# Patient Record
Sex: MALE | Race: WHITE | Employment: UNEMPLOYED | URBAN - METROPOLITAN AREA
[De-identification: names, ages, dates, MRNs, and addresses within clinical notes are randomized per-mention and may not be internally consistent; named-entity substitution may affect disease eponyms.]

---

## 2017-03-03 DIAGNOSIS — E13.8 OTHER SPECIFIED DIABETES MELLITUS WITH UNSPECIFIED COMPLICATIONS (HCC): ICD-10-CM

## 2017-03-03 DIAGNOSIS — E11.42 DM TYPE 2 WITH DIABETIC PERIPHERAL NEUROPATHY (HCC): ICD-10-CM

## 2017-03-03 DIAGNOSIS — I10 ESSENTIAL HYPERTENSION: ICD-10-CM

## 2017-03-03 RX ORDER — FUROSEMIDE 40 MG/1
TABLET ORAL
Qty: 60 TABLET | Refills: 11 | Status: SHIPPED | OUTPATIENT
Start: 2017-03-03 | End: 2017-11-14 | Stop reason: SDUPTHER

## 2017-03-03 RX ORDER — CARVEDILOL 25 MG/1
TABLET ORAL
Qty: 60 TABLET | Refills: 11 | Status: SHIPPED | OUTPATIENT
Start: 2017-03-03 | End: 2017-11-14 | Stop reason: SDUPTHER

## 2017-03-03 RX ORDER — GLYBURIDE 5 MG/1
TABLET ORAL
Qty: 90 TABLET | Refills: 11 | Status: SHIPPED | OUTPATIENT
Start: 2017-03-03 | End: 2017-11-14 | Stop reason: SDUPTHER

## 2017-08-04 ENCOUNTER — TELEPHONE (OUTPATIENT)
Dept: INTERNAL MEDICINE CLINIC | Age: 52
End: 2017-08-04

## 2017-08-29 ENCOUNTER — TELEPHONE (OUTPATIENT)
Dept: INTERNAL MEDICINE CLINIC | Age: 52
End: 2017-08-29

## 2017-08-29 DIAGNOSIS — Z79.4 DIABETES MELLITUS DUE TO UNDERLYING CONDITION WITH COMPLICATION, WITH LONG-TERM CURRENT USE OF INSULIN (HCC): Primary | ICD-10-CM

## 2017-08-29 DIAGNOSIS — E08.8 DIABETES MELLITUS DUE TO UNDERLYING CONDITION WITH COMPLICATION, WITH LONG-TERM CURRENT USE OF INSULIN (HCC): Primary | ICD-10-CM

## 2017-08-30 RX ORDER — DIGOXIN 125 MCG
TABLET ORAL
Qty: 30 TABLET | Refills: 6 | Status: SHIPPED | OUTPATIENT
Start: 2017-08-30 | End: 2017-11-14 | Stop reason: SDUPTHER

## 2017-08-30 RX ORDER — ATORVASTATIN CALCIUM 20 MG/1
TABLET, FILM COATED ORAL
Qty: 30 TABLET | Refills: 6 | Status: SHIPPED | OUTPATIENT
Start: 2017-08-30 | End: 2017-11-14 | Stop reason: SDUPTHER

## 2017-08-30 RX ORDER — LOSARTAN POTASSIUM 50 MG/1
TABLET ORAL
Qty: 30 TABLET | Refills: 6 | Status: SHIPPED | OUTPATIENT
Start: 2017-08-30 | End: 2017-11-14 | Stop reason: SDUPTHER

## 2017-11-14 ENCOUNTER — OFFICE VISIT (OUTPATIENT)
Dept: INTERNAL MEDICINE CLINIC | Age: 52
End: 2017-11-14
Payer: MEDICAID

## 2017-11-14 ENCOUNTER — TELEPHONE (OUTPATIENT)
Dept: INTERNAL MEDICINE CLINIC | Age: 52
End: 2017-11-14

## 2017-11-14 ENCOUNTER — HOSPITAL ENCOUNTER (OUTPATIENT)
Age: 52
Setting detail: SPECIMEN
Discharge: HOME | End: 2017-11-14
Payer: MEDICAID

## 2017-11-14 VITALS
SYSTOLIC BLOOD PRESSURE: 168 MMHG | HEIGHT: 71 IN | WEIGHT: 223.11 LBS | BODY MASS INDEX: 31.23 KG/M2 | DIASTOLIC BLOOD PRESSURE: 102 MMHG

## 2017-11-14 DIAGNOSIS — Z79.4 DIABETES MELLITUS DUE TO UNDERLYING CONDITION WITH COMPLICATION, WITH LONG-TERM CURRENT USE OF INSULIN (HCC): Primary | ICD-10-CM

## 2017-11-14 DIAGNOSIS — E11.42 DM TYPE 2 WITH DIABETIC PERIPHERAL NEUROPATHY (HCC): Primary | ICD-10-CM

## 2017-11-14 DIAGNOSIS — E08.8 DIABETES MELLITUS DUE TO UNDERLYING CONDITION WITH COMPLICATION, WITH LONG-TERM CURRENT USE OF INSULIN (HCC): Primary | ICD-10-CM

## 2017-11-14 DIAGNOSIS — E11.8 TYPE 2 DIABETES MELLITUS WITH COMPLICATION, UNSPECIFIED LONG TERM INSULIN USE STATUS: ICD-10-CM

## 2017-11-14 DIAGNOSIS — E08.8 DIABETES MELLITUS DUE TO UNDERLYING CONDITION WITH COMPLICATION, WITH LONG-TERM CURRENT USE OF INSULIN (HCC): ICD-10-CM

## 2017-11-14 DIAGNOSIS — Z12.11 SCREEN FOR COLON CANCER: ICD-10-CM

## 2017-11-14 DIAGNOSIS — E11.42 DM TYPE 2 WITH DIABETIC PERIPHERAL NEUROPATHY (HCC): ICD-10-CM

## 2017-11-14 DIAGNOSIS — Z79.4 DIABETES MELLITUS DUE TO UNDERLYING CONDITION WITH COMPLICATION, WITH LONG-TERM CURRENT USE OF INSULIN (HCC): ICD-10-CM

## 2017-11-14 DIAGNOSIS — I10 ESSENTIAL HYPERTENSION: ICD-10-CM

## 2017-11-14 LAB
ANION GAP SERPL CALCULATED.3IONS-SCNC: 11 MMOL/L (ref 9–17)
BUN BLDV-MCNC: 31 MG/DL (ref 6–20)
BUN/CREAT BLD: ABNORMAL (ref 9–20)
CALCIUM SERPL-MCNC: 9.1 MG/DL (ref 8.6–10.4)
CHLORIDE BLD-SCNC: 96 MMOL/L (ref 98–107)
CHOLESTEROL/HDL RATIO: 12
CHOLESTEROL: 419 MG/DL
CO2: 27 MMOL/L (ref 20–31)
CREAT SERPL-MCNC: 1.8 MG/DL (ref 0.7–1.2)
CREATININE URINE: 60.8 MG/DL (ref 39–259)
GFR AFRICAN AMERICAN: 48 ML/MIN
GFR NON-AFRICAN AMERICAN: 40 ML/MIN
GFR SERPL CREATININE-BSD FRML MDRD: ABNORMAL ML/MIN/{1.73_M2}
GFR SERPL CREATININE-BSD FRML MDRD: ABNORMAL ML/MIN/{1.73_M2}
GLUCOSE BLD-MCNC: 413 MG/DL (ref 70–99)
HBA1C MFR BLD: 14 %
HDLC SERPL-MCNC: 35 MG/DL
LDL CHOLESTEROL DIRECT: 300 MG/DL
LDL CHOLESTEROL: ABNORMAL MG/DL (ref 0–130)
MICROALBUMIN/CREAT 24H UR: 5774 MG/L
MICROALBUMIN/CREAT UR-RTO: 9497 MCG/MG CREAT
POTASSIUM SERPL-SCNC: 4.6 MMOL/L (ref 3.7–5.3)
SODIUM BLD-SCNC: 134 MMOL/L (ref 135–144)
TRIGL SERPL-MCNC: 408 MG/DL
VLDLC SERPL CALC-MCNC: ABNORMAL MG/DL (ref 1–30)

## 2017-11-14 PROCEDURE — G8484 FLU IMMUNIZE NO ADMIN: HCPCS | Performed by: INTERNAL MEDICINE

## 2017-11-14 PROCEDURE — 99214 OFFICE O/P EST MOD 30 MIN: CPT | Performed by: INTERNAL MEDICINE

## 2017-11-14 PROCEDURE — G8427 DOCREV CUR MEDS BY ELIG CLIN: HCPCS | Performed by: INTERNAL MEDICINE

## 2017-11-14 PROCEDURE — 1036F TOBACCO NON-USER: CPT | Performed by: INTERNAL MEDICINE

## 2017-11-14 PROCEDURE — 83036 HEMOGLOBIN GLYCOSYLATED A1C: CPT | Performed by: INTERNAL MEDICINE

## 2017-11-14 PROCEDURE — 3046F HEMOGLOBIN A1C LEVEL >9.0%: CPT | Performed by: INTERNAL MEDICINE

## 2017-11-14 PROCEDURE — G8417 CALC BMI ABV UP PARAM F/U: HCPCS | Performed by: INTERNAL MEDICINE

## 2017-11-14 PROCEDURE — 3017F COLORECTAL CA SCREEN DOC REV: CPT | Performed by: INTERNAL MEDICINE

## 2017-11-14 RX ORDER — ATORVASTATIN CALCIUM 20 MG/1
TABLET, FILM COATED ORAL
Qty: 30 TABLET | Refills: 11 | Status: SHIPPED | OUTPATIENT
Start: 2017-11-14 | End: 2017-11-14 | Stop reason: SDUPTHER

## 2017-11-14 RX ORDER — FUROSEMIDE 40 MG/1
TABLET ORAL
Qty: 60 TABLET | Refills: 11 | Status: SHIPPED | OUTPATIENT
Start: 2017-11-14 | End: 2018-01-01 | Stop reason: DRUGHIGH

## 2017-11-14 RX ORDER — CARVEDILOL 25 MG/1
TABLET ORAL
Qty: 60 TABLET | Refills: 11 | Status: ON HOLD | OUTPATIENT
Start: 2017-11-14 | End: 2018-01-01 | Stop reason: HOSPADM

## 2017-11-14 RX ORDER — GLYBURIDE 5 MG/1
TABLET ORAL
Qty: 90 TABLET | Refills: 11 | Status: SHIPPED | OUTPATIENT
Start: 2017-11-14 | End: 2018-01-01 | Stop reason: SDUPTHER

## 2017-11-14 RX ORDER — LANCETS 30 GAUGE
EACH MISCELLANEOUS
Qty: 100 EACH | Refills: 3 | Status: SHIPPED | OUTPATIENT
Start: 2017-11-14 | End: 2018-01-01 | Stop reason: SDUPTHER

## 2017-11-14 RX ORDER — ATORVASTATIN CALCIUM 20 MG/1
TABLET, FILM COATED ORAL
Qty: 30 TABLET | Refills: 11 | Status: SHIPPED | OUTPATIENT
Start: 2017-11-14 | End: 2018-01-01 | Stop reason: SDUPTHER

## 2017-11-14 RX ORDER — LOSARTAN POTASSIUM 50 MG/1
TABLET ORAL
Qty: 30 TABLET | Refills: 11 | Status: SHIPPED | OUTPATIENT
Start: 2017-11-14 | End: 2018-01-01 | Stop reason: SDUPTHER

## 2017-11-14 RX ORDER — DIGOXIN 125 MCG
TABLET ORAL
Qty: 30 TABLET | Refills: 11 | Status: SHIPPED | OUTPATIENT
Start: 2017-11-14 | End: 2018-01-01 | Stop reason: SDUPTHER

## 2017-11-14 ASSESSMENT — PATIENT HEALTH QUESTIONNAIRE - PHQ9
SUM OF ALL RESPONSES TO PHQ QUESTIONS 1-9: 0
1. LITTLE INTEREST OR PLEASURE IN DOING THINGS: 0
SUM OF ALL RESPONSES TO PHQ9 QUESTIONS 1 & 2: 0
2. FEELING DOWN, DEPRESSED OR HOPELESS: 0

## 2017-11-14 NOTE — TELEPHONE ENCOUNTER
Patient was seen today, was told to call back with name of glucometer so we can call in the rest of his supplies. He is using a true results machine. Needs to have test strips and lancets called to [facility] on [address]. I believe he is testing 3x daily since he is on insulin.

## 2017-11-15 RX ORDER — BLOOD-GLUCOSE METER
1 EACH MISCELLANEOUS 3 TIMES DAILY
Qty: 1 DEVICE | Refills: 0 | Status: ON HOLD | OUTPATIENT
Start: 2017-11-15 | End: 2019-01-01

## 2017-11-16 ENCOUNTER — HOSPITAL ENCOUNTER (EMERGENCY)
Age: 52
Discharge: HOME | End: 2017-11-17
Attending: EMERGENCY MEDICINE
Payer: MEDICAID

## 2017-11-16 VITALS
DIASTOLIC BLOOD PRESSURE: 82 MMHG | OXYGEN SATURATION: 96 % | WEIGHT: 228 LBS | HEIGHT: 72 IN | RESPIRATION RATE: 16 BRPM | BODY MASS INDEX: 30.88 KG/M2 | HEART RATE: 105 BPM | TEMPERATURE: 98.7 F | SYSTOLIC BLOOD PRESSURE: 169 MMHG

## 2017-11-16 DIAGNOSIS — R20.2 PARESTHESIA: ICD-10-CM

## 2017-11-16 DIAGNOSIS — G62.9 PERIPHERAL POLYNEUROPATHY: Primary | ICD-10-CM

## 2017-11-16 DIAGNOSIS — R26.9 GAIT ABNORMALITY: ICD-10-CM

## 2017-11-16 LAB
ABSOLUTE EOS #: 0.3 K/UL (ref 0–0.4)
ABSOLUTE IMMATURE GRANULOCYTE: ABNORMAL K/UL (ref 0–0.3)
ABSOLUTE LYMPH #: 1.7 K/UL (ref 1–4.8)
ABSOLUTE MONO #: 0.6 K/UL (ref 0.1–1.3)
ANION GAP SERPL CALCULATED.3IONS-SCNC: 12 MMOL/L (ref 9–17)
BASOPHILS # BLD: 1 %
BASOPHILS ABSOLUTE: 0.1 K/UL (ref 0–0.2)
BUN BLDV-MCNC: 34 MG/DL (ref 6–20)
BUN/CREAT BLD: ABNORMAL (ref 9–20)
CALCIUM SERPL-MCNC: 8.7 MG/DL (ref 8.6–10.4)
CHLORIDE BLD-SCNC: 98 MMOL/L (ref 98–107)
CO2: 26 MMOL/L (ref 20–31)
CREAT SERPL-MCNC: 2 MG/DL (ref 0.7–1.2)
DIFFERENTIAL TYPE: ABNORMAL
EOSINOPHILS RELATIVE PERCENT: 5 %
GFR AFRICAN AMERICAN: 43 ML/MIN
GFR NON-AFRICAN AMERICAN: 35 ML/MIN
GFR SERPL CREATININE-BSD FRML MDRD: ABNORMAL ML/MIN/{1.73_M2}
GFR SERPL CREATININE-BSD FRML MDRD: ABNORMAL ML/MIN/{1.73_M2}
GLUCOSE BLD-MCNC: 221 MG/DL (ref 70–99)
HCT VFR BLD CALC: 37.8 % (ref 41–53)
HEMOGLOBIN: 13.3 G/DL (ref 13.5–17.5)
IMMATURE GRANULOCYTES: ABNORMAL %
LYMPHOCYTES # BLD: 27 %
MAGNESIUM: 2 MG/DL (ref 1.6–2.6)
MCH RBC QN AUTO: 28.3 PG (ref 26–34)
MCHC RBC AUTO-ENTMCNC: 35.3 G/DL (ref 31–37)
MCV RBC AUTO: 80.2 FL (ref 80–100)
MONOCYTES # BLD: 9 %
PDW BLD-RTO: 13.7 % (ref 11.5–14.9)
PHOSPHORUS: 4.7 MG/DL (ref 2.5–4.5)
PLATELET # BLD: 268 K/UL (ref 150–450)
PLATELET ESTIMATE: ABNORMAL
PMV BLD AUTO: 8.3 FL (ref 6–12)
POTASSIUM SERPL-SCNC: 3.8 MMOL/L (ref 3.7–5.3)
RBC # BLD: 4.71 M/UL (ref 4.5–5.9)
RBC # BLD: ABNORMAL 10*6/UL
SEG NEUTROPHILS: 58 %
SEGMENTED NEUTROPHILS ABSOLUTE COUNT: 3.7 K/UL (ref 1.3–9.1)
SODIUM BLD-SCNC: 136 MMOL/L (ref 135–144)
WBC # BLD: 6.4 K/UL (ref 3.5–11)
WBC # BLD: ABNORMAL 10*3/UL

## 2017-11-16 PROCEDURE — 84100 ASSAY OF PHOSPHORUS: CPT

## 2017-11-16 PROCEDURE — 93971 EXTREMITY STUDY: CPT

## 2017-11-16 PROCEDURE — 2580000003 HC RX 258: Performed by: EMERGENCY MEDICINE

## 2017-11-16 PROCEDURE — 99284 EMERGENCY DEPT VISIT MOD MDM: CPT

## 2017-11-16 PROCEDURE — 83735 ASSAY OF MAGNESIUM: CPT

## 2017-11-16 PROCEDURE — 85025 COMPLETE CBC W/AUTO DIFF WBC: CPT

## 2017-11-16 PROCEDURE — 80048 BASIC METABOLIC PNL TOTAL CA: CPT

## 2017-11-16 PROCEDURE — 36415 COLL VENOUS BLD VENIPUNCTURE: CPT

## 2017-11-16 RX ORDER — 0.9 % SODIUM CHLORIDE 0.9 %
1000 INTRAVENOUS SOLUTION INTRAVENOUS ONCE
Status: COMPLETED | OUTPATIENT
Start: 2017-11-16 | End: 2017-11-16

## 2017-11-16 RX ADMIN — SODIUM CHLORIDE 1000 ML: 9 INJECTION, SOLUTION INTRAVENOUS at 22:16

## 2017-11-17 ENCOUNTER — TELEPHONE (OUTPATIENT)
Dept: INTERNAL MEDICINE CLINIC | Age: 52
End: 2017-11-17

## 2017-11-17 ASSESSMENT — ENCOUNTER SYMPTOMS
VOMITING: 0
COUGH: 1
RHINORRHEA: 0
EYE PAIN: 0
NAUSEA: 0
ABDOMINAL PAIN: 0
SHORTNESS OF BREATH: 0

## 2017-11-17 NOTE — ED NOTES
Pt presented to the ED c/o of left leg numbness that started 1930 11/16/17. Pt states he was sitting in a recliner watching TV when he felt his left leg go numb. Pt denies pain, denies tingling. Pt has Hx of neuropathy in both legs. Pt states he started new medication on 11/15/17, and states that he had a non-productive coughing fit last night that made him SOB. Pt states he checked his BS at 2000 11/16/17 and it was 330. Pt alert and oriented, PWD. Pt was able to identify when legs were being touch, able to stand and ambulate with unsteady gait.      Jacklyn Payne RN  11/16/17 2115

## 2017-11-17 NOTE — ED PROVIDER NOTES
[address]  eMERGENCY dEPARTMENT eNCOUnter   Attending Attestation     Pt Name: Rolando Morillo  MRN: [id number]  [place] 1965  Date of evaluation: 11/16/17       Rolando Morillo is a 47 y.o. male who presents with Numbness (left leg)      MDM: Left leg numbness below his knee that started today. No falls or trauma. Patient has been ambulating but states his left leg feels \"heavy. \"  He has no neurologic deficits on exam.  He ambulated into the department. Remainder of neurologic exam is unremarkable. He doesn't history of diabetes. On exam his left calf is slightly more swollen than his right however his right is mildly swollen as well. Plan to get basic blood work, Doppler ultrasound of left leg and reassess. Vitals:   Vitals:    11/16/17 2053   BP: (!) 176/93   Pulse: 111   Resp: 16   Temp: 98.5 °F (36.9 °C)   TempSrc: Oral   SpO2: 96%   Weight: 228 lb (103.4 kg)   Height: 6' (1.829 m)         I personally evaluated and examined the patient in conjunction with the resident and agree with the assessment, treatment plan, and disposition of the patient as recorded by the resident. I performed a history and physical examination of the patient and discussed management with the resident. I reviewed the residents note and agree with the documented findings and plan of care. Any areas of disagreement are noted on the chart. I was personally present for the key portions of any procedures. I have documented in the chart those procedures where I was not present during the key portions. I have personally reviewed all images and agree with the resident's interpretation. I have reviewed the emergency nurses triage note. I agree with the chief complaint, past medical history, past surgical history, allergies, medications, social and family history as documented unless otherwise noted.     Bianka Lunsford DO  Attending Emergency Physician            Bianka Lunsford DO  11/16/17 6448

## 2017-11-17 NOTE — ED PROVIDER NOTES
No    Drug use: No    Sexual activity: Yes     Partners: Female     Other Topics Concern    Not on file     Social History Narrative    No narrative on file       Family History   Problem Relation Age of Onset    Diabetes Mother     Heart Disease Mother     Diabetes Brother     Heart Disease Father        Allergies:  Metformin and related    Home Medications:  Prior to Admission medications    Medication Sig Start Date End Date Taking? Authorizing Provider   Blood Glucose Monitoring Suppl (TRUE METRIX AIR GLUCOSE METER) JONAH 1 Device by Does not apply route 3 times daily 11/15/17  Yes Hermes Cameron MD   digoxin (LANOXIN) 125 MCG tablet TAKE ONE TABLET BY MOUTH ONCE DAILY 11/14/17  Yes Hermes Cameron MD   losartan (COZAAR) 50 MG tablet TAKE ONE TABLET BY MOUTH ONCE DAILY 11/14/17  Yes Hermes Cameron MD   glyBURIDE (DIABETA) 5 MG tablet TAKE TWO TABLETS BY MOUTH IN THE MORNING AND ONE TABLET IN THE EVENING 11/14/17  Yes Hermes Cameron MD   furosemide (LASIX) 40 MG tablet TAKE ONE TABLET BY MOUTH TWICE DAILY 11/14/17  Yes Hermes Cameron MD   carvedilol (COREG) 25 MG tablet TAKE ONE TABLET BY MOUTH TWICE DAILY WITH MEALS 11/14/17  Yes Hermes Cameron MD   Insulin Pen Needle (B-D ULTRAFINE III SHORT PEN) 31G X 8 MM MISC Inject 1 each into the skin daily May substitute with any brand. 11/14/17  Yes Hermes Cameron MD   insulin glargine (TOUJEO SOLOSTAR) 300 UNIT/ML injection pen Inject 20 Units into the skin daily 11/14/17  Yes Hermes Cameron MD   atorvastatin (LIPITOR) 20 MG tablet TAKE ONE TABLET BY MOUTH ONCE DAILY 11/14/17  Yes Hermes Cameron MD   Lancets MISC Use three times daily 11/14/17  Yes Hermes Cameron MD   glucose blood VI test strips (ASCENSIA AUTODISC VI;ONE TOUCH ULTRA TEST VI) strip Use tid with associated glucose meter.  Pt is using true result meter 11/14/17  Yes Hermes Cameron MD   ONE TOUCH ULTRA TEST strip USE ONE NEW STRIP TO CHECK GLUCOSE 4 TIMES DAILY AS NEEDED 8/30/17  Yes Keiko Patel MD   aspirin 81 MG EC tablet Take 81 mg by mouth daily. Yes Historical Provider, MD       REVIEW OF SYSTEMS    (2-9 systems for level 4, 10 or more for level 5)      Review of Systems   Constitutional: Negative for chills, fatigue and fever. HENT: Negative for congestion and rhinorrhea. Eyes: Negative for pain and visual disturbance. Respiratory: Positive for cough. Negative for shortness of breath. Cardiovascular: Negative for chest pain and palpitations. Gastrointestinal: Negative for abdominal pain, nausea and vomiting. Genitourinary: Negative for difficulty urinating and dysuria. Musculoskeletal: Positive for gait problem. Negative for neck pain. Skin: Negative for rash and wound. Neurological: Positive for numbness. Negative for dizziness and weakness. PHYSICAL EXAM   (up to 7 for level 4, 8 or more for level 5)      INITIAL VITALS:   BP (!) 169/82   Pulse 105   Temp 98.7 °F (37.1 °C) (Oral)   Resp 16   Ht 6' (1.829 m)   Wt 228 lb (103.4 kg)   SpO2 96%   BMI 30.92 kg/m²     Physical Exam   Constitutional: He is oriented to person, place, and time. He appears well-developed and well-nourished. No distress. HENT:   Head: Normocephalic and atraumatic. Mouth/Throat: Oropharynx is clear and moist.   Eyes: Conjunctivae and EOM are normal. Pupils are equal, round, and reactive to light. Neck: Neck supple. Cardiovascular: Normal rate, regular rhythm, normal heart sounds and intact distal pulses. Exam reveals no gallop and no friction rub. No murmur heard. Pulses:       Dorsalis pedis pulses are 2+ on the right side, and 2+ on the left side. Posterior tibial pulses are 2+ on the right side, and 2+ on the left side. Pulmonary/Chest: Effort normal and breath sounds normal. No respiratory distress. He has no wheezes. He has no rales. Abdominal: Soft. He exhibits no distension. There is no tenderness.  There k/uL    MPV 8.3 6.0 - 12.0 fL    Differential Type NOT REPORTED     Seg Neutrophils 58 %    Lymphocytes 27 %    Monocytes 9 %    Eosinophils % 5 %    Basophils 1 %    Immature Granulocytes NOT REPORTED 0 %    Segs Absolute 3.70 1.3 - 9.1 k/uL    Absolute Lymph # 1.70 1.0 - 4.8 k/uL    Absolute Mono # 0.60 0.1 - 1.3 k/uL    Absolute Eos # 0.30 0.0 - 0.4 k/uL    Basophils # 0.10 0.0 - 0.2 k/uL    Absolute Immature Granulocyte NOT REPORTED 0.00 - 0.30 k/uL    WBC Morphology NOT REPORTED     RBC Morphology NOT REPORTED     Platelet Estimate NOT REPORTED    Basic Metabolic Panel   Result Value Ref Range    Glucose 221 (H) 70 - 99 mg/dL    BUN 34 (H) 6 - 20 mg/dL    CREATININE 2.00 (H) 0.70 - 1.20 mg/dL    Bun/Cre Ratio NOT REPORTED 9 - 20    Calcium 8.7 8.6 - 10.4 mg/dL    Sodium 136 135 - 144 mmol/L    Potassium 3.8 3.7 - 5.3 mmol/L    Chloride 98 98 - 107 mmol/L    CO2 26 20 - 31 mmol/L    Anion Gap 12 9 - 17 mmol/L    GFR Non-African American 35 (L) >60 mL/min    GFR  43 (L) >60 mL/min    GFR Comment          GFR Staging NOT REPORTED    Magnesium   Result Value Ref Range    Magnesium 2.0 1.6 - 2.6 mg/dL   Phosphorus   Result Value Ref Range    Phosphorus 4.7 (H) 2.5 - 4.5 mg/dL       IMPRESSION: Neuropathy/paresthesia, abnormal gait    RADIOLOGY:  None    EKG  None    All EKG's are interpreted by the Emergency Department Physician who either signs or Co-signs this chart in the absence of a cardiologist.    EMERGENCY DEPARTMENT COURSE:  Patient evaluated by myself and attending physician. Patient appears in no acute distress. Patient does have sensation intact in bilateral lower extremities until the feet where he states he cannot feel to palpation. Patient moves all extremities. Muscle strength out of 5 bilateral upper and lower extremities. Patient lungs clear to auscultation bilaterally. Patient mildly tachycardic. Clear to auscultation bilaterally. Patient numbers to her distress.   O2 of this note were completed with a voice recognition program.  Efforts were made to edit the dictations but occasionally words are mis-transcribed.)      Erica Beckford, DO  11/17/17 1400 [place] Ari, DO  11/17/17 2760

## 2017-11-17 NOTE — ED NOTES
Pt ambulated in hallway with 2 RNs. Pt ambulated with unsteady gait, was dragging left leg.      Arik Avila RN  11/17/17 0005

## 2017-11-17 NOTE — ED NOTES
Pt discharged with crutches. Pt educated about the proper use of crutches. Ambulated with no difficulty with crutches.      Elena Ro RN  11/17/17 9045

## 2017-11-20 ENCOUNTER — OFFICE VISIT (OUTPATIENT)
Dept: INTERNAL MEDICINE CLINIC | Age: 52
End: 2017-11-20
Payer: MEDICAID

## 2017-11-20 VITALS
WEIGHT: 225 LBS | SYSTOLIC BLOOD PRESSURE: 132 MMHG | RESPIRATION RATE: 14 BRPM | TEMPERATURE: 97.7 F | HEART RATE: 102 BPM | OXYGEN SATURATION: 96 % | BODY MASS INDEX: 30.48 KG/M2 | HEIGHT: 72 IN | DIASTOLIC BLOOD PRESSURE: 80 MMHG

## 2017-11-20 DIAGNOSIS — R20.2 PARESTHESIA: ICD-10-CM

## 2017-11-20 DIAGNOSIS — R05.9 COUGH: Primary | ICD-10-CM

## 2017-11-20 DIAGNOSIS — R26.9 GAIT ABNORMALITY: ICD-10-CM

## 2017-11-20 DIAGNOSIS — Z23 NEED FOR VACCINATION: ICD-10-CM

## 2017-11-20 PROCEDURE — 90686 IIV4 VACC NO PRSV 0.5 ML IM: CPT | Performed by: NURSE PRACTITIONER

## 2017-11-20 PROCEDURE — 99213 OFFICE O/P EST LOW 20 MIN: CPT | Performed by: NURSE PRACTITIONER

## 2017-11-20 PROCEDURE — G8427 DOCREV CUR MEDS BY ELIG CLIN: HCPCS | Performed by: NURSE PRACTITIONER

## 2017-11-20 PROCEDURE — 1036F TOBACCO NON-USER: CPT | Performed by: NURSE PRACTITIONER

## 2017-11-20 PROCEDURE — 90471 IMMUNIZATION ADMIN: CPT | Performed by: NURSE PRACTITIONER

## 2017-11-20 PROCEDURE — G8484 FLU IMMUNIZE NO ADMIN: HCPCS | Performed by: NURSE PRACTITIONER

## 2017-11-20 PROCEDURE — 3017F COLORECTAL CA SCREEN DOC REV: CPT | Performed by: NURSE PRACTITIONER

## 2017-11-20 PROCEDURE — G8417 CALC BMI ABV UP PARAM F/U: HCPCS | Performed by: NURSE PRACTITIONER

## 2017-11-20 RX ORDER — BENZONATATE 100 MG/1
100 CAPSULE ORAL 3 TIMES DAILY PRN
Qty: 21 CAPSULE | Refills: 0 | Status: SHIPPED | OUTPATIENT
Start: 2017-11-20 | End: 2017-11-27

## 2017-11-20 ASSESSMENT — ENCOUNTER SYMPTOMS
SHORTNESS OF BREATH: 0
COUGH: 1
SORE THROAT: 0
RHINORRHEA: 1
SINUS PRESSURE: 0

## 2017-11-20 NOTE — PROGRESS NOTES
Topics    Smoking status: Never Smoker    Smokeless tobacco: Never Used    Alcohol use No      Current Outpatient Prescriptions   Medication Sig Dispense Refill    benzonatate (TESSALON PERLES) 100 MG capsule Take 1 capsule by mouth 3 times daily as needed for Cough 21 capsule 0    Blood Glucose Monitoring Suppl (TRUE METRIX AIR GLUCOSE METER) JONAH 1 Device by Does not apply route 3 times daily 1 Device 0    digoxin (LANOXIN) 125 MCG tablet TAKE ONE TABLET BY MOUTH ONCE DAILY 30 tablet 11    losartan (COZAAR) 50 MG tablet TAKE ONE TABLET BY MOUTH ONCE DAILY 30 tablet 11    glyBURIDE (DIABETA) 5 MG tablet TAKE TWO TABLETS BY MOUTH IN THE MORNING AND ONE TABLET IN THE EVENING 90 tablet 11    furosemide (LASIX) 40 MG tablet TAKE ONE TABLET BY MOUTH TWICE DAILY 60 tablet 11    carvedilol (COREG) 25 MG tablet TAKE ONE TABLET BY MOUTH TWICE DAILY WITH MEALS 60 tablet 11    Insulin Pen Needle (B-D ULTRAFINE III SHORT PEN) 31G X 8 MM MISC Inject 1 each into the skin daily May substitute with any brand. 100 each 11    insulin glargine (TOUJEO SOLOSTAR) 300 UNIT/ML injection pen Inject 20 Units into the skin daily 3 Pen 3    atorvastatin (LIPITOR) 20 MG tablet TAKE ONE TABLET BY MOUTH ONCE DAILY 30 tablet 11    Lancets MISC Use three times daily 100 each 3    glucose blood VI test strips (ASCENSIA AUTODISC VI;ONE TOUCH ULTRA TEST VI) strip Use tid with associated glucose meter. Pt is using true result meter 100 strip 11    ONE TOUCH ULTRA TEST strip USE ONE NEW STRIP TO CHECK GLUCOSE 4 TIMES DAILY AS NEEDED 100 strip 6    aspirin 81 MG EC tablet Take 81 mg by mouth daily. No current facility-administered medications for this visit. Allergies   Allergen Reactions    Metformin And Related Nausea And Vomiting         Subjective:      Review of Systems   Constitutional: Positive for fatigue. HENT: Positive for rhinorrhea (in am ).  Negative for congestion, postnasal drip, sinus pressure and sore throat. Respiratory: Positive for cough. Negative for shortness of breath. Neurological: Positive for weakness (L leg ). Negative for headaches. Objective:     Physical Exam   Constitutional: He is oriented to person, place, and time. He appears well-developed and well-nourished. HENT:   Head: Normocephalic and atraumatic. Eyes: EOM are normal.   Cardiovascular: Normal rate, regular rhythm and normal heart sounds. Pulmonary/Chest: Effort normal and breath sounds normal.   Abdominal: Soft. Bowel sounds are normal.   Musculoskeletal:   Affected gait. L leg weakness. No rom assessed  in L ankle /foot d/t inability. Using crutches to ambulate. Does bear wt mildly on L foot when using crutches. Neurological: He is alert and oriented to person, place, and time. Skin: Skin is warm and dry. Psychiatric: He has a normal mood and affect. Nursing note and vitals reviewed. /80   Pulse 102   Temp 97.7 °F (36.5 °C)   Resp 14   Ht 5' 11.65\" (1.82 m)   Wt 225 lb (102.1 kg)   SpO2 96%   BMI 30.81 kg/m²     CBC:   Lab Results   Component Value Date    WBC 6.4 11/16/2017    RBC 4.71 11/16/2017    RBC 3.98 02/27/2012    HGB 13.3 11/16/2017    HCT 37.8 11/16/2017    MCV 80.2 11/16/2017    MCH 28.3 11/16/2017    MCHC 35.3 11/16/2017    RDW 13.7 11/16/2017     11/16/2017     02/27/2012    MPV 8.3 11/16/2017     CMP:    Lab Results   Component Value Date     11/16/2017    K 3.8 11/16/2017    CL 98 11/16/2017    CO2 26 11/16/2017    BUN 34 11/16/2017    CREATININE 2.00 11/16/2017    GFRAA 43 11/16/2017    LABGLOM 35 11/16/2017    GLUCOSE 221 11/16/2017    GLUCOSE 229 02/21/2012    PROT 7.6 10/14/2015    LABALBU 3.9 10/14/2015    LABALBU 3.6 01/16/2012    CALCIUM 8.7 11/16/2017    BILITOT 0.55 10/14/2015    ALKPHOS 110 10/14/2015    AST 15 10/14/2015    ALT 24 10/14/2015     Lab Results   Component Value Date    LABA1C 14 11/14/2017           Assessment:      1.  Cough benzonatate (TESSALON PERLES) 100 MG capsule   2. Gait abnormality  [facility].- Ravi Pedraza MD   3. Demetrius Hinojosa MD   4. Need for vaccination  INFLUENZA, QUADV, 3 YRS AND OLDER, IM, PF, PREFILL SYR OR SDV, 0.5ML (FLUZONE QUADV, PF)       Plan:        Yan Lopez received counseling on the following healthy behaviors: medication adherence  Reviewed prior labs and health maintenance. Continue current medications, diet and exercise. Discussed use, benefit, and side effects of prescribed medications. Barriers to medication compliance addressed. Patient given educational materials - see patient instructions. All patient questions answered. Patient voiced understanding. Return if symptoms worsen or fail to improve. Orders Placed This Encounter   Procedures    INFLUENZA, QUADV, 3 YRS AND OLDER, IM, PF, PREFILL SYR OR SDV, 0.5ML ([address] [place] [address], )   [address] [place] [address] Rd.- Ravi Pedraza MD     Referral Priority:   Routine     Referral Type:   Consult for Advice and Opinion     Referral Reason:   Specialty Services Required     Referred to Provider:   Rachele Parmar MD     Requested Specialty:   Neurology     Number of Visits Requested:   1     Orders Placed This Encounter   Medications    benzonatate (TESSALON PERLES) 100 MG capsule     Sig: Take 1 capsule by mouth 3 times daily as needed for Cough     Dispense:  21 capsule     Refill:  0       Patient given verbal and/or written educational instructions. Follow up as directed. I have reviewed and agree with the nursing documentation.     Electronically signed by Zakia Tripp NP on 11/20/2017 at 4:04 PM

## 2017-11-21 ENCOUNTER — TELEPHONE (OUTPATIENT)
Dept: INTERNAL MEDICINE CLINIC | Age: 52
End: 2017-11-21

## 2017-11-21 DIAGNOSIS — R20.2 PARESTHESIA: ICD-10-CM

## 2017-11-21 DIAGNOSIS — R26.9 ABNORMALITY OF GAIT: Primary | ICD-10-CM

## 2017-11-21 NOTE — TELEPHONE ENCOUNTER
Patient's daughter called, Dr Addy Taylor doesn't participate with his insurance. Insurance recommended [facility].     Referral was faxed along with office note, er note, vascular results

## 2017-11-22 ENCOUNTER — TELEPHONE (OUTPATIENT)
Dept: INTERNAL MEDICINE CLINIC | Age: 52
End: 2017-11-22

## 2017-11-22 RX ORDER — PROMETHAZINE HYDROCHLORIDE AND CODEINE PHOSPHATE 6.25; 1 MG/5ML; MG/5ML
5 SYRUP ORAL EVERY 4 HOURS PRN
Qty: 90 ML | Refills: 0 | Status: SHIPPED | OUTPATIENT
Start: 2017-11-22 | End: 2017-11-25

## 2017-11-22 NOTE — TELEPHONE ENCOUNTER
Patient was seen by [facility] Monday. Was given tessalon perles for his cough, but daughter states that there has been no improvement. States that he is still fatigued and coughing. Wanting to know if something else can be prescribed.     Allergies   Allergen Reactions    Metformin And Related Nausea And Vomiting       [facility] [place]

## 2017-11-27 ENCOUNTER — TELEPHONE (OUTPATIENT)
Dept: INTERNAL MEDICINE CLINIC | Age: 52
End: 2017-11-27

## 2017-11-27 NOTE — TELEPHONE ENCOUNTER
Sick Call    Loral Romberg   1965   [id number]   Jena Mcdaniels MD   Allergies   Allergen Reactions    Metformin And Related Nausea And Vomiting        Last Visit: 11/20/17    Reason for No Same Day Appt: no appts avail    Complaint:dry cough, no other symptoms. Daughter states this only happens when he starts taking his medications again.      Was given tessalon and phenergan with codeine    [facility]arm[facility]: [facility]

## 2017-11-28 NOTE — TELEPHONE ENCOUNTER
dtr called, informed that he needed to be seen to address this concern. He has an appt 11/30/2017 but might be admitted by the neurologist because of his leg.

## 2017-11-30 ENCOUNTER — HOSPITAL ENCOUNTER (OUTPATIENT)
Dept: CT IMAGING | Age: 52
Discharge: HOME | End: 2017-11-30
Payer: MEDICAID

## 2017-11-30 ENCOUNTER — OFFICE VISIT (OUTPATIENT)
Dept: INTERNAL MEDICINE CLINIC | Age: 52
End: 2017-11-30
Payer: MEDICAID

## 2017-11-30 ENCOUNTER — TELEPHONE (OUTPATIENT)
Dept: INTERNAL MEDICINE CLINIC | Age: 52
End: 2017-11-30

## 2017-11-30 VITALS
HEIGHT: 72 IN | DIASTOLIC BLOOD PRESSURE: 78 MMHG | BODY MASS INDEX: 28.71 KG/M2 | SYSTOLIC BLOOD PRESSURE: 124 MMHG | WEIGHT: 212 LBS

## 2017-11-30 DIAGNOSIS — I63.9 CEREBROVASCULAR ACCIDENT (CVA), UNSPECIFIED MECHANISM (HCC): ICD-10-CM

## 2017-11-30 DIAGNOSIS — I63.9 CEREBROVASCULAR ACCIDENT (CVA), UNSPECIFIED MECHANISM (HCC): Primary | ICD-10-CM

## 2017-11-30 PROCEDURE — 99214 OFFICE O/P EST MOD 30 MIN: CPT | Performed by: INTERNAL MEDICINE

## 2017-11-30 PROCEDURE — 70450 CT HEAD/BRAIN W/O DYE: CPT

## 2017-11-30 PROCEDURE — G8598 ASA/ANTIPLAT THER USED: HCPCS | Performed by: INTERNAL MEDICINE

## 2017-11-30 PROCEDURE — G8484 FLU IMMUNIZE NO ADMIN: HCPCS | Performed by: INTERNAL MEDICINE

## 2017-11-30 PROCEDURE — G8417 CALC BMI ABV UP PARAM F/U: HCPCS | Performed by: INTERNAL MEDICINE

## 2017-11-30 PROCEDURE — 1036F TOBACCO NON-USER: CPT | Performed by: INTERNAL MEDICINE

## 2017-11-30 PROCEDURE — 3017F COLORECTAL CA SCREEN DOC REV: CPT | Performed by: INTERNAL MEDICINE

## 2017-11-30 PROCEDURE — G8427 DOCREV CUR MEDS BY ELIG CLIN: HCPCS | Performed by: INTERNAL MEDICINE

## 2017-11-30 RX ORDER — ATORVASTATIN CALCIUM 20 MG/1
20 TABLET, FILM COATED ORAL DAILY
Qty: 30 TABLET | Refills: 3 | Status: ON HOLD | OUTPATIENT
Start: 2017-11-30 | End: 2018-01-01 | Stop reason: HOSPADM

## 2017-11-30 NOTE — PROGRESS NOTES
Subjective:      Patient ID: Francie Powell is a 47 y.o. male. Chronic Disease Visit Information    BP Readings from Last 3 Encounters:   11/20/17 132/80   11/16/17 (!) 169/82   11/14/17 (!) 168/102          Hemoglobin A1C (%)   Date Value   11/14/2017 14   04/04/2016 16.1 (H)   11/20/2015 10.4 (H)     Microalb/Crt. Ratio (mcg/mg creat)   Date Value   11/14/2017 9,497 (H)     LDL Cholesterol (mg/dL)   Date Value   11/14/2017          HDL (mg/dL)   Date Value   11/14/2017 35 (L)     BUN (mg/dL)   Date Value   11/16/2017 34 (H)     CREATININE (mg/dL)   Date Value   11/16/2017 2.00 (H)     Glucose (mg/dL)   Date Value   11/16/2017 221 (H)   02/21/2012 229 (H)            Have you changed or started any medications since your last visit including any over-the-counter medicines, vitamins, or herbal medicines? no   Are you having any side effects from any of your medications? -  no  Have you stopped taking any of your medications? Is so, why? -  no    Have you seen any other physician or provider since your last visit? No  Have you had any other diagnostic tests since your last visit? No  Have you been seen in the emergency room and/or had an admission to a hospital since we last saw you? No  Have you had your annual diabetic retinal (eye) exam? No  Have you had your routine dental cleaning in the past 6 months? yes -     Have you activated your [employer] account? If not, what are your barriers?  No: declined     Patient Care Team:  Sola Iglesias MD as PCP - General (Internal Medicine)         Medical History Review  Past Medical, Family, and Social History reviewed and does contribute to the patient presenting condition    Health Maintenance   Topic Date Due    HIV screen  10/06/1981    Pneumococcal med risk (1 of 1 - PPSV23) 10/06/1985    Diabetic foot exam  07/29/2016    Diabetic retinal exam  10/06/2016    Colon cancer screen colonoscopy  10/06/2016    DTaP/Tdap/Td vaccine (1 - Tdap) 11/14/2018 (Originally

## 2017-12-04 ENCOUNTER — TELEPHONE (OUTPATIENT)
Dept: INTERNAL MEDICINE CLINIC | Age: 52
End: 2017-12-04

## 2017-12-04 RX ORDER — AZITHROMYCIN 250 MG/1
TABLET, FILM COATED ORAL
Qty: 1 PACKET | Refills: 0 | Status: SHIPPED | OUTPATIENT
Start: 2017-12-04 | End: 2017-12-14

## 2017-12-04 NOTE — TELEPHONE ENCOUNTER
Per pharmacist Milan Hernandez has a drug interaction that will increase the concentration  levels of digoxin.     Please rx alternative to [facility] [place]    Allergies   Allergen Reactions    Metformin And Related Nausea And Vomiting

## 2017-12-04 NOTE — TELEPHONE ENCOUNTER
Pt was seen last Thursday, still c/o cough. Was told to call back today if no better and an atb would be called in. Please advise. Current Outpatient Prescriptions   Medication Sig Dispense Refill    atorvastatin (LIPITOR) 20 MG tablet Take 1 tablet by mouth daily 30 tablet 3    Blood Glucose Monitoring Suppl (TRUE METRIX AIR GLUCOSE METER) JONAH 1 Device by Does not apply route 3 times daily 1 Device 0    digoxin (LANOXIN) 125 MCG tablet TAKE ONE TABLET BY MOUTH ONCE DAILY 30 tablet 11    losartan (COZAAR) 50 MG tablet TAKE ONE TABLET BY MOUTH ONCE DAILY 30 tablet 11    glyBURIDE (DIABETA) 5 MG tablet TAKE TWO TABLETS BY MOUTH IN THE MORNING AND ONE TABLET IN THE EVENING 90 tablet 11    furosemide (LASIX) 40 MG tablet TAKE ONE TABLET BY MOUTH TWICE DAILY 60 tablet 11    carvedilol (COREG) 25 MG tablet TAKE ONE TABLET BY MOUTH TWICE DAILY WITH MEALS 60 tablet 11    Insulin Pen Needle (B-D ULTRAFINE III SHORT PEN) 31G X 8 MM MISC Inject 1 each into the skin daily May substitute with any brand. 100 each 11    insulin glargine (TOUJEO SOLOSTAR) 300 UNIT/ML injection pen Inject 20 Units into the skin daily 3 Pen 3    atorvastatin (LIPITOR) 20 MG tablet TAKE ONE TABLET BY MOUTH ONCE DAILY 30 tablet 11    Lancets MISC Use three times daily 100 each 3    glucose blood VI test strips (ASCENSIA AUTODISC VI;ONE TOUCH ULTRA TEST VI) strip Use tid with associated glucose meter. Pt is using true result meter 100 strip 11    ONE TOUCH ULTRA TEST strip USE ONE NEW STRIP TO CHECK GLUCOSE 4 TIMES DAILY AS NEEDED 100 strip 6    aspirin 81 MG EC tablet Take 81 mg by mouth daily. No current facility-administered medications for this visit.           Allergies   Allergen Reactions    Metformin And Related Nausea And Vomiting

## 2017-12-04 NOTE — TELEPHONE ENCOUNTER
Pt called re: STAT ct scan from last Thursday. He has an MRI scheduled for 12/17/17 and a f/u appt in January. Would you like to see him sooner? Please advise on CT results.

## 2017-12-05 ENCOUNTER — TELEPHONE (OUTPATIENT)
Dept: INTERNAL MEDICINE CLINIC | Age: 52
End: 2017-12-05

## 2017-12-05 NOTE — TELEPHONE ENCOUNTER
Pts dtr called today asking Dr. Patrick Emery to please address message regarding drug interaction w/ zpak. Please advise.

## 2017-12-05 NOTE — TELEPHONE ENCOUNTER
Results of CT Scan? He still is having problems with his left foot not working properly. Physical therapy? Please advise.

## 2017-12-07 ENCOUNTER — HOSPITAL ENCOUNTER (OUTPATIENT)
Dept: MRI IMAGING | Age: 52
Discharge: HOME | End: 2017-12-07
Payer: MEDICAID

## 2017-12-07 DIAGNOSIS — I63.9 CEREBROVASCULAR ACCIDENT (CVA), UNSPECIFIED MECHANISM (HCC): ICD-10-CM

## 2017-12-07 PROCEDURE — 70551 MRI BRAIN STEM W/O DYE: CPT

## 2017-12-11 ENCOUNTER — TELEPHONE (OUTPATIENT)
Dept: INTERNAL MEDICINE CLINIC | Age: 52
End: 2017-12-11

## 2017-12-11 DIAGNOSIS — E13.621 DIABETIC FOOT ULCER ASSOCIATED WITH OTHER SPECIFIED DIABETES MELLITUS, UNSPECIFIED LATERALITY, UNSPECIFIED PART OF FOOT, UNSPECIFIED ULCER STAGE (HCC): Primary | ICD-10-CM

## 2017-12-11 DIAGNOSIS — L97.509 DIABETIC FOOT ULCER ASSOCIATED WITH OTHER SPECIFIED DIABETES MELLITUS, UNSPECIFIED LATERALITY, UNSPECIFIED PART OF FOOT, UNSPECIFIED ULCER STAGE (HCC): Primary | ICD-10-CM

## 2017-12-11 NOTE — TELEPHONE ENCOUNTER
Pt's dtr called & stated pt is using crutches for numbness in legs. Crutches are getting in pts way & he is not using, & dtr is afraid pt will fall. Dtr is requesting orders for a cane to the [address] [place] [address] # [phone], Fax # [phone]    Please advise.

## 2018-01-01 ENCOUNTER — TELEPHONE (OUTPATIENT)
Dept: INTERNAL MEDICINE CLINIC | Age: 53
End: 2018-01-01

## 2018-01-01 ENCOUNTER — OFFICE VISIT (OUTPATIENT)
Dept: INTERNAL MEDICINE CLINIC | Age: 53
End: 2018-01-01
Payer: MEDICAID

## 2018-01-01 ENCOUNTER — APPOINTMENT (OUTPATIENT)
Dept: GENERAL RADIOLOGY | Age: 53
DRG: 469 | End: 2018-01-01
Payer: MEDICAID

## 2018-01-01 ENCOUNTER — APPOINTMENT (OUTPATIENT)
Dept: GENERAL RADIOLOGY | Age: 53
DRG: 194 | End: 2018-01-01
Attending: INTERNAL MEDICINE
Payer: MEDICAID

## 2018-01-01 ENCOUNTER — APPOINTMENT (OUTPATIENT)
Dept: INTERVENTIONAL RADIOLOGY/VASCULAR | Age: 53
DRG: 721 | End: 2018-01-01
Payer: MEDICAID

## 2018-01-01 ENCOUNTER — CARE COORDINATION (OUTPATIENT)
Dept: CASE MANAGEMENT | Age: 53
End: 2018-01-01

## 2018-01-01 ENCOUNTER — APPOINTMENT (OUTPATIENT)
Dept: ULTRASOUND IMAGING | Age: 53
DRG: 469 | End: 2018-01-01
Payer: MEDICAID

## 2018-01-01 ENCOUNTER — HOSPITAL ENCOUNTER (INPATIENT)
Age: 53
LOS: 10 days | Discharge: HOME HEALTH | DRG: 469 | End: 2018-09-07
Attending: EMERGENCY MEDICINE | Admitting: INTERNAL MEDICINE
Payer: MEDICAID

## 2018-01-01 ENCOUNTER — TELEPHONE (OUTPATIENT)
Dept: VASCULAR SURGERY | Age: 53
End: 2018-01-01

## 2018-01-01 ENCOUNTER — APPOINTMENT (OUTPATIENT)
Dept: NUCLEAR MEDICINE | Age: 53
DRG: 194 | End: 2018-01-01
Attending: INTERNAL MEDICINE
Payer: MEDICAID

## 2018-01-01 ENCOUNTER — HOSPITAL ENCOUNTER (OUTPATIENT)
Dept: PREADMISSION TESTING | Age: 53
Discharge: HOME | End: 2018-11-09
Payer: MEDICAID

## 2018-01-01 ENCOUNTER — APPOINTMENT (OUTPATIENT)
Dept: GENERAL RADIOLOGY | Age: 53
DRG: 721 | End: 2018-01-01
Payer: MEDICAID

## 2018-01-01 ENCOUNTER — HOSPITAL ENCOUNTER (INPATIENT)
Age: 53
LOS: 2 days | Discharge: HOME | DRG: 194 | End: 2018-08-09
Attending: INTERNAL MEDICINE | Admitting: INTERNAL MEDICINE
Payer: MEDICAID

## 2018-01-01 ENCOUNTER — TELEPHONE (OUTPATIENT)
Dept: INFECTIOUS DISEASES | Age: 53
End: 2018-01-01

## 2018-01-01 ENCOUNTER — INITIAL CONSULT (OUTPATIENT)
Dept: VASCULAR SURGERY | Age: 53
End: 2018-01-01
Payer: MEDICAID

## 2018-01-01 ENCOUNTER — APPOINTMENT (OUTPATIENT)
Dept: INTERVENTIONAL RADIOLOGY/VASCULAR | Age: 53
DRG: 469 | End: 2018-01-01
Payer: MEDICAID

## 2018-01-01 ENCOUNTER — APPOINTMENT (OUTPATIENT)
Dept: ULTRASOUND IMAGING | Age: 53
DRG: 194 | End: 2018-01-01
Attending: INTERNAL MEDICINE
Payer: MEDICAID

## 2018-01-01 ENCOUNTER — HOSPITAL ENCOUNTER (INPATIENT)
Age: 53
LOS: 5 days | Discharge: HOME | DRG: 721 | End: 2018-11-13
Attending: EMERGENCY MEDICINE | Admitting: INTERNAL MEDICINE
Payer: MEDICAID

## 2018-01-01 VITALS
SYSTOLIC BLOOD PRESSURE: 156 MMHG | HEIGHT: 72 IN | TEMPERATURE: 97.3 F | RESPIRATION RATE: 17 BRPM | DIASTOLIC BLOOD PRESSURE: 74 MMHG | OXYGEN SATURATION: 100 % | WEIGHT: 210.32 LBS | HEART RATE: 86 BPM | BODY MASS INDEX: 28.49 KG/M2

## 2018-01-01 VITALS
DIASTOLIC BLOOD PRESSURE: 40 MMHG | HEART RATE: 76 BPM | WEIGHT: 183.64 LBS | BODY MASS INDEX: 24.87 KG/M2 | RESPIRATION RATE: 20 BRPM | OXYGEN SATURATION: 98 % | HEIGHT: 72 IN | SYSTOLIC BLOOD PRESSURE: 80 MMHG

## 2018-01-01 VITALS
DIASTOLIC BLOOD PRESSURE: 78 MMHG | RESPIRATION RATE: 16 BRPM | BODY MASS INDEX: 25.5 KG/M2 | HEIGHT: 72 IN | SYSTOLIC BLOOD PRESSURE: 134 MMHG

## 2018-01-01 VITALS
TEMPERATURE: 97.9 F | SYSTOLIC BLOOD PRESSURE: 133 MMHG | HEART RATE: 86 BPM | HEIGHT: 72 IN | OXYGEN SATURATION: 100 % | WEIGHT: 188.05 LBS | DIASTOLIC BLOOD PRESSURE: 75 MMHG | RESPIRATION RATE: 15 BRPM | BODY MASS INDEX: 25.47 KG/M2

## 2018-01-01 VITALS
OXYGEN SATURATION: 99 % | TEMPERATURE: 97.9 F | BODY MASS INDEX: 29.38 KG/M2 | DIASTOLIC BLOOD PRESSURE: 82 MMHG | HEART RATE: 87 BPM | HEIGHT: 72 IN | RESPIRATION RATE: 16 BRPM | SYSTOLIC BLOOD PRESSURE: 133 MMHG | WEIGHT: 216.93 LBS

## 2018-01-01 VITALS
WEIGHT: 217 LBS | OXYGEN SATURATION: 97 % | HEART RATE: 82 BPM | SYSTOLIC BLOOD PRESSURE: 130 MMHG | DIASTOLIC BLOOD PRESSURE: 72 MMHG | HEIGHT: 72 IN | BODY MASS INDEX: 29.39 KG/M2

## 2018-01-01 VITALS
HEIGHT: 72 IN | BODY MASS INDEX: 25.68 KG/M2 | TEMPERATURE: 97.5 F | OXYGEN SATURATION: 98 % | HEART RATE: 88 BPM | SYSTOLIC BLOOD PRESSURE: 144 MMHG | RESPIRATION RATE: 16 BRPM | WEIGHT: 189.6 LBS | DIASTOLIC BLOOD PRESSURE: 70 MMHG

## 2018-01-01 VITALS
HEIGHT: 72 IN | SYSTOLIC BLOOD PRESSURE: 108 MMHG | RESPIRATION RATE: 19 BRPM | OXYGEN SATURATION: 99 % | DIASTOLIC BLOOD PRESSURE: 58 MMHG | HEART RATE: 73 BPM | BODY MASS INDEX: 28.49 KG/M2 | WEIGHT: 210.32 LBS

## 2018-01-01 DIAGNOSIS — I95.9 HYPOTENSION, UNSPECIFIED HYPOTENSION TYPE: Primary | ICD-10-CM

## 2018-01-01 DIAGNOSIS — E08.8 DIABETES MELLITUS DUE TO UNDERLYING CONDITION WITH COMPLICATION, WITH LONG-TERM CURRENT USE OF INSULIN (HCC): Chronic | ICD-10-CM

## 2018-01-01 DIAGNOSIS — I20.0 UNSTABLE ANGINA PECTORIS (HCC): ICD-10-CM

## 2018-01-01 DIAGNOSIS — N18.6 ESRD ON DIALYSIS (HCC): ICD-10-CM

## 2018-01-01 DIAGNOSIS — I50.22 CHRONIC SYSTOLIC CONGESTIVE HEART FAILURE (HCC): Chronic | ICD-10-CM

## 2018-01-01 DIAGNOSIS — N18.5 CHRONIC RENAL FAILURE, STAGE 5 (HCC): Chronic | ICD-10-CM

## 2018-01-01 DIAGNOSIS — A04.72 C. DIFFICILE COLITIS: Primary | ICD-10-CM

## 2018-01-01 DIAGNOSIS — I50.9 CONGESTIVE HEART FAILURE, UNSPECIFIED HF CHRONICITY, UNSPECIFIED HEART FAILURE TYPE (HCC): ICD-10-CM

## 2018-01-01 DIAGNOSIS — I48.91 ATRIAL FIBRILLATION, UNSPECIFIED TYPE (HCC): ICD-10-CM

## 2018-01-01 DIAGNOSIS — E11.42 DM TYPE 2 WITH DIABETIC PERIPHERAL NEUROPATHY (HCC): Primary | ICD-10-CM

## 2018-01-01 DIAGNOSIS — Z79.4 DIABETES MELLITUS DUE TO UNDERLYING CONDITION WITH COMPLICATION, WITH LONG-TERM CURRENT USE OF INSULIN (HCC): Chronic | ICD-10-CM

## 2018-01-01 DIAGNOSIS — I65.23 CAROTID STENOSIS, ASYMPTOMATIC, BILATERAL: ICD-10-CM

## 2018-01-01 DIAGNOSIS — N18.4 CKD (CHRONIC KIDNEY DISEASE) STAGE 4, GFR 15-29 ML/MIN (HCC): Chronic | ICD-10-CM

## 2018-01-01 DIAGNOSIS — R50.9 FEVER, UNSPECIFIED FEVER CAUSE: Primary | ICD-10-CM

## 2018-01-01 DIAGNOSIS — I50.9 CONGESTIVE HEART FAILURE, UNSPECIFIED HF CHRONICITY, UNSPECIFIED HEART FAILURE TYPE (HCC): Primary | ICD-10-CM

## 2018-01-01 DIAGNOSIS — Z99.2 ESRD ON DIALYSIS (HCC): ICD-10-CM

## 2018-01-01 DIAGNOSIS — R07.9 CHEST PAIN, UNSPECIFIED TYPE: Primary | ICD-10-CM

## 2018-01-01 DIAGNOSIS — E11.42 DM TYPE 2 WITH DIABETIC PERIPHERAL NEUROPATHY (HCC): Primary | Chronic | ICD-10-CM

## 2018-01-01 DIAGNOSIS — E11.8 TYPE 2 DIABETES MELLITUS WITH COMPLICATION (HCC): ICD-10-CM

## 2018-01-01 DIAGNOSIS — I50.22 CHRONIC SYSTOLIC CONGESTIVE HEART FAILURE (HCC): ICD-10-CM

## 2018-01-01 DIAGNOSIS — Z12.11 SCREENING FOR COLON CANCER: ICD-10-CM

## 2018-01-01 DIAGNOSIS — E11.42 DM TYPE 2 WITH DIABETIC PERIPHERAL NEUROPATHY (HCC): ICD-10-CM

## 2018-01-01 DIAGNOSIS — L03.113 CELLULITIS OF RIGHT UPPER EXTREMITY: ICD-10-CM

## 2018-01-01 DIAGNOSIS — N18.4 CKD (CHRONIC KIDNEY DISEASE) STAGE 4, GFR 15-29 ML/MIN (HCC): ICD-10-CM

## 2018-01-01 DIAGNOSIS — N18.6 ESRD (END STAGE RENAL DISEASE) (HCC): Primary | ICD-10-CM

## 2018-01-01 DIAGNOSIS — Z96.0 INDWELLING CATHETER PRESENT ON ADMISSION: ICD-10-CM

## 2018-01-01 DIAGNOSIS — N17.9 AKI (ACUTE KIDNEY INJURY) (HCC): Primary | ICD-10-CM

## 2018-01-01 LAB
-: ABNORMAL
ABSOLUTE BANDS #: 0.46 K/UL (ref 0–1)
ABSOLUTE EOS #: 0.15 K/UL (ref 0–0.4)
ABSOLUTE EOS #: 0.15 K/UL (ref 0–0.4)
ABSOLUTE EOS #: 0.2 K/UL (ref 0–0.4)
ABSOLUTE EOS #: 0.31 K/UL (ref 0–0.4)
ABSOLUTE EOS #: 0.32 K/UL (ref 0–0.4)
ABSOLUTE EOS #: 0.38 K/UL (ref 0–0.4)
ABSOLUTE EOS #: 0.39 K/UL (ref 0–0.4)
ABSOLUTE EOS #: 0.4 K/UL (ref 0–0.4)
ABSOLUTE EOS #: 0.4 K/UL (ref 0–0.4)
ABSOLUTE EOS #: 0.5 K/UL (ref 0–0.4)
ABSOLUTE EOS #: 0.5 K/UL (ref 0–0.4)
ABSOLUTE IMMATURE GRANULOCYTE: ABNORMAL K/UL (ref 0–0.3)
ABSOLUTE LYMPH #: 0.31 K/UL (ref 1–4.8)
ABSOLUTE LYMPH #: 1.1 K/UL (ref 1–4.8)
ABSOLUTE LYMPH #: 1.18 K/UL (ref 1–4.8)
ABSOLUTE LYMPH #: 1.4 K/UL (ref 1–4.8)
ABSOLUTE LYMPH #: 1.41 K/UL (ref 1–4.8)
ABSOLUTE LYMPH #: 1.46 K/UL (ref 1–4.8)
ABSOLUTE LYMPH #: 1.51 K/UL (ref 1–4.8)
ABSOLUTE LYMPH #: 1.69 K/UL (ref 1–4.8)
ABSOLUTE LYMPH #: 1.9 K/UL (ref 1–4.8)
ABSOLUTE LYMPH #: 2 K/UL (ref 1–4.8)
ABSOLUTE LYMPH #: 2.1 K/UL (ref 1–4.8)
ABSOLUTE MONO #: 0.5 K/UL (ref 0.1–1.3)
ABSOLUTE MONO #: 0.59 K/UL (ref 0.1–1.3)
ABSOLUTE MONO #: 0.6 K/UL (ref 0.1–1.3)
ABSOLUTE MONO #: 0.6 K/UL (ref 0.1–1.3)
ABSOLUTE MONO #: 0.62 K/UL (ref 0.1–1.3)
ABSOLUTE MONO #: 0.69 K/UL (ref 0.1–1.3)
ABSOLUTE MONO #: 0.7 K/UL (ref 0.1–1.3)
ABSOLUTE MONO #: 0.92 K/UL (ref 0.1–1.3)
ABSOLUTE MONO #: 1.33 K/UL (ref 0.1–1.3)
ADENOVIRUS PCR: NOT DETECTED
ALBUMIN (CALCULATED): 2.9 G/DL (ref 3.2–5.2)
ALBUMIN PERCENT: 54 % (ref 45–65)
ALBUMIN SERPL-MCNC: 1.9 G/DL (ref 3.5–5.2)
ALBUMIN SERPL-MCNC: 1.9 G/DL (ref 3.5–5.2)
ALBUMIN SERPL-MCNC: 2 G/DL (ref 3.5–5.2)
ALBUMIN SERPL-MCNC: 2.1 G/DL (ref 3.5–5.2)
ALBUMIN SERPL-MCNC: 2.3 G/DL (ref 3.5–5.2)
ALBUMIN SERPL-MCNC: 2.5 G/DL (ref 3.5–5.2)
ALBUMIN SERPL-MCNC: 2.9 G/DL (ref 3.5–5.2)
ALBUMIN SERPL-MCNC: 3.9 G/DL (ref 3.5–5.2)
ALBUMIN/GLOBULIN RATIO: ABNORMAL (ref 1–2.5)
ALBUMIN/GLOBULIN RATIO: NORMAL (ref 1–2.5)
ALP BLD-CCNC: 103 U/L (ref 40–129)
ALP BLD-CCNC: 107 U/L (ref 40–129)
ALP BLD-CCNC: 109 U/L (ref 40–129)
ALP BLD-CCNC: 110 U/L (ref 40–129)
ALP BLD-CCNC: 115 U/L (ref 40–129)
ALP BLD-CCNC: 117 U/L (ref 40–129)
ALP BLD-CCNC: 122 U/L (ref 40–129)
ALP BLD-CCNC: 130 U/L (ref 40–129)
ALP BLD-CCNC: 131 U/L (ref 40–129)
ALP BLD-CCNC: 79 U/L (ref 40–129)
ALP BLD-CCNC: 80 U/L (ref 40–129)
ALPHA 1 PERCENT: 3 % (ref 3–6)
ALPHA 2 PERCENT: 17 % (ref 6–13)
ALPHA-1-GLOBULIN: 0.2 G/DL (ref 0.1–0.4)
ALPHA-2-GLOBULIN: 0.9 G/DL (ref 0.5–0.9)
ALT SERPL-CCNC: 10 U/L (ref 5–41)
ALT SERPL-CCNC: 11 U/L (ref 5–41)
ALT SERPL-CCNC: 12 U/L (ref 5–41)
ALT SERPL-CCNC: 14 U/L (ref 5–41)
ALT SERPL-CCNC: 14 U/L (ref 5–41)
ALT SERPL-CCNC: 15 U/L (ref 5–41)
ALT SERPL-CCNC: 18 U/L (ref 5–41)
ALT SERPL-CCNC: 6 U/L (ref 5–41)
ALT SERPL-CCNC: 9 U/L (ref 5–41)
AMORPHOUS: ABNORMAL
ANA REFERENCE RANGE:: ABNORMAL
ANCA MYELOPEROXIDASE: 25 AU/ML
ANCA PROTEINASE 3: 20 AU/ML
ANION GAP SERPL CALCULATED.3IONS-SCNC: 11 MMOL/L (ref 9–17)
ANION GAP SERPL CALCULATED.3IONS-SCNC: 12 MMOL/L (ref 9–17)
ANION GAP SERPL CALCULATED.3IONS-SCNC: 13 MMOL/L (ref 9–17)
ANION GAP SERPL CALCULATED.3IONS-SCNC: 14 MMOL/L (ref 9–17)
ANION GAP SERPL CALCULATED.3IONS-SCNC: 15 MMOL/L (ref 9–17)
ANION GAP SERPL CALCULATED.3IONS-SCNC: 16 MMOL/L (ref 9–17)
ANION GAP SERPL CALCULATED.3IONS-SCNC: 17 MMOL/L (ref 9–17)
ANION GAP SERPL CALCULATED.3IONS-SCNC: 17 MMOL/L (ref 9–17)
ANION GAP SERPL CALCULATED.3IONS-SCNC: 20 MMOL/L (ref 9–17)
ANTI DNA DOUBLE STRANDED: 33 IU/ML
ANTI JO-1 IGG: 19 U/ML
ANTI RNP AB: 149 U/ML
ANTI SSA: 35 U/ML
ANTI SSB: 35 U/ML
ANTI-CENTROMERE: 8 U/ML
ANTI-NUCLEAR ANTIBODY (ANA): POSITIVE
ANTI-SCLERODERMA: 12 U/ML
ANTI-SMITH: 24 U/ML
AST SERPL-CCNC: 10 U/L
AST SERPL-CCNC: 12 U/L
AST SERPL-CCNC: 12 U/L
AST SERPL-CCNC: 13 U/L
AST SERPL-CCNC: 13 U/L
AST SERPL-CCNC: 16 U/L
AST SERPL-CCNC: 8 U/L
AST SERPL-CCNC: 9 U/L
BACTERIA: ABNORMAL
BANDS: 3 % (ref 0–10)
BASOPHILS # BLD: 0 % (ref 0–2)
BASOPHILS # BLD: 0 % (ref 0–2)
BASOPHILS # BLD: 1 % (ref 0–2)
BASOPHILS # BLD: 2 % (ref 0–2)
BASOPHILS ABSOLUTE: 0 K/UL (ref 0–0.2)
BASOPHILS ABSOLUTE: 0 K/UL (ref 0–0.2)
BASOPHILS ABSOLUTE: 0.06 K/UL (ref 0–0.2)
BASOPHILS ABSOLUTE: 0.06 K/UL (ref 0–0.2)
BASOPHILS ABSOLUTE: 0.1 K/UL (ref 0–0.2)
BASOPHILS ABSOLUTE: 0.13 K/UL (ref 0–0.2)
BETA GLOBULIN: 0.7 G/DL (ref 0.5–1.1)
BETA PERCENT: 13 % (ref 11–19)
BILIRUB SERPL-MCNC: 0.25 MG/DL (ref 0.3–1.2)
BILIRUB SERPL-MCNC: 0.26 MG/DL (ref 0.3–1.2)
BILIRUB SERPL-MCNC: 0.27 MG/DL (ref 0.3–1.2)
BILIRUB SERPL-MCNC: 0.27 MG/DL (ref 0.3–1.2)
BILIRUB SERPL-MCNC: 0.28 MG/DL (ref 0.3–1.2)
BILIRUB SERPL-MCNC: 0.28 MG/DL (ref 0.3–1.2)
BILIRUB SERPL-MCNC: 0.31 MG/DL (ref 0.3–1.2)
BILIRUB SERPL-MCNC: 0.32 MG/DL (ref 0.3–1.2)
BILIRUB SERPL-MCNC: 0.33 MG/DL (ref 0.3–1.2)
BILIRUB SERPL-MCNC: 0.36 MG/DL (ref 0.3–1.2)
BILIRUB SERPL-MCNC: 0.47 MG/DL (ref 0.3–1.2)
BILIRUB SERPL-MCNC: 0.49 MG/DL (ref 0.3–1.2)
BILIRUB SERPL-MCNC: 0.94 MG/DL (ref 0.3–1.2)
BILIRUBIN DIRECT: 0.17 MG/DL
BILIRUBIN URINE: NEGATIVE
BILIRUBIN, INDIRECT: 0.77 MG/DL (ref 0–1)
BNP INTERPRETATION: ABNORMAL
BORDETELLA PERTUSSIS PCR: NOT DETECTED
BUN BLDV-MCNC: 100 MG/DL (ref 6–20)
BUN BLDV-MCNC: 101 MG/DL (ref 6–20)
BUN BLDV-MCNC: 22 MG/DL (ref 6–20)
BUN BLDV-MCNC: 23 MG/DL (ref 6–20)
BUN BLDV-MCNC: 27 MG/DL (ref 6–20)
BUN BLDV-MCNC: 28 MG/DL (ref 6–20)
BUN BLDV-MCNC: 28 MG/DL (ref 6–20)
BUN BLDV-MCNC: 29 MG/DL (ref 6–20)
BUN BLDV-MCNC: 29 MG/DL (ref 6–20)
BUN BLDV-MCNC: 31 MG/DL (ref 6–20)
BUN BLDV-MCNC: 35 MG/DL (ref 6–20)
BUN BLDV-MCNC: 36 MG/DL (ref 6–20)
BUN BLDV-MCNC: 37 MG/DL (ref 6–20)
BUN BLDV-MCNC: 40 MG/DL (ref 6–20)
BUN BLDV-MCNC: 40 MG/DL (ref 6–20)
BUN BLDV-MCNC: 42 MG/DL (ref 6–20)
BUN BLDV-MCNC: 43 MG/DL (ref 6–20)
BUN BLDV-MCNC: 71 MG/DL (ref 6–20)
BUN BLDV-MCNC: 98 MG/DL (ref 6–20)
BUN BLDV-MCNC: 99 MG/DL (ref 6–20)
BUN/CREAT BLD: ABNORMAL (ref 9–20)
C DIFFICILE TOXINS, PCR: ABNORMAL
C-REACTIVE PROTEIN: 36 MG/L (ref 0–5)
CALCIUM SERPL-MCNC: 7.7 MG/DL (ref 8.6–10.4)
CALCIUM SERPL-MCNC: 7.9 MG/DL (ref 8.6–10.4)
CALCIUM SERPL-MCNC: 7.9 MG/DL (ref 8.6–10.4)
CALCIUM SERPL-MCNC: 8 MG/DL (ref 8.6–10.4)
CALCIUM SERPL-MCNC: 8.1 MG/DL (ref 8.6–10.4)
CALCIUM SERPL-MCNC: 8.2 MG/DL (ref 8.6–10.4)
CALCIUM SERPL-MCNC: 8.3 MG/DL (ref 8.6–10.4)
CALCIUM SERPL-MCNC: 8.4 MG/DL (ref 8.6–10.4)
CALCIUM SERPL-MCNC: 8.7 MG/DL (ref 8.6–10.4)
CALCIUM SERPL-MCNC: 9.3 MG/DL (ref 8.6–10.4)
CASTS UA: ABNORMAL /LPF
CHLAMYDIA PNEUMONIAE BY PCR: NOT DETECTED
CHLORIDE BLD-SCNC: 100 MMOL/L (ref 98–107)
CHLORIDE BLD-SCNC: 101 MMOL/L (ref 98–107)
CHLORIDE BLD-SCNC: 101 MMOL/L (ref 98–107)
CHLORIDE BLD-SCNC: 102 MMOL/L (ref 98–107)
CHLORIDE BLD-SCNC: 108 MMOL/L (ref 98–107)
CHLORIDE BLD-SCNC: 90 MMOL/L (ref 98–107)
CHLORIDE BLD-SCNC: 95 MMOL/L (ref 98–107)
CHLORIDE BLD-SCNC: 95 MMOL/L (ref 98–107)
CHLORIDE BLD-SCNC: 96 MMOL/L (ref 98–107)
CHLORIDE BLD-SCNC: 96 MMOL/L (ref 98–107)
CHLORIDE BLD-SCNC: 97 MMOL/L (ref 98–107)
CHLORIDE BLD-SCNC: 98 MMOL/L (ref 98–107)
CHLORIDE BLD-SCNC: 99 MMOL/L (ref 98–107)
CHLORIDE, UR: 79 MMOL/L
CHLORIDE, UR: <20 MMOL/L
CHOLESTEROL/HDL RATIO: 11.6
CHOLESTEROL: 279 MG/DL
CO2: 16 MMOL/L (ref 20–31)
CO2: 17 MMOL/L (ref 20–31)
CO2: 17 MMOL/L (ref 20–31)
CO2: 19 MMOL/L (ref 20–31)
CO2: 20 MMOL/L (ref 20–31)
CO2: 22 MMOL/L (ref 20–31)
CO2: 22 MMOL/L (ref 20–31)
CO2: 23 MMOL/L (ref 20–31)
CO2: 24 MMOL/L (ref 20–31)
CO2: 25 MMOL/L (ref 20–31)
CO2: 26 MMOL/L (ref 20–31)
CO2: 27 MMOL/L (ref 20–31)
CO2: 28 MMOL/L (ref 20–31)
COLOR: YELLOW
COMMENT UA: ABNORMAL
COMPLEMENT C3: 119 MG/DL (ref 90–180)
COMPLEMENT C4: 30 MG/DL (ref 10–40)
CORONAVIRUS 229E PCR: NOT DETECTED
CORONAVIRUS HKU1 PCR: NOT DETECTED
CORONAVIRUS NL63 PCR: NOT DETECTED
CORONAVIRUS OC43 PCR: NOT DETECTED
CREAT SERPL-MCNC: 2.8 MG/DL (ref 0.7–1.2)
CREAT SERPL-MCNC: 2.89 MG/DL (ref 0.7–1.2)
CREAT SERPL-MCNC: 2.93 MG/DL (ref 0.7–1.2)
CREAT SERPL-MCNC: 3.26 MG/DL (ref 0.7–1.2)
CREAT SERPL-MCNC: 3.31 MG/DL (ref 0.7–1.2)
CREAT SERPL-MCNC: 3.48 MG/DL (ref 0.7–1.2)
CREAT SERPL-MCNC: 3.49 MG/DL (ref 0.7–1.2)
CREAT SERPL-MCNC: 3.7 MG/DL (ref 0.7–1.2)
CREAT SERPL-MCNC: 3.78 MG/DL (ref 0.7–1.2)
CREAT SERPL-MCNC: 3.85 MG/DL (ref 0.7–1.2)
CREAT SERPL-MCNC: 3.87 MG/DL (ref 0.7–1.2)
CREAT SERPL-MCNC: 4.14 MG/DL (ref 0.7–1.2)
CREAT SERPL-MCNC: 4.32 MG/DL (ref 0.7–1.2)
CREAT SERPL-MCNC: 4.37 MG/DL (ref 0.7–1.2)
CREAT SERPL-MCNC: 4.44 MG/DL (ref 0.7–1.2)
CREAT SERPL-MCNC: 4.76 MG/DL (ref 0.7–1.2)
CREAT SERPL-MCNC: 4.87 MG/DL (ref 0.7–1.2)
CREAT SERPL-MCNC: 4.91 MG/DL (ref 0.7–1.2)
CREAT SERPL-MCNC: 5.09 MG/DL (ref 0.7–1.2)
CREAT SERPL-MCNC: 6.65 MG/DL (ref 0.7–1.2)
CREAT SERPL-MCNC: 6.82 MG/DL (ref 0.7–1.2)
CREAT SERPL-MCNC: 7.01 MG/DL (ref 0.7–1.2)
CREAT SERPL-MCNC: 7.08 MG/DL (ref 0.7–1.2)
CREATININE URINE: 116.6 MG/DL (ref 39–259)
CREATININE URINE: 61.8 MG/DL (ref 39–259)
CREATININE URINE: 93.8 MG/DL (ref 39–259)
CRYSTALS, UA: ABNORMAL /HPF
CULTURE: ABNORMAL
CULTURE: NO GROWTH
CULTURE: NORMAL
DIFFERENTIAL TYPE: ABNORMAL
DIGOXIN DATE LAST DOSE: ABNORMAL
DIGOXIN DATE LAST DOSE: ABNORMAL
DIGOXIN DATE LAST DOSE: NORMAL
DIGOXIN DATE LAST DOSE: NORMAL
DIGOXIN DOSE AMOUNT: 125
DIGOXIN DOSE AMOUNT: ABNORMAL
DIGOXIN DOSE AMOUNT: ABNORMAL
DIGOXIN DOSE AMOUNT: NORMAL
DIGOXIN DOSE TIME: 2100
DIGOXIN DOSE TIME: 900
DIGOXIN DOSE TIME: ABNORMAL
DIGOXIN DOSE TIME: NORMAL
DIGOXIN LEVEL: 0.4 NG/ML (ref 0.5–2)
DIGOXIN LEVEL: 0.6 NG/ML (ref 0.5–2)
DIGOXIN LEVEL: 1.6 NG/ML (ref 0.5–2)
DIGOXIN LEVEL: <0.3 NG/ML (ref 0.5–2)
DIRECT EXAM: ABNORMAL
DIRECT EXAM: NORMAL
EKG ATRIAL RATE: 125 BPM
EKG ATRIAL RATE: 80 BPM
EKG ATRIAL RATE: 84 BPM
EKG ATRIAL RATE: 95 BPM
EKG ATRIAL RATE: 98 BPM
EKG P AXIS: 17 DEGREES
EKG P AXIS: 36 DEGREES
EKG P AXIS: 46 DEGREES
EKG P AXIS: 58 DEGREES
EKG P-R INTERVAL: 166 MS
EKG P-R INTERVAL: 180 MS
EKG P-R INTERVAL: 186 MS
EKG P-R INTERVAL: 200 MS
EKG Q-T INTERVAL: 374 MS
EKG Q-T INTERVAL: 376 MS
EKG Q-T INTERVAL: 406 MS
EKG Q-T INTERVAL: 430 MS
EKG Q-T INTERVAL: 434 MS
EKG QRS DURATION: 154 MS
EKG QRS DURATION: 154 MS
EKG QRS DURATION: 164 MS
EKG QRS DURATION: 164 MS
EKG QRS DURATION: 168 MS
EKG QTC CALCULATION (BAZETT): 462 MS
EKG QTC CALCULATION (BAZETT): 472 MS
EKG QTC CALCULATION (BAZETT): 477 MS
EKG QTC CALCULATION (BAZETT): 500 MS
EKG QTC CALCULATION (BAZETT): 508 MS
EKG R AXIS: 10 DEGREES
EKG R AXIS: 22 DEGREES
EKG R AXIS: 26 DEGREES
EKG R AXIS: 27 DEGREES
EKG R AXIS: 29 DEGREES
EKG T AXIS: -168 DEGREES
EKG T AXIS: -175 DEGREES
EKG T AXIS: 117 DEGREES
EKG T AXIS: 158 DEGREES
EKG T AXIS: 166 DEGREES
EKG VENTRICULAR RATE: 78 BPM
EKG VENTRICULAR RATE: 80 BPM
EKG VENTRICULAR RATE: 84 BPM
EKG VENTRICULAR RATE: 95 BPM
EKG VENTRICULAR RATE: 98 BPM
EOSINOPHIL,URINE: NORMAL
EOSINOPHIL,URINE: NORMAL
EOSINOPHILS RELATIVE PERCENT: 1 % (ref 0–4)
EOSINOPHILS RELATIVE PERCENT: 1 % (ref 0–4)
EOSINOPHILS RELATIVE PERCENT: 2 % (ref 0–4)
EOSINOPHILS RELATIVE PERCENT: 3 % (ref 0–4)
EOSINOPHILS RELATIVE PERCENT: 5 % (ref 0–4)
EOSINOPHILS RELATIVE PERCENT: 6 % (ref 0–4)
EPITHELIAL CELLS UA: ABNORMAL /HPF
ESTIMATED AVERAGE GLUCOSE: 326 MG/DL
FERRITIN: 1519 UG/L (ref 30–400)
FREE KAPPA/LAMBDA RATIO: 1.81 (ref 0.26–1.65)
GAMMA GLOBULIN %: 12 % (ref 9–20)
GAMMA GLOBULIN: 0.7 G/DL (ref 0.5–1.5)
GBM ANTIBODY IGG: 17 AU/ML
GFR AFRICAN AMERICAN: 10 ML/MIN
GFR AFRICAN AMERICAN: 11 ML/MIN
GFR AFRICAN AMERICAN: 15 ML/MIN
GFR AFRICAN AMERICAN: 16 ML/MIN
GFR AFRICAN AMERICAN: 17 ML/MIN
GFR AFRICAN AMERICAN: 17 ML/MIN
GFR AFRICAN AMERICAN: 18 ML/MIN
GFR AFRICAN AMERICAN: 18 ML/MIN
GFR AFRICAN AMERICAN: 20 ML/MIN
GFR AFRICAN AMERICAN: 20 ML/MIN
GFR AFRICAN AMERICAN: 21 ML/MIN
GFR AFRICAN AMERICAN: 21 ML/MIN
GFR AFRICAN AMERICAN: 23 ML/MIN
GFR AFRICAN AMERICAN: 23 ML/MIN
GFR AFRICAN AMERICAN: 24 ML/MIN
GFR AFRICAN AMERICAN: 24 ML/MIN
GFR AFRICAN AMERICAN: 28 ML/MIN
GFR AFRICAN AMERICAN: 28 ML/MIN
GFR AFRICAN AMERICAN: 29 ML/MIN
GFR NON-AFRICAN AMERICAN: 12 ML/MIN
GFR NON-AFRICAN AMERICAN: 13 ML/MIN
GFR NON-AFRICAN AMERICAN: 14 ML/MIN
GFR NON-AFRICAN AMERICAN: 14 ML/MIN
GFR NON-AFRICAN AMERICAN: 15 ML/MIN
GFR NON-AFRICAN AMERICAN: 15 ML/MIN
GFR NON-AFRICAN AMERICAN: 17 ML/MIN
GFR NON-AFRICAN AMERICAN: 19 ML/MIN
GFR NON-AFRICAN AMERICAN: 19 ML/MIN
GFR NON-AFRICAN AMERICAN: 20 ML/MIN
GFR NON-AFRICAN AMERICAN: 20 ML/MIN
GFR NON-AFRICAN AMERICAN: 23 ML/MIN
GFR NON-AFRICAN AMERICAN: 23 ML/MIN
GFR NON-AFRICAN AMERICAN: 24 ML/MIN
GFR NON-AFRICAN AMERICAN: 8 ML/MIN
GFR NON-AFRICAN AMERICAN: 8 ML/MIN
GFR NON-AFRICAN AMERICAN: 9 ML/MIN
GFR NON-AFRICAN AMERICAN: 9 ML/MIN
GFR SERPL CREATININE-BSD FRML MDRD: ABNORMAL ML/MIN/{1.73_M2}
GLOBULIN: NORMAL G/DL (ref 1.5–3.8)
GLUCOSE BLD-MCNC: 100 MG/DL (ref 75–110)
GLUCOSE BLD-MCNC: 100 MG/DL (ref 75–110)
GLUCOSE BLD-MCNC: 102 MG/DL (ref 75–110)
GLUCOSE BLD-MCNC: 103 MG/DL (ref 70–99)
GLUCOSE BLD-MCNC: 104 MG/DL (ref 75–110)
GLUCOSE BLD-MCNC: 111 MG/DL (ref 75–110)
GLUCOSE BLD-MCNC: 112 MG/DL (ref 75–110)
GLUCOSE BLD-MCNC: 113 MG/DL (ref 75–110)
GLUCOSE BLD-MCNC: 115 MG/DL (ref 75–110)
GLUCOSE BLD-MCNC: 118 MG/DL (ref 75–110)
GLUCOSE BLD-MCNC: 123 MG/DL (ref 70–99)
GLUCOSE BLD-MCNC: 127 MG/DL (ref 75–110)
GLUCOSE BLD-MCNC: 128 MG/DL (ref 70–99)
GLUCOSE BLD-MCNC: 129 MG/DL (ref 75–110)
GLUCOSE BLD-MCNC: 131 MG/DL (ref 75–110)
GLUCOSE BLD-MCNC: 135 MG/DL (ref 75–110)
GLUCOSE BLD-MCNC: 137 MG/DL (ref 75–110)
GLUCOSE BLD-MCNC: 139 MG/DL (ref 75–110)
GLUCOSE BLD-MCNC: 142 MG/DL (ref 75–110)
GLUCOSE BLD-MCNC: 144 MG/DL (ref 70–99)
GLUCOSE BLD-MCNC: 149 MG/DL (ref 75–110)
GLUCOSE BLD-MCNC: 150 MG/DL (ref 75–110)
GLUCOSE BLD-MCNC: 151 MG/DL (ref 75–110)
GLUCOSE BLD-MCNC: 151 MG/DL (ref 75–110)
GLUCOSE BLD-MCNC: 153 MG/DL (ref 75–110)
GLUCOSE BLD-MCNC: 155 MG/DL (ref 75–110)
GLUCOSE BLD-MCNC: 157 MG/DL (ref 70–99)
GLUCOSE BLD-MCNC: 160 MG/DL (ref 70–99)
GLUCOSE BLD-MCNC: 162 MG/DL (ref 75–110)
GLUCOSE BLD-MCNC: 162 MG/DL (ref 75–110)
GLUCOSE BLD-MCNC: 163 MG/DL (ref 70–99)
GLUCOSE BLD-MCNC: 163 MG/DL (ref 75–110)
GLUCOSE BLD-MCNC: 165 MG/DL (ref 75–110)
GLUCOSE BLD-MCNC: 166 MG/DL (ref 75–110)
GLUCOSE BLD-MCNC: 170 MG/DL (ref 70–99)
GLUCOSE BLD-MCNC: 170 MG/DL (ref 75–110)
GLUCOSE BLD-MCNC: 170 MG/DL (ref 75–110)
GLUCOSE BLD-MCNC: 171 MG/DL (ref 75–110)
GLUCOSE BLD-MCNC: 175 MG/DL (ref 70–99)
GLUCOSE BLD-MCNC: 175 MG/DL (ref 75–110)
GLUCOSE BLD-MCNC: 176 MG/DL (ref 75–110)
GLUCOSE BLD-MCNC: 177 MG/DL (ref 70–99)
GLUCOSE BLD-MCNC: 178 MG/DL (ref 75–110)
GLUCOSE BLD-MCNC: 187 MG/DL (ref 75–110)
GLUCOSE BLD-MCNC: 188 MG/DL (ref 70–99)
GLUCOSE BLD-MCNC: 190 MG/DL (ref 75–110)
GLUCOSE BLD-MCNC: 191 MG/DL (ref 75–110)
GLUCOSE BLD-MCNC: 194 MG/DL (ref 75–110)
GLUCOSE BLD-MCNC: 195 MG/DL (ref 75–110)
GLUCOSE BLD-MCNC: 197 MG/DL (ref 75–110)
GLUCOSE BLD-MCNC: 201 MG/DL (ref 75–110)
GLUCOSE BLD-MCNC: 208 MG/DL (ref 70–99)
GLUCOSE BLD-MCNC: 208 MG/DL (ref 75–110)
GLUCOSE BLD-MCNC: 209 MG/DL (ref 75–110)
GLUCOSE BLD-MCNC: 210 MG/DL (ref 75–110)
GLUCOSE BLD-MCNC: 213 MG/DL (ref 70–99)
GLUCOSE BLD-MCNC: 214 MG/DL (ref 75–110)
GLUCOSE BLD-MCNC: 216 MG/DL (ref 70–99)
GLUCOSE BLD-MCNC: 219 MG/DL (ref 75–110)
GLUCOSE BLD-MCNC: 224 MG/DL (ref 70–99)
GLUCOSE BLD-MCNC: 225 MG/DL (ref 70–99)
GLUCOSE BLD-MCNC: 225 MG/DL (ref 75–110)
GLUCOSE BLD-MCNC: 226 MG/DL (ref 75–110)
GLUCOSE BLD-MCNC: 227 MG/DL (ref 75–110)
GLUCOSE BLD-MCNC: 227 MG/DL (ref 75–110)
GLUCOSE BLD-MCNC: 229 MG/DL (ref 75–110)
GLUCOSE BLD-MCNC: 238 MG/DL (ref 70–99)
GLUCOSE BLD-MCNC: 240 MG/DL (ref 75–110)
GLUCOSE BLD-MCNC: 250 MG/DL (ref 70–99)
GLUCOSE BLD-MCNC: 273 MG/DL (ref 75–110)
GLUCOSE BLD-MCNC: 284 MG/DL (ref 75–110)
GLUCOSE BLD-MCNC: 285 MG/DL (ref 75–110)
GLUCOSE BLD-MCNC: 292 MG/DL (ref 70–99)
GLUCOSE BLD-MCNC: 295 MG/DL (ref 75–110)
GLUCOSE BLD-MCNC: 44 MG/DL (ref 75–110)
GLUCOSE BLD-MCNC: 54 MG/DL (ref 75–110)
GLUCOSE BLD-MCNC: 60 MG/DL (ref 75–110)
GLUCOSE BLD-MCNC: 64 MG/DL (ref 75–110)
GLUCOSE BLD-MCNC: 66 MG/DL (ref 75–110)
GLUCOSE BLD-MCNC: 71 MG/DL (ref 75–110)
GLUCOSE BLD-MCNC: 80 MG/DL (ref 75–110)
GLUCOSE BLD-MCNC: 81 MG/DL (ref 70–99)
GLUCOSE BLD-MCNC: 83 MG/DL (ref 70–99)
GLUCOSE BLD-MCNC: 84 MG/DL (ref 70–99)
GLUCOSE BLD-MCNC: 84 MG/DL (ref 75–110)
GLUCOSE BLD-MCNC: 86 MG/DL (ref 75–110)
GLUCOSE BLD-MCNC: 86 MG/DL (ref 75–110)
GLUCOSE BLD-MCNC: 89 MG/DL (ref 75–110)
GLUCOSE BLD-MCNC: 89 MG/DL (ref 75–110)
GLUCOSE BLD-MCNC: 91 MG/DL (ref 75–110)
GLUCOSE BLD-MCNC: 92 MG/DL (ref 75–110)
GLUCOSE BLD-MCNC: 93 MG/DL (ref 75–110)
GLUCOSE BLD-MCNC: 98 MG/DL (ref 75–110)
GLUCOSE BLD-MCNC: 99 MG/DL (ref 70–99)
GLUCOSE BLD-MCNC: 99 MG/DL (ref 75–110)
GLUCOSE URINE: ABNORMAL
HBA1C MFR BLD: 13 % (ref 4–6)
HBA1C MFR BLD: 6.2 %
HBV SURFACE AB TITR SER: <3.5 MIU/ML
HCT VFR BLD CALC: 22.1 % (ref 41–53)
HCT VFR BLD CALC: 22.5 % (ref 41–53)
HCT VFR BLD CALC: 22.6 % (ref 41–53)
HCT VFR BLD CALC: 23 % (ref 41–53)
HCT VFR BLD CALC: 23.3 % (ref 41–53)
HCT VFR BLD CALC: 23.6 % (ref 41–53)
HCT VFR BLD CALC: 25.4 % (ref 41–53)
HCT VFR BLD CALC: 26.1 % (ref 41–53)
HCT VFR BLD CALC: 26.2 % (ref 41–53)
HCT VFR BLD CALC: 26.3 % (ref 41–53)
HCT VFR BLD CALC: 26.4 % (ref 41–53)
HCT VFR BLD CALC: 28.2 % (ref 41–53)
HCT VFR BLD CALC: 29 % (ref 41–53)
HCT VFR BLD CALC: 30 % (ref 41–53)
HCT VFR BLD CALC: 31.1 % (ref 41–53)
HCT VFR BLD CALC: 31.1 % (ref 41–53)
HCT VFR BLD CALC: 31.5 % (ref 41–53)
HCT VFR BLD CALC: 31.5 % (ref 41–53)
HCT VFR BLD CALC: 31.8 % (ref 41–53)
HCT VFR BLD CALC: 32 % (ref 41–53)
HCT VFR BLD CALC: 32.5 % (ref 41–53)
HCT VFR BLD CALC: 34.2 % (ref 41–53)
HCT VFR BLD CALC: 37.4 % (ref 40.7–50.3)
HCT VFR BLD CALC: 38.8 % (ref 41–53)
HDLC SERPL-MCNC: 24 MG/DL
HEMOGLOBIN: 10 G/DL (ref 13.5–17.5)
HEMOGLOBIN: 10.4 G/DL (ref 13.5–17.5)
HEMOGLOBIN: 10.7 G/DL (ref 13.5–17.5)
HEMOGLOBIN: 10.8 G/DL (ref 13.5–17.5)
HEMOGLOBIN: 10.9 G/DL (ref 13.5–17.5)
HEMOGLOBIN: 11 G/DL (ref 13.5–17.5)
HEMOGLOBIN: 11 G/DL (ref 13.5–17.5)
HEMOGLOBIN: 11.4 G/DL (ref 13–17)
HEMOGLOBIN: 12.7 G/DL (ref 13.5–17.5)
HEMOGLOBIN: 7.4 G/DL (ref 13.5–17.5)
HEMOGLOBIN: 7.4 G/DL (ref 13.5–17.5)
HEMOGLOBIN: 7.5 G/DL (ref 13.5–17.5)
HEMOGLOBIN: 7.5 G/DL (ref 13.5–17.5)
HEMOGLOBIN: 7.6 G/DL (ref 13.5–17.5)
HEMOGLOBIN: 7.8 G/DL (ref 13.5–17.5)
HEMOGLOBIN: 8.2 G/DL (ref 13.5–17.5)
HEMOGLOBIN: 8.3 G/DL (ref 13.5–17.5)
HEMOGLOBIN: 8.5 G/DL (ref 13.5–17.5)
HEMOGLOBIN: 8.6 G/DL (ref 13.5–17.5)
HEMOGLOBIN: 8.7 G/DL (ref 13.5–17.5)
HEMOGLOBIN: 9.1 G/DL (ref 13.5–17.5)
HEMOGLOBIN: 9.4 G/DL (ref 13.5–17.5)
HEMOGLOBIN: 9.6 G/DL (ref 13.5–17.5)
HEMOGLOBIN: 9.9 G/DL (ref 13.5–17.5)
HEPATITIS B CORE IGM ANTIBODY: NONREACTIVE
HEPATITIS B CORE TOTAL ANTIBODY: NONREACTIVE
HEPATITIS B SURFACE ANTIGEN: NONREACTIVE
HEPATITIS C ANTIBODY: NONREACTIVE
HISTONE ANTIBODY: 16 U/ML
HOURS COLLECTED: 24 H
HUMAN METAPNEUMOVIRUS PCR: NOT DETECTED
IMMATURE GRANULOCYTES: ABNORMAL %
INFLUENZA A BY PCR: NOT DETECTED
INFLUENZA A H1 (2009) PCR: NORMAL
INFLUENZA A H1 PCR: NORMAL
INFLUENZA A H3 PCR: NORMAL
INFLUENZA B BY PCR: NOT DETECTED
INR BLD: 1.1
INR BLD: 1.2
IRON SATURATION: 16 % (ref 20–55)
IRON: 15 UG/DL (ref 59–158)
KAPPA FREE LIGHT CHAINS QNT: 12.88 MG/DL (ref 0.37–1.94)
KETONES, URINE: NEGATIVE
LACTIC ACID: 1.4 MMOL/L (ref 0.5–2.2)
LACTIC ACID: 1.5 MMOL/L (ref 0.5–2.2)
LAMBDA FREE LIGHT CHAINS QNT: 7.1 MG/DL (ref 0.57–2.63)
LDL CHOLESTEROL: 202 MG/DL (ref 0–130)
LEUKOCYTE ESTERASE, URINE: NEGATIVE
LIPASE: 37 U/L (ref 13–60)
LV EF: 22 %
LV EF: 43 %
LVEF MODALITY: NORMAL
LVEF MODALITY: NORMAL
LYMPHOCYTES # BLD: 14 % (ref 24–44)
LYMPHOCYTES # BLD: 14 % (ref 24–44)
LYMPHOCYTES # BLD: 2 % (ref 24–44)
LYMPHOCYTES # BLD: 21 % (ref 24–44)
LYMPHOCYTES # BLD: 22 % (ref 24–44)
LYMPHOCYTES # BLD: 23 % (ref 24–44)
LYMPHOCYTES # BLD: 24 % (ref 24–44)
LYMPHOCYTES # BLD: 25 % (ref 24–44)
LYMPHOCYTES # BLD: 26 % (ref 24–44)
LYMPHOCYTES # BLD: 28 % (ref 24–44)
LYMPHOCYTES # BLD: 8 % (ref 24–44)
Lab: ABNORMAL
Lab: NORMAL
MAGNESIUM: 1.8 MG/DL (ref 1.6–2.6)
MAGNESIUM: 2.2 MG/DL (ref 1.6–2.6)
MAGNESIUM: 2.5 MG/DL (ref 1.6–2.6)
MCH RBC QN AUTO: 24.8 PG (ref 26–34)
MCH RBC QN AUTO: 25 PG (ref 26–34)
MCH RBC QN AUTO: 25.1 PG (ref 26–34)
MCH RBC QN AUTO: 25.2 PG (ref 26–34)
MCH RBC QN AUTO: 25.2 PG (ref 26–34)
MCH RBC QN AUTO: 25.4 PG (ref 26–34)
MCH RBC QN AUTO: 25.4 PG (ref 26–34)
MCH RBC QN AUTO: 25.5 PG (ref 26–34)
MCH RBC QN AUTO: 25.5 PG (ref 26–34)
MCH RBC QN AUTO: 25.6 PG (ref 26–34)
MCH RBC QN AUTO: 25.8 PG (ref 26–34)
MCH RBC QN AUTO: 25.9 PG (ref 26–34)
MCH RBC QN AUTO: 26 PG (ref 26–34)
MCH RBC QN AUTO: 26.1 PG (ref 26–34)
MCH RBC QN AUTO: 26.8 PG (ref 26–34)
MCH RBC QN AUTO: 27 PG (ref 26–34)
MCH RBC QN AUTO: 27 PG (ref 26–34)
MCH RBC QN AUTO: 27.2 PG (ref 26–34)
MCH RBC QN AUTO: 27.8 PG (ref 26–34)
MCHC RBC AUTO-ENTMCNC: 31.8 G/DL (ref 31–37)
MCHC RBC AUTO-ENTMCNC: 31.8 G/DL (ref 31–37)
MCHC RBC AUTO-ENTMCNC: 32.1 G/DL (ref 31–37)
MCHC RBC AUTO-ENTMCNC: 32.2 G/DL (ref 31–37)
MCHC RBC AUTO-ENTMCNC: 32.2 G/DL (ref 31–37)
MCHC RBC AUTO-ENTMCNC: 32.3 G/DL (ref 31–37)
MCHC RBC AUTO-ENTMCNC: 32.3 G/DL (ref 31–37)
MCHC RBC AUTO-ENTMCNC: 32.4 G/DL (ref 31–37)
MCHC RBC AUTO-ENTMCNC: 32.6 G/DL (ref 31–37)
MCHC RBC AUTO-ENTMCNC: 32.6 G/DL (ref 31–37)
MCHC RBC AUTO-ENTMCNC: 32.7 G/DL (ref 31–37)
MCHC RBC AUTO-ENTMCNC: 32.8 G/DL (ref 31–37)
MCHC RBC AUTO-ENTMCNC: 32.9 G/DL (ref 31–37)
MCHC RBC AUTO-ENTMCNC: 32.9 G/DL (ref 31–37)
MCHC RBC AUTO-ENTMCNC: 33.1 G/DL (ref 31–37)
MCHC RBC AUTO-ENTMCNC: 33.4 G/DL (ref 31–37)
MCHC RBC AUTO-ENTMCNC: 33.8 G/DL (ref 31–37)
MCHC RBC AUTO-ENTMCNC: 33.9 G/DL (ref 31–37)
MCHC RBC AUTO-ENTMCNC: 33.9 G/DL (ref 31–37)
MCHC RBC AUTO-ENTMCNC: 34.1 G/DL (ref 31–37)
MCHC RBC AUTO-ENTMCNC: 34.5 G/DL (ref 31–37)
MCV RBC AUTO: 77.2 FL (ref 80–100)
MCV RBC AUTO: 77.9 FL (ref 80–100)
MCV RBC AUTO: 77.9 FL (ref 80–100)
MCV RBC AUTO: 78.1 FL (ref 80–100)
MCV RBC AUTO: 78.1 FL (ref 80–100)
MCV RBC AUTO: 78.3 FL (ref 80–100)
MCV RBC AUTO: 78.4 FL (ref 80–100)
MCV RBC AUTO: 78.4 FL (ref 80–100)
MCV RBC AUTO: 78.5 FL (ref 80–100)
MCV RBC AUTO: 78.5 FL (ref 80–100)
MCV RBC AUTO: 78.6 FL (ref 80–100)
MCV RBC AUTO: 78.6 FL (ref 80–100)
MCV RBC AUTO: 78.8 FL (ref 80–100)
MCV RBC AUTO: 78.8 FL (ref 80–100)
MCV RBC AUTO: 78.9 FL (ref 80–100)
MCV RBC AUTO: 79.3 FL (ref 80–100)
MCV RBC AUTO: 79.4 FL (ref 80–100)
MCV RBC AUTO: 79.6 FL (ref 80–100)
MCV RBC AUTO: 79.7 FL (ref 80–100)
MCV RBC AUTO: 79.8 FL (ref 80–100)
MCV RBC AUTO: 80.2 FL (ref 80–100)
MCV RBC AUTO: 80.4 FL (ref 80–100)
MCV RBC AUTO: 80.5 FL (ref 80–100)
MONOCYTES # BLD: 11 % (ref 1–7)
MONOCYTES # BLD: 11 % (ref 1–7)
MONOCYTES # BLD: 6 % (ref 1–7)
MONOCYTES # BLD: 6 % (ref 1–7)
MONOCYTES # BLD: 7 % (ref 1–7)
MONOCYTES # BLD: 8 % (ref 1–7)
MONOCYTES # BLD: 9 % (ref 1–7)
MONOCYTES # BLD: 9 % (ref 1–7)
MORPHOLOGY: ABNORMAL
MUCUS: ABNORMAL
MYCOPLASMA PNEUMONIAE PCR: NOT DETECTED
MYOGLOBIN: 557 NG/ML (ref 28–72)
MYOGLOBIN: 617 NG/ML (ref 28–72)
NITRITE, URINE: NEGATIVE
NRBC AUTOMATED: ABNORMAL PER 100 WBC
ORGANISM: ABNORMAL
ORGANISM: ABNORMAL
OTHER OBSERVATIONS UA: ABNORMAL
PARAINFLUENZA 1 PCR: NOT DETECTED
PARAINFLUENZA 2 PCR: NOT DETECTED
PARAINFLUENZA 3 PCR: NOT DETECTED
PARAINFLUENZA 4 PCR: NOT DETECTED
PARTIAL THROMBOPLASTIN TIME: 29.9 SEC (ref 23–31)
PARTIAL THROMBOPLASTIN TIME: 30.7 SEC (ref 23–31)
PARTIAL THROMBOPLASTIN TIME: 31.3 SEC (ref 23–31)
PARTIAL THROMBOPLASTIN TIME: 31.3 SEC (ref 23–31)
PARTIAL THROMBOPLASTIN TIME: 31.4 SEC (ref 23–31)
PARTIAL THROMBOPLASTIN TIME: 42.9 SEC (ref 23–31)
PARTIAL THROMBOPLASTIN TIME: 44.7 SEC (ref 23–31)
PARTIAL THROMBOPLASTIN TIME: 52.7 SEC (ref 23–31)
PARTIAL THROMBOPLASTIN TIME: 53.7 SEC (ref 23–31)
PARTIAL THROMBOPLASTIN TIME: 57.4 SEC (ref 23–31)
PARTIAL THROMBOPLASTIN TIME: 65.5 SEC (ref 23–31)
PATHOLOGIST: ABNORMAL
PDW BLD-RTO: 13.8 % (ref 11.5–14.9)
PDW BLD-RTO: 13.9 % (ref 11.5–14.9)
PDW BLD-RTO: 13.9 % (ref 11.5–14.9)
PDW BLD-RTO: 14 % (ref 11.5–14.9)
PDW BLD-RTO: 14.1 % (ref 11.5–14.9)
PDW BLD-RTO: 14.1 % (ref 11.5–14.9)
PDW BLD-RTO: 14.3 % (ref 11.5–14.9)
PDW BLD-RTO: 14.4 % (ref 11.5–14.9)
PDW BLD-RTO: 14.5 % (ref 11.5–14.9)
PDW BLD-RTO: 14.6 % (ref 11.5–14.9)
PDW BLD-RTO: 14.7 % (ref 11.5–14.9)
PDW BLD-RTO: 17.8 % (ref 11.5–14.9)
PDW BLD-RTO: 18.2 % (ref 11.5–14.9)
PDW BLD-RTO: 18.4 % (ref 11.5–14.9)
PDW BLD-RTO: 18.7 % (ref 11.5–14.9)
PDW BLD-RTO: 18.9 % (ref 11.5–14.9)
PDW BLD-RTO: 19 % (ref 11.5–14.9)
PDW BLD-RTO: 19 % (ref 11.5–14.9)
PH UA: 5 (ref 5–8)
PH UA: 5.5 (ref 5–8)
PH UA: 6.5 (ref 5–8)
PHOSPHORUS: 3.1 MG/DL (ref 2.5–4.5)
PLATELET # BLD: 165 K/UL (ref 150–450)
PLATELET # BLD: 167 K/UL (ref 150–450)
PLATELET # BLD: 178 K/UL (ref 150–450)
PLATELET # BLD: 184 K/UL (ref 150–450)
PLATELET # BLD: 190 K/UL (ref 150–450)
PLATELET # BLD: 225 K/UL (ref 150–450)
PLATELET # BLD: 281 K/UL (ref 150–450)
PLATELET # BLD: 283 K/UL (ref 150–450)
PLATELET # BLD: 283 K/UL (ref 150–450)
PLATELET # BLD: 289 K/UL (ref 150–450)
PLATELET # BLD: 292 K/UL (ref 150–450)
PLATELET # BLD: 303 K/UL (ref 150–450)
PLATELET # BLD: 358 K/UL (ref 150–450)
PLATELET # BLD: 400 K/UL (ref 150–450)
PLATELET # BLD: 413 K/UL (ref 150–450)
PLATELET # BLD: 422 K/UL (ref 150–450)
PLATELET # BLD: 424 K/UL (ref 150–450)
PLATELET # BLD: 451 K/UL (ref 150–450)
PLATELET # BLD: 471 K/UL (ref 150–450)
PLATELET # BLD: 485 K/UL (ref 150–450)
PLATELET # BLD: 495 K/UL (ref 150–450)
PLATELET # BLD: 495 K/UL (ref 150–450)
PLATELET # BLD: 515 K/UL (ref 150–450)
PLATELET ESTIMATE: ABNORMAL
PMV BLD AUTO: 7.4 FL (ref 6–12)
PMV BLD AUTO: 7.5 FL (ref 6–12)
PMV BLD AUTO: 7.7 FL (ref 6–12)
PMV BLD AUTO: 7.7 FL (ref 6–12)
PMV BLD AUTO: 7.8 FL (ref 6–12)
PMV BLD AUTO: 7.8 FL (ref 6–12)
PMV BLD AUTO: 7.9 FL (ref 6–12)
PMV BLD AUTO: 8 FL (ref 6–12)
PMV BLD AUTO: 8 FL (ref 6–12)
PMV BLD AUTO: 8.1 FL (ref 6–12)
PMV BLD AUTO: 8.5 FL (ref 6–12)
PMV BLD AUTO: 8.6 FL (ref 6–12)
PMV BLD AUTO: 8.7 FL (ref 6–12)
PMV BLD AUTO: 8.7 FL (ref 6–12)
PMV BLD AUTO: 8.8 FL (ref 6–12)
PMV BLD AUTO: 8.8 FL (ref 6–12)
PMV BLD AUTO: 8.9 FL (ref 6–12)
POTASSIUM SERPL-SCNC: 3.5 MMOL/L (ref 3.7–5.3)
POTASSIUM SERPL-SCNC: 3.6 MMOL/L (ref 3.7–5.3)
POTASSIUM SERPL-SCNC: 3.7 MMOL/L (ref 3.7–5.3)
POTASSIUM SERPL-SCNC: 3.7 MMOL/L (ref 3.7–5.3)
POTASSIUM SERPL-SCNC: 3.8 MMOL/L (ref 3.7–5.3)
POTASSIUM SERPL-SCNC: 3.8 MMOL/L (ref 3.7–5.3)
POTASSIUM SERPL-SCNC: 3.9 MMOL/L (ref 3.7–5.3)
POTASSIUM SERPL-SCNC: 4 MMOL/L (ref 3.7–5.3)
POTASSIUM SERPL-SCNC: 4 MMOL/L (ref 3.7–5.3)
POTASSIUM SERPL-SCNC: 4.1 MMOL/L (ref 3.7–5.3)
POTASSIUM SERPL-SCNC: 4.1 MMOL/L (ref 3.7–5.3)
POTASSIUM SERPL-SCNC: 4.2 MMOL/L (ref 3.7–5.3)
POTASSIUM SERPL-SCNC: 4.4 MMOL/L (ref 3.7–5.3)
POTASSIUM SERPL-SCNC: 4.5 MMOL/L (ref 3.7–5.3)
POTASSIUM SERPL-SCNC: 4.5 MMOL/L (ref 3.7–5.3)
POTASSIUM SERPL-SCNC: 4.6 MMOL/L (ref 3.7–5.3)
POTASSIUM SERPL-SCNC: 4.8 MMOL/L (ref 3.7–5.3)
POTASSIUM SERPL-SCNC: 5 MMOL/L (ref 3.7–5.3)
POTASSIUM, UR: 26.7 MMOL/L
POTASSIUM, UR: 31.6 MMOL/L
PRO-BNP: ABNORMAL PG/ML
PROCALCITONIN: 1.73 NG/ML
PROTEIN 24 HOUR URINE: 7246 MG/24 H
PROTEIN ELECTROPHORESIS, SERUM: ABNORMAL
PROTEIN UA: ABNORMAL
PROTEIN,TOT TIMED UR: ABNORMAL MG/X H
PROTHROMBIN TIME: 11.1 SEC (ref 9.7–12)
PROTHROMBIN TIME: 12.9 SEC (ref 9.7–12)
RBC # BLD: 2.86 M/UL (ref 4.5–5.9)
RBC # BLD: 2.86 M/UL (ref 4.5–5.9)
RBC # BLD: 2.9 M/UL (ref 4.5–5.9)
RBC # BLD: 2.91 M/UL (ref 4.5–5.9)
RBC # BLD: 2.93 M/UL (ref 4.5–5.9)
RBC # BLD: 3.01 M/UL (ref 4.5–5.9)
RBC # BLD: 3.23 M/UL (ref 4.5–5.9)
RBC # BLD: 3.35 M/UL (ref 4.5–5.9)
RBC # BLD: 3.36 M/UL (ref 4.5–5.9)
RBC # BLD: 3.36 M/UL (ref 4.5–5.9)
RBC # BLD: 3.37 M/UL (ref 4.5–5.9)
RBC # BLD: 3.58 M/UL (ref 4.5–5.9)
RBC # BLD: 3.69 M/UL (ref 4.5–5.9)
RBC # BLD: 3.83 M/UL (ref 4.5–5.9)
RBC # BLD: 3.9 M/UL (ref 4.5–5.9)
RBC # BLD: 3.91 M/UL (ref 4.5–5.9)
RBC # BLD: 3.92 M/UL (ref 4.5–5.9)
RBC # BLD: 3.96 M/UL (ref 4.5–5.9)
RBC # BLD: 4 M/UL (ref 4.5–5.9)
RBC # BLD: 4.01 M/UL (ref 4.5–5.9)
RBC # BLD: 4.08 M/UL (ref 4.5–5.9)
RBC # BLD: 4.31 M/UL (ref 4.5–5.9)
RBC # BLD: 4.94 M/UL (ref 4.5–5.9)
RBC # BLD: ABNORMAL 10*6/UL
RBC UA: ABNORMAL /HPF
RENAL EPITHELIAL, UA: ABNORMAL /HPF
RESP SYNCYTIAL VIRUS PCR: NOT DETECTED
RHINO/ENTEROVIRUS PCR: NOT DETECTED
SEDIMENTATION RATE, ERYTHROCYTE: 47 MM (ref 0–15)
SEDIMENTATION RATE, ERYTHROCYTE: >130 MM (ref 0–15)
SEG NEUTROPHILS: 57 % (ref 36–66)
SEG NEUTROPHILS: 57 % (ref 36–66)
SEG NEUTROPHILS: 59 % (ref 36–66)
SEG NEUTROPHILS: 59 % (ref 36–66)
SEG NEUTROPHILS: 60 % (ref 36–66)
SEG NEUTROPHILS: 63 % (ref 36–66)
SEG NEUTROPHILS: 65 % (ref 36–66)
SEG NEUTROPHILS: 76 % (ref 36–66)
SEG NEUTROPHILS: 76 % (ref 36–66)
SEG NEUTROPHILS: 82 % (ref 36–66)
SEG NEUTROPHILS: 88 % (ref 36–66)
SEGMENTED NEUTROPHILS ABSOLUTE COUNT: 12.14 K/UL (ref 1.3–9.1)
SEGMENTED NEUTROPHILS ABSOLUTE COUNT: 13.46 K/UL (ref 1.3–9.1)
SEGMENTED NEUTROPHILS ABSOLUTE COUNT: 3.7 K/UL (ref 1.3–9.1)
SEGMENTED NEUTROPHILS ABSOLUTE COUNT: 3.72 K/UL (ref 1.3–9.1)
SEGMENTED NEUTROPHILS ABSOLUTE COUNT: 3.85 K/UL (ref 1.3–9.1)
SEGMENTED NEUTROPHILS ABSOLUTE COUNT: 4.3 K/UL (ref 1.3–9.1)
SEGMENTED NEUTROPHILS ABSOLUTE COUNT: 4.3 K/UL (ref 1.3–9.1)
SEGMENTED NEUTROPHILS ABSOLUTE COUNT: 4.9 K/UL (ref 1.3–9.1)
SEGMENTED NEUTROPHILS ABSOLUTE COUNT: 5.4 K/UL (ref 1.3–9.1)
SEGMENTED NEUTROPHILS ABSOLUTE COUNT: 5.9 K/UL (ref 1.3–9.1)
SEGMENTED NEUTROPHILS ABSOLUTE COUNT: 7.91 K/UL (ref 1.3–9.1)
SODIUM BLD-SCNC: 126 MMOL/L (ref 135–144)
SODIUM BLD-SCNC: 129 MMOL/L (ref 135–144)
SODIUM BLD-SCNC: 130 MMOL/L (ref 135–144)
SODIUM BLD-SCNC: 131 MMOL/L (ref 135–144)
SODIUM BLD-SCNC: 134 MMOL/L (ref 135–144)
SODIUM BLD-SCNC: 135 MMOL/L (ref 135–144)
SODIUM BLD-SCNC: 136 MMOL/L (ref 135–144)
SODIUM BLD-SCNC: 137 MMOL/L (ref 135–144)
SODIUM BLD-SCNC: 138 MMOL/L (ref 135–144)
SODIUM BLD-SCNC: 140 MMOL/L (ref 135–144)
SODIUM BLD-SCNC: 142 MMOL/L (ref 135–144)
SODIUM,UR: 31 MMOL/L
SODIUM,UR: 80 MMOL/L
SOURCE: NORMAL
SPECIFIC GRAVITY UA: 1.01 (ref 1–1.03)
SPECIFIC GRAVITY UA: 1.02 (ref 1–1.03)
SPECIFIC GRAVITY UA: 1.02 (ref 1–1.03)
SPECIMEN DESCRIPTION: ABNORMAL
SPECIMEN DESCRIPTION: NORMAL
STATUS: ABNORMAL
STATUS: NORMAL
TOTAL CK: 181 U/L (ref 39–308)
TOTAL IRON BINDING CAPACITY: 95 UG/DL (ref 250–450)
TOTAL PROT. SUM,%: 99 % (ref 98–102)
TOTAL PROT. SUM: 5.4 G/DL (ref 6.3–8.2)
TOTAL PROTEIN, URINE: 114 MG/DL
TOTAL PROTEIN, URINE: 218 MG/DL
TOTAL PROTEIN, URINE: 438 MG/DL
TOTAL PROTEIN: 5.4 G/DL (ref 6.4–8.3)
TOTAL PROTEIN: 5.5 G/DL (ref 6.4–8.3)
TOTAL PROTEIN: 5.5 G/DL (ref 6.4–8.3)
TOTAL PROTEIN: 5.6 G/DL (ref 6.4–8.3)
TOTAL PROTEIN: 5.7 G/DL (ref 6.4–8.3)
TOTAL PROTEIN: 5.8 G/DL (ref 6.4–8.3)
TOTAL PROTEIN: 5.9 G/DL (ref 6.4–8.3)
TOTAL PROTEIN: 5.9 G/DL (ref 6.4–8.3)
TOTAL PROTEIN: 6.9 G/DL (ref 6.4–8.3)
TOTAL PROTEIN: 7.6 G/DL (ref 6.4–8.3)
TRICHOMONAS: ABNORMAL
TRIGL SERPL-MCNC: 267 MG/DL
TROPONIN INTERP: ABNORMAL
TROPONIN T: 0.17 NG/ML
TROPONIN T: 0.17 NG/ML
TROPONIN T: 0.18 NG/ML
TROPONIN T: 0.18 NG/ML
TURBIDITY: ABNORMAL
TURBIDITY: CLEAR
TURBIDITY: CLEAR
UNSATURATED IRON BINDING CAPACITY: 80 UG/DL (ref 112–347)
URIC ACID: 11.6 MG/DL (ref 3.4–7)
URIC ACID: 8.4 MG/DL (ref 3.4–7)
URINE HGB: ABNORMAL
URINE TOTAL PROTEIN: 337 MG/DL
UROBILINOGEN, URINE: NORMAL
VLDLC SERPL CALC-MCNC: ABNORMAL MG/DL (ref 1–30)
VOLUME: 2150 ML
WBC # BLD: 10.4 K/UL (ref 3.5–11)
WBC # BLD: 10.7 K/UL (ref 3.5–11)
WBC # BLD: 12.2 K/UL (ref 3.5–11)
WBC # BLD: 12.9 K/UL (ref 3.5–11)
WBC # BLD: 13.4 K/UL (ref 3.5–11)
WBC # BLD: 14.3 K/UL (ref 3.5–11)
WBC # BLD: 14.8 K/UL (ref 3.5–11)
WBC # BLD: 15.1 K/UL (ref 3.5–11)
WBC # BLD: 15.1 K/UL (ref 3.5–11)
WBC # BLD: 15.2 K/UL (ref 3.5–11)
WBC # BLD: 15.3 K/UL (ref 3.5–11)
WBC # BLD: 15.8 K/UL (ref 3.5–11)
WBC # BLD: 16.7 K/UL (ref 3.5–11)
WBC # BLD: 6.3 K/UL (ref 3.5–11)
WBC # BLD: 6.4 K/UL (ref 3.5–11)
WBC # BLD: 6.5 K/UL (ref 3.5–11)
WBC # BLD: 6.5 K/UL (ref 3.5–11)
WBC # BLD: 6.6 K/UL (ref 3.5–11)
WBC # BLD: 7.4 K/UL (ref 3.5–11)
WBC # BLD: 7.9 K/UL (ref 3.5–11)
WBC # BLD: 8.1 K/UL (ref 3.5–11)
WBC # BLD: 8.5 K/UL (ref 3.5–11)
WBC # BLD: 9.5 K/UL (ref 3.5–11)
WBC # BLD: ABNORMAL 10*3/UL
WBC UA: ABNORMAL /HPF
YEAST: ABNORMAL

## 2018-01-01 PROCEDURE — 86705 HEP B CORE ANTIBODY IGM: CPT

## 2018-01-01 PROCEDURE — 6360000002 HC RX W HCPCS: Performed by: INTERNAL MEDICINE

## 2018-01-01 PROCEDURE — 2580000003 HC RX 258: Performed by: INTERNAL MEDICINE

## 2018-01-01 PROCEDURE — 80053 COMPREHEN METABOLIC PANEL: CPT

## 2018-01-01 PROCEDURE — 6370000000 HC RX 637 (ALT 250 FOR IP): Performed by: NURSE PRACTITIONER

## 2018-01-01 PROCEDURE — 97163 PT EVAL HIGH COMPLEX 45 MIN: CPT

## 2018-01-01 PROCEDURE — 2500000003 HC RX 250 WO HCPCS: Performed by: INTERNAL MEDICINE

## 2018-01-01 PROCEDURE — 80076 HEPATIC FUNCTION PANEL: CPT

## 2018-01-01 PROCEDURE — 97165 OT EVAL LOW COMPLEX 30 MIN: CPT

## 2018-01-01 PROCEDURE — 90937 HEMODIALYSIS REPEATED EVAL: CPT

## 2018-01-01 PROCEDURE — 99285 EMERGENCY DEPT VISIT HI MDM: CPT

## 2018-01-01 PROCEDURE — 85025 COMPLETE CBC W/AUTO DIFF WBC: CPT

## 2018-01-01 PROCEDURE — G8598 ASA/ANTIPLAT THER USED: HCPCS | Performed by: INTERNAL MEDICINE

## 2018-01-01 PROCEDURE — 2060000000 HC ICU INTERMEDIATE R&B

## 2018-01-01 PROCEDURE — 93005 ELECTROCARDIOGRAM TRACING: CPT

## 2018-01-01 PROCEDURE — 2500000003 HC RX 250 WO HCPCS: Performed by: RADIOLOGY

## 2018-01-01 PROCEDURE — 3046F HEMOGLOBIN A1C LEVEL >9.0%: CPT | Performed by: INTERNAL MEDICINE

## 2018-01-01 PROCEDURE — 97530 THERAPEUTIC ACTIVITIES: CPT

## 2018-01-01 PROCEDURE — 6370000000 HC RX 637 (ALT 250 FOR IP): Performed by: STUDENT IN AN ORGANIZED HEALTH CARE EDUCATION/TRAINING PROGRAM

## 2018-01-01 PROCEDURE — 82947 ASSAY GLUCOSE BLOOD QUANT: CPT

## 2018-01-01 PROCEDURE — 80051 ELECTROLYTE PANEL: CPT

## 2018-01-01 PROCEDURE — 1111F DSCHRG MED/CURRENT MED MERGE: CPT | Performed by: INTERNAL MEDICINE

## 2018-01-01 PROCEDURE — G8427 DOCREV CUR MEDS BY ELIG CLIN: HCPCS | Performed by: INTERNAL MEDICINE

## 2018-01-01 PROCEDURE — 83735 ASSAY OF MAGNESIUM: CPT

## 2018-01-01 PROCEDURE — C1769 GUIDE WIRE: HCPCS

## 2018-01-01 PROCEDURE — 85027 COMPLETE CBC AUTOMATED: CPT

## 2018-01-01 PROCEDURE — 6360000002 HC RX W HCPCS: Performed by: RADIOLOGY

## 2018-01-01 PROCEDURE — 6370000000 HC RX 637 (ALT 250 FOR IP): Performed by: INTERNAL MEDICINE

## 2018-01-01 PROCEDURE — 6360000002 HC RX W HCPCS: Performed by: NURSE PRACTITIONER

## 2018-01-01 PROCEDURE — 36415 COLL VENOUS BLD VENIPUNCTURE: CPT

## 2018-01-01 PROCEDURE — 80048 BASIC METABOLIC PNL TOTAL CA: CPT

## 2018-01-01 PROCEDURE — B214YZZ FLUOROSCOPY OF RIGHT HEART USING OTHER CONTRAST: ICD-10-PCS | Performed by: RADIOLOGY

## 2018-01-01 PROCEDURE — 2709999900 IR FLUORO GUIDED CVA DEVICE PLACEMENT

## 2018-01-01 PROCEDURE — 96365 THER/PROPH/DIAG IV INF INIT: CPT

## 2018-01-01 PROCEDURE — 3017F COLORECTAL CA SCREEN DOC REV: CPT | Performed by: INTERNAL MEDICINE

## 2018-01-01 PROCEDURE — 2580000003 HC RX 258: Performed by: NURSE PRACTITIONER

## 2018-01-01 PROCEDURE — 6360000002 HC RX W HCPCS: Performed by: EMERGENCY MEDICINE

## 2018-01-01 PROCEDURE — 99233 SBSQ HOSP IP/OBS HIGH 50: CPT | Performed by: INTERNAL MEDICINE

## 2018-01-01 PROCEDURE — 99214 OFFICE O/P EST MOD 30 MIN: CPT | Performed by: INTERNAL MEDICINE

## 2018-01-01 PROCEDURE — 84300 ASSAY OF URINE SODIUM: CPT

## 2018-01-01 PROCEDURE — G0379 DIRECT REFER HOSPITAL OBSERV: HCPCS

## 2018-01-01 PROCEDURE — 76770 US EXAM ABDO BACK WALL COMP: CPT

## 2018-01-01 PROCEDURE — 99232 SBSQ HOSP IP/OBS MODERATE 35: CPT | Performed by: INTERNAL MEDICINE

## 2018-01-01 PROCEDURE — G8417 CALC BMI ABV UP PARAM F/U: HCPCS | Performed by: SURGERY

## 2018-01-01 PROCEDURE — 86038 ANTINUCLEAR ANTIBODIES: CPT

## 2018-01-01 PROCEDURE — 51702 INSERT TEMP BLADDER CATH: CPT

## 2018-01-01 PROCEDURE — 99214 OFFICE O/P EST MOD 30 MIN: CPT | Performed by: NURSE PRACTITIONER

## 2018-01-01 PROCEDURE — 2000000000 HC ICU R&B

## 2018-01-01 PROCEDURE — 2580000003 HC RX 258: Performed by: EMERGENCY MEDICINE

## 2018-01-01 PROCEDURE — 6370000000 HC RX 637 (ALT 250 FOR IP): Performed by: EMERGENCY MEDICINE

## 2018-01-01 PROCEDURE — 73030 X-RAY EXAM OF SHOULDER: CPT

## 2018-01-01 PROCEDURE — 84133 ASSAY OF URINE POTASSIUM: CPT

## 2018-01-01 PROCEDURE — 84156 ASSAY OF PROTEIN URINE: CPT

## 2018-01-01 PROCEDURE — 83874 ASSAY OF MYOGLOBIN: CPT

## 2018-01-01 PROCEDURE — 85730 THROMBOPLASTIN TIME PARTIAL: CPT

## 2018-01-01 PROCEDURE — 87798 DETECT AGENT NOS DNA AMP: CPT

## 2018-01-01 PROCEDURE — 71045 X-RAY EXAM CHEST 1 VIEW: CPT

## 2018-01-01 PROCEDURE — 02PA33Z REMOVAL OF INFUSION DEVICE FROM HEART, PERCUTANEOUS APPROACH: ICD-10-PCS | Performed by: RADIOLOGY

## 2018-01-01 PROCEDURE — 6360000002 HC RX W HCPCS: Performed by: STUDENT IN AN ORGANIZED HEALTH CARE EDUCATION/TRAINING PROGRAM

## 2018-01-01 PROCEDURE — 6360000004 HC RX CONTRAST MEDICATION: Performed by: INTERNAL MEDICINE

## 2018-01-01 PROCEDURE — 82565 ASSAY OF CREATININE: CPT

## 2018-01-01 PROCEDURE — 85610 PROTHROMBIN TIME: CPT

## 2018-01-01 PROCEDURE — 99239 HOSP IP/OBS DSCHRG MGMT >30: CPT | Performed by: INTERNAL MEDICINE

## 2018-01-01 PROCEDURE — 87205 SMEAR GRAM STAIN: CPT

## 2018-01-01 PROCEDURE — 97110 THERAPEUTIC EXERCISES: CPT

## 2018-01-01 PROCEDURE — 83605 ASSAY OF LACTIC ACID: CPT

## 2018-01-01 PROCEDURE — 84520 ASSAY OF UREA NITROGEN: CPT

## 2018-01-01 PROCEDURE — 83036 HEMOGLOBIN GLYCOSYLATED A1C: CPT

## 2018-01-01 PROCEDURE — 99223 1ST HOSP IP/OBS HIGH 75: CPT | Performed by: INTERNAL MEDICINE

## 2018-01-01 PROCEDURE — 97116 GAIT TRAINING THERAPY: CPT

## 2018-01-01 PROCEDURE — 97162 PT EVAL MOD COMPLEX 30 MIN: CPT

## 2018-01-01 PROCEDURE — 86140 C-REACTIVE PROTEIN: CPT

## 2018-01-01 PROCEDURE — 84484 ASSAY OF TROPONIN QUANT: CPT

## 2018-01-01 PROCEDURE — 87086 URINE CULTURE/COLONY COUNT: CPT

## 2018-01-01 PROCEDURE — 3017F COLORECTAL CA SCREEN DOC REV: CPT | Performed by: NURSE PRACTITIONER

## 2018-01-01 PROCEDURE — 5A1D70Z PERFORMANCE OF URINARY FILTRATION, INTERMITTENT, LESS THAN 6 HOURS PER DAY: ICD-10-PCS | Performed by: INTERNAL MEDICINE

## 2018-01-01 PROCEDURE — 3017F COLORECTAL CA SCREEN DOC REV: CPT | Performed by: SURGERY

## 2018-01-01 PROCEDURE — 99254 IP/OBS CNSLTJ NEW/EST MOD 60: CPT | Performed by: SURGERY

## 2018-01-01 PROCEDURE — 81001 URINALYSIS AUTO W/SCOPE: CPT

## 2018-01-01 PROCEDURE — 86225 DNA ANTIBODY NATIVE: CPT

## 2018-01-01 PROCEDURE — 84550 ASSAY OF BLOOD/URIC ACID: CPT

## 2018-01-01 PROCEDURE — 96375 TX/PRO/DX INJ NEW DRUG ADDON: CPT

## 2018-01-01 PROCEDURE — 2580000003 HC RX 258: Performed by: RADIOLOGY

## 2018-01-01 PROCEDURE — 83516 IMMUNOASSAY NONANTIBODY: CPT

## 2018-01-01 PROCEDURE — 36556 INSERT NON-TUNNEL CV CATH: CPT

## 2018-01-01 PROCEDURE — 84145 PROCALCITONIN (PCT): CPT

## 2018-01-01 PROCEDURE — 97535 SELF CARE MNGMENT TRAINING: CPT

## 2018-01-01 PROCEDURE — 2709999900 IR TUNNELED CVC PLACE WO SQ PORT/PUMP > 5 YEARS

## 2018-01-01 PROCEDURE — 86803 HEPATITIS C AB TEST: CPT

## 2018-01-01 PROCEDURE — 96374 THER/PROPH/DIAG INJ IV PUSH: CPT

## 2018-01-01 PROCEDURE — 85651 RBC SED RATE NONAUTOMATED: CPT

## 2018-01-01 PROCEDURE — 83036 HEMOGLOBIN GLYCOSYLATED A1C: CPT | Performed by: NURSE PRACTITIONER

## 2018-01-01 PROCEDURE — 2580000003 HC RX 258: Performed by: STUDENT IN AN ORGANIZED HEALTH CARE EDUCATION/TRAINING PROGRAM

## 2018-01-01 PROCEDURE — G8484 FLU IMMUNIZE NO ADMIN: HCPCS | Performed by: INTERNAL MEDICINE

## 2018-01-01 PROCEDURE — 87581 M.PNEUMON DNA AMP PROBE: CPT

## 2018-01-01 PROCEDURE — 86403 PARTICLE AGGLUT ANTBDY SCRN: CPT

## 2018-01-01 PROCEDURE — 05PY33Z REMOVAL OF INFUSION DEVICE FROM UPPER VEIN, PERCUTANEOUS APPROACH: ICD-10-PCS | Performed by: RADIOLOGY

## 2018-01-01 PROCEDURE — 82570 ASSAY OF URINE CREATININE: CPT

## 2018-01-01 PROCEDURE — 81050 URINALYSIS VOLUME MEASURE: CPT

## 2018-01-01 PROCEDURE — G8427 DOCREV CUR MEDS BY ELIG CLIN: HCPCS | Performed by: NURSE PRACTITIONER

## 2018-01-01 PROCEDURE — 3430000000 HC RX DIAGNOSTIC RADIOPHARMACEUTICAL: Performed by: INTERNAL MEDICINE

## 2018-01-01 PROCEDURE — 87040 BLOOD CULTURE FOR BACTERIA: CPT

## 2018-01-01 PROCEDURE — 83550 IRON BINDING TEST: CPT

## 2018-01-01 PROCEDURE — 82436 ASSAY OF URINE CHLORIDE: CPT

## 2018-01-01 PROCEDURE — 94762 N-INVAS EAR/PLS OXIMTRY CONT: CPT

## 2018-01-01 PROCEDURE — 82550 ASSAY OF CK (CPK): CPT

## 2018-01-01 PROCEDURE — G8484 FLU IMMUNIZE NO ADMIN: HCPCS | Performed by: SURGERY

## 2018-01-01 PROCEDURE — 80061 LIPID PANEL: CPT

## 2018-01-01 PROCEDURE — 71046 X-RAY EXAM CHEST 2 VIEWS: CPT

## 2018-01-01 PROCEDURE — 86317 IMMUNOASSAY INFECTIOUS AGENT: CPT

## 2018-01-01 PROCEDURE — G8427 DOCREV CUR MEDS BY ELIG CLIN: HCPCS | Performed by: SURGERY

## 2018-01-01 PROCEDURE — 87186 SC STD MICRODIL/AGAR DIL: CPT

## 2018-01-01 PROCEDURE — 80162 ASSAY OF DIGOXIN TOTAL: CPT

## 2018-01-01 PROCEDURE — C8929 TTE W OR WO FOL WCON,DOPPLER: HCPCS

## 2018-01-01 PROCEDURE — 96366 THER/PROPH/DIAG IV INF ADDON: CPT

## 2018-01-01 PROCEDURE — 99214 OFFICE O/P EST MOD 30 MIN: CPT | Performed by: SURGERY

## 2018-01-01 PROCEDURE — G8979 MOBILITY GOAL STATUS: HCPCS

## 2018-01-01 PROCEDURE — 3044F HG A1C LEVEL LT 7.0%: CPT | Performed by: INTERNAL MEDICINE

## 2018-01-01 PROCEDURE — 83880 ASSAY OF NATRIURETIC PEPTIDE: CPT

## 2018-01-01 PROCEDURE — 96376 TX/PRO/DX INJ SAME DRUG ADON: CPT

## 2018-01-01 PROCEDURE — 82728 ASSAY OF FERRITIN: CPT

## 2018-01-01 PROCEDURE — 77001 FLUOROGUIDE FOR VEIN DEVICE: CPT

## 2018-01-01 PROCEDURE — 2709999900 IR NONTUNNELED VASCULAR CATHETER > 5 YEARS

## 2018-01-01 PROCEDURE — 83883 ASSAY NEPHELOMETRY NOT SPEC: CPT

## 2018-01-01 PROCEDURE — 1111F DSCHRG MED/CURRENT MED MERGE: CPT

## 2018-01-01 PROCEDURE — G0378 HOSPITAL OBSERVATION PER HR: HCPCS

## 2018-01-01 PROCEDURE — 86235 NUCLEAR ANTIGEN ANTIBODY: CPT

## 2018-01-01 PROCEDURE — 1036F TOBACCO NON-USER: CPT | Performed by: SURGERY

## 2018-01-01 PROCEDURE — 99254 IP/OBS CNSLTJ NEW/EST MOD 60: CPT | Performed by: INTERNAL MEDICINE

## 2018-01-01 PROCEDURE — 85014 HEMATOCRIT: CPT

## 2018-01-01 PROCEDURE — A9500 TC99M SESTAMIBI: HCPCS | Performed by: INTERNAL MEDICINE

## 2018-01-01 PROCEDURE — 99213 OFFICE O/P EST LOW 20 MIN: CPT | Performed by: INTERNAL MEDICINE

## 2018-01-01 PROCEDURE — 36558 INSERT TUNNELED CV CATH: CPT

## 2018-01-01 PROCEDURE — 87486 CHLMYD PNEUM DNA AMP PROBE: CPT

## 2018-01-01 PROCEDURE — 99220 PR INITIAL OBSERVATION CARE/DAY 70 MINUTES: CPT | Performed by: INTERNAL MEDICINE

## 2018-01-01 PROCEDURE — 93308 TTE F-UP OR LMTD: CPT

## 2018-01-01 PROCEDURE — 83690 ASSAY OF LIPASE: CPT

## 2018-01-01 PROCEDURE — 84165 PROTEIN E-PHORESIS SERUM: CPT

## 2018-01-01 PROCEDURE — 02H633Z INSERTION OF INFUSION DEVICE INTO RIGHT ATRIUM, PERCUTANEOUS APPROACH: ICD-10-PCS | Performed by: RADIOLOGY

## 2018-01-01 PROCEDURE — G8598 ASA/ANTIPLAT THER USED: HCPCS | Performed by: NURSE PRACTITIONER

## 2018-01-01 PROCEDURE — 36558 INSERT TUNNELED CV CATH: CPT | Performed by: RADIOLOGY

## 2018-01-01 PROCEDURE — G0365 VESSEL MAPPING HEMO ACCESS: HCPCS

## 2018-01-01 PROCEDURE — 2022F DILAT RTA XM EVC RTNOPTHY: CPT | Performed by: INTERNAL MEDICINE

## 2018-01-01 PROCEDURE — 76937 US GUIDE VASCULAR ACCESS: CPT

## 2018-01-01 PROCEDURE — 87070 CULTURE OTHR SPECIMN AEROBIC: CPT

## 2018-01-01 PROCEDURE — 87075 CULTR BACTERIA EXCEPT BLOOD: CPT

## 2018-01-01 PROCEDURE — 84155 ASSAY OF PROTEIN SERUM: CPT

## 2018-01-01 PROCEDURE — 86704 HEP B CORE ANTIBODY TOTAL: CPT

## 2018-01-01 PROCEDURE — 83540 ASSAY OF IRON: CPT

## 2018-01-01 PROCEDURE — 87147 CULTURE TYPE IMMUNOLOGIC: CPT

## 2018-01-01 PROCEDURE — G8978 MOBILITY CURRENT STATUS: HCPCS

## 2018-01-01 PROCEDURE — 1036F TOBACCO NON-USER: CPT | Performed by: INTERNAL MEDICINE

## 2018-01-01 PROCEDURE — 0JPV3XZ REMOVAL OF TUNNELED VASCULAR ACCESS DEVICE FROM UPPER EXTREMITY SUBCUTANEOUS TISSUE AND FASCIA, PERCUTANEOUS APPROACH: ICD-10-PCS | Performed by: RADIOLOGY

## 2018-01-01 PROCEDURE — 85018 HEMOGLOBIN: CPT

## 2018-01-01 PROCEDURE — 87340 HEPATITIS B SURFACE AG IA: CPT

## 2018-01-01 PROCEDURE — G8420 CALC BMI NORM PARAMETERS: HCPCS | Performed by: NURSE PRACTITIONER

## 2018-01-01 PROCEDURE — 05HN33Z INSERTION OF INFUSION DEVICE INTO LEFT INTERNAL JUGULAR VEIN, PERCUTANEOUS APPROACH: ICD-10-PCS | Performed by: RADIOLOGY

## 2018-01-01 PROCEDURE — G8484 FLU IMMUNIZE NO ADMIN: HCPCS | Performed by: NURSE PRACTITIONER

## 2018-01-01 PROCEDURE — 87493 C DIFF AMPLIFIED PROBE: CPT

## 2018-01-01 PROCEDURE — 97167 OT EVAL HIGH COMPLEX 60 MIN: CPT

## 2018-01-01 PROCEDURE — 1036F TOBACCO NON-USER: CPT | Performed by: NURSE PRACTITIONER

## 2018-01-01 PROCEDURE — 2500000003 HC RX 250 WO HCPCS: Performed by: FAMILY MEDICINE

## 2018-01-01 PROCEDURE — B544ZZA ULTRASONOGRAPHY OF LEFT JUGULAR VEINS, GUIDANCE: ICD-10-PCS | Performed by: RADIOLOGY

## 2018-01-01 PROCEDURE — G8598 ASA/ANTIPLAT THER USED: HCPCS | Performed by: SURGERY

## 2018-01-01 PROCEDURE — 87633 RESP VIRUS 12-25 TARGETS: CPT

## 2018-01-01 PROCEDURE — 87804 INFLUENZA ASSAY W/OPTIC: CPT

## 2018-01-01 PROCEDURE — 93017 CV STRESS TEST TRACING ONLY: CPT

## 2018-01-01 PROCEDURE — 86160 COMPLEMENT ANTIGEN: CPT

## 2018-01-01 PROCEDURE — G8417 CALC BMI ABV UP PARAM F/U: HCPCS | Performed by: INTERNAL MEDICINE

## 2018-01-01 PROCEDURE — 0JH63XZ INSERTION OF TUNNELED VASCULAR ACCESS DEVICE INTO CHEST SUBCUTANEOUS TISSUE AND FASCIA, PERCUTANEOUS APPROACH: ICD-10-PCS | Performed by: RADIOLOGY

## 2018-01-01 PROCEDURE — 84100 ASSAY OF PHOSPHORUS: CPT

## 2018-01-01 PROCEDURE — 77001 FLUOROGUIDE FOR VEIN DEVICE: CPT | Performed by: RADIOLOGY

## 2018-01-01 PROCEDURE — 78452 HT MUSCLE IMAGE SPECT MULT: CPT

## 2018-01-01 PROCEDURE — 96372 THER/PROPH/DIAG INJ SC/IM: CPT

## 2018-01-01 RX ORDER — SODIUM CHLORIDE 0.9 % (FLUSH) 0.9 %
10 SYRINGE (ML) INJECTION PRN
Status: DISCONTINUED | OUTPATIENT
Start: 2018-01-01 | End: 2018-01-01 | Stop reason: HOSPADM

## 2018-01-01 RX ORDER — SODIUM CHLORIDE 0.9 % (FLUSH) 0.9 %
10 SYRINGE (ML) INJECTION PRN
Status: ACTIVE | OUTPATIENT
Start: 2018-01-01 | End: 2018-01-01

## 2018-01-01 RX ORDER — HEPARIN SODIUM 1000 [USP'U]/ML
INJECTION, SOLUTION INTRAVENOUS; SUBCUTANEOUS
Status: COMPLETED | OUTPATIENT
Start: 2018-01-01 | End: 2018-01-01

## 2018-01-01 RX ORDER — DIGOXIN 125 MCG
125 TABLET ORAL DAILY
Status: DISCONTINUED | OUTPATIENT
Start: 2018-01-01 | End: 2018-01-01

## 2018-01-01 RX ORDER — FUROSEMIDE 10 MG/ML
40 INJECTION INTRAMUSCULAR; INTRAVENOUS DAILY
Status: DISCONTINUED | OUTPATIENT
Start: 2018-01-01 | End: 2018-01-01

## 2018-01-01 RX ORDER — FAMOTIDINE 20 MG/1
20 TABLET, FILM COATED ORAL 2 TIMES DAILY
Status: DISCONTINUED | OUTPATIENT
Start: 2018-01-01 | End: 2018-01-01 | Stop reason: HOSPADM

## 2018-01-01 RX ORDER — SODIUM CHLORIDE 0.9 % (FLUSH) 0.9 %
10 SYRINGE (ML) INJECTION EVERY 12 HOURS SCHEDULED
Status: DISCONTINUED | OUTPATIENT
Start: 2018-01-01 | End: 2018-01-01 | Stop reason: HOSPADM

## 2018-01-01 RX ORDER — HEPARIN SODIUM 5000 [USP'U]/ML
5000 INJECTION, SOLUTION INTRAVENOUS; SUBCUTANEOUS 2 TIMES DAILY
Status: DISCONTINUED | OUTPATIENT
Start: 2018-01-01 | End: 2018-01-01 | Stop reason: SDUPTHER

## 2018-01-01 RX ORDER — HEPARIN SODIUM 1000 [USP'U]/ML
2000 INJECTION, SOLUTION INTRAVENOUS; SUBCUTANEOUS PRN
Status: DISCONTINUED | OUTPATIENT
Start: 2018-01-01 | End: 2018-01-01 | Stop reason: ALTCHOICE

## 2018-01-01 RX ORDER — NITROGLYCERIN 0.4 MG/1
0.4 TABLET SUBLINGUAL EVERY 5 MIN PRN
Status: ACTIVE | OUTPATIENT
Start: 2018-01-01 | End: 2018-01-01

## 2018-01-01 RX ORDER — LOSARTAN POTASSIUM 50 MG/1
TABLET ORAL
Qty: 30 TABLET | Refills: 11 | Status: SHIPPED | OUTPATIENT
Start: 2018-01-01

## 2018-01-01 RX ORDER — HEPARIN SODIUM 5000 [USP'U]/ML
INJECTION, SOLUTION INTRAVENOUS; SUBCUTANEOUS
Status: COMPLETED | OUTPATIENT
Start: 2018-01-01 | End: 2018-01-01

## 2018-01-01 RX ORDER — HYDROCODONE BITARTRATE AND ACETAMINOPHEN 5; 325 MG/1; MG/1
1 TABLET ORAL EVERY 4 HOURS PRN
Status: DISCONTINUED | OUTPATIENT
Start: 2018-01-01 | End: 2018-01-01 | Stop reason: HOSPADM

## 2018-01-01 RX ORDER — FAMOTIDINE 20 MG/1
20 TABLET, FILM COATED ORAL 2 TIMES DAILY
Status: DISCONTINUED | OUTPATIENT
Start: 2018-01-01 | End: 2018-01-01

## 2018-01-01 RX ORDER — DOCUSATE SODIUM 100 MG/1
100 CAPSULE, LIQUID FILLED ORAL 2 TIMES DAILY PRN
Status: DISCONTINUED | OUTPATIENT
Start: 2018-01-01 | End: 2018-01-01 | Stop reason: HOSPADM

## 2018-01-01 RX ORDER — CARVEDILOL 25 MG/1
25 TABLET ORAL 2 TIMES DAILY WITH MEALS
Status: DISCONTINUED | OUTPATIENT
Start: 2018-01-01 | End: 2018-01-01 | Stop reason: HOSPADM

## 2018-01-01 RX ORDER — PEN NEEDLE, DIABETIC 29 G X1/2"
NEEDLE, DISPOSABLE MISCELLANEOUS
Qty: 100 EACH | Refills: 3 | Status: ON HOLD | OUTPATIENT
Start: 2018-01-01 | End: 2019-01-01

## 2018-01-01 RX ORDER — SODIUM CHLORIDE 9 MG/ML
INJECTION, SOLUTION INTRAVENOUS CONTINUOUS PRN
Status: COMPLETED | OUTPATIENT
Start: 2018-01-01 | End: 2018-01-01

## 2018-01-01 RX ORDER — NICOTINE POLACRILEX 4 MG
15 LOZENGE BUCCAL PRN
Status: DISCONTINUED | OUTPATIENT
Start: 2018-01-01 | End: 2018-01-01 | Stop reason: HOSPADM

## 2018-01-01 RX ORDER — TAMSULOSIN HYDROCHLORIDE 0.4 MG/1
0.4 CAPSULE ORAL DAILY
Qty: 30 CAPSULE | Refills: 3 | Status: SHIPPED | OUTPATIENT
Start: 2018-01-01 | End: 2019-01-01 | Stop reason: SDUPTHER

## 2018-01-01 RX ORDER — ATORVASTATIN CALCIUM 40 MG/1
40 TABLET, FILM COATED ORAL NIGHTLY
Qty: 30 TABLET | Refills: 3 | Status: SHIPPED | OUTPATIENT
Start: 2018-01-01 | End: 2018-01-01 | Stop reason: ALTCHOICE

## 2018-01-01 RX ORDER — DEXTROSE MONOHYDRATE 50 MG/ML
100 INJECTION, SOLUTION INTRAVENOUS PRN
Status: DISCONTINUED | OUTPATIENT
Start: 2018-01-01 | End: 2018-01-01 | Stop reason: HOSPADM

## 2018-01-01 RX ORDER — HEPARIN SODIUM 10000 [USP'U]/100ML
12 INJECTION, SOLUTION INTRAVENOUS CONTINUOUS
Status: DISCONTINUED | OUTPATIENT
Start: 2018-01-01 | End: 2018-01-01

## 2018-01-01 RX ORDER — LIDOCAINE HYDROCHLORIDE 20 MG/ML
INJECTION, SOLUTION EPIDURAL; INFILTRATION; INTRACAUDAL; PERINEURAL
Status: COMPLETED | OUTPATIENT
Start: 2018-01-01 | End: 2018-01-01

## 2018-01-01 RX ORDER — TAMSULOSIN HYDROCHLORIDE 0.4 MG/1
0.4 CAPSULE ORAL DAILY
Qty: 30 CAPSULE | Refills: 3 | Status: SHIPPED | OUTPATIENT
Start: 2018-01-01 | End: 2018-01-01

## 2018-01-01 RX ORDER — ATORVASTATIN CALCIUM 40 MG/1
40 TABLET, FILM COATED ORAL NIGHTLY
Status: DISCONTINUED | OUTPATIENT
Start: 2018-01-01 | End: 2018-01-01 | Stop reason: HOSPADM

## 2018-01-01 RX ORDER — DIGOXIN 125 MCG
125 TABLET ORAL DAILY
Status: DISCONTINUED | OUTPATIENT
Start: 2018-01-01 | End: 2018-01-01 | Stop reason: HOSPADM

## 2018-01-01 RX ORDER — DEXTROSE MONOHYDRATE 25 G/50ML
12.5 INJECTION, SOLUTION INTRAVENOUS PRN
Status: DISCONTINUED | OUTPATIENT
Start: 2018-01-01 | End: 2018-01-01 | Stop reason: HOSPADM

## 2018-01-01 RX ORDER — TAMSULOSIN HYDROCHLORIDE 0.4 MG/1
0.4 CAPSULE ORAL DAILY
Status: DISCONTINUED | OUTPATIENT
Start: 2018-01-01 | End: 2018-01-01 | Stop reason: HOSPADM

## 2018-01-01 RX ORDER — CARVEDILOL 12.5 MG/1
12.5 TABLET ORAL 2 TIMES DAILY WITH MEALS
Status: DISCONTINUED | OUTPATIENT
Start: 2018-01-01 | End: 2018-01-01

## 2018-01-01 RX ORDER — GLYBURIDE 5 MG/1
5 TABLET ORAL 2 TIMES DAILY WITH MEALS
Status: DISCONTINUED | OUTPATIENT
Start: 2018-01-01 | End: 2018-01-01

## 2018-01-01 RX ORDER — WARFARIN SODIUM 5 MG/1
5 TABLET ORAL
Status: COMPLETED | OUTPATIENT
Start: 2018-01-01 | End: 2018-01-01

## 2018-01-01 RX ORDER — ACETAMINOPHEN 325 MG/1
650 TABLET ORAL EVERY 4 HOURS PRN
Status: DISCONTINUED | OUTPATIENT
Start: 2018-01-01 | End: 2018-01-01 | Stop reason: HOSPADM

## 2018-01-01 RX ORDER — CARVEDILOL 12.5 MG/1
12.5 TABLET ORAL 2 TIMES DAILY WITH MEALS
Status: DISCONTINUED | OUTPATIENT
Start: 2018-01-01 | End: 2018-01-01 | Stop reason: HOSPADM

## 2018-01-01 RX ORDER — ASPIRIN 81 MG/1
81 TABLET ORAL DAILY
Status: DISCONTINUED | OUTPATIENT
Start: 2018-01-01 | End: 2018-01-01

## 2018-01-01 RX ORDER — GLYBURIDE 5 MG/1
TABLET ORAL
Qty: 90 TABLET | Refills: 11 | Status: SHIPPED | OUTPATIENT
Start: 2018-01-01 | End: 2018-01-01

## 2018-01-01 RX ORDER — CARVEDILOL 25 MG/1
25 TABLET ORAL 2 TIMES DAILY WITH MEALS
Status: DISCONTINUED | OUTPATIENT
Start: 2018-01-01 | End: 2018-01-01

## 2018-01-01 RX ORDER — POTASSIUM CHLORIDE 20MEQ/15ML
40 LIQUID (ML) ORAL PRN
Status: DISCONTINUED | OUTPATIENT
Start: 2018-01-01 | End: 2018-01-01 | Stop reason: HOSPADM

## 2018-01-01 RX ORDER — INSULIN GLARGINE 100 [IU]/ML
30 INJECTION, SOLUTION SUBCUTANEOUS EVERY MORNING
Status: DISCONTINUED | OUTPATIENT
Start: 2018-01-01 | End: 2018-01-01 | Stop reason: HOSPADM

## 2018-01-01 RX ORDER — HEPARIN SODIUM 10000 [USP'U]/100ML
10.1 INJECTION, SOLUTION INTRAVENOUS CONTINUOUS
Status: DISCONTINUED | OUTPATIENT
Start: 2018-01-01 | End: 2018-01-01

## 2018-01-01 RX ORDER — LOSARTAN POTASSIUM 50 MG/1
50 TABLET ORAL DAILY
Status: DISCONTINUED | OUTPATIENT
Start: 2018-01-01 | End: 2018-01-01

## 2018-01-01 RX ORDER — HEPARIN SODIUM 5000 [USP'U]/ML
5000 INJECTION, SOLUTION INTRAVENOUS; SUBCUTANEOUS EVERY 8 HOURS SCHEDULED
Status: DISCONTINUED | OUTPATIENT
Start: 2018-01-01 | End: 2018-01-01

## 2018-01-01 RX ORDER — WARFARIN SODIUM 5 MG/1
5 TABLET ORAL DAILY
Qty: 30 TABLET | Refills: 3
Start: 2018-01-01 | End: 2019-01-01

## 2018-01-01 RX ORDER — ONDANSETRON 2 MG/ML
4 INJECTION INTRAMUSCULAR; INTRAVENOUS EVERY 6 HOURS PRN
Status: DISCONTINUED | OUTPATIENT
Start: 2018-01-01 | End: 2018-01-01 | Stop reason: HOSPADM

## 2018-01-01 RX ORDER — METOPROLOL TARTRATE 5 MG/5ML
2.5 INJECTION INTRAVENOUS PRN
Status: ACTIVE | OUTPATIENT
Start: 2018-01-01 | End: 2018-01-01

## 2018-01-01 RX ORDER — DOCUSATE SODIUM 100 MG/1
100 CAPSULE, LIQUID FILLED ORAL 2 TIMES DAILY
Status: DISCONTINUED | OUTPATIENT
Start: 2018-01-01 | End: 2018-01-01 | Stop reason: HOSPADM

## 2018-01-01 RX ORDER — ADHESIVE BANDAGE 3/4"
BANDAGE TOPICAL
Qty: 1 EACH | Refills: 0 | Status: ON HOLD | OUTPATIENT
Start: 2018-01-01 | End: 2019-01-01

## 2018-01-01 RX ORDER — DIGOXIN 125 MCG
TABLET ORAL
Qty: 30 TABLET | Refills: 11 | Status: SHIPPED | OUTPATIENT
Start: 2018-01-01 | End: 2019-01-01

## 2018-01-01 RX ORDER — FUROSEMIDE 40 MG/1
40 TABLET ORAL 2 TIMES DAILY
Status: ON HOLD | COMMUNITY
End: 2019-01-01 | Stop reason: HOSPADM

## 2018-01-01 RX ORDER — ONDANSETRON 2 MG/ML
4 INJECTION INTRAMUSCULAR; INTRAVENOUS ONCE
Status: COMPLETED | OUTPATIENT
Start: 2018-01-01 | End: 2018-01-01

## 2018-01-01 RX ORDER — MAGNESIUM SULFATE 1 G/100ML
1 INJECTION INTRAVENOUS PRN
Status: DISCONTINUED | OUTPATIENT
Start: 2018-01-01 | End: 2018-01-01 | Stop reason: HOSPADM

## 2018-01-01 RX ORDER — ACETAMINOPHEN 325 MG/1
650 TABLET ORAL EVERY 4 HOURS PRN
Status: DISCONTINUED | OUTPATIENT
Start: 2018-01-01 | End: 2018-01-01 | Stop reason: SDUPTHER

## 2018-01-01 RX ORDER — FLASH GLUCOSE SCANNING READER
EACH MISCELLANEOUS
Qty: 1 DEVICE | Refills: 0 | Status: SHIPPED | OUTPATIENT
Start: 2018-01-01 | End: 2018-01-01 | Stop reason: SDUPTHER

## 2018-01-01 RX ORDER — FUROSEMIDE 40 MG/1
TABLET ORAL
Qty: 60 TABLET | Refills: 11 | Status: SHIPPED | OUTPATIENT
Start: 2018-01-01 | End: 2018-01-01 | Stop reason: SDUPTHER

## 2018-01-01 RX ORDER — SPIRONOLACTONE 25 MG/1
25 TABLET ORAL ONCE
Status: COMPLETED | OUTPATIENT
Start: 2018-01-01 | End: 2018-01-01

## 2018-01-01 RX ORDER — CARVEDILOL 12.5 MG/1
12.5 TABLET ORAL 2 TIMES DAILY
Qty: 60 TABLET | Refills: 3 | Status: ON HOLD | OUTPATIENT
Start: 2018-01-01 | End: 2019-01-01

## 2018-01-01 RX ORDER — HEPARIN SODIUM 1000 [USP'U]/ML
4000 INJECTION, SOLUTION INTRAVENOUS; SUBCUTANEOUS PRN
Status: DISCONTINUED | OUTPATIENT
Start: 2018-01-01 | End: 2018-01-01

## 2018-01-01 RX ORDER — ATORVASTATIN CALCIUM 20 MG/1
20 TABLET, FILM COATED ORAL NIGHTLY
Status: DISCONTINUED | OUTPATIENT
Start: 2018-01-01 | End: 2018-01-01

## 2018-01-01 RX ORDER — LIDOCAINE HYDROCHLORIDE 20 MG/ML
INJECTION, SOLUTION INFILTRATION; PERINEURAL
Status: COMPLETED | OUTPATIENT
Start: 2018-01-01 | End: 2018-01-01

## 2018-01-01 RX ORDER — FUROSEMIDE 40 MG/1
40 TABLET ORAL 2 TIMES DAILY WITH MEALS
Status: DISCONTINUED | OUTPATIENT
Start: 2018-01-01 | End: 2018-01-01 | Stop reason: HOSPADM

## 2018-01-01 RX ORDER — LOSARTAN POTASSIUM 50 MG/1
50 TABLET ORAL DAILY
Status: DISCONTINUED | OUTPATIENT
Start: 2018-01-01 | End: 2018-01-01 | Stop reason: HOSPADM

## 2018-01-01 RX ORDER — FUROSEMIDE 10 MG/ML
40 INJECTION INTRAMUSCULAR; INTRAVENOUS ONCE
Status: COMPLETED | OUTPATIENT
Start: 2018-01-01 | End: 2018-01-01

## 2018-01-01 RX ORDER — HEPARIN SODIUM 5000 [USP'U]/ML
5000 INJECTION, SOLUTION INTRAVENOUS; SUBCUTANEOUS EVERY 8 HOURS SCHEDULED
Status: DISCONTINUED | OUTPATIENT
Start: 2018-01-01 | End: 2018-01-01 | Stop reason: HOSPADM

## 2018-01-01 RX ORDER — CLOPIDOGREL BISULFATE 75 MG/1
75 TABLET ORAL DAILY
Status: DISCONTINUED | OUTPATIENT
Start: 2018-01-01 | End: 2018-01-01

## 2018-01-01 RX ORDER — NITROGLYCERIN 0.4 MG/1
0.4 TABLET SUBLINGUAL EVERY 5 MIN PRN
Status: DISCONTINUED | OUTPATIENT
Start: 2018-01-01 | End: 2018-01-01 | Stop reason: HOSPADM

## 2018-01-01 RX ORDER — AMINOPHYLLINE DIHYDRATE 25 MG/ML
100 INJECTION, SOLUTION INTRAVENOUS
Status: ACTIVE | OUTPATIENT
Start: 2018-01-01 | End: 2018-01-01

## 2018-01-01 RX ORDER — FAMOTIDINE 20 MG/1
20 TABLET, FILM COATED ORAL DAILY
Status: DISCONTINUED | OUTPATIENT
Start: 2018-01-01 | End: 2018-01-01 | Stop reason: HOSPADM

## 2018-01-01 RX ORDER — SODIUM CHLORIDE 9 MG/ML
INJECTION, SOLUTION INTRAVENOUS CONTINUOUS
Status: DISCONTINUED | OUTPATIENT
Start: 2018-01-01 | End: 2018-01-01

## 2018-01-01 RX ORDER — 0.9 % SODIUM CHLORIDE 0.9 %
250 INTRAVENOUS SOLUTION INTRAVENOUS ONCE
Status: DISCONTINUED | OUTPATIENT
Start: 2018-01-01 | End: 2018-01-01 | Stop reason: HOSPADM

## 2018-01-01 RX ORDER — INSULIN GLARGINE 100 [IU]/ML
24 INJECTION, SOLUTION SUBCUTANEOUS EVERY MORNING
Status: DISCONTINUED | OUTPATIENT
Start: 2018-01-01 | End: 2018-01-01

## 2018-01-01 RX ORDER — SODIUM CHLORIDE 0.9 % (FLUSH) 0.9 %
10 SYRINGE (ML) INJECTION PRN
Status: DISCONTINUED | OUTPATIENT
Start: 2018-01-01 | End: 2018-01-01 | Stop reason: SDUPTHER

## 2018-01-01 RX ORDER — CARVEDILOL 25 MG/1
25 TABLET ORAL 2 TIMES DAILY WITH MEALS
Qty: 60 TABLET | Refills: 3 | Status: SHIPPED | OUTPATIENT
Start: 2018-01-01 | End: 2018-01-01 | Stop reason: DRUGHIGH

## 2018-01-01 RX ORDER — GLYBURIDE 5 MG/1
5 TABLET ORAL DAILY
Status: DISCONTINUED | OUTPATIENT
Start: 2018-01-01 | End: 2018-01-01

## 2018-01-01 RX ORDER — BLOOD-GLUCOSE METER
1 EACH MISCELLANEOUS DAILY
Qty: 1 KIT | Refills: 0 | Status: ON HOLD | OUTPATIENT
Start: 2018-01-01 | End: 2019-01-01

## 2018-01-01 RX ORDER — 0.9 % SODIUM CHLORIDE 0.9 %
250 INTRAVENOUS SOLUTION INTRAVENOUS ONCE
Status: COMPLETED | OUTPATIENT
Start: 2018-01-01 | End: 2018-01-01

## 2018-01-01 RX ORDER — GLYBURIDE 5 MG/1
10 TABLET ORAL
Status: DISCONTINUED | OUTPATIENT
Start: 2018-01-01 | End: 2018-01-01

## 2018-01-01 RX ORDER — FUROSEMIDE 40 MG/1
40 TABLET ORAL DAILY
Qty: 30 TABLET | Refills: 1
Start: 2018-01-01 | End: 2018-01-01 | Stop reason: DRUGHIGH

## 2018-01-01 RX ORDER — 0.9 % SODIUM CHLORIDE 0.9 %
1000 INTRAVENOUS SOLUTION INTRAVENOUS ONCE
Status: COMPLETED | OUTPATIENT
Start: 2018-01-01 | End: 2018-01-01

## 2018-01-01 RX ORDER — HEPARIN SODIUM 1000 [USP'U]/ML
4000 INJECTION, SOLUTION INTRAVENOUS; SUBCUTANEOUS ONCE
Status: COMPLETED | OUTPATIENT
Start: 2018-01-01 | End: 2018-01-01

## 2018-01-01 RX ORDER — CLOPIDOGREL BISULFATE 75 MG/1
75 TABLET ORAL DAILY
Status: DISCONTINUED | OUTPATIENT
Start: 2018-01-01 | End: 2018-01-01 | Stop reason: HOSPADM

## 2018-01-01 RX ORDER — ATORVASTATIN CALCIUM 40 MG/1
40 TABLET, FILM COATED ORAL NIGHTLY
Status: DISCONTINUED | OUTPATIENT
Start: 2018-01-01 | End: 2018-01-01

## 2018-01-01 RX ORDER — SODIUM CHLORIDE 0.9 % (FLUSH) 0.9 %
10 SYRINGE (ML) INJECTION EVERY 12 HOURS SCHEDULED
Status: DISCONTINUED | OUTPATIENT
Start: 2018-01-01 | End: 2018-01-01 | Stop reason: SDUPTHER

## 2018-01-01 RX ORDER — ACETAMINOPHEN 500 MG
1000 TABLET ORAL ONCE
Status: COMPLETED | OUTPATIENT
Start: 2018-01-01 | End: 2018-01-01

## 2018-01-01 RX ORDER — LACTOBACILLUS RHAMNOSUS GG 10B CELL
1 CAPSULE ORAL
Status: DISCONTINUED | OUTPATIENT
Start: 2018-01-01 | End: 2018-01-01 | Stop reason: HOSPADM

## 2018-01-01 RX ORDER — FUROSEMIDE 40 MG/1
40 TABLET ORAL 2 TIMES DAILY WITH MEALS
Status: DISCONTINUED | OUTPATIENT
Start: 2018-01-01 | End: 2018-01-01

## 2018-01-01 RX ORDER — FUROSEMIDE 40 MG/1
40 TABLET ORAL DAILY
Status: DISCONTINUED | OUTPATIENT
Start: 2018-01-01 | End: 2018-01-01 | Stop reason: HOSPADM

## 2018-01-01 RX ORDER — CLOPIDOGREL BISULFATE 75 MG/1
75 TABLET ORAL
Status: ON HOLD | COMMUNITY
Start: 2018-04-23 | End: 2018-01-01 | Stop reason: HOSPADM

## 2018-01-01 RX ORDER — CLOPIDOGREL BISULFATE 75 MG/1
75 TABLET ORAL DAILY
Qty: 30 TABLET | Refills: 3 | Status: SHIPPED | OUTPATIENT
Start: 2018-01-01

## 2018-01-01 RX ORDER — HEPARIN SODIUM 1000 [USP'U]/ML
4000 INJECTION, SOLUTION INTRAVENOUS; SUBCUTANEOUS PRN
Status: DISCONTINUED | OUTPATIENT
Start: 2018-01-01 | End: 2018-01-01 | Stop reason: ALTCHOICE

## 2018-01-01 RX ORDER — LINEZOLID 600 MG/1
600 TABLET, FILM COATED ORAL EVERY 12 HOURS SCHEDULED
Status: DISCONTINUED | OUTPATIENT
Start: 2018-01-01 | End: 2018-01-01

## 2018-01-01 RX ORDER — HEPARIN SODIUM 1000 [USP'U]/ML
2000 INJECTION, SOLUTION INTRAVENOUS; SUBCUTANEOUS PRN
Status: DISCONTINUED | OUTPATIENT
Start: 2018-01-01 | End: 2018-01-01

## 2018-01-01 RX ORDER — VANCOMYCIN HYDROCHLORIDE 125 MG/1
125 CAPSULE ORAL 4 TIMES DAILY
Qty: 64 CAPSULE | Refills: 0 | Status: SHIPPED | OUTPATIENT
Start: 2018-01-01 | End: 2018-01-01

## 2018-01-01 RX ORDER — ASPIRIN 81 MG/1
324 TABLET, CHEWABLE ORAL ONCE
Status: COMPLETED | OUTPATIENT
Start: 2018-01-01 | End: 2018-01-01

## 2018-01-01 RX ORDER — NICOTINE 21 MG/24HR
1 PATCH, TRANSDERMAL 24 HOURS TRANSDERMAL DAILY PRN
Status: DISCONTINUED | OUTPATIENT
Start: 2018-01-01 | End: 2018-01-01 | Stop reason: HOSPADM

## 2018-01-01 RX ORDER — FLASH GLUCOSE SENSOR
KIT MISCELLANEOUS
Qty: 10 EACH | Refills: 3 | Status: SHIPPED | OUTPATIENT
Start: 2018-01-01 | End: 2018-01-01 | Stop reason: ALTCHOICE

## 2018-01-01 RX ORDER — GLYBURIDE 5 MG/1
5 TABLET ORAL 2 TIMES DAILY WITH MEALS
Status: DISCONTINUED | OUTPATIENT
Start: 2018-01-01 | End: 2018-01-01 | Stop reason: SDUPTHER

## 2018-01-01 RX ORDER — POTASSIUM CHLORIDE 7.45 MG/ML
10 INJECTION INTRAVENOUS PRN
Status: DISCONTINUED | OUTPATIENT
Start: 2018-01-01 | End: 2018-01-01 | Stop reason: HOSPADM

## 2018-01-01 RX ORDER — POTASSIUM CHLORIDE 20 MEQ/1
40 TABLET, EXTENDED RELEASE ORAL PRN
Status: DISCONTINUED | OUTPATIENT
Start: 2018-01-01 | End: 2018-01-01 | Stop reason: HOSPADM

## 2018-01-01 RX ORDER — IBUPROFEN 800 MG/1
800 TABLET ORAL ONCE
Status: COMPLETED | OUTPATIENT
Start: 2018-01-01 | End: 2018-01-01

## 2018-01-01 RX ORDER — LANCETS
EACH MISCELLANEOUS
Qty: 100 EACH | Refills: 3 | Status: ON HOLD | OUTPATIENT
Start: 2018-01-01 | End: 2019-01-01

## 2018-01-01 RX ORDER — CEFAZOLIN SODIUM 1 G/3ML
3 INJECTION, POWDER, FOR SOLUTION INTRAMUSCULAR; INTRAVENOUS
Qty: 6000 MG | Refills: 0
Start: 2018-01-01 | End: 2018-01-01

## 2018-01-01 RX ORDER — PREDNISONE 20 MG/1
20 TABLET ORAL DAILY
Status: COMPLETED | OUTPATIENT
Start: 2018-01-01 | End: 2018-01-01

## 2018-01-01 RX ADMIN — MICONAZOLE NITRATE: 20.6 POWDER TOPICAL at 21:22

## 2018-01-01 RX ADMIN — FUROSEMIDE 40 MG: 40 TABLET ORAL at 18:33

## 2018-01-01 RX ADMIN — TAMSULOSIN HYDROCHLORIDE 0.4 MG: 0.4 CAPSULE ORAL at 12:26

## 2018-01-01 RX ADMIN — ATORVASTATIN CALCIUM 40 MG: 40 TABLET, FILM COATED ORAL at 21:27

## 2018-01-01 RX ADMIN — Medication 10 ML: at 08:51

## 2018-01-01 RX ADMIN — HEPARIN SODIUM 5000 UNITS: 5000 INJECTION, SOLUTION INTRAVENOUS; SUBCUTANEOUS at 05:12

## 2018-01-01 RX ADMIN — MICONAZOLE NITRATE: 20.6 POWDER TOPICAL at 21:30

## 2018-01-01 RX ADMIN — HYDROCODONE BITARTRATE AND ACETAMINOPHEN 1 TABLET: 5; 325 TABLET ORAL at 21:21

## 2018-01-01 RX ADMIN — CARVEDILOL 25 MG: 25 TABLET ORAL at 16:31

## 2018-01-01 RX ADMIN — HEPARIN SODIUM AND DEXTROSE 12 UNITS/KG/HR: 10000; 5 INJECTION INTRAVENOUS at 06:40

## 2018-01-01 RX ADMIN — FAMOTIDINE 20 MG: 20 TABLET ORAL at 08:06

## 2018-01-01 RX ADMIN — LOSARTAN POTASSIUM 50 MG: 50 TABLET ORAL at 08:38

## 2018-01-01 RX ADMIN — FUROSEMIDE 40 MG: 10 INJECTION, SOLUTION INTRAMUSCULAR; INTRAVENOUS at 08:13

## 2018-01-01 RX ADMIN — SODIUM CHLORIDE: 9 INJECTION, SOLUTION INTRAVENOUS at 03:03

## 2018-01-01 RX ADMIN — PIPERACILLIN SODIUM,TAZOBACTAM SODIUM 2.25 G: 2; .25 INJECTION, POWDER, FOR SOLUTION INTRAVENOUS at 17:11

## 2018-01-01 RX ADMIN — INSULIN LISPRO 2 UNITS: 100 INJECTION, SOLUTION INTRAVENOUS; SUBCUTANEOUS at 21:14

## 2018-01-01 RX ADMIN — SODIUM CHLORIDE 20 ML/HR: 9 INJECTION, SOLUTION INTRAVENOUS at 16:40

## 2018-01-01 RX ADMIN — TETRAKIS(2-METHOXYISOBUTYLISOCYANIDE)COPPER(I) TETRAFLUOROBORATE 35.2 MILLICURIE: 1 INJECTION, POWDER, LYOPHILIZED, FOR SOLUTION INTRAVENOUS at 09:11

## 2018-01-01 RX ADMIN — INSULIN LISPRO 2 UNITS: 100 INJECTION, SOLUTION INTRAVENOUS; SUBCUTANEOUS at 20:36

## 2018-01-01 RX ADMIN — MICONAZOLE NITRATE: 20.6 POWDER TOPICAL at 08:41

## 2018-01-01 RX ADMIN — Medication 1.2 ML: at 19:02

## 2018-01-01 RX ADMIN — Medication 10 ML: at 20:52

## 2018-01-01 RX ADMIN — Medication 1.3 ML: at 14:47

## 2018-01-01 RX ADMIN — SODIUM CHLORIDE: 9 INJECTION, SOLUTION INTRAVENOUS at 08:08

## 2018-01-01 RX ADMIN — HEPARIN SODIUM 5000 UNITS: 5000 INJECTION, SOLUTION INTRAVENOUS; SUBCUTANEOUS at 21:02

## 2018-01-01 RX ADMIN — DOCUSATE SODIUM 100 MG: 100 CAPSULE, LIQUID FILLED ORAL at 22:09

## 2018-01-01 RX ADMIN — FUROSEMIDE 40 MG: 40 TABLET ORAL at 11:37

## 2018-01-01 RX ADMIN — FUROSEMIDE 40 MG: 10 INJECTION, SOLUTION INTRAMUSCULAR; INTRAVENOUS at 10:28

## 2018-01-01 RX ADMIN — HEPARIN SODIUM 5000 UNITS: 5000 INJECTION, SOLUTION INTRAVENOUS; SUBCUTANEOUS at 21:04

## 2018-01-01 RX ADMIN — ACETAMINOPHEN 650 MG: 325 TABLET, FILM COATED ORAL at 08:07

## 2018-01-01 RX ADMIN — FAMOTIDINE 20 MG: 20 TABLET ORAL at 08:21

## 2018-01-01 RX ADMIN — TAMSULOSIN HYDROCHLORIDE 0.4 MG: 0.4 CAPSULE ORAL at 08:21

## 2018-01-01 RX ADMIN — INSULIN LISPRO 4 UNITS: 100 INJECTION, SOLUTION INTRAVENOUS; SUBCUTANEOUS at 18:52

## 2018-01-01 RX ADMIN — CARVEDILOL 25 MG: 25 TABLET, FILM COATED ORAL at 18:36

## 2018-01-01 RX ADMIN — GLYBURIDE 5 MG: 5 TABLET ORAL at 02:30

## 2018-01-01 RX ADMIN — INSULIN LISPRO 4 UNITS: 100 INJECTION, SOLUTION INTRAVENOUS; SUBCUTANEOUS at 07:59

## 2018-01-01 RX ADMIN — INSULIN LISPRO 2 UNITS: 100 INJECTION, SOLUTION INTRAVENOUS; SUBCUTANEOUS at 08:13

## 2018-01-01 RX ADMIN — ATORVASTATIN CALCIUM 20 MG: 20 TABLET, FILM COATED ORAL at 01:36

## 2018-01-01 RX ADMIN — INSULIN LISPRO 2 UNITS: 100 INJECTION, SOLUTION INTRAVENOUS; SUBCUTANEOUS at 18:15

## 2018-01-01 RX ADMIN — MUPIROCIN: 20 OINTMENT TOPICAL at 21:22

## 2018-01-01 RX ADMIN — IRON SUCROSE 100 MG: 20 INJECTION, SOLUTION INTRAVENOUS at 18:37

## 2018-01-01 RX ADMIN — LINEZOLID 600 MG: 600 TABLET, FILM COATED ORAL at 08:58

## 2018-01-01 RX ADMIN — MICONAZOLE NITRATE: 20.6 POWDER TOPICAL at 21:35

## 2018-01-01 RX ADMIN — INSULIN LISPRO 2 UNITS: 100 INJECTION, SOLUTION INTRAVENOUS; SUBCUTANEOUS at 16:38

## 2018-01-01 RX ADMIN — Medication 10 ML: at 09:09

## 2018-01-01 RX ADMIN — FUROSEMIDE 40 MG: 10 INJECTION, SOLUTION INTRAMUSCULAR; INTRAVENOUS at 17:13

## 2018-01-01 RX ADMIN — SODIUM CHLORIDE: 9 INJECTION, SOLUTION INTRAVENOUS at 00:23

## 2018-01-01 RX ADMIN — HEPARIN SODIUM 5000 UNITS: 5000 INJECTION, SOLUTION INTRAVENOUS; SUBCUTANEOUS at 14:19

## 2018-01-01 RX ADMIN — INSULIN LISPRO 6 UNITS: 100 INJECTION, SOLUTION INTRAVENOUS; SUBCUTANEOUS at 18:37

## 2018-01-01 RX ADMIN — INSULIN LISPRO 4 UNITS: 100 INJECTION, SOLUTION INTRAVENOUS; SUBCUTANEOUS at 17:23

## 2018-01-01 RX ADMIN — LIDOCAINE HYDROCHLORIDE 5 ML: 20 INJECTION, SOLUTION EPIDURAL; INFILTRATION; INTRACAUDAL; PERINEURAL at 16:31

## 2018-01-01 RX ADMIN — MUPIROCIN: 20 OINTMENT TOPICAL at 12:27

## 2018-01-01 RX ADMIN — Medication 10 ML: at 10:01

## 2018-01-01 RX ADMIN — CARVEDILOL 25 MG: 25 TABLET ORAL at 17:12

## 2018-01-01 RX ADMIN — SODIUM CHLORIDE 1000 ML: 9 INJECTION, SOLUTION INTRAVENOUS at 19:44

## 2018-01-01 RX ADMIN — SODIUM CHLORIDE: 9 INJECTION, SOLUTION INTRAVENOUS at 17:21

## 2018-01-01 RX ADMIN — HEPARIN SODIUM 1.2 UNITS: 1000 INJECTION, SOLUTION INTRAVENOUS; SUBCUTANEOUS at 12:03

## 2018-01-01 RX ADMIN — HEPARIN SODIUM 5000 UNITS: 5000 INJECTION, SOLUTION INTRAVENOUS; SUBCUTANEOUS at 13:31

## 2018-01-01 RX ADMIN — PREDNISONE 20 MG: 20 TABLET ORAL at 08:19

## 2018-01-01 RX ADMIN — Medication 125 MCG: at 10:27

## 2018-01-01 RX ADMIN — Medication 1.3 ML: at 17:14

## 2018-01-01 RX ADMIN — DIGOXIN 125 MCG: 125 TABLET ORAL at 08:25

## 2018-01-01 RX ADMIN — Medication 10 ML: at 09:37

## 2018-01-01 RX ADMIN — IRON SUCROSE 300 MG: 20 INJECTION, SOLUTION INTRAVENOUS at 16:03

## 2018-01-01 RX ADMIN — HEPARIN SODIUM 1.6 UNITS: 1000 INJECTION, SOLUTION INTRAVENOUS; SUBCUTANEOUS at 16:54

## 2018-01-01 RX ADMIN — FUROSEMIDE 40 MG: 40 TABLET ORAL at 08:38

## 2018-01-01 RX ADMIN — ATORVASTATIN CALCIUM 40 MG: 40 TABLET, FILM COATED ORAL at 20:58

## 2018-01-01 RX ADMIN — FAMOTIDINE 20 MG: 20 TABLET ORAL at 08:17

## 2018-01-01 RX ADMIN — PREDNISONE 20 MG: 20 TABLET ORAL at 08:58

## 2018-01-01 RX ADMIN — TAMSULOSIN HYDROCHLORIDE 0.4 MG: 0.4 CAPSULE ORAL at 16:17

## 2018-01-01 RX ADMIN — PIPERACILLIN SODIUM,TAZOBACTAM SODIUM 2.25 G: 2; .25 INJECTION, POWDER, FOR SOLUTION INTRAVENOUS at 05:27

## 2018-01-01 RX ADMIN — MUPIROCIN: 20 OINTMENT TOPICAL at 08:41

## 2018-01-01 RX ADMIN — ATORVASTATIN CALCIUM 40 MG: 40 TABLET, FILM COATED ORAL at 22:15

## 2018-01-01 RX ADMIN — Medication 10 ML: at 21:36

## 2018-01-01 RX ADMIN — DOCUSATE SODIUM 100 MG: 100 CAPSULE, LIQUID FILLED ORAL at 08:58

## 2018-01-01 RX ADMIN — TAMSULOSIN HYDROCHLORIDE 0.4 MG: 0.4 CAPSULE ORAL at 08:06

## 2018-01-01 RX ADMIN — Medication 10 ML: at 21:26

## 2018-01-01 RX ADMIN — Medication 3 ML: at 13:56

## 2018-01-01 RX ADMIN — FAMOTIDINE 20 MG: 20 TABLET ORAL at 11:52

## 2018-01-01 RX ADMIN — INSULIN LISPRO 3 UNITS: 100 INJECTION, SOLUTION INTRAVENOUS; SUBCUTANEOUS at 23:43

## 2018-01-01 RX ADMIN — ATORVASTATIN CALCIUM 40 MG: 40 TABLET, FILM COATED ORAL at 22:08

## 2018-01-01 RX ADMIN — INSULIN LISPRO 2 UNITS: 100 INJECTION, SOLUTION INTRAVENOUS; SUBCUTANEOUS at 08:17

## 2018-01-01 RX ADMIN — FUROSEMIDE 40 MG: 40 TABLET ORAL at 08:26

## 2018-01-01 RX ADMIN — FUROSEMIDE 40 MG: 40 TABLET ORAL at 13:41

## 2018-01-01 RX ADMIN — Medication 10 ML: at 21:02

## 2018-01-01 RX ADMIN — Medication 1.3 ML: at 11:55

## 2018-01-01 RX ADMIN — MICONAZOLE NITRATE: 20.6 POWDER TOPICAL at 08:53

## 2018-01-01 RX ADMIN — Medication 1.2 ML: at 17:14

## 2018-01-01 RX ADMIN — MICONAZOLE NITRATE: 20.6 POWDER TOPICAL at 08:18

## 2018-01-01 RX ADMIN — TAMSULOSIN HYDROCHLORIDE 0.4 MG: 0.4 CAPSULE ORAL at 13:42

## 2018-01-01 RX ADMIN — CARVEDILOL 25 MG: 25 TABLET, FILM COATED ORAL at 09:14

## 2018-01-01 RX ADMIN — PIPERACILLIN SODIUM AND TAZOBACTAM SODIUM 3.38 G: 3; .375 INJECTION, POWDER, LYOPHILIZED, FOR SOLUTION INTRAVENOUS at 05:48

## 2018-01-01 RX ADMIN — FAMOTIDINE 20 MG: 20 TABLET ORAL at 13:42

## 2018-01-01 RX ADMIN — LINEZOLID 600 MG: 600 TABLET, FILM COATED ORAL at 08:19

## 2018-01-01 RX ADMIN — SPIRONOLACTONE 25 MG: 25 TABLET ORAL at 11:58

## 2018-01-01 RX ADMIN — Medication 125 MG: at 13:43

## 2018-01-01 RX ADMIN — Medication 125 MCG: at 01:31

## 2018-01-01 RX ADMIN — VANCOMYCIN HYDROCHLORIDE 1250 MG: 10 INJECTION, POWDER, LYOPHILIZED, FOR SOLUTION INTRAVENOUS at 14:03

## 2018-01-01 RX ADMIN — DIGOXIN 125 MCG: 125 TABLET ORAL at 13:42

## 2018-01-01 RX ADMIN — SODIUM CHLORIDE: 9 INJECTION, SOLUTION INTRAVENOUS at 23:11

## 2018-01-01 RX ADMIN — Medication 10 ML: at 22:06

## 2018-01-01 RX ADMIN — DEXTROSE MONOHYDRATE 12.5 G: 25 INJECTION, SOLUTION INTRAVENOUS at 17:42

## 2018-01-01 RX ADMIN — MUPIROCIN: 20 OINTMENT TOPICAL at 09:19

## 2018-01-01 RX ADMIN — FAMOTIDINE 20 MG: 20 TABLET ORAL at 09:14

## 2018-01-01 RX ADMIN — CARVEDILOL 12.5 MG: 12.5 TABLET, FILM COATED ORAL at 17:40

## 2018-01-01 RX ADMIN — MUPIROCIN: 20 OINTMENT TOPICAL at 16:51

## 2018-01-01 RX ADMIN — LINEZOLID 600 MG: 600 TABLET, FILM COATED ORAL at 08:40

## 2018-01-01 RX ADMIN — CARVEDILOL 25 MG: 25 TABLET, FILM COATED ORAL at 16:30

## 2018-01-01 RX ADMIN — HEPARIN SODIUM 4000 UNITS: 1000 INJECTION, SOLUTION INTRAVENOUS; SUBCUTANEOUS at 18:18

## 2018-01-01 RX ADMIN — DOCUSATE SODIUM 100 MG: 100 CAPSULE, LIQUID FILLED ORAL at 21:27

## 2018-01-01 RX ADMIN — Medication 125 MG: at 20:23

## 2018-01-01 RX ADMIN — HYDROCODONE BITARTRATE AND ACETAMINOPHEN 1 TABLET: 5; 325 TABLET ORAL at 08:19

## 2018-01-01 RX ADMIN — HYDROCODONE BITARTRATE AND ACETAMINOPHEN 1 TABLET: 5; 325 TABLET ORAL at 08:34

## 2018-01-01 RX ADMIN — LINEZOLID 600 MG: 600 TABLET, FILM COATED ORAL at 08:17

## 2018-01-01 RX ADMIN — LOSARTAN POTASSIUM 50 MG: 50 TABLET ORAL at 13:42

## 2018-01-01 RX ADMIN — Medication 2 G: at 13:41

## 2018-01-01 RX ADMIN — HEPARIN SODIUM 5000 UNITS: 5000 INJECTION, SOLUTION INTRAVENOUS; SUBCUTANEOUS at 16:30

## 2018-01-01 RX ADMIN — DIGOXIN 125 MCG: 125 TABLET ORAL at 08:38

## 2018-01-01 RX ADMIN — HEPARIN SODIUM 5000 UNITS: 5000 INJECTION, SOLUTION INTRAVENOUS; SUBCUTANEOUS at 06:06

## 2018-01-01 RX ADMIN — Medication 10 ML: at 21:06

## 2018-01-01 RX ADMIN — SODIUM CHLORIDE: 9 INJECTION, SOLUTION INTRAVENOUS at 18:38

## 2018-01-01 RX ADMIN — HEPARIN SODIUM 5000 UNITS: 5000 INJECTION, SOLUTION INTRAVENOUS; SUBCUTANEOUS at 06:11

## 2018-01-01 RX ADMIN — Medication 10 ML: at 21:55

## 2018-01-01 RX ADMIN — LOSARTAN POTASSIUM 50 MG: 50 TABLET ORAL at 11:36

## 2018-01-01 RX ADMIN — FAMOTIDINE 20 MG: 20 TABLET ORAL at 08:58

## 2018-01-01 RX ADMIN — CLOPIDOGREL BISULFATE 75 MG: 75 TABLET ORAL at 11:58

## 2018-01-01 RX ADMIN — Medication 10 ML: at 13:43

## 2018-01-01 RX ADMIN — ASPIRIN 81 MG 324 MG: 81 TABLET ORAL at 23:32

## 2018-01-01 RX ADMIN — Medication 10 ML: at 08:21

## 2018-01-01 RX ADMIN — FAMOTIDINE 20 MG: 20 TABLET ORAL at 12:25

## 2018-01-01 RX ADMIN — FAMOTIDINE 20 MG: 20 TABLET ORAL at 09:34

## 2018-01-01 RX ADMIN — MUPIROCIN: 20 OINTMENT TOPICAL at 09:00

## 2018-01-01 RX ADMIN — CARVEDILOL 25 MG: 25 TABLET, FILM COATED ORAL at 08:51

## 2018-01-01 RX ADMIN — Medication 10 ML: at 08:08

## 2018-01-01 RX ADMIN — INSULIN LISPRO 2 UNITS: 100 INJECTION, SOLUTION INTRAVENOUS; SUBCUTANEOUS at 11:51

## 2018-01-01 RX ADMIN — HEPARIN SODIUM 1.9 ML: 5000 INJECTION, SOLUTION INTRAVENOUS; SUBCUTANEOUS at 16:55

## 2018-01-01 RX ADMIN — ONDANSETRON 4 MG: 2 INJECTION INTRAMUSCULAR; INTRAVENOUS at 14:46

## 2018-01-01 RX ADMIN — HEPARIN SODIUM 5000 UNITS: 5000 INJECTION, SOLUTION INTRAVENOUS; SUBCUTANEOUS at 16:37

## 2018-01-01 RX ADMIN — ACETAMINOPHEN 650 MG: 325 TABLET, FILM COATED ORAL at 23:42

## 2018-01-01 RX ADMIN — HEPARIN SODIUM 2000 UNITS: 1000 INJECTION, SOLUTION INTRAVENOUS; SUBCUTANEOUS at 08:06

## 2018-01-01 RX ADMIN — HYDROCODONE BITARTRATE AND ACETAMINOPHEN 1 TABLET: 5; 325 TABLET ORAL at 16:45

## 2018-01-01 RX ADMIN — MUPIROCIN: 20 OINTMENT TOPICAL at 08:18

## 2018-01-01 RX ADMIN — CLOPIDOGREL BISULFATE 75 MG: 75 TABLET ORAL at 08:17

## 2018-01-01 RX ADMIN — CARVEDILOL 12.5 MG: 12.5 TABLET, FILM COATED ORAL at 08:38

## 2018-01-01 RX ADMIN — INSULIN LISPRO 1 UNITS: 100 INJECTION, SOLUTION INTRAVENOUS; SUBCUTANEOUS at 23:32

## 2018-01-01 RX ADMIN — Medication 1.4 ML: at 12:55

## 2018-01-01 RX ADMIN — LOSARTAN POTASSIUM 50 MG: 50 TABLET ORAL at 01:36

## 2018-01-01 RX ADMIN — ATORVASTATIN CALCIUM 40 MG: 40 TABLET, FILM COATED ORAL at 21:21

## 2018-01-01 RX ADMIN — CARVEDILOL 25 MG: 25 TABLET, FILM COATED ORAL at 12:26

## 2018-01-01 RX ADMIN — CARVEDILOL 25 MG: 25 TABLET ORAL at 01:37

## 2018-01-01 RX ADMIN — HEPARIN SODIUM 5000 UNITS: 5000 INJECTION, SOLUTION INTRAVENOUS; SUBCUTANEOUS at 01:41

## 2018-01-01 RX ADMIN — Medication 10 ML: at 19:50

## 2018-01-01 RX ADMIN — HEPARIN SODIUM 5000 UNITS: 5000 INJECTION, SOLUTION INTRAVENOUS; SUBCUTANEOUS at 15:57

## 2018-01-01 RX ADMIN — CLOPIDOGREL BISULFATE 75 MG: 75 TABLET ORAL at 12:25

## 2018-01-01 RX ADMIN — INSULIN LISPRO 4 UNITS: 100 INJECTION, SOLUTION INTRAVENOUS; SUBCUTANEOUS at 08:58

## 2018-01-01 RX ADMIN — HEPARIN SODIUM 5000 UNITS: 5000 INJECTION, SOLUTION INTRAVENOUS; SUBCUTANEOUS at 21:34

## 2018-01-01 RX ADMIN — HEPARIN SODIUM AND DEXTROSE 12 UNITS/KG/HR: 10000; 5 INJECTION INTRAVENOUS at 16:21

## 2018-01-01 RX ADMIN — LIDOCAINE HYDROCHLORIDE 5 ML: 20 INJECTION, SOLUTION INFILTRATION; PERINEURAL at 16:46

## 2018-01-01 RX ADMIN — Medication 3 G: at 13:47

## 2018-01-01 RX ADMIN — MICONAZOLE NITRATE: 20.6 POWDER TOPICAL at 11:52

## 2018-01-01 RX ADMIN — LIDOCAINE HYDROCHLORIDE 5 ML: 20 INJECTION, SOLUTION EPIDURAL; INFILTRATION; INTRACAUDAL; PERINEURAL at 11:51

## 2018-01-01 RX ADMIN — Medication 125 MG: at 13:41

## 2018-01-01 RX ADMIN — ACETAMINOPHEN 1000 MG: 500 TABLET, FILM COATED ORAL at 11:25

## 2018-01-01 RX ADMIN — CLOPIDOGREL BISULFATE 75 MG: 75 TABLET ORAL at 08:19

## 2018-01-01 RX ADMIN — FAMOTIDINE 20 MG: 20 TABLET ORAL at 08:40

## 2018-01-01 RX ADMIN — Medication 1.2 ML: at 11:47

## 2018-01-01 RX ADMIN — TAMSULOSIN HYDROCHLORIDE 0.4 MG: 0.4 CAPSULE ORAL at 09:34

## 2018-01-01 RX ADMIN — HEPARIN SODIUM 5000 UNITS: 5000 INJECTION, SOLUTION INTRAVENOUS; SUBCUTANEOUS at 06:26

## 2018-01-01 RX ADMIN — Medication 1.5 ML: at 13:07

## 2018-01-01 RX ADMIN — LINEZOLID 600 MG: 600 TABLET, FILM COATED ORAL at 20:58

## 2018-01-01 RX ADMIN — PIPERACILLIN SODIUM AND TAZOBACTAM SODIUM 3.38 G: 3; .375 INJECTION, POWDER, LYOPHILIZED, FOR SOLUTION INTRAVENOUS at 19:46

## 2018-01-01 RX ADMIN — LINEZOLID 600 MG: 600 TABLET, FILM COATED ORAL at 22:06

## 2018-01-01 RX ADMIN — PREDNISONE 20 MG: 20 TABLET ORAL at 09:14

## 2018-01-01 RX ADMIN — Medication 10 ML: at 08:19

## 2018-01-01 RX ADMIN — DOCUSATE SODIUM 100 MG: 100 CAPSULE, LIQUID FILLED ORAL at 08:18

## 2018-01-01 RX ADMIN — INSULIN LISPRO 2 UNITS: 100 INJECTION, SOLUTION INTRAVENOUS; SUBCUTANEOUS at 12:26

## 2018-01-01 RX ADMIN — FAMOTIDINE 20 MG: 20 TABLET ORAL at 01:43

## 2018-01-01 RX ADMIN — CARVEDILOL 25 MG: 25 TABLET, FILM COATED ORAL at 08:18

## 2018-01-01 RX ADMIN — Medication 10 ML: at 20:37

## 2018-01-01 RX ADMIN — DARBEPOETIN ALFA 25 MCG: 25 INJECTION, SOLUTION INTRAVENOUS; SUBCUTANEOUS at 17:22

## 2018-01-01 RX ADMIN — Medication 1.3 ML: at 19:27

## 2018-01-01 RX ADMIN — Medication 10 ML: at 21:31

## 2018-01-01 RX ADMIN — Medication 10 ML: at 07:56

## 2018-01-01 RX ADMIN — SODIUM BICARBONATE: 84 INJECTION, SOLUTION INTRAVENOUS at 23:54

## 2018-01-01 RX ADMIN — MICONAZOLE NITRATE: 20.6 POWDER TOPICAL at 08:20

## 2018-01-01 RX ADMIN — Medication 1.3 ML: at 19:02

## 2018-01-01 RX ADMIN — INSULIN LISPRO 2 UNITS: 100 INJECTION, SOLUTION INTRAVENOUS; SUBCUTANEOUS at 09:05

## 2018-01-01 RX ADMIN — Medication 10 ML: at 09:00

## 2018-01-01 RX ADMIN — CARVEDILOL 12.5 MG: 12.5 TABLET, FILM COATED ORAL at 11:36

## 2018-01-01 RX ADMIN — INSULIN LISPRO 2 UNITS: 100 INJECTION, SOLUTION INTRAVENOUS; SUBCUTANEOUS at 08:40

## 2018-01-01 RX ADMIN — IBUPROFEN 800 MG: 800 TABLET ORAL at 15:36

## 2018-01-01 RX ADMIN — CARVEDILOL 25 MG: 25 TABLET, FILM COATED ORAL at 08:19

## 2018-01-01 RX ADMIN — Medication 10 ML: at 08:42

## 2018-01-01 RX ADMIN — ATORVASTATIN CALCIUM 40 MG: 40 TABLET, FILM COATED ORAL at 22:06

## 2018-01-01 RX ADMIN — INSULIN LISPRO 2 UNITS: 100 INJECTION, SOLUTION INTRAVENOUS; SUBCUTANEOUS at 11:58

## 2018-01-01 RX ADMIN — CARVEDILOL 25 MG: 25 TABLET, FILM COATED ORAL at 17:15

## 2018-01-01 RX ADMIN — Medication 1.4 ML: at 13:07

## 2018-01-01 RX ADMIN — FAMOTIDINE 20 MG: 20 TABLET ORAL at 20:35

## 2018-01-01 RX ADMIN — CLOPIDOGREL BISULFATE 75 MG: 75 TABLET ORAL at 08:40

## 2018-01-01 RX ADMIN — MUPIROCIN: 20 OINTMENT TOPICAL at 08:51

## 2018-01-01 RX ADMIN — Medication 1.2 ML: at 12:00

## 2018-01-01 RX ADMIN — DOCUSATE SODIUM 100 MG: 100 CAPSULE, LIQUID FILLED ORAL at 08:19

## 2018-01-01 RX ADMIN — DARBEPOETIN ALFA 25 MCG: 25 INJECTION, SOLUTION INTRAVENOUS; SUBCUTANEOUS at 17:25

## 2018-01-01 RX ADMIN — HEPARIN SODIUM 5000 UNITS: 5000 INJECTION, SOLUTION INTRAVENOUS; SUBCUTANEOUS at 14:34

## 2018-01-01 RX ADMIN — HEPARIN SODIUM 5000 UNITS: 5000 INJECTION, SOLUTION INTRAVENOUS; SUBCUTANEOUS at 15:55

## 2018-01-01 RX ADMIN — CLOPIDOGREL BISULFATE 75 MG: 75 TABLET ORAL at 07:56

## 2018-01-01 RX ADMIN — DOCUSATE SODIUM 100 MG: 100 CAPSULE, LIQUID FILLED ORAL at 09:14

## 2018-01-01 RX ADMIN — HEPARIN SODIUM AND DEXTROSE 10.1 UNITS/KG/HR: 10000; 5 INJECTION INTRAVENOUS at 11:51

## 2018-01-01 RX ADMIN — Medication 1.2 ML: at 14:48

## 2018-01-01 RX ADMIN — CARVEDILOL 25 MG: 25 TABLET, FILM COATED ORAL at 21:27

## 2018-01-01 RX ADMIN — DOCUSATE SODIUM 100 MG: 100 CAPSULE, LIQUID FILLED ORAL at 22:06

## 2018-01-01 RX ADMIN — CARVEDILOL 25 MG: 25 TABLET, FILM COATED ORAL at 17:19

## 2018-01-01 RX ADMIN — DEXTROSE MONOHYDRATE 12.5 G: 25 INJECTION, SOLUTION INTRAVENOUS at 07:54

## 2018-01-01 RX ADMIN — INSULIN LISPRO 4 UNITS: 100 INJECTION, SOLUTION INTRAVENOUS; SUBCUTANEOUS at 16:31

## 2018-01-01 RX ADMIN — CARVEDILOL 25 MG: 25 TABLET ORAL at 10:27

## 2018-01-01 RX ADMIN — FAMOTIDINE 20 MG: 20 TABLET ORAL at 21:49

## 2018-01-01 RX ADMIN — FUROSEMIDE 40 MG: 40 TABLET ORAL at 17:31

## 2018-01-01 RX ADMIN — PIPERACILLIN SODIUM AND TAZOBACTAM SODIUM 3.38 G: 3; .375 INJECTION, POWDER, LYOPHILIZED, FOR SOLUTION INTRAVENOUS at 17:21

## 2018-01-01 RX ADMIN — INSULIN LISPRO 3 UNITS: 100 INJECTION, SOLUTION INTRAVENOUS; SUBCUTANEOUS at 21:22

## 2018-01-01 RX ADMIN — HEPARIN SODIUM 5000 UNITS: 5000 INJECTION, SOLUTION INTRAVENOUS; SUBCUTANEOUS at 15:02

## 2018-01-01 RX ADMIN — MICONAZOLE NITRATE: 20.6 POWDER TOPICAL at 20:58

## 2018-01-01 RX ADMIN — PERFLUTREN 2.2 MG: 6.52 INJECTION, SUSPENSION INTRAVENOUS at 13:53

## 2018-01-01 RX ADMIN — INSULIN LISPRO 2 UNITS: 100 INJECTION, SOLUTION INTRAVENOUS; SUBCUTANEOUS at 12:38

## 2018-01-01 RX ADMIN — FAMOTIDINE 20 MG: 20 TABLET ORAL at 17:15

## 2018-01-01 RX ADMIN — FUROSEMIDE 40 MG: 40 TABLET ORAL at 17:40

## 2018-01-01 RX ADMIN — HEPARIN SODIUM 1.3 UNITS: 1000 INJECTION, SOLUTION INTRAVENOUS; SUBCUTANEOUS at 12:04

## 2018-01-01 RX ADMIN — SODIUM BICARBONATE: 84 INJECTION, SOLUTION INTRAVENOUS at 03:28

## 2018-01-01 RX ADMIN — LINEZOLID 600 MG: 600 TABLET, FILM COATED ORAL at 22:08

## 2018-01-01 RX ADMIN — HEPARIN SODIUM 5000 UNITS: 5000 INJECTION, SOLUTION INTRAVENOUS; SUBCUTANEOUS at 21:14

## 2018-01-01 RX ADMIN — CARVEDILOL 25 MG: 25 TABLET, FILM COATED ORAL at 07:55

## 2018-01-01 RX ADMIN — CARVEDILOL 25 MG: 25 TABLET, FILM COATED ORAL at 18:15

## 2018-01-01 RX ADMIN — CARVEDILOL 25 MG: 25 TABLET, FILM COATED ORAL at 16:38

## 2018-01-01 RX ADMIN — Medication 1.3 ML: at 11:47

## 2018-01-01 RX ADMIN — SODIUM BICARBONATE: 84 INJECTION, SOLUTION INTRAVENOUS at 17:28

## 2018-01-01 RX ADMIN — DEXTROSE MONOHYDRATE 12.5 G: 25 INJECTION, SOLUTION INTRAVENOUS at 00:33

## 2018-01-01 RX ADMIN — WARFARIN SODIUM 5 MG: 5 TABLET ORAL at 18:33

## 2018-01-01 RX ADMIN — LINEZOLID 600 MG: 600 TABLET, FILM COATED ORAL at 21:24

## 2018-01-01 RX ADMIN — CLOPIDOGREL BISULFATE 75 MG: 75 TABLET ORAL at 08:51

## 2018-01-01 RX ADMIN — TETRAKIS(2-METHOXYISOBUTYLISOCYANIDE)COPPER(I) TETRAFLUOROBORATE 10.7 MILLICURIE: 1 INJECTION, POWDER, LYOPHILIZED, FOR SOLUTION INTRAVENOUS at 07:49

## 2018-01-01 RX ADMIN — CARVEDILOL 25 MG: 25 TABLET, FILM COATED ORAL at 08:40

## 2018-01-01 RX ADMIN — CARVEDILOL 25 MG: 25 TABLET, FILM COATED ORAL at 17:23

## 2018-01-01 RX ADMIN — Medication 125 MG: at 05:31

## 2018-01-01 RX ADMIN — Medication 1 CAPSULE: at 08:38

## 2018-01-01 RX ADMIN — INSULIN LISPRO 2 UNITS: 100 INJECTION, SOLUTION INTRAVENOUS; SUBCUTANEOUS at 09:14

## 2018-01-01 RX ADMIN — MUPIROCIN: 20 OINTMENT TOPICAL at 08:20

## 2018-01-01 RX ADMIN — Medication 10 ML: at 21:41

## 2018-01-01 RX ADMIN — CLOPIDOGREL BISULFATE 75 MG: 75 TABLET ORAL at 01:37

## 2018-01-01 RX ADMIN — ATORVASTATIN CALCIUM 40 MG: 40 TABLET, FILM COATED ORAL at 20:35

## 2018-01-01 RX ADMIN — Medication 10 ML: at 21:14

## 2018-01-01 RX ADMIN — Medication 125 MG: at 19:29

## 2018-01-01 RX ADMIN — Medication 10 ML: at 08:13

## 2018-01-01 RX ADMIN — HEPARIN SODIUM 5000 UNITS: 5000 INJECTION, SOLUTION INTRAVENOUS; SUBCUTANEOUS at 22:11

## 2018-01-01 RX ADMIN — DOCUSATE SODIUM 100 MG: 100 CAPSULE, LIQUID FILLED ORAL at 21:13

## 2018-01-01 RX ADMIN — FAMOTIDINE 20 MG: 20 TABLET ORAL at 19:49

## 2018-01-01 RX ADMIN — HYDROCODONE BITARTRATE AND ACETAMINOPHEN 1 TABLET: 5; 325 TABLET ORAL at 11:56

## 2018-01-01 RX ADMIN — HYDROCODONE BITARTRATE AND ACETAMINOPHEN 1 TABLET: 5; 325 TABLET ORAL at 02:13

## 2018-01-01 RX ADMIN — DOCUSATE SODIUM 100 MG: 100 CAPSULE, LIQUID FILLED ORAL at 08:40

## 2018-01-01 RX ADMIN — FAMOTIDINE 20 MG: 20 TABLET ORAL at 08:19

## 2018-01-01 RX ADMIN — MICONAZOLE NITRATE: 20.6 POWDER TOPICAL at 21:14

## 2018-01-01 RX ADMIN — FUROSEMIDE 40 MG: 10 INJECTION, SOLUTION INTRAMUSCULAR; INTRAVENOUS at 23:32

## 2018-01-01 RX ADMIN — HEPARIN SODIUM 5000 UNITS: 5000 INJECTION, SOLUTION INTRAVENOUS; SUBCUTANEOUS at 21:21

## 2018-01-01 RX ADMIN — REGADENOSON 0.4 MG: 0.08 INJECTION, SOLUTION INTRAVENOUS at 09:09

## 2018-01-01 RX ADMIN — HEPARIN SODIUM 5000 UNITS: 5000 INJECTION, SOLUTION INTRAVENOUS; SUBCUTANEOUS at 22:06

## 2018-01-01 RX ADMIN — HEPARIN SODIUM 5000 UNITS: 5000 INJECTION, SOLUTION INTRAVENOUS; SUBCUTANEOUS at 06:16

## 2018-01-01 RX ADMIN — CARVEDILOL 25 MG: 25 TABLET, FILM COATED ORAL at 08:58

## 2018-01-01 RX ADMIN — LINEZOLID 600 MG: 600 TABLET, FILM COATED ORAL at 00:08

## 2018-01-01 RX ADMIN — FAMOTIDINE 20 MG: 20 TABLET ORAL at 10:27

## 2018-01-01 RX ADMIN — Medication 125 MG: at 06:05

## 2018-01-01 RX ADMIN — MICONAZOLE NITRATE: 20.6 POWDER TOPICAL at 09:19

## 2018-01-01 RX ADMIN — FAMOTIDINE 20 MG: 20 TABLET ORAL at 08:51

## 2018-01-01 RX ADMIN — Medication 125 MG: at 18:33

## 2018-01-01 RX ADMIN — TAMSULOSIN HYDROCHLORIDE 0.4 MG: 0.4 CAPSULE ORAL at 17:21

## 2018-01-01 RX ADMIN — HEPARIN SODIUM 4000 UNITS: 1000 INJECTION, SOLUTION INTRAVENOUS; SUBCUTANEOUS at 11:51

## 2018-01-01 RX ADMIN — INSULIN LISPRO 6 UNITS: 100 INJECTION, SOLUTION INTRAVENOUS; SUBCUTANEOUS at 08:25

## 2018-01-01 RX ADMIN — FAMOTIDINE 20 MG: 20 TABLET ORAL at 09:00

## 2018-01-01 RX ADMIN — FAMOTIDINE 20 MG: 20 TABLET ORAL at 08:26

## 2018-01-01 RX ADMIN — CLOPIDOGREL BISULFATE 75 MG: 75 TABLET ORAL at 08:58

## 2018-01-01 RX ADMIN — CARVEDILOL 25 MG: 25 TABLET, FILM COATED ORAL at 19:32

## 2018-01-01 RX ADMIN — MICONAZOLE NITRATE: 20.6 POWDER TOPICAL at 08:51

## 2018-01-01 RX ADMIN — HEPARIN SODIUM 5000 UNITS: 5000 INJECTION, SOLUTION INTRAVENOUS; SUBCUTANEOUS at 15:54

## 2018-01-01 RX ADMIN — MICONAZOLE NITRATE: 20.6 POWDER TOPICAL at 09:00

## 2018-01-01 RX ADMIN — FAMOTIDINE 20 MG: 20 TABLET ORAL at 08:38

## 2018-01-01 RX ADMIN — TAMSULOSIN HYDROCHLORIDE 0.4 MG: 0.4 CAPSULE ORAL at 08:38

## 2018-01-01 RX ADMIN — DOCUSATE SODIUM 100 MG: 100 CAPSULE, LIQUID FILLED ORAL at 21:21

## 2018-01-01 RX ADMIN — DIGOXIN 125 MCG: 125 TABLET ORAL at 07:56

## 2018-01-01 RX ADMIN — SODIUM CHLORIDE: 9 INJECTION, SOLUTION INTRAVENOUS at 01:19

## 2018-01-01 RX ADMIN — MICONAZOLE NITRATE: 20.6 POWDER TOPICAL at 00:02

## 2018-01-01 RX ADMIN — INSULIN GLARGINE 30 UNITS: 100 INJECTION, SOLUTION SUBCUTANEOUS at 08:25

## 2018-01-01 RX ADMIN — HEPARIN SODIUM 5000 UNITS: 5000 INJECTION, SOLUTION INTRAVENOUS; SUBCUTANEOUS at 23:43

## 2018-01-01 RX ADMIN — DIGOXIN 125 MCG: 125 TABLET ORAL at 11:36

## 2018-01-01 RX ADMIN — MICONAZOLE NITRATE: 20.6 POWDER TOPICAL at 22:11

## 2018-01-01 RX ADMIN — HYDROCODONE BITARTRATE AND ACETAMINOPHEN 1 TABLET: 5; 325 TABLET ORAL at 19:13

## 2018-01-01 RX ADMIN — MICONAZOLE NITRATE: 20.6 POWDER TOPICAL at 22:06

## 2018-01-01 RX ADMIN — INSULIN LISPRO 1 UNITS: 100 INJECTION, SOLUTION INTRAVENOUS; SUBCUTANEOUS at 22:11

## 2018-01-01 RX ADMIN — IRON SUCROSE 300 MG: 20 INJECTION, SOLUTION INTRAVENOUS at 12:25

## 2018-01-01 RX ADMIN — HEPARIN SODIUM 5000 UNITS: 5000 INJECTION, SOLUTION INTRAVENOUS; SUBCUTANEOUS at 22:38

## 2018-01-01 RX ADMIN — INSULIN LISPRO 2 UNITS: 100 INJECTION, SOLUTION INTRAVENOUS; SUBCUTANEOUS at 21:51

## 2018-01-01 RX ADMIN — Medication 10 ML: at 07:50

## 2018-01-01 RX ADMIN — CARVEDILOL 12.5 MG: 12.5 TABLET, FILM COATED ORAL at 18:33

## 2018-01-01 RX ADMIN — DOCUSATE SODIUM 100 MG: 100 CAPSULE, LIQUID FILLED ORAL at 20:58

## 2018-01-01 RX ADMIN — HEPARIN SODIUM 5000 UNITS: 5000 INJECTION, SOLUTION INTRAVENOUS; SUBCUTANEOUS at 14:25

## 2018-01-01 RX ADMIN — Medication 125 MG: at 00:38

## 2018-01-01 RX ADMIN — SODIUM BICARBONATE: 84 INJECTION, SOLUTION INTRAVENOUS at 14:17

## 2018-01-01 RX ADMIN — HEPARIN SODIUM 5000 UNITS: 5000 INJECTION, SOLUTION INTRAVENOUS; SUBCUTANEOUS at 05:15

## 2018-01-01 RX ADMIN — Medication 10 ML: at 08:53

## 2018-01-01 RX ADMIN — CARVEDILOL 12.5 MG: 12.5 TABLET, FILM COATED ORAL at 17:31

## 2018-01-01 RX ADMIN — GLYBURIDE 10 MG: 5 TABLET ORAL at 08:13

## 2018-01-01 RX ADMIN — MUPIROCIN: 20 OINTMENT TOPICAL at 08:52

## 2018-01-01 RX ADMIN — HEPARIN SODIUM 5000 UNITS: 5000 INJECTION, SOLUTION INTRAVENOUS; SUBCUTANEOUS at 21:30

## 2018-01-01 RX ADMIN — Medication 10 ML: at 01:44

## 2018-01-01 RX ADMIN — HYDROCODONE BITARTRATE AND ACETAMINOPHEN 1 TABLET: 5; 325 TABLET ORAL at 03:11

## 2018-01-01 RX ADMIN — Medication 1 CAPSULE: at 13:41

## 2018-01-01 RX ADMIN — LINEZOLID 600 MG: 600 TABLET, FILM COATED ORAL at 09:14

## 2018-01-01 RX ADMIN — CARVEDILOL 12.5 MG: 12.5 TABLET, FILM COATED ORAL at 13:42

## 2018-01-01 RX ADMIN — CLOPIDOGREL BISULFATE 75 MG: 75 TABLET ORAL at 08:26

## 2018-01-01 RX ADMIN — CARVEDILOL 25 MG: 25 TABLET ORAL at 08:25

## 2018-01-01 RX ADMIN — CARVEDILOL 25 MG: 25 TABLET, FILM COATED ORAL at 09:00

## 2018-01-01 RX ADMIN — Medication 10 ML: at 23:55

## 2018-01-01 RX ADMIN — Medication 1.5 ML: at 12:55

## 2018-01-01 RX ADMIN — Medication 10 ML: at 08:00

## 2018-01-01 RX ADMIN — HEPARIN SODIUM 5000 UNITS: 5000 INJECTION, SOLUTION INTRAVENOUS; SUBCUTANEOUS at 05:57

## 2018-01-01 RX ADMIN — HEPARIN SODIUM 5000 UNITS: 5000 INJECTION, SOLUTION INTRAVENOUS; SUBCUTANEOUS at 05:51

## 2018-01-01 RX ADMIN — CLOPIDOGREL BISULFATE 75 MG: 75 TABLET ORAL at 09:14

## 2018-01-01 RX ADMIN — SODIUM CHLORIDE 250 ML: 9 INJECTION, SOLUTION INTRAVENOUS at 14:00

## 2018-01-01 RX ADMIN — Medication 1.2 ML: at 19:27

## 2018-01-01 RX ADMIN — Medication 10 ML: at 21:34

## 2018-01-01 RX ADMIN — VANCOMYCIN HYDROCHLORIDE 1500 MG: 10 INJECTION, POWDER, LYOPHILIZED, FOR SOLUTION INTRAVENOUS at 20:38

## 2018-01-01 RX ADMIN — CLOPIDOGREL BISULFATE 75 MG: 75 TABLET ORAL at 09:00

## 2018-01-01 RX ADMIN — Medication 1.2 ML: at 11:55

## 2018-01-01 RX ADMIN — MICONAZOLE NITRATE: 20.6 POWDER TOPICAL at 12:26

## 2018-01-01 RX ADMIN — CARVEDILOL 25 MG: 25 TABLET, FILM COATED ORAL at 16:45

## 2018-01-01 RX ADMIN — HEPARIN SODIUM AND DEXTROSE 12 UNITS/KG/HR: 10000; 5 INJECTION INTRAVENOUS at 18:17

## 2018-01-01 RX ADMIN — HEPARIN SODIUM 5000 UNITS: 5000 INJECTION, SOLUTION INTRAVENOUS; SUBCUTANEOUS at 21:23

## 2018-01-01 RX ADMIN — HEPARIN SODIUM AND DEXTROSE 11.9 UNITS/KG/HR: 10000; 5 INJECTION INTRAVENOUS at 08:37

## 2018-01-01 RX ADMIN — Medication 1.3 ML: at 12:00

## 2018-01-01 RX ADMIN — HEPARIN SODIUM AND DEXTROSE 12 UNITS/KG/HR: 10000; 5 INJECTION INTRAVENOUS at 17:26

## 2018-01-01 RX ADMIN — Medication 125 MG: at 00:53

## 2018-01-01 RX ADMIN — INSULIN LISPRO 4 UNITS: 100 INJECTION, SOLUTION INTRAVENOUS; SUBCUTANEOUS at 17:19

## 2018-01-01 ASSESSMENT — PAIN SCALES - GENERAL
PAINLEVEL_OUTOF10: 0
PAINLEVEL_OUTOF10: 3
PAINLEVEL_OUTOF10: 0
PAINLEVEL_OUTOF10: 0
PAINLEVEL_OUTOF10: 3
PAINLEVEL_OUTOF10: 0
PAINLEVEL_OUTOF10: 0
PAINLEVEL_OUTOF10: 8
PAINLEVEL_OUTOF10: 0
PAINLEVEL_OUTOF10: 4
PAINLEVEL_OUTOF10: 10
PAINLEVEL_OUTOF10: 0
PAINLEVEL_OUTOF10: 5
PAINLEVEL_OUTOF10: 0
PAINLEVEL_OUTOF10: 8
PAINLEVEL_OUTOF10: 0
PAINLEVEL_OUTOF10: 5
PAINLEVEL_OUTOF10: 0
PAINLEVEL_OUTOF10: 9
PAINLEVEL_OUTOF10: 2
PAINLEVEL_OUTOF10: 8
PAINLEVEL_OUTOF10: 0
PAINLEVEL_OUTOF10: 4
PAINLEVEL_OUTOF10: 0
PAINLEVEL_OUTOF10: 7
PAINLEVEL_OUTOF10: 0
PAINLEVEL_OUTOF10: 8
PAINLEVEL_OUTOF10: 8
PAINLEVEL_OUTOF10: 0
PAINLEVEL_OUTOF10: 8
PAINLEVEL_OUTOF10: 8
PAINLEVEL_OUTOF10: 0
PAINLEVEL_OUTOF10: 5
PAINLEVEL_OUTOF10: 0

## 2018-01-01 ASSESSMENT — ENCOUNTER SYMPTOMS
DIARRHEA: 0
ALLERGIC/IMMUNOLOGIC NEGATIVE: 1
DOUBLE VISION: 0
VOMITING: 0
DIARRHEA: 1
NAUSEA: 0
VOMITING: 0
SHORTNESS OF BREATH: 0
DOUBLE VISION: 0
EYE PAIN: 0
SHORTNESS OF BREATH: 0
COUGH: 0
ABDOMINAL PAIN: 0
ABDOMINAL DISTENTION: 0
ABDOMINAL PAIN: 0
RHINORRHEA: 0
CONSTIPATION: 0
SORE THROAT: 0
ABDOMINAL PAIN: 0
SHORTNESS OF BREATH: 1
VOMITING: 0
COUGH: 0
DIARRHEA: 0
COUGH: 0
SHORTNESS OF BREATH: 0
WHEEZING: 0
ABDOMINAL PAIN: 0
NAUSEA: 0
ABDOMINAL PAIN: 0
ABDOMINAL PAIN: 0
SORE THROAT: 1
GASTROINTESTINAL NEGATIVE: 1
BACK PAIN: 1
DIARRHEA: 0
SORE THROAT: 0
NAUSEA: 0
CONSTIPATION: 0
DIARRHEA: 0
SHORTNESS OF BREATH: 0
NAUSEA: 1
EYE DISCHARGE: 0
DOUBLE VISION: 0
VOMITING: 0
EYE REDNESS: 0
DOUBLE VISION: 0
SHORTNESS OF BREATH: 0
CONSTIPATION: 0
DIARRHEA: 1
VOMITING: 0
ABDOMINAL PAIN: 0
COUGH: 0
BLURRED VISION: 0
CONSTIPATION: 0
NAUSEA: 0
VOMITING: 0
DIARRHEA: 0
CONSTIPATION: 0
BLURRED VISION: 0
VOMITING: 0
SPUTUM PRODUCTION: 0
BACK PAIN: 0
CONSTIPATION: 0
SORE THROAT: 0
DOUBLE VISION: 0
NAUSEA: 0
BACK PAIN: 0
VOMITING: 0
SORE THROAT: 0
VOMITING: 0
SHORTNESS OF BREATH: 0
DIARRHEA: 0
BLOOD IN STOOL: 0
BLURRED VISION: 0
SHORTNESS OF BREATH: 0
VOMITING: 0
WHEEZING: 0
SORE THROAT: 0
VOMITING: 1
BLURRED VISION: 0
NAUSEA: 0
BLURRED VISION: 0
SORE THROAT: 0
SHORTNESS OF BREATH: 0
COUGH: 0
NAUSEA: 0
BLURRED VISION: 0
CONSTIPATION: 0
CONSTIPATION: 0
COUGH: 0
EYE REDNESS: 0
SORE THROAT: 0
NAUSEA: 1
VOMITING: 1
NAUSEA: 0
SORE THROAT: 0
DIARRHEA: 0
SHORTNESS OF BREATH: 0
EYE DISCHARGE: 0
CONSTIPATION: 0
SHORTNESS OF BREATH: 0
WHEEZING: 0
VOMITING: 0
ANAL BLEEDING: 0
SHORTNESS OF BREATH: 0
NAUSEA: 0
ABDOMINAL PAIN: 0
EYES NEGATIVE: 1
CONSTIPATION: 0
ABDOMINAL PAIN: 0
CONSTIPATION: 0
SORE THROAT: 0
CONSTIPATION: 0
SORE THROAT: 0
ABDOMINAL PAIN: 0
SORE THROAT: 0
DOUBLE VISION: 0
COUGH: 0
DIARRHEA: 1
SHORTNESS OF BREATH: 0
WHEEZING: 0
VOMITING: 0
COLOR CHANGE: 0
SHORTNESS OF BREATH: 0
DIARRHEA: 0
NAUSEA: 0
CONSTIPATION: 0
SORE THROAT: 0
COUGH: 0
DIARRHEA: 0
ABDOMINAL PAIN: 1
BLURRED VISION: 0
BLURRED VISION: 0
NAUSEA: 0
SHORTNESS OF BREATH: 0
CONSTIPATION: 0
NAUSEA: 0
WHEEZING: 0
ABDOMINAL PAIN: 0
RHINORRHEA: 0
VOMITING: 0
SORE THROAT: 0
SHORTNESS OF BREATH: 1
COUGH: 1
SHORTNESS OF BREATH: 0
WHEEZING: 0
RHINORRHEA: 0
DOUBLE VISION: 0
SHORTNESS OF BREATH: 0
CONSTIPATION: 0
ABDOMINAL PAIN: 0
EYES NEGATIVE: 1
ABDOMINAL PAIN: 0
DIARRHEA: 1
COUGH: 0
DIARRHEA: 0
ABDOMINAL PAIN: 0
ALLERGIC/IMMUNOLOGIC NEGATIVE: 1
DIARRHEA: 0
COUGH: 1
ORTHOPNEA: 0
CONSTIPATION: 0
VOMITING: 0
DOUBLE VISION: 0
SHORTNESS OF BREATH: 0
DOUBLE VISION: 0
COUGH: 0
ORTHOPNEA: 1
ABDOMINAL PAIN: 0
ALLERGIC/IMMUNOLOGIC NEGATIVE: 1
ORTHOPNEA: 1
DIARRHEA: 0
COUGH: 0
ABDOMINAL PAIN: 0
VOMITING: 0
NAUSEA: 0
VOMITING: 0
RHINORRHEA: 0
NAUSEA: 0
ABDOMINAL PAIN: 0
NAUSEA: 0
RESPIRATORY NEGATIVE: 1
VOMITING: 0
BLURRED VISION: 0
COUGH: 0
WHEEZING: 0
NAUSEA: 0
COLOR CHANGE: 1
BLURRED VISION: 0
ABDOMINAL PAIN: 0
RHINORRHEA: 0
SHORTNESS OF BREATH: 1
SHORTNESS OF BREATH: 0
SORE THROAT: 0
DIARRHEA: 0
COUGH: 1
CONSTIPATION: 0
VOMITING: 0
NAUSEA: 0

## 2018-01-01 ASSESSMENT — PAIN DESCRIPTION - ORIENTATION
ORIENTATION: RIGHT
ORIENTATION: RIGHT;LEFT

## 2018-01-01 ASSESSMENT — PAIN DESCRIPTION - LOCATION
LOCATION: FLANK
LOCATION: SHOULDER
LOCATION: GENERALIZED
LOCATION: FLANK
LOCATION: LEG
LOCATION: ARM;LEG

## 2018-01-01 ASSESSMENT — PAIN DESCRIPTION - PAIN TYPE
TYPE: ACUTE PAIN

## 2018-01-01 ASSESSMENT — PAIN DESCRIPTION - FREQUENCY: FREQUENCY: INTERMITTENT

## 2018-01-01 ASSESSMENT — PAIN DESCRIPTION - DESCRIPTORS: DESCRIPTORS: SHARP

## 2018-01-01 ASSESSMENT — PATIENT HEALTH QUESTIONNAIRE - PHQ9
SUM OF ALL RESPONSES TO PHQ QUESTIONS 1-9: 0
SUM OF ALL RESPONSES TO PHQ QUESTIONS 1-9: 0
2. FEELING DOWN, DEPRESSED OR HOPELESS: 0
SUM OF ALL RESPONSES TO PHQ QUESTIONS 1-9: 0
SUM OF ALL RESPONSES TO PHQ9 QUESTIONS 1 & 2: 0
1. LITTLE INTEREST OR PLEASURE IN DOING THINGS: 0
SUM OF ALL RESPONSES TO PHQ QUESTIONS 1-9: 0
SUM OF ALL RESPONSES TO PHQ9 QUESTIONS 1 & 2: 0
1. LITTLE INTEREST OR PLEASURE IN DOING THINGS: 0
2. FEELING DOWN, DEPRESSED OR HOPELESS: 0

## 2018-01-01 ASSESSMENT — PAIN DESCRIPTION - PROGRESSION
CLINICAL_PROGRESSION: GRADUALLY WORSENING
CLINICAL_PROGRESSION: GRADUALLY WORSENING

## 2018-02-05 ENCOUNTER — HOSPITAL ENCOUNTER (OUTPATIENT)
Dept: VASCULAR LAB | Age: 53
Discharge: HOME | End: 2018-02-05
Payer: MEDICAID

## 2018-02-05 PROCEDURE — 93880 EXTRACRANIAL BILAT STUDY: CPT

## 2018-02-14 ENCOUNTER — HOSPITAL ENCOUNTER (OUTPATIENT)
Dept: NON INVASIVE DIAGNOSTICS | Age: 53
Discharge: HOME | End: 2018-02-14
Payer: MEDICAID

## 2018-02-14 ENCOUNTER — HOSPITAL ENCOUNTER (OUTPATIENT)
Age: 53
Discharge: HOME | End: 2018-02-14
Payer: MEDICAID

## 2018-02-14 LAB
FOLATE: 12.5 NG/ML
LV EF: 48 %
LVEF MODALITY: NORMAL
TOTAL CK: 61 U/L (ref 39–308)

## 2018-02-14 PROCEDURE — 36415 COLL VENOUS BLD VENIPUNCTURE: CPT

## 2018-02-14 PROCEDURE — 93306 TTE W/DOPPLER COMPLETE: CPT

## 2018-02-14 PROCEDURE — 82550 ASSAY OF CK (CPK): CPT

## 2018-02-14 PROCEDURE — 82746 ASSAY OF FOLIC ACID SERUM: CPT

## 2018-02-17 ENCOUNTER — HOSPITAL ENCOUNTER (OUTPATIENT)
Dept: MRI IMAGING | Age: 53
Discharge: HOME | End: 2018-02-19
Payer: MEDICAID

## 2018-02-17 DIAGNOSIS — R53.1 WEAKNESS: ICD-10-CM

## 2018-02-17 DIAGNOSIS — R20.0 NUMBNESS: ICD-10-CM

## 2018-02-17 DIAGNOSIS — R27.0 ATAXIA: ICD-10-CM

## 2018-02-17 DIAGNOSIS — R93.7 ABNORMAL MRI, LUMBAR SPINE: ICD-10-CM

## 2018-02-17 DIAGNOSIS — G62.9 POLYNEUROPATHY: ICD-10-CM

## 2018-02-17 PROCEDURE — 72146 MRI CHEST SPINE W/O DYE: CPT

## 2018-02-17 PROCEDURE — 72148 MRI LUMBAR SPINE W/O DYE: CPT

## 2018-08-06 PROBLEM — R07.9 CHEST PAIN: Status: ACTIVE | Noted: 2018-01-01

## 2018-08-06 NOTE — H&P
Medication Sig Start Date End Date Taking? Authorizing Provider   clopidogrel (PLAVIX) 75 MG tablet Take 75 mg by mouth 4/23/18   Historical Provider, MD   insulin glargine (TOUJEO SOLOSTAR) 300 UNIT/ML injection pen Inject 20 Units into the skin every morning 3/28/18 3/23/19  Will Martines MD   atorvastatin (LIPITOR) 20 MG tablet Take 1 tablet by mouth daily 11/30/17   Will Martines MD   Blood Glucose Monitoring Suppl (TRUE METRIX AIR GLUCOSE METER) JONAH 1 Device by Does not apply route 3 times daily 11/15/17   Will Martines MD   digoxin (LANOXIN) 125 MCG tablet TAKE ONE TABLET BY MOUTH ONCE DAILY 11/14/17   Will Martines MD   losartan (COZAAR) 50 MG tablet TAKE ONE TABLET BY MOUTH ONCE DAILY 11/14/17   Will Martines MD   glyBURIDE (DIABETA) 5 MG tablet TAKE TWO TABLETS BY MOUTH IN THE MORNING AND ONE TABLET IN THE EVENING 11/14/17   Will Martines MD   furosemide (LASIX) 40 MG tablet TAKE ONE TABLET BY MOUTH TWICE DAILY 11/14/17   Will Martines MD   carvedilol (COREG) 25 MG tablet TAKE ONE TABLET BY MOUTH TWICE DAILY WITH MEALS 11/14/17   Will Martines MD   Insulin Pen Needle (B-D ULTRAFINE III SHORT PEN) 31G X 8 MM MISC Inject 1 each into the skin daily May substitute with any brand. 11/14/17   Will Martines MD   Lancets MISC Use three times daily 11/14/17   Will Martines MD   glucose blood VI test strips (ASCENSIA AUTODISC VI;ONE TOUCH ULTRA TEST VI) strip Use tid with associated glucose meter. Pt is using true result meter 11/14/17   Will Martines MD   ONE TOUCH ULTRA TEST strip USE ONE NEW STRIP TO CHECK GLUCOSE 4 TIMES DAILY AS NEEDED 8/30/17   Will Martines MD   aspirin 81 MG EC tablet Take 81 mg by mouth daily. Historical Provider, MD        Allergies:     Metformin and related    Social History:     Tobacco:    reports that he has never smoked.  He has never used smokeless tobacco.  Alcohol:      reports that he does not drink Active Hospital Problems    Diagnosis Date Noted    Chest pain [R07.9] 08/06/2018       Plan:     Patient status Admit as inpatient in the  Progressive Unit/Step down    1. Chest pain likely 2/2 CHF decompensation   -EKG  -Trops x2  -chest x-ray  -BNP  -Lasix 40mg IV daily   -Echo (02/2018): EF 45-50%  -Monitor I/O  -F/U digoxin level     2. New LBBB (as compared to EKG from 2015)  -Aspirin 325mg    -Heparin injection 5,000 units BID      3. Diabetes Type II   -ISS  -POC glucose Q4h  -hypoglycemia protocol     4. DVT prophylaxis    -Lovenox         Consultations:   None     Patient is admitted as inpatient status because of co-morbidities listed above, severity of signs and symptoms as outlined, requirement for current medical therapies and most importantly because of direct risk to patient if care not provided in a hospital setting. Cara Bull MD  8/6/2018  4:07 PM    Copy sent to Dr. Ernesto Miranda MD     IM Attending    Pt seen and examined today   I have discussed the care of pt , including pertinent history and exam findings,  with the resident. I have reviewed the key elements of all parts of the encounter with the resident. I agree with the assessment, plan and orders as documented by the resident.     Electronically signed by Ernesto Miranda MD on 8/7/2018 at 11:16 AM

## 2018-08-07 NOTE — FLOWSHEET NOTE
08/07/18 1318   Encounter Summary   Services provided to: Patient   Referral/Consult From: [facility] (8/7/18)   Volunteer Visit Yes   Complexity of Encounter Low   Length of Encounter 15 minutes   Routine   Type Initial   Intervention Provided reading materials/devotional materials

## 2018-08-07 NOTE — CONSULTS
never used smokeless tobacco.  ETOH:   reports that he does not drink alcohol. DRUGS:  reports that he does not use drugs. OCCUPATION:          Family Histroy:     Family History   Problem Relation Age of Onset    Diabetes Mother     Heart Disease Mother     Diabetes Brother     Heart Disease Father            Review of Systems:   · Constitutional: there has been no unanticipated weight loss. There's been no change in energy level, sleep pattern, or activity level. · Eyes: No visual changes or diplopia. No scleral icterus. · ENT: No Headaches, hearing loss or vertigo. No mouth sores or sore throat. · Cardiovascular: No chest pain, dyspnea on exertion, palpitations or loss of consciousness. No cough, hemoptysis, pleuritic pain, or phlebitis. · Respiratory: No cough or wheezing, no sputum production. No hematemesis. · Gastrointestinal: No abdominal pain, appetite loss, blood in stools. No change in bowel or bladder habits. · Genitourinary: No dysuria, trouble voiding, or hematuria. · Musculoskeletal:  No gait disturbance, weakness or joint complaints. · Integumentary: No rash or pruritis. · Neurological: No headache, diplopia, change in muscle strength, numbness or tingling. No change in gait, balance, coordination, mood, affect, memory, mentation, behavior. · Psychiatric: No anxiety, or depression. · Endocrine: No temperature intolerance. No excessive thirst, fluid intake, or urination. No tremor. · Hematologic/Lymphatic: No abnormal bruising or bleeding, blood clots or swollen lymph nodes. · Allergic/Immunologic: No nasal congestion or hives.        Physical Exam    Vital Signs: /70   Pulse 77   Temp 97.2 °F (36.2 °C) (Oral)   Resp 17   Ht 6' (1.829 m)   Wt 216 lb 14.9 oz (98.4 kg)   SpO2 97%   BMI 29.42 kg/m²        Admission Weight: 211 lb (95.7 kg)     General appearance: Awake, Alert Cooperative    Head: Normocephalic, without obvious abnormality, atraumatic    Eyes: was done in [facility].  Troponin peaked at 0.18 currently patient is chest pain-free. Will recommend stopping heparin drip. Repeat echocardiogram showed EF is similar to previous study on  February 2018. Will plan for Lexiscan stress test tomorrow due to newly diagnosed left bundle-branch block and elevated troponin. Will try to avoid cardiac catheterization due to the patient's renal insufficiency high risk for contrast nephropathy and dialysis needs. Will consider invasive ischemic workup depending on the results of the [place] if there is a large ischemia territory. Continue Plavix, Coreg, digoxin, Lasix, and high-intensity statin. Will follow along please call for any question or concern.     Electronically signed by Laura Gatica DO on 8/7/2018 at 3:26 PM

## 2018-08-07 NOTE — PROGRESS NOTES
Physical Therapy  DATE: 2018    NAME: Sol Chisholm  MRN: [id number]   : 1965    Patient not seen this date for Physical Therapy due to:  [] Blood transfusion in progress  [] Hemodialysis  []  Patient Declined  [] Spine Precautions   [] Strict Bedrest  [] Surgery/ Procedure  [] Testing      [] Other        [x] PT being discontinued at this time. Patient independent. No further needs. [] PT being discontinued at this time as the patient has been transferred to palliative care. No further needs.     Garcia Bellamy, PT

## 2018-08-07 NOTE — PROGRESS NOTES
Intravenous Daily    clopidogrel  75 mg Oral Daily    insulin glargine  24 Units Subcutaneous QAM    insulin lispro  0-12 Units Subcutaneous TID WC    insulin lispro  0-6 Units Subcutaneous Nightly    heparin (porcine)  5,000 Units Subcutaneous BID     Continuous Infusions:    dextrose       PRN Meds: sodium chloride flush, potassium chloride **OR** potassium chloride **OR** potassium chloride, potassium chloride, magnesium sulfate, acetaminophen, docusate sodium, ondansetron, nitroGLYCERIN, glucose, dextrose, glucagon (rDNA), dextrose    Data:     Past Medical History:   has a past medical history of Anemia; Arrhythmia; CHF (congestive heart failure) ([facility] [address] [facility] Diabetic foot ulcer ([facility] [address] [facility] DM type 2, uncontrolled, [facility] Hyperlipidemia; Hypertension; Neuropathy; and Renal insufficiency, mild. Social History:   reports that he has never smoked. He has never used smokeless tobacco. He reports that he does not drink alcohol or use drugs. Family History:   Family History   Problem Relation Age of Onset    Diabetes Mother     Heart Disease Mother     Diabetes Brother     Heart Disease Father        Vitals:  /77   Pulse 86   Temp 97.7 °F (36.5 °C) (Oral)   Resp 17   Ht 6' (1.829 m)   Wt 216 lb 14.9 oz (98.4 kg)   SpO2 97%   BMI 29.42 kg/m²   Temp (24hrs), Av.6 °F (36.4 °C), Min:97.1 °F (36.2 °C), Max:97.9 °F (36.6 °C)    Recent Labs      18   1656  18   0734   POCGLU  225*  171*  195*       I/O (24Hr):     Intake/Output Summary (Last 24 hours) at 18 0850  Last data filed at 18 0601   Gross per 24 hour   Intake              780 ml   Output              880 ml   Net             -100 ml       Labs:    Lab Results   Component Value Date    WBC 7.4 2018    HGB 10.7 (L) 2018    HCT 31.5 (L) 2018    MCV 80.4 2018     2018     Lab Results   Component Value Date     2018    K 4.1 08/07/2018     08/07/2018    CO2 23 08/07/2018    BUN 40 (H) 08/07/2018    CREATININE 2.80 (H) 08/07/2018    GLUCOSE 213 (H) 08/07/2018    CALCIUM 8.3 (L) 08/07/2018    PROT 5.9 (L) 08/06/2018    LABALBU 2.9 (L) 08/06/2018    BILITOT 0.49 08/06/2018    ALKPHOS 79 08/06/2018    AST 9 08/06/2018    ALT 6 08/06/2018    LABGLOM 24 (L) 08/07/2018    GFRAA 29 (L) 08/07/2018    GLOB NOT REPORTED 09/22/2013           Lab Results   Component Value Date/Time    CULTURE (A) 03/29/2016 02:10 PM     STREPTOCOCCI, BETA HEMOLYTIC GROUP B >[id number] CFU/ML    CULTURE  03/29/2016 02:10 PM     Performed at [address], [address] [place] [address] [phone]         Radiology:    Xr Chest Standard (2 Vw)    Result Date: 8/6/2018  EXAMINATION: TWO VIEWS OF THE CHEST 8/6/2018 5:29 pm COMPARISON: 11/21/2015. HISTORY: ORDERING SYSTEM PROVIDED HISTORY: CHF exacerbation possible TECHNOLOGIST PROVIDED HISTORY: Reason for exam:->CHF exacerbation possible Ordering Physician Provided Reason for Exam: PT CO SOB and fatigue X several days. HX CHF Acuity: Chronic Type of Exam: Ongoing FINDINGS: The heart size is enlarged but unchanged. The pulmonary vasculature is within normal limits. There is increased density involving the lower lung zones with blunting of the costophrenic angles. No pneumothoraces are seen. 1. Increased density involving the lower lung zones likely representing pleural effusions with lower lobe atelectasis. 2. Cardiomegaly without overt failure. Physical Examination:        Physical Exam   Constitutional: He is well-developed, well-nourished, and in no distress. HENT:   Head: Normocephalic. Neck: Normal range of motion. Neck supple. Cardiovascular: Normal rate, regular rhythm and normal heart sounds. Pulmonary/Chest: Effort normal and breath sounds normal.   Abdominal: Soft. Bowel sounds are normal.   Musculoskeletal: He exhibits edema (Bilateral 2+ pitting edema in legs ).

## 2018-08-07 NOTE — CONSULTS
Relation Age of Onset    Diabetes Mother     Heart Disease Mother     Diabetes Brother     Heart Disease Father        Review of Systems:    Constitutional: No fever, no chills, no night sweats, fatigue, generalized weakness, loss of appetite  HEENT:  No headache, otalgia, itchy eyes, epistaxis, nasal discharge or sore throat. Cardiac:  No chest pain, dyspnea, orthopnea or PND, palpitations  Chest:  No cough, hemoptysis, pleuritic chest pain, wheezing,SOB  Abdomen:  No abdominal pain, nausea, vomiting, diarrhea, melena, dysphagia hematemesis,constipation, abdominal bloating, flank pain  Neuro:  No CVA, TIA or seizure like activity. Skin:   No rashes, no itching. :   No hematuria, no pyuria, no dysuria, no flank pain. Extremities:  No swelling or joint pains. Objective:  CURRENT TEMPERATURE:  Temp: 97.2 °F (36.2 °C)  MAXIMUM TEMPERATURE OVER 24HRS:  Temp (24hrs), Av.6 °F (36.4 °C), Min:97.2 °F (36.2 °C), Max:97.9 °F (36.6 °C)    CURRENT RESPIRATORY RATE:  Resp: 17  CURRENT PULSE:  Pulse: 77  CURRENT BLOOD PRESSURE:  BP: 128/70  24HR BLOOD PRESSURE RANGE:  Systolic (92IUZ), BETH:570 , Min:127 , NDC:743   ; Diastolic (54WOO), MKL:28, Min:70, Max:84    24HR INTAKE/OUTPUT:    Intake/Output Summary (Last 24 hours) at 18 1811  Last data filed at 18 1321   Gross per 24 hour   Intake             1330 ml   Output             1280 ml   Net               50 ml     Patient Vitals for the past 96 hrs (Last 3 readings):   Weight   18 0600 216 lb 14.9 oz (98.4 kg)   18 1608 211 lb (95.7 kg)         Physical Exam:  GENERAL APPEARANCE: Alert and cooperative, and appears to be in no acute distress. HEAD: normocephalic  EYES: PERRL, EOMI. Not pale, anicteric   NOSE:  No nasal discharge. THROAT:  Oral cavity and pharynx normal. Moist  NECK: Neck supple, non-tender without lymphadenopathy, masses or thyromegaly. JVD-neg  CARDIAC: Normal S1 and S2. No S3, S4 or murmurs.  Rhythm is regular. LUNGS: Clear to auscultation and percussion without rales, rhonchi, wheezing or diminished breath sounds. ABD-Soft non distended, BS+ Non tender no organomegally  BACK: Examination of the spine reveals  no spinal deformity, without tenderness,   MUSKULOSKELETAL: Adequately aligned spine. No joint erythema or tenderness. EXTREMITIES: No edema. Peripheral pulses intact. NEURO:Alert oriented x 3 ,power 5/5 in all extremities      Labs:   CBC:  Recent Labs      08/06/18   1708  08/07/18   0510  08/07/18   1154   WBC  6.6  7.4  6.5   RBC  4.08*  3.92*  4.00*   HGB  11.0*  10.7*  10.4*   HCT  32.5*  31.5*  32.0*   MCV  79.6*  80.4  79.8*   MCH  27.0  27.2  25.9*   MCHC  33.9  33.9  32.4   RDW  14.0  13.9  13.9   PLT  283  281  283   MPV  8.0  8.8  8.1      BMP: Recent Labs      08/06/18   1708  08/07/18   0510   NA  138  137   K  4.5  4.1   CL  102  102   CO2  24  23   BUN  40*  40*   CREATININE  2.93*  2.80*   GLUCOSE  225*  213*   CALCIUM  8.3*  8.3*        Phosphorus:  No results for input(s): PHOS in the last 72 hours.   Magnesium:   Recent Labs      08/06/18   1708   MG  2.2     Albumin:   Recent Labs      08/06/18   1708   LABALBU  2.9*       IRON:    Lab Results   Component Value Date    IRON 35 08/02/2013     Iron Saturation:  No components found for: PERCENTFE  TIBC:    Lab Results   Component Value Date    TIBC 243 08/02/2013     FERRITIN:    Lab Results   Component Value Date    FERRITIN 283 08/02/2013     SPEP: Lab Results   Component Value Date    PROT 5.9 08/06/2018    ALBCAL 3.3 08/02/2013    ALBPCT 57 08/02/2013    LABALPH 0.2 08/02/2013    LABALPH 0.8 08/02/2013    A1PCT 3 08/02/2013    A2PCT 14 08/02/2013    LABBETA 0.8 08/02/2013    BETAPCT 14 08/02/2013    GAMGLOB 0.7 08/02/2013    GGPCT 13 08/02/2013    PATH NOT REPORTED 08/02/2013     UPEP:   Lab Results   Component Value Date     08/02/2013        C3: No results found for: C3  C4: No results found for: C4  MPO ANCA:  No results

## 2018-08-08 NOTE — PROGRESS NOTES
[facility]   OCCUPATIONAL THERAPY MISSED TREATMENT NOTE   INPATIENT   Date: 18  Patient Name: Omi Sidhu       Room:   MRN: [id number]   Account #: [de-identified]    : 1965  (47 y.o.)  Gender: male      REASON FOR MISSED TREATMENT:  Per Pt, Pt is IND with all self-care and functional mobility. Pt observed wearing pants and shoes; Pt reported he completed LB dressing with IND. No need for skilled occupational therapy at this time.   -   OT being discontinued at this time.  Patient functioning at Premorbid Level  No further needs      Dari Elmore OTR/L

## 2018-08-08 NOTE — PROGRESS NOTES
Nephrology Progress Note    Subjective/   47y.o. year old male who we are seeing in consultation for SUKHDEV on CKD 3/4. This is a 47 y.o. male with history of diabetes mellitus type 2 non-insulin-dependent,  uncontrolled with HbA1c of 14 in December 2017. He also has history of hypertension, CKD stage III creatinine of 1.8 mg/dl  December 2017 Associated with diabetic nephropathy. Urine albumin creatinine ratio was 9.4 g in November 2017 and 363 mg in April 2016. He has congestive heart failure with systolic dysfunction EF of 45-50 %. Presented to the ER with ongoing onset of shortness of breath for about a week associated with chest pain midsternal nonradiating and not associated with diaphoresis nausea vomiting or abdominal pain. His EKG showed new left bundle branch block is followed by cardiology. He presented with a BUN/creatinine of 40 and 2.93 mg/dL with proBNP of 14,209. Serum albumin was 2.9 with HbA1c of 13. He did have troponins of 0.17. Pt denies any history of  prolonged NSAID use. Patient denies dysuria, gross hematuria, flank pain, urgency, passing frothy urine or urinary incontinence. He has nocturia ×3. He denies passage of foamy urine  He had been taken Lasix 40 mg twice daily at home losartan 50 mg daily. Cardiology has ordered a myocardial stress test tomorrow for risk stratification and further workup. Interval History. He denies any chest pain today, shortness of breath. He has decreased  urine output. However his creatinine has increased to 3.31 mg/dl. Heart stress test today showing decrease in EF from 45-50% in February 2018 to 22% today. Area of infarct of anterior apical wall junction non-reversible perfusion defect.   Objective/     Vitals:    08/07/18 1821 08/08/18 0019 08/08/18 0615 08/08/18 1027   BP: (!) 97/54 125/67 137/84 135/80   Pulse: 78 92 89 85   Resp: 17 16 14    Temp: 97.9 °F (36.6 °C) 97.5 °F (36.4 °C) 98.1 °F (36.7 °C)    TempSrc: Oral Oral Oral    SpO2: 97% 98% 95%    Weight:       Height:         24HR INTAKE/OUTPUT:    Intake/Output Summary (Last 24 hours) at 08/08/18 1307  Last data filed at 08/08/18 0019   Gross per 24 hour   Intake              790 ml   Output              450 ml   Net              340 ml     Patient Vitals for the past 96 hrs (Last 3 readings):   Weight   08/07/18 0600 216 lb 14.9 oz (98.4 kg)   08/06/18 1608 211 lb (95.7 kg)       Constitutional:  Alert, awake, no apparent distress  Cardiovascular:  S1, S2 without m/r/g  Respiratory:  CTA B without w/r/r  Abdomen: +bs, soft, nt  Ext: 2+LE edema    Data/  Recent Labs      08/07/18   0510  08/07/18   1154  08/08/18   0537   WBC  7.4  6.5  8.5   HGB  10.7*  10.4*  10.9*   HCT  31.5*  32.0*  31.8*   MCV  80.4  79.8*  79.3*   PLT  281  283  289     Recent Labs      08/06/18   1708  08/07/18   0510  08/08/18   0537   NA  138  137  136   K  4.5  4.1  4.4   CL  102  102  100   CO2  24  23  23   GLUCOSE  225*  213*  123*   MG  2.2   --    --    BUN  40*  40*  42*   CREATININE  2.93*  2.80*  3.31*   LABGLOM  23*  24*  20*   GFRAA  28*  29*  24*         Assessment/   . Acute kidney injury nonoliguric related to cardiorenal syndrome versus hemodynamic injury. Rule out obstructive uropathy-He may also have progression of chronic kidney disease. Renal function is worse.     2. Chronic kidney disease stage III possibly with progression associated with diabetic nephropathy     3. Nephrotic syndrome secondary to diabetic nephropathy rule out other autoimmune causes.     4. Acute on chronic CHF with systolic dysfunction      5. Type 2 diabetes mellitus Insulin-dependent, Poorly controlled     6. Chest pain with elevated troponins and left bundle branch block-     7,Essential hypertension      Plan/   1. Change Lasix to p.o. 40 mg daily tomorrow morning  2. Hold losartan  3.  Check digoxin level   4 Nephrotic syndrome-FOLLOW  workup with 24-hour urine for protein, RUDOLPH, complements, SPEP free light chains and hepatitis virology. 5Urinalysis with microscopy  7. Daily weights with strict I's and O's and 1500 mils fluid restriction  8. Avoid hypotension  9. High risk for worsening kidney function if he is to have a cardiac cath.     Breezy Ortiz

## 2018-08-08 NOTE — PROGRESS NOTES
[facility]    PROGRESS NOTE             8/8/2018    1:44 PM    Name:   Jovani Zapien  MRN:     [id number]     Acct:      [de-identified]   Room:   2112/2112-01   Day:  1  Admit Date:  8/6/2018  3:55 PM    PCP:   Elli Castillo MD  Code Status:  Full Code    Subjective:     C/C: shortness of breath    Interval History Status: not changed. Patient seen by bedside, alert. Patient had shortness of breath overnight while laying down, relieved by sitting up. Urine output was minimal, +115. Denies chest pain. Brief History:     The patient is a 47 y.o.  and he is admitted to the hospital for the management of  Chest pain likely secondary to CHF decompensation. Chest pain began 1 week ago, heavy in nature, worsened by laying down, relieved by sitting/standing up, associated with shortness of breath, non-productive cough when laying down and bilateral leg edema.  Patient states he sleeps with 4 pillows - pain is elicited still.  Denies nausea, vomiting, headache, blurry vision, light headedness, diarrhea, urinary symptoms, recent illness.         Review of Systems:     Review of Systems   Constitutional: Negative for chills, diaphoresis and fever. Respiratory: Positive for shortness of breath (while laying down, relieved by sitting up ). Cardiovascular: Positive for leg swelling (not relieved yet). Negative for chest pain and palpitations. Gastrointestinal: Negative for abdominal pain, constipation, diarrhea, nausea and vomiting. Genitourinary: Positive for frequency (decreased). Neurological: Negative for weakness and headaches. Medications: Allergies:     Allergies   Allergen Reactions    Metformin And Related Nausea And Vomiting       Current Meds:   Scheduled Meds:    0.9 % sodium chloride  250 mL Intravenous Once    clopidogrel  75 mg Oral Daily    [START ON 8/9/2018] digoxin  125 mcg Oral Daily    carvedilol  25 mg Oral BID     atorvastatin  40 mg Oral Nightly    furosemide  40 mg Intravenous Daily    sodium chloride flush  10 mL Intravenous 2 times per day    famotidine  20 mg Oral BID    insulin glargine  24 Units Subcutaneous QAM    insulin lispro  0-12 Units Subcutaneous TID WC    insulin lispro  0-6 Units Subcutaneous Nightly     Continuous Infusions:    dextrose       PRN Meds: regadenoson, aminophylline, metoprolol, nitroGLYCERIN, sodium chloride flush, sodium chloride flush, sodium chloride flush, potassium chloride **OR** potassium chloride **OR** potassium chloride, potassium chloride, magnesium sulfate, acetaminophen, docusate sodium, ondansetron, nitroGLYCERIN, glucose, dextrose, glucagon (rDNA), dextrose    Data:     Past Medical History:   has a past medical history of Anemia; Arrhythmia; CHF (congestive heart failure) ([facility] [address] [facility] Diabetic foot ulcer ([facility] [address] [facility] DM type 2, uncontrolled, [facility] Hyperlipidemia; Hypertension; Neuropathy; and Renal insufficiency, mild. Social History:   reports that he has never smoked. He has never used smokeless tobacco. He reports that he does not drink alcohol or use drugs. Family History:   Family History   Problem Relation Age of Onset    Diabetes Mother     Heart Disease Mother     Diabetes Brother     Heart Disease Father        Vitals:  /75   Pulse 85   Temp 97.3 °F (36.3 °C) (Oral)   Resp 14   Ht 6' (1.829 m)   Wt 216 lb 14.9 oz (98.4 kg)   SpO2 98%   BMI 29.42 kg/m²   Temp (24hrs), Av.7 °F (36.5 °C), Min:97.3 °F (36.3 °C), Max:98.1 °F (36.7 °C)    Recent Labs      18   1650  18   0629  18   1129   POCGLU  112*  197*  111*  162*       I/O (24Hr):     Intake/Output Summary (Last 24 hours) at 18 1344  Last data filed at 18 0019   Gross per 24 hour   Intake              515 ml   Output              450 ml   Net               65 ml       Labs:    Lab Results Component Value Date    WBC 8.5 08/08/2018    HGB 10.9 (L) 08/08/2018    HCT 31.8 (L) 08/08/2018    MCV 79.3 (L) 08/08/2018     08/08/2018     Lab Results   Component Value Date     08/08/2018    K 4.4 08/08/2018     08/08/2018    CO2 23 08/08/2018    BUN 42 (H) 08/08/2018    CREATININE 3.31 (H) 08/08/2018    GLUCOSE 123 (H) 08/08/2018    CALCIUM 8.4 (L) 08/08/2018    PROT 5.4 (L) 08/08/2018    LABALBU 2.9 (L) 08/06/2018    BILITOT 0.49 08/06/2018    ALKPHOS 79 08/06/2018    AST 9 08/06/2018    ALT 6 08/06/2018    LABGLOM 20 (L) 08/08/2018    GFRAA 24 (L) 08/08/2018    GLOB NOT REPORTED 09/22/2013           Lab Results   Component Value Date/Time    SPECIAL NOT REPORTED 03/29/2016 02:10 PM     Lab Results   Component Value Date/Time    CULTURE (A) 03/29/2016 02:10 PM     STREPTOCOCCI, BETA HEMOLYTIC GROUP B >[id number] CFU/ML    CULTURE  03/29/2016 02:10 PM     Performed at [address] [phone]       [facility]    Radiology:    Xr Chest Standard (2 Vw)    Result Date: 8/6/2018  EXAMINATION: TWO VIEWS OF THE CHEST 8/6/2018 5:29 pm COMPARISON: 11/21/2015. HISTORY: ORDERING SYSTEM PROVIDED HISTORY: CHF exacerbation possible TECHNOLOGIST PROVIDED HISTORY: Reason for exam:->CHF exacerbation possible Ordering Physician Provided Reason for Exam: PT CO SOB and fatigue X several days. HX CHF Acuity: Chronic Type of Exam: Ongoing FINDINGS: The heart size is enlarged but unchanged. The pulmonary vasculature is within normal limits. There is increased density involving the lower lung zones with blunting of the costophrenic angles. No pneumothoraces are seen. 1. Increased density involving the lower lung zones likely representing pleural effusions with lower lobe atelectasis. 2. Cardiomegaly without overt failure.      Us Renal Complete    Result Date: 8/7/2018  EXAMINATION: RETROPERITONEAL ULTRASOUND OF THE KIDNEYS AND URINARY BLADDER 8/7/2018 COMPARISON: None

## 2018-08-09 NOTE — PROGRESS NOTES
[address] [facility].    PROGRESS NOTE             8/9/2018    9:41 AM    Name:   Nolan Cnonell  MRN:     [id number]     Acct:      [de-identified]   Room:   2112/2112-01  IP Day:  2  Admit Date:  8/6/2018  3:55 PM    PCP:   Gonzalo Morris MD  Code Status:  Full Code    Subjective:     C/C:Shortness of breath    Interval History Status: improved. Patient seen by bedside, alert. Had no complaints overnight. Urine output was -1,228. Patient's edema has improved. Brief History:     The patient is a 47 y.o.  and he is admitted to the hospital for the management of  Chest pain likely secondary to CHF decompensation. Chest pain began 1 week ago, heavy in nature, worsened by laying down, relieved by sitting/standing up, associated with shortness of breath, non-productive cough when laying down and bilateral leg edema.  Patient states he sleeps with 4 pillows - pain is elicited still.  Denies nausea, vomiting, headache, blurry vision, light headedness, diarrhea, urinary symptoms, recent illness. Review of Systems:     Review of Systems   Constitutional: Negative for diaphoresis, fever and malaise/fatigue. Respiratory: Negative for cough and shortness of breath. Cardiovascular: Positive for leg swelling. Negative for chest pain, palpitations and orthopnea. Gastrointestinal: Negative for abdominal pain, constipation, diarrhea, nausea and vomiting. Genitourinary: Negative for dysuria and urgency. Frequency: Improved frequency         Improved frequency of voiding    Neurological: Negative for weakness and headaches. Medications: Allergies:     Allergies   Allergen Reactions    Metformin And Related Nausea And Vomiting       Current Meds:   Scheduled Meds:    insulin glargine  30 Units Subcutaneous QAM    insulin lispro  0-18 Units Subcutaneous TID WC    insulin lispro  0-9 Units Subcutaneous Nightly    0.9 % sodium chloride  250 mL Intravenous Once    clopidogrel  75 mg Oral Daily    digoxin  125 mcg Oral Daily    heparin (porcine)  5,000 Units Subcutaneous 3 times per day    furosemide  40 mg Oral Daily    carvedilol  25 mg Oral BID WC    atorvastatin  40 mg Oral Nightly    sodium chloride flush  10 mL Intravenous 2 times per day    famotidine  20 mg Oral BID     Continuous Infusions:    dextrose       PRN Meds: sodium chloride flush, sodium chloride flush, potassium chloride **OR** potassium chloride **OR** potassium chloride, potassium chloride, magnesium sulfate, acetaminophen, docusate sodium, ondansetron, nitroGLYCERIN, glucose, dextrose, glucagon (rDNA), dextrose    Data:     Past Medical History:   has a past medical history of Anemia; Arrhythmia; CHF (congestive heart failure) ([facility] [address] [facility] DM type 2, uncontrolled, with lower extremity ulcer ([facility] Hyperlipidemia; Hypertension; Neuropathy; and Renal insufficiency, mild. Social History:   reports that he has never smoked. He has never used smokeless tobacco. He reports that he does not drink alcohol or use drugs. Family History:   Family History   Problem Relation Age of Onset    Diabetes Mother     Heart Disease Mother     Diabetes Brother     Heart Disease Father        Vitals:  BP (!) 147/88   Pulse 98   Temp 98.1 °F (36.7 °C) (Oral)   Resp 16   Ht 6' (1.829 m)   Wt 216 lb 14.9 oz (98.4 kg)   SpO2 96%   BMI 29.42 kg/m²   Temp (24hrs), Av.7 °F (36.5 °C), Min:97.3 °F (36.3 °C), Max:98.1 °F (36.7 °C)    Recent Labs      18   1129  18   1626  18   2034  18   0722   POCGLU  162*  240*  191*  227*       I/O (24Hr):     Intake/Output Summary (Last 24 hours) at 18 09  Last data filed at 18 192   Gross per 24 hour   Intake                0 ml   Output             1343 ml   Net            -1343 ml       Labs:    Lab Results   Component Value Date    WBC 8.1 2018    HGB 10.8 (L) Kidneys: The right kidney measures 10.0 cm in length and the left kidney measures 11.2 cm in length. Kidneys demonstrate normal cortical echogenicity. No evidence of hydronephrosis or intrarenal stones. Bladder: Urinary bladder is decompressed. Unremarkable ultrasound of the kidneys and urinary bladder. Nm Myocardial Spect Rest Exercise Or Rx    Result Date: 8/8/2018  EXAMINATION: MYOCARDIAL PERFUSION IMAGING 8/8/2018 11:11 am TECHNIQUE: Rest dose:  10.7 mCi Tc-99m sestamibi intravenously Stress dose:  35.2  mCi Tc-99m sestamibi intravenously Under cardiology supervision, 0.4 mg Lexiscan was infused intravenously prior to injection of the stress dose. SPECT imaging was acquired following injection of the sestamibi. ECG gating was obtained following the stress acquisition. COMPARISON: 01/01/2012 HISTORY: ORDERING SYSTEM PROVIDED HISTORY: CHEST PAIN, ACUTE CORONARY SYNDROME SUSPECT TECHNOLOGIST PROVIDED HISTORY: Procedure Type->Rx Ordering Physician Provided Reason for Exam: chest pain acute coronay sydrome suspect Acuity: Unknown Type of Exam: Unknown 78-year-old male with chest pain ; suspected acute coronary syndrome FINDINGS: The patient achieved a maximum heart rate of 97 beats per minute, 57 % of the maximum age predicted heart rate of 169 beats per minute. Perfusion: Fixed perfusion defect involving the anterior and apical wall junction consistent with infarct. No reversible perfusion defect identified to suggest reversible ischemia. Function: The gated SPECT data demonstrates enlarged left ventricular size with global hypokinesis. Left ventricular ejection fraction:  22% TID score:  1.01  (Threshold value of 1.39 is used for Lexiscan stress with Tc-99m). There is no stress-induced cavitary dilatation to suggest compensated triple vessel disease. End diastolic volume:  260NU Scores are visually adjusted to account for potential artifact.  Summed stress score:  9 Summed rest score:  9 Summed reversibility score:  0     1. Infarct of the anterior and apical wall junction. 2. No reversible perfusion defect to suggest reversible ischemia. 3. Enlarged left ventricular cavity with global hypokinesis. Left ventricular ejection fraction of 22%. Findings can be seen with a dilated cardiomyopathy. 4. EKG portion of the exam was uninterpretable according to the cardiologist. Please see report for EKG portion of the examination which will be performed separately by physician from cardiology. Risk stratification:  High risk Note:  Risk stratification incorporates both clinical history and test results. Final risk determination is the responsibility of the ordering provider as history and other test results may increase or decrease the risk stratification reported for this examination. Risk stratification criteria are adapted from \"Noninvasive Risk Stratification\" criteria from [employer]. Al, ACC/AATS/AHA/ASE/ASNC/SCAI/SCCT/STS 2017 Appropriate Use Criteria For Coronary Revascularization in Patients With Stable Ischemic Heart Disease [facility] Volume 69, Issue 17, May 2017 High risk (>3% annual death or MI) 1. Severe resting LV dysfunction (LVEF >35%) not readily explained by non coronary causes 2. Resting perfusion abnormalities greater than 10% of the myocardium in patients without prior history or evidence of MI 3. Stress-induced perfusion abnormalities encumbering greater than or equal to 10% myocardium or stress segmental scores indicating multiple vascular territories with abnormalities 4. Stress-induced LV dilatation (TID ratio greater than 1.19 for exercise and greater than 1.39 for regadenoson) Intermediate risk (1% to 3% annual death or MI) 1. Mild/moderate resting LV dysfunction (LVEF 35% to 49%) not readily explained by non coronary causes. 2.  Resting perfusion abnormalities in 5%-9.9% of the myocardium in patients without a history or prior evidence of MI 3.   Stress-induced perfusion abnormality -24 hr protein: 7,264  -Nephro consulted     5.  DVT prophylaxis     -Heparin injection 5,000 units BID     Narendra Kraft MD  8/9/2018  9:41 AM       IM Attending    Pt seen and examined today   I have discussed the care of pt , including pertinent history and exam findings,  with the resident. I have reviewed the key elements of all parts of the encounter with the resident. I agree with the assessment, plan and orders as documented by the resident.     Electronically signed by Hardeep Medeiros MD on 8/9/2018 at 11:34 AM

## 2018-08-09 NOTE — DISCHARGE SUMMARY
[address]    Discharge Summary     Patient ID: Royal Munson  :  1965   MRN: [id number]     ACCOUNT:  [de-identified]   Patient's PCP: Ghulam Delgado MD  Admit Date: 2018   Discharge Date: 2018     Length of Stay: 2  Code Status:  Full Code  Admitting Physician: Ghulam Delgado MD  Discharge Physician: Kesha Limon MD     Active Discharge Diagnoses:       Primary Problem  CHF (congestive heart failure) [facility])      [facility] Problems    Diagnosis Date Noted    Chest pain [R07.9] 2018     Priority: High    CHF (congestive heart failure) [I50.9] 2013     Priority: High    Shortness of breath [R06.02]      Priority: Medium    DM type 2 with diabetic peripheral neuropathy [E11.42] 2012       Admission Condition:  fair     Discharged Condition: good    Hospital Stay:       Hospital Course:  Royal Munson is a 47 y.o. male who was admitted for the management of   CHF (congestive heart failure) ([facility] [address] , presented to ER with shortness of breath and chest pain on 18. Patient was stable and in no acute distress on admission. Complained of shortness of breath and heaviness on chest when he laid down, associated with bilateral leg swelling. Patient states he had been non compliant with his medications. EKG showed new LBBB. Lasix was administered daily. On , stress test was done and showed fixed perfusion defect involving the anterior and apical wall junction consistent with infarct. EF(2018) 45-55% --> ef (2018) 22%. No reversible perfusion defect identified to suggest reversible ischemia. Patient had another episode of SOB in AM.  Edema minimally improved. Urine output minimal.  Lasix dose administered one in the AM and one in PM.  One dose of spironolactone administered in AM as well.  Nephrology followed up with patient and did a work up for nephrotic SOCIAL WORK  IP CONSULT TO NEPHROLOGY  IP CONSULT TO CARDIOLOGY      The patient was seen and examined on day of discharge and this discharge summary is in conjunction with any daily progress note from day of discharge. Discharge plan:       Disposition: Home    Physician Follow Up:     Sol Balderrama MD  [address] [facility] [address]  [address] [place] [address], [address] [place] [id number]  [phone]    In 2 weeks      Porfirio Espino MD  [address] [facility]  [facility] [id number] [phone]    In 2 weeks         Requiring Further Evaluation/Follow Up POST HOSPITALIZATION/Incidental Findings: CHF Decompensation, Elevated TSH     Diet: cardiac diet    Activity: As tolerated    Instructions to Patient:   - Take all medications as prescribed  - Follow up with PCP   - In case of any worsening condition please visit Emergency Room.       Discharge Medications:      Medication List      CHANGE how you take these medications    atorvastatin 40 MG tablet  Commonly known as:  LIPITOR  Take 1 tablet by mouth nightly  What changed:  · medication strength  · how much to take  · when to take this        CONTINUE taking these medications    aspirin 81 MG EC tablet     carvedilol 25 MG tablet  Commonly known as:  COREG  TAKE ONE TABLET BY MOUTH TWICE DAILY WITH MEALS     clopidogrel 75 MG tablet  Commonly known as:  PLAVIX     digoxin 125 MCG tablet  Commonly known as:  LANOXIN  TAKE ONE TABLET BY MOUTH ONCE DAILY     furosemide 40 MG tablet  Commonly known as:  LASIX  TAKE ONE TABLET BY MOUTH TWICE DAILY     glyBURIDE 5 MG tablet  Commonly known as:  DIABETA  TAKE TWO TABLETS BY MOUTH IN THE MORNING AND ONE TABLET IN THE EVENING     insulin glargine 300 UNIT/ML injection pen  Commonly known as:  TOUJEO SOLOSTAR  Inject 20 Units into the skin every morning     Insulin Pen Needle 31G X 8 MM Misc  Commonly known as:  B-D ULTRAFINE III SHORT PEN  Inject 1 each into the skin daily May

## 2018-08-09 NOTE — CARE COORDINATION
ONGOING DISCHARGE PLAN:    Spoke with patient regarding discharge plan and patient confirms that plan is still to discharge to home   Per PCP     1.  Chest pain likely 2/2 CHF decompensation   -Lasix 40mg PO daily  -1 dose of spironolactone 25mg yesterday AM 08/08   -Monitor I/O - (+115 output last night)   -Echo (02/2018): EF 45-50%  -Echo (08/08/2018): EF 22%  -Stress test: infarct of anterior/apical wall junction, nonreversible perfusion defect  -Monitor for improvement      2.   New LBBB (as compared to EKG from 2015)  -Patient received aspirin 325mg 08/07     3.  Diabetes Type II   -ISS  -POC glucose Q4h  -glucose 227  -hypoglycemia protocol      4. CKD  -Creatinine : 3.49  -hold Losartan   -check Digoxin level  -Nephrotic syndrome F/U   -24 hr protein: 7,264  -Nephro consulted     5.  DVT prophylaxis     -Heparin injection 5,000 units BID     Will continue to follow for additional discharge needs.     Electronically signed by Nilesh Swanson RN on 8/9/2018 at 2:55 PM

## 2018-08-09 NOTE — CONSULTS
Cc: chest pain  HPI:  53yo known hx NICM, cath 2010 with nonobstructive disease, severe LV dysfunction. He presents with chest pain that improved on sitting up. No orthopnea but has diuresed with IV diuretics, now feels good and wants to go home    Nuclear stress with fixed defect, it appears there is diffuse photopenia, c/w NICM    Med therapy restarted, he was noncompliant with everything  Scheduled Meds:   insulin glargine  30 Units Subcutaneous QAM    insulin lispro  0-18 Units Subcutaneous TID WC    insulin lispro  0-9 Units Subcutaneous Nightly    0.9 % sodium chloride  250 mL Intravenous Once    clopidogrel  75 mg Oral Daily    digoxin  125 mcg Oral Daily    heparin (porcine)  5,000 Units Subcutaneous 3 times per day    furosemide  40 mg Oral Daily    carvedilol  25 mg Oral BID WC    atorvastatin  40 mg Oral Nightly    sodium chloride flush  10 mL Intravenous 2 times per day    famotidine  20 mg Oral BID     Continuous Infusions:   dextrose       PRN Meds:.sodium chloride flush, sodium chloride flush, potassium chloride **OR** potassium chloride **OR** potassium chloride, potassium chloride, magnesium sulfate, acetaminophen, docusate sodium, ondansetron, nitroGLYCERIN, glucose, dextrose, glucagon (rDNA), dextrose  Social History     Social History    Marital status: Single     Spouse name: N/A    Number of children: N/A    Years of education: N/A     Occupational History    Not on file.      Social History Main Topics    Smoking status: Never Smoker    Smokeless tobacco: Never Used    Alcohol use No    Drug use: No    Sexual activity: Yes     Partners: Female     Other Topics Concern    Not on file     Social History Narrative    No narrative on file     Past Medical History:   Diagnosis Date    Anemia     Arrhythmia     [facility]estive [facility]) ([facility] 2013    Diabetic foot ulcer ([facility]     DM type 2, uncontrolled, with lower extremity ulcer ([facility] [address] [facility] 7/28/2015    Hyperlipidemia  Hypertension     Neuropathy     Renal insufficiency, mild 2013     Family History   Problem Relation Age of Onset    Diabetes Mother     Heart Disease Mother     Diabetes Brother     Heart Disease Father      Social History     Social History    Marital status: Single     Spouse name: N/A    Number of children: N/A    Years of education: N/A     Occupational History    Not on file. Social History Main Topics    Smoking status: Never Smoker    Smokeless tobacco: Never Used    Alcohol use No    Drug use: No    Sexual activity: Yes     Partners: Female     Other Topics Concern    Not on file     Social History Narrative    No narrative on file         Vitals:    08/09/18 1230   BP: 133/82   Pulse: 87   Resp: 16   Temp: 97.9 °F (36.6 °C)   SpO2: 99%       1+ pitting le edema  Skin intact  Op clear  jvp normal  Carotids ctab  rrr no mrg  Abd s/nt/nd  Pulses 2+ x 4  Neuro nonfocal cn2-12 intact  Psych appropriate    Impression/  1. nonspecifiic troponin elevation  2. NICM, acute systolic chf d/t noncompliance  3. Wide LBBB, EF is 40%, had previously refused defib, EF on nuke 22%, would repeat as outpt the echo may be underestimate but I did not personally review  4.  CKD creat 3.5    Limited options, coreg may be all we can do which has been restarted  Agree with daily diuretic  Hydral/isordil in office  reeval EF in several months, he has wide LBBB, if EF 35 or less, discuss CRT with or without Dilcia Todd [id number] for home and f/u in 1 week, chf clinic

## 2018-08-09 NOTE — PLAN OF CARE
Problem: Falls - Risk of:  Goal: Will remain free from falls  Will remain free from falls   Outcome: Met This Shift  Pt free from falls this shift   Goal: Absence of physical injury  Absence of physical injury   Outcome: Met This Shift  Pt free from injury     Problem: Cardiac Output - Decreased:  Goal: Hemodynamic stability will improve  Hemodynamic stability will improve   Outcome: Met This Shift

## 2018-08-13 NOTE — TELEPHONE ENCOUNTER
Per patients daughter was started on atorvastatin last week. Takes at night time. Dtr states that since starting this medication he has been waking at night with diarrhea which usually lasts through most of the day. Is this a known side effect? Can we change it to something else?     Allergies   Allergen Reactions    Metformin And Related Nausea And Vomiting

## 2018-08-17 PROBLEM — I20.0 UNSTABLE ANGINA PECTORIS (HCC): Status: ACTIVE | Noted: 2018-01-01

## 2018-08-22 NOTE — TELEPHONE ENCOUNTER
Patient's S.O. Called stating the patient is very weak, lethargic, in pain and has a rash all over his body. He is on the upper floor. She states she is very concerned he is very sick. I advised her he needs to go to the ER. Also, if she felt she could not get him downstairs safely, she is to call 911. She understood.

## 2018-08-24 NOTE — TELEPHONE ENCOUNTER
Per patients daughter--pt too weak to get up and down the stairs at his house, and bathroom is on the first floor of his home. She is requesting a bedside commode for him. They want to get this from the [address] equipment on [address] and [facility]. She did not have a phone or fax number for me.

## 2018-08-28 PROBLEM — N17.9 AKI (ACUTE KIDNEY INJURY) (HCC): Status: ACTIVE | Noted: 2018-01-01

## 2018-08-28 NOTE — ED PROVIDER NOTES
[address]  eMERGENCY dEPARTMENT eNCOUnter      Pt Name: Akil Campbell  MRN: [id number]  [place] 1965  Date of evaluation: 8/28/2018  PCP:    Emma Carcamo MD      [address] [place] [address]       Chief Complaint   Patient presents with    Fatigue    Leg Swelling     bilat and right hand         HISTORY OF PRESENT ILLNESS      Akil Campbell is a 47 y.o. male who presents For evaluation for not feeling well. Patient states he thinks his body shutting down. Patient states he's had lower extremity swelling and worse. Week. Also states very tired. Denies chest pain shortness of breath or abdominal pain. States he's had poor urine output. Also states his right hand has been swollen as well. The top is also red. No fevers or chills. Otherwise no other complaints       REVIEW OF SYSTEMS         Review of Systems   Constitutional: Positive for fatigue. Negative for chills and fever. HENT: Negative for congestion and rhinorrhea. Eyes: Negative for pain, discharge and redness. Respiratory: Negative for cough and shortness of breath. Cardiovascular: Positive for leg swelling. Negative for chest pain. Gastrointestinal: Negative for abdominal pain, diarrhea and vomiting. Genitourinary: Negative for dysuria and hematuria. Musculoskeletal: Negative for arthralgias, back pain, myalgias, neck pain and neck stiffness. Skin: Negative for rash. Neurological: Negative for light-headedness and headaches. Hematological: Does not bruise/bleed easily.           PAST MEDICAL HISTORY     Past Medical History:   Diagnosis Date    Anemia     Arrhythmia     CHF (congestive heart failure) ([address] 2013    CKD (chronic kidney disease) stage 4, GFR 15-29 ml/min (HCC)     Diabetic foot ulcer ([address]     DM type 2, uncontrolled, with lower extremity ulcer ([address] 7/28/2015    Hyperlipidemia     Hypertension     Nephropathy     Neuropathy     Renal insufficiency, mild 2013       SURGICAL HISTORY       Past Surgical History:   Procedure Laterality Date    CARDIAC CATHETERIZATION Bilateral 8/5/13    no intervention, med management for non-obstructive CAD    ELBOW FRACTURE SURGERY      FINGER FRACTURE SURGERY Left     5TH DIGIT    FOOT DEBRIDEMENT Left 7/2015    [facility] OTHER SURGICAL HISTORY Left 8/19/2015    Arthrodesis IP Joint Hallux       CURRENT MEDICATIONS       Previous Medications    ASPIRIN 81 MG EC TABLET    Take 81 mg by mouth daily. ATORVASTATIN (LIPITOR) 40 MG TABLET    Take 1 tablet by mouth nightly    BLOOD GLUCOSE MONITORING SUPPL (TRUE METRIX AIR GLUCOSE METER) JONAH    1 Device by Does not apply route 3 times daily    CARVEDILOL (COREG) 25 MG TABLET    TAKE ONE TABLET BY MOUTH TWICE DAILY WITH MEALS    CLOPIDOGREL (PLAVIX) 75 MG TABLET    Take 75 mg by mouth    DIGOXIN (LANOXIN) 125 MCG TABLET    TAKE ONE TABLET BY MOUTH ONCE DAILY    FUROSEMIDE (LASIX) 40 MG TABLET    TAKE ONE TABLET BY MOUTH TWICE DAILY    GLUCOSE BLOOD VI TEST STRIPS (ASCENSIA AUTODISC VI;ONE TOUCH ULTRA TEST VI) STRIP    Use tid with associated glucose meter. Pt is using true result meter    GLYBURIDE (DIABETA) 5 MG TABLET    TAKE TWO TABLETS BY MOUTH IN THE MORNING AND ONE TABLET IN THE EVENING    INSULIN GLARGINE (TOUJEO SOLOSTAR) 300 UNIT/ML INJECTION PEN    Inject 20 Units into the skin every morning    INSULIN PEN NEEDLE (B-D ULTRAFINE III SHORT PEN) 31G X 8 MM MISC    Inject 1 each into the skin daily May substitute with any brand.     LANCETS MISC    Use three times daily    LOSARTAN (COZAAR) 50 MG TABLET    TAKE ONE TABLET BY MOUTH ONCE DAILY    ONE TOUCH ULTRA TEST STRIP    USE ONE NEW STRIP TO CHECK GLUCOSE 4 TIMES DAILY AS NEEDED       ALLERGIES     Metformin and related    FAMILY HISTORY       Family Status   Relation Status    Mother (Not Specified)    Brother (Not Specified)    Father (Not Specified)      Family History   Problem Relation Age of Onset    Diabetes Mother    [facility] Heart Disease Mother     Diabetes Brother     Heart Disease Father        SOCIAL HISTORY       Social History     Social History    Marital status: Single     Spouse name: N/A    Number of children: N/A    Years of education: N/A     Social History Main Topics    Smoking status: Never Smoker    Smokeless tobacco: Never Used    Alcohol use No    Drug use: No    Sexual activity: Yes     Partners: Female     Other Topics Concern    None     Social History Narrative    None       PHYSICAL EXAM     INITIAL VITALS:  height is 6' (1.829 m) and weight is 210 lb (95.3 kg). His oral temperature is 99.7 °F (37.6 °C). His blood pressure is 150/60 (abnormal) and his pulse is 92. His respiration is 22 and oxygen saturation is 98%. Physical Exam   Constitutional: He is oriented to person, place, and time. He appears well-developed and well-nourished. HENT:   Head: Normocephalic and atraumatic. Mouth/Throat: Oropharynx is clear and moist.   Eyes: Pupils are equal, round, and reactive to light. Conjunctivae and EOM are normal. Right eye exhibits no discharge. Left eye exhibits no discharge. Neck: Normal range of motion. Neck supple. Cardiovascular: Regular rhythm, normal heart sounds and intact distal pulses. Borderline tachycardia   Pulmonary/Chest: Effort normal and breath sounds normal. No respiratory distress. He has no wheezes. Mild tachypnea however lungs are clear   Abdominal: Soft. Bowel sounds are normal. He exhibits no distension. There is no tenderness. Musculoskeletal: Normal range of motion. He exhibits edema (bilateral 3+ pitting edema without redness lower extremities). Right dorsum of the hand is swollen with redness consistent with likely a cellulitis without any specific focal source   Neurological: He is alert and oriented to person, place, and time. Skin: Skin is warm and dry. Nursing note and vitals reviewed.       DIFFERENTIAL DIAGNOSIS/ MDM:     Patient here with (*)     All other components within normal limits   TROP/MYOGLOBIN - Abnormal; Notable for the following:     Troponin T 0.18 (*)     Myoglobin 617 (*)     All other components within normal limits   POC GLUCOSE FINGERSTICK - Abnormal; Notable for the following:     POC Glucose 285 (*)     All other components within normal limits   CULTURE BLOOD #1   CULTURE BLOOD #1   LACTIC ACID   URINALYSIS           EMERGENCY DEPARTMENT COURSE:   Vitals:    Vitals:    08/28/18 1857 08/28/18 2000 08/28/18 2045   BP: (!) 128/50 (!) 150/64 (!) 150/60   Pulse: 96 96 92   Resp: (!) 32  22   Temp: 98.2 °F (36.8 °C)  99.7 °F (37.6 °C)   TempSrc: Oral  Oral   SpO2: 98% 97% 98%   Weight: 210 lb (95.3 kg)     Height: 6' (1.829 m)       -------------------------  BP: (!) 150/60, Temp: 99.7 °F (37.6 °C), Pulse: 92, Resp: 22    CC TIME  Due to the immediate potential for life-threatening deterioration due to SUKHDEV , I spent 30 minutes providing critical care. This time is excluding time spent performing procedures. FINAL IMPRESSION      1. SUKHDEV (acute kidney injury) ([facility]    2.  Cellulitis of right upper extremity          DISPOSITION/PLAN    DISPOSITION Admitted 08/28/2018 08:46:00 PM        (Please note that portions of this note were completed with a voice recognition program.  Efforts were made to edit the dictations but occasionally words are mis-transcribed.)    Emerson Sheikh MD, JESSICA, [facility]  Attending Emergency Physician                     Emerson Sheikh MD  08/28/18 2052

## 2018-08-29 PROBLEM — E87.1 HYPONATREMIA: Status: ACTIVE | Noted: 2018-01-01

## 2018-08-29 PROBLEM — E87.1 HYPONATREMIA: Chronic | Status: ACTIVE | Noted: 2018-01-01

## 2018-08-29 PROBLEM — N17.9 AKI (ACUTE KIDNEY INJURY) (HCC): Chronic | Status: ACTIVE | Noted: 2018-01-01

## 2018-08-29 NOTE — CARE COORDINATION
CASE MANAGEMENT NOTE:    Admission Date:  8/28/2018 Britany Alexander is a 47 y.o.  male    Admitted for : SUKHDEV (acute kidney injury) ([address] [N17.9]    Met with:  Patient's Becki Logan    PCP:  Dr. Estrella Collet:  Luis Eshorty Cords:  independently at home , lives w/ GF & Family            Current Services PTA:  No    Is patient agreeable to VNS: Yes    Freedom of choice provided: Yes    List of [address] provided: Yes    VNS chosen:  No,  Ad Castillo will look over list & notify writer of choice    DME:  Crutches, Glucometer/supplies    Home Oxygen: No    Nebulizer: No    CPAP/BIPAP: No    Supplier: N/A    Potential Assistance Needed: Yes, GF states \"may need new supplies/glucometer, Follow for VNS    SNF needed: No    Pharmacy:  [facility]       Does Patient want to use MEDS to BEDS? No    Family Members/Caregivers that pt would like involved in their care:    Yes    If yes, list name here:  Daughter Abhijeet Cunningham    Transportation Provider:  Patient                      Discharge Plan:  8/29/18 [facility] Pt. Lives in 2 story home w/ GF & Family. DME crutches, Glucometer/supplies. GF, states \"he may need new one, unsure if he has all supplies\". Agreeable to VNS, no choice as of yet, list given, will need to follow. Marv started, will need signed/completed. Has been to [facility] in past, but hasn't been there in about a year. Cr. 6.82, Bun 10 , WBC 13.4, Nephro following.  Will follow for needs//KB                 Electronically signed by: Noah Mercado, RN on 8/29/2018 at 9:12 AM

## 2018-08-29 NOTE — PROGRESS NOTES
Pt admitted to ICU 10 and transferred to bed per staff. Connected to monitors, assessment completed. Oriented to room and call light.

## 2018-08-29 NOTE — CONSULTS
Mother     Heart Disease Mother     Diabetes Brother     Heart Disease Father        Review of Systems:    Constitutional: No fever, no chills, no night sweats, fatigue, generalized weakness, loss of appetite  HEENT:  No headache, otalgia, itchy eyes, epistaxis, nasal discharge or sore throat. Cardiac:  No chest pain, dyspnea, orthopnea or PND, palpitations  Chest:  No cough, hemoptysis, pleuritic chest pain, wheezing,SOB  Abdomen:  No abdominal pain, Positive nausea, vomiting, diarrhea, melena, dysphagia hematemesis,constipation, abdominal bloating, flank pain  Neuro:  No CVA, TIA or seizure like activity. Skin:   No rashes, no itching. :   No hematuria, no pyuria, no dysuria, no flank pain. Extremities:  Positive leg swelling and joint pain    Objective:  CURRENT TEMPERATURE:  Temp: 99.3 °F (37.4 °C)  MAXIMUM TEMPERATURE OVER 24HRS:  Temp (24hrs), Av.9 °F (37.2 °C), Min:97.9 °F (36.6 °C), Max:99.9 °F (37.7 °C)    CURRENT RESPIRATORY RATE:  Resp: 22  CURRENT PULSE:  Pulse: 76  CURRENT BLOOD PRESSURE:  BP: (!) 131/52  24HR BLOOD PRESSURE RANGE:  Systolic (35KKZ), SUSANA:187 , Min:120 , MO   ; Diastolic (25IRF), VRC:85, Min:48, Max:70    24HR INTAKE/OUTPUT:    Intake/Output Summary (Last 24 hours) at 18 1520  Last data filed at 18 0400   Gross per 24 hour   Intake           788.75 ml   Output              350 ml   Net           438.75 ml     Patient Vitals for the past 96 hrs (Last 3 readings):   Weight   18 1857 210 lb (95.3 kg)         Physical Exam:  GENERAL APPEARANCE: Alert and cooperative, and appears to be in no acute distress. HEAD: normocephalic  EYES: PERRL, EOMI. Not pale, anicteric   NOSE:  No nasal discharge. THROAT:  Oral cavity and pharynx normal. Moist  NECK: Neck supple, non-tender without lymphadenopathy, masses or thyromegaly. JVD-neg  CARDIAC: Normal S1 and S2. No S3, S4 or murmurs. Rhythm is regular.   LUNGS: Clear to auscultation and percussion without rales, rhonchi, wheezing or diminished breath sounds. ABD-Soft non distended, BS+ Non tender no organomegally  BACK: Examination of the spine reveals  no spinal deformity, without tenderness,   MUSKULOSKELETAL: Adequately aligned spine. No joint erythema or tenderness. EXTREMITIES: No edema. Peripheral pulses intact. NEURO:Alert oriented x 3 ,power 5/5 in all extremities      Labs:   CBC:  Recent Labs      08/28/18 1934 08/29/18 0422   WBC  14.8*  13.4*   RBC  3.36*  2.90*   HGB  8.6*  7.4*   HCT  26.2*  22.6*   MCV  77.9*  77.9*   MCH  25.5*  25.4*   MCHC  32.7  32.6   RDW  14.1  14.4   PLT  495*  424   MPV  8.6  7.9      BMP: Recent Labs      08/28/18 1934 08/29/18 0422 08/29/18   1159   NA  126*  129*  130*   K  5.0  4.5  4.4   CL  90*  95*  96*   CO2  16*  17*  19*   BUN  98*  101*  99*   CREATININE  7.01*  6.82*  6.65*   GLUCOSE  292*  208*  84   CALCIUM  8.7  8.2*  8.1*        Phosphorus:  No results for input(s): PHOS in the last 72 hours.   Magnesium:   Recent Labs      08/29/18   0422   MG  2.5     Albumin:   Recent Labs      08/28/18 1934 08/29/18   0422   LABALBU  2.5*  2.1*       IRON:    Lab Results   Component Value Date    IRON 35 08/02/2013     Iron Saturation:  No components found for: PERCENTFE  TIBC:    Lab Results   Component Value Date    TIBC 243 08/02/2013     FERRITIN:    Lab Results   Component Value Date    FERRITIN 283 08/02/2013     SPEP: Lab Results   Component Value Date    PROT 5.8 08/29/2018    ALBCAL 2.9 08/08/2018    ALBPCT 54 08/08/2018    LABALPH 0.2 08/08/2018    LABALPH 0.9 08/08/2018    A1PCT 3 08/08/2018    A2PCT 17 08/08/2018    LABBETA 0.7 08/08/2018    BETAPCT 13 08/08/2018    GAMGLOB 0.7 08/08/2018    GGPCT 12 08/08/2018    PATH NOT REPORTED 08/08/2018     UPEP:   Lab Results   Component Value Date     08/08/2018        C3:   Lab Results   Component Value Date    C3 119 08/08/2018     C4:   Lab Results   Component Value Date    C4 30 08/08/2018     MPO 8.  Metabolic acidosis with increased anion gap    9. Hyponatremia with low urine sodium    9  right hand cellulitis  Plan:  1. Continue IV bicarbonate drip at 75 mL per hour  2    IV vancomycin with pharmacy to dose  3. Check ESR and uric acid, CPK level, urine electrolyte and UA with microscopy  4. Hold Lasix and losartan  5. Strict I's and O  6. Monitor digoxin level. 7.  The patient appears uremic and likely would need acute dialysis-His  GI symptoms may be related to uremia. We will request general surgery for Edinson catheter and perform dialysis tomorrow. Thank you for the consultation.       Electronically signed by Munir Cardoso MD on 8/29/2018 at 3:20 PM

## 2018-08-29 NOTE — PROGRESS NOTES
medications on file prior to encounter. Current Outpatient Prescriptions on File Prior to Encounter   Medication Sig Dispense Refill    atorvastatin (LIPITOR) 40 MG tablet Take 1 tablet by mouth nightly 30 tablet 3    clopidogrel (PLAVIX) 75 MG tablet Take 75 mg by mouth      Blood Glucose Monitoring Suppl (TRUE METRIX AIR GLUCOSE METER) JONAH 1 Device by Does not apply route 3 times daily 1 Device 0    digoxin (LANOXIN) 125 MCG tablet TAKE ONE TABLET BY MOUTH ONCE DAILY 30 tablet 11    losartan (COZAAR) 50 MG tablet TAKE ONE TABLET BY MOUTH ONCE DAILY 30 tablet 11    glyBURIDE (DIABETA) 5 MG tablet TAKE TWO TABLETS BY MOUTH IN THE MORNING AND ONE TABLET IN THE EVENING 90 tablet 11    furosemide (LASIX) 40 MG tablet TAKE ONE TABLET BY MOUTH TWICE DAILY 60 tablet 11    carvedilol (COREG) 25 MG tablet TAKE ONE TABLET BY MOUTH TWICE DAILY WITH MEALS 60 tablet 11    Lancets MISC Use three times daily 100 each 3    glucose blood VI test strips (ASCENSIA AUTODISC VI;ONE TOUCH ULTRA TEST VI) strip Use tid with associated glucose meter. Pt is using true result meter 100 strip 11    ONE TOUCH ULTRA TEST strip USE ONE NEW STRIP TO CHECK GLUCOSE 4 TIMES DAILY AS NEEDED 100 strip 6    aspirin 81 MG EC tablet Take 81 mg by mouth daily.            Objective    BP (!) 131/52   Pulse 76   Temp 99.3 °F (37.4 °C) (Oral)   Resp 22   Ht 6' (1.829 m)   Wt 210 lb (95.3 kg)   SpO2 99%   BMI 28.48 kg/m²     LABS:  WBC:    Lab Results   Component Value Date    WBC 13.4 08/29/2018     H/H:    Lab Results   Component Value Date    HGB 7.4 08/29/2018    HCT 22.6 08/29/2018     PTT:    Lab Results   Component Value Date    APTT 31.3 08/08/2018   [APTT}  PT/INR:    Lab Results   Component Value Date    PROTIME 12.9 08/29/2018    PROTIME 12.0 12/24/2011    INR 1.2 08/29/2018     HgBA1c:    Lab Results   Component Value Date    LABA1C 13.0 08/06/2018       Assessment   Grabiel Risk Score: Grabiel Scale Score: 18    Patient

## 2018-08-29 NOTE — H&P
POCGLU  285*  284*  80       Intake/Output Summary (Last 24 hours) at 08/29/18 1520  Last data filed at 08/29/18 0400   Gross per 24 hour   Intake           788.75 ml   Output              350 ml   Net           438.75 ml       Physical Exam   Constitutional: He is oriented to person, place, and time and well-developed, well-nourished, and in no distress. No distress. HENT:   Head: Normocephalic and atraumatic. Eyes: Conjunctivae and EOM are normal.   Neck: Normal range of motion. Neck supple. Cardiovascular: Normal rate, normal heart sounds and intact distal pulses. B/L LE edema, 3+. Pulmonary/Chest: Effort normal and breath sounds normal. No respiratory distress. He has no wheezes. He has no rales. Abdominal: Soft. Bowel sounds are normal. He exhibits no distension. There is no tenderness. There is no rebound and no guarding. Musculoskeletal: Normal range of motion. Neurological: He is alert and oriented to person, place, and time. Skin: Skin is warm and dry. He is not diaphoretic. Investigations:      Laboratory Testing:  Recent Results (from the past 24 hour(s))   POC Glucose Fingerstick    Collection Time: 08/28/18  6:59 PM   Result Value Ref Range    POC Glucose 285 (H) 75 - 110 mg/dL   Culture Blood #1    Collection Time: 08/28/18  7:34 PM   Result Value Ref Range    Specimen Description . BLOOD 10ML RED 10ML PURP LEFT AC     Special Requests NOT REPORTED     Culture NO GROWTH 12 HOURS     Status Pending    Lactic Acid    Collection Time: 08/28/18  7:34 PM   Result Value Ref Range    Lactic Acid 1.4 0.5 - 2.2 mmol/L   CBC Auto Differential    Collection Time: 08/28/18  7:34 PM   Result Value Ref Range    WBC 14.8 (H) 3.5 - 11.0 k/uL    RBC 3.36 (L) 4.5 - 5.9 m/uL    Hemoglobin 8.6 (L) 13.5 - 17.5 g/dL    Hematocrit 26.2 (L) 41 - 53 %    MCV 77.9 (L) 80 - 100 fL    MCH 25.5 (L) 26 - 34 pg    MCHC 32.7 31 - 37 g/dL    RDW 14.1 11.5 - 14.9 %    Platelets 856 (H) 421 - 450 k/uL MPV 8.6 6.0 - 12.0 fL    NRBC Automated NOT REPORTED per 100 WBC    Differential Type NOT REPORTED     Immature Granulocytes NOT REPORTED 0 %    Absolute Immature Granulocyte NOT REPORTED 0.00 - 0.30 k/uL    WBC Morphology NOT REPORTED     RBC Morphology NOT REPORTED     Platelet Estimate NOT REPORTED     Seg Neutrophils 82 (H) 36 - 66 %    Lymphocytes 8 (L) 24 - 44 %    Monocytes 9 (H) 1 - 7 %    Eosinophils % 1 0 - 4 %    Basophils 0 0 - 2 %    Segs Absolute 12.14 (H) 1.3 - 9.1 k/uL    Absolute Lymph # 1.18 1.0 - 4.8 k/uL    Absolute Mono # 1.33 (H) 0.1 - 1.3 k/uL    Absolute Eos # 0.15 0.0 - 0.4 k/uL    Basophils # 0.00 0.0 - 0.2 k/uL    Morphology MICROCYTOSIS PRESENT    Comprehensive Metabolic Panel    Collection Time: 08/28/18  7:34 PM   Result Value Ref Range    Glucose 292 (H) 70 - 99 mg/dL    BUN 98 (HH) 6 - 20 mg/dL    CREATININE 7.01 (HH) 0.70 - 1.20 mg/dL    Bun/Cre Ratio NOT REPORTED 9 - 20    Calcium 8.7 8.6 - 10.4 mg/dL    Sodium 126 (L) 135 - 144 mmol/L    Potassium 5.0 3.7 - 5.3 mmol/L    Chloride 90 (L) 98 - 107 mmol/L    CO2 16 (L) 20 - 31 mmol/L    Anion Gap 20 (H) 9 - 17 mmol/L    Alkaline Phosphatase 131 (H) 40 - 129 U/L    ALT 18 5 - 41 U/L    AST 13 <40 U/L    Total Bilirubin 0.47 0.3 - 1.2 mg/dL    Total Protein 6.9 6.4 - 8.3 g/dL    Alb 2.5 (L) 3.5 - 5.2 g/dL    Albumin/Globulin Ratio NOT REPORTED 1.0 - 2.5    GFR Non- 8 (L) >60 mL/min    GFR  10 (L) >60 mL/min    GFR Comment          GFR Staging NOT REPORTED    TROP/MYOGLOBIN    Collection Time: 08/28/18  7:34 PM   Result Value Ref Range    Troponin T 0.18 (HH) <0.03 ng/mL    Troponin Interp          Myoglobin 617 (H) 28 - 72 ng/mL   Culture Blood #1    Collection Time: 08/28/18  7:41 PM   Result Value Ref Range    Specimen Description . BLOOD 10ML RED 10ML PURP LEFT FOREARM     Special Requests NOT REPORTED     Culture NO GROWTH 12 HOURS     Status Pending    EKG 12 Lead    Collection Time: 08/28/18  7:50 PM Result Value Ref Range    Ventricular Rate 95 BPM    Atrial Rate 95 BPM    P-R Interval 180 ms    QRS Duration 154 ms    Q-T Interval 376 ms    QTc Calculation (Bazett) 472 ms    P Axis 58 degrees    R Axis 27 degrees    T Axis 117 degrees   Trop/Myoglobin    Collection Time: 08/28/18 11:14 PM   Result Value Ref Range    Troponin T 0.17 (HH) <0.03 ng/mL    Troponin Interp          Myoglobin 557 (H) 28 - 72 ng/mL   POC Glucose Fingerstick    Collection Time: 08/28/18 11:29 PM   Result Value Ref Range    POC Glucose 284 (H) 75 - 110 mg/dL   Comprehensive Metabolic Panel w/ Reflex to MG    Collection Time: 08/29/18  4:22 AM   Result Value Ref Range    Glucose 208 (H) 70 - 99 mg/dL     (HH) 6 - 20 mg/dL    CREATININE 6.82 (HH) 0.70 - 1.20 mg/dL    Bun/Cre Ratio NOT REPORTED 9 - 20    Calcium 8.2 (L) 8.6 - 10.4 mg/dL    Sodium 129 (L) 135 - 144 mmol/L    Potassium 4.5 3.7 - 5.3 mmol/L    Chloride 95 (L) 98 - 107 mmol/L    CO2 17 (L) 20 - 31 mmol/L    Anion Gap 17 9 - 17 mmol/L    Alkaline Phosphatase 107 40 - 129 U/L    ALT 15 5 - 41 U/L    AST 9 <40 U/L    Total Bilirubin 0.33 0.3 - 1.2 mg/dL    Total Protein 5.8 (L) 6.4 - 8.3 g/dL    Alb 2.1 (L) 3.5 - 5.2 g/dL    Albumin/Globulin Ratio NOT REPORTED 1.0 - 2.5    GFR Non-African American 9 (L) >60 mL/min    GFR  10 (L) >60 mL/min    GFR Comment          GFR Staging NOT REPORTED    Magnesium    Collection Time: 08/29/18  4:22 AM   Result Value Ref Range    Magnesium 2.5 1.6 - 2.6 mg/dL   CBC    Collection Time: 08/29/18  4:22 AM   Result Value Ref Range    WBC 13.4 (H) 3.5 - 11.0 k/uL    RBC 2.90 (L) 4.5 - 5.9 m/uL    Hemoglobin 7.4 (L) 13.5 - 17.5 g/dL    Hematocrit 22.6 (L) 41 - 53 %    MCV 77.9 (L) 80 - 100 fL    MCH 25.4 (L) 26 - 34 pg    MCHC 32.6 31 - 37 g/dL    RDW 14.4 11.5 - 14.9 %    Platelets 670 475 - 271 k/uL    MPV 7.9 6.0 - 12.0 fL    NRBC Automated NOT REPORTED per 100 WBC   Protime-INR    Collection Time: 08/29/18  4:22 AM Result Value Ref Range    Protime 12.9 (H) 9.7 - 12.0 sec    INR 1.2    Creatine Kinase    Collection Time: 08/29/18  4:22 AM   Result Value Ref Range    Total  39 - 308 U/L   DIGOXIN LEVEL    Collection Time: 08/29/18  4:22 AM   Result Value Ref Range    Digoxin Lvl 1.6 0.5 - 2.0 ng/mL    Digoxin Dose Amount UNKNOWN     Digoxin Date Last Dose UNKNOWN     Digoxin Dose Time UNKNOWN    Urinalysis Reflex to Culture    Collection Time: 08/29/18  9:10 AM   Result Value Ref Range    Color, UA YELLOW YEL    Turbidity UA CLOUDY (A) CLEAR    Glucose, Ur 1+ (A) NEG    Bilirubin Urine NEGATIVE NEG    Ketones, Urine NEGATIVE NEG    Specific [place], UA 1.016 1.000 - 1.030    Urine Hgb TRACE (A) NEG    pH, UA 5.0 5.0 - 8.0    Protein, UA 4+ (A) NEG    Urobilinogen, Urine Normal NORM    Nitrite, Urine NEGATIVE NEG    Leukocyte Esterase, Urine NEGATIVE NEG    Urinalysis Comments NOT REPORTED    Microscopic Urinalysis    Collection Time: 08/29/18  9:10 AM   Result Value Ref Range    -          WBC, UA 0 TO 2 /HPF    RBC, UA 2 TO 5 /HPF    Casts UA NOT REPORTED /LPF    Crystals UA NOT REPORTED NONE /HPF    Epithelial Cells UA 2 TO 5 /HPF    Renal Epithelial, Urine NOT REPORTED 0 /HPF    Bacteria, UA FEW (A) NONE    Mucus, UA NOT REPORTED NONE    Trichomonas, UA NOT REPORTED NONE    Amorphous, UA 1+ (A) NONE    Other Observations UA NOT REPORTED NREQ    Yeast, UA NOT REPORTED NONE   POC Glucose Fingerstick    Collection Time: 08/29/18 11:54 AM   Result Value Ref Range    POC Glucose 80 75 - 110 mg/dL   Basic Metabolic Prof    Collection Time: 08/29/18 11:59 AM   Result Value Ref Range    Glucose 84 70 - 99 mg/dL    BUN 99 (HH) 6 - 20 mg/dL    CREATININE 6.65 (HH) 0.70 - 1.20 mg/dL    Bun/Cre Ratio NOT REPORTED 9 - 20    Calcium 8.1 (L) 8.6 - 10.4 mg/dL    Sodium 130 (L) 135 - 144 mmol/L    Potassium 4.4 3.7 - 5.3 mmol/L    Chloride 96 (L) 98 - 107 mmol/L    CO2 19 (L) 20 - 31 mmol/L    Anion Gap 15 9 - 17 mmol/L    GFR Non- 9 (L) >60 mL/min    GFR  11 (L) >60 mL/min    GFR Comment          GFR Staging NOT REPORTED    Hep B Surf Ag    Collection Time: 08/29/18 11:59 AM   Result Value Ref Range    Hepatitis B Surface Ag NONREACTIVE NR       Imaging/Diagnostics:  Xr Chest Standard (2 Vw)    Result Date: 8/6/2018  EXAMINATION: TWO VIEWS OF THE CHEST 8/6/2018 5:29 pm COMPARISON: 11/21/2015. HISTORY: ORDERING SYSTEM PROVIDED HISTORY: CHF exacerbation possible TECHNOLOGIST PROVIDED HISTORY: Reason for exam:->CHF exacerbation possible Ordering Physician Provided Reason for Exam: PT CO SOB and fatigue X several days. HX CHF Acuity: Chronic Type of Exam: Ongoing FINDINGS: The heart size is enlarged but unchanged. The pulmonary vasculature is within normal limits. There is increased density involving the lower lung zones with blunting of the costophrenic angles. No pneumothoraces are seen. 1. Increased density involving the lower lung zones likely representing pleural effusions with lower lobe atelectasis. 2. Cardiomegaly without overt failure. Us Renal Complete    Result Date: 8/7/2018  EXAMINATION: RETROPERITONEAL ULTRASOUND OF THE KIDNEYS AND URINARY BLADDER 8/7/2018 COMPARISON: None HISTORY: ORDERING SYSTEM PROVIDED HISTORY: renal failure TECHNOLOGIST PROVIDED HISTORY: Reason for exam:->renal failure Acuity: Acute Type of Exam: Initial FINDINGS: Kidneys: The right kidney measures 10.0 cm in length and the left kidney measures 11.2 cm in length. Kidneys demonstrate normal cortical echogenicity. No evidence of hydronephrosis or intrarenal stones. Bladder: Urinary bladder is decompressed. Unremarkable ultrasound of the kidneys and urinary bladder. Xr Chest Portable    Result Date: 8/28/2018  EXAMINATION: SINGLE XRAY VIEW OF THE CHEST 8/28/2018 8:09 pm COMPARISON: Chest radiograph dated 08/06/2018.  HISTORY: ORDERING SYSTEM PROVIDED HISTORY: Chest Pain TECHNOLOGIST PROVIDED HISTORY: Chest

## 2018-08-30 NOTE — FLOWSHEET NOTE
08/30/18 0631   Provider Notification   Reason for Communication Review case   Provider Name Dr. Claudia Huddleston   Provider Notification Physician   Method of Communication Page   Response Waiting for response   Notification Time 0630     Second page to Dr. Claudia Huddleston.

## 2018-08-30 NOTE — PROGRESS NOTES
Practitioner: Dr. Brien Choi  Referral Date : 08/29/18  Diagnosis: SUKHDEV  Follows Commands: Within Functional Limits  Other (Comment): Ok per nurse Aleksandra Dukes to get Pt up to chair. General Comment  Comments: per chart pt admitted with complaints of fatiuge and swelling to BLE and R hand. Subjective  Subjective: Pt reports that he was having difficulty walking at home for the last two days prior to admission. Pain Screening  Patient Currently in Pain: Denies  Vital Signs  Patient Currently in Pain: Denies       Orientation  Orientation  Overall Orientation Status: Within Functional Limits    Social/Functional History  Social/Functional History  Lives With: Significant other (gilfriend and son, Son is 8 y.o.)  Type of Home: House  Home Layout: Two level  Home Access: Stairs to enter with rails  Entrance Stairs - Number of Steps: 2 (15 steps to upstairs bedroom.)  Entrance Stairs - Rails: Right  Bathroom Shower/Tub: Tub/Shower unit, Curtain  Bathroom Toilet: Standard  Bathroom Accessibility: Accessible  Home Equipment: Crutches  Receives Help From: Family (Girlfriend does not work, daughter willing to help out. )  ADL Assistance: Needs assistance  Homemaking Assistance: Needs assistance  Homemaking Responsibilities: Yes  Ambulation Assistance: Needs assistance (leg swelling caused immobilzation 2 days prior to [employer])  Transfer Assistance: Needs assistance  Active : Yes  Mode of Transportation: Car  Additional Comments: Pt's daughter states she is available to help and Pt's significant other is able to provide 24/7 assitance. Objective     Observation/Palpation  Observation: Pt presents with LE swelling, R arm swelling. L foot diabetic ulcer. R IJ, urinary cath. Scar: R olecranon process scar secondary to trauma at age 7    AROM RLE (degrees)  RLE AROM: Exceptions  RLE General AROM: Limited hip flexion, knee extension, ankle plantar flexion and dorsiflexion.    R Hip Flexion 0-125: 0 to 95 degrees  R Knee fall risk precautions in place, Patient at risk for falls, Call light within reach, Nurse notified (Nurse Martha Rausch)  Restraints  Initially in place: No    G-Code  PT G-Codes  Functional Assessment Tool Used: [place]  Score: 7/20  Functional Limitation: Mobility: Walking and moving around  Mobility: Walking and Moving Around Current Status (): At least 80 percent but less than 100 percent impaired, limited or restricted  Mobility: Walking and Moving Around Goal Status (): At least 40 percent but less than 60 percent impaired, limited or restricted  OutComes Score                                           AM-PAC Score     AM-PAC Inpatient Mobility without Stair Climbing Raw Score : 7  AM-PAC Inpatient without Stair Climbing T-Scale Score : 28.66  Mobility Inpatient CMS 0-100% Score: 86.29  Mobility Inpatient without Stair CMS G-Code Modifier : CM       Goals  Short term goals  Time Frame for Short term goals: 5-7 visits  Short term goal 1: Pt to perform bed mobility at [address] term goal 2: Pt to perform transfers at Mod-A. Short term goal 3: Pt to ambulate 50 ft, with a RW, at [address] level. Short term goal 4: Pt to demonstrate good technique with LE HEP for strengthening and balance activities. Short term goal 5: Pt to demonstrate increased strength by 1.5 MMT for hips and knees. Patient Goals   Patient goals : Pt did not state a goal.        Therapy Time   Individual Concurrent Group Co-treatment   Time In 1402         Time Out 1437         Minutes 35         Timed Code Treatment Minutes: 15 Minutes     The above documentation completed by Student Physical Therapist Anika Mercado  was reviewed and accepted by the Supervising Clinical instructor, CALLY Alvarado SPT    The above documentation completed by Student Physical Therapist  Anika Mercado  was reviewed and accepted by the supervising Clinical instructor JOLEEN Alvarado.

## 2018-08-30 NOTE — PROGRESS NOTES
Nephrology Progress Note    Subjective/   47y.o. year old male with PMH significant for uncontrolled DM2, CHF, who we are seeing in consultation for SUKHDEV on CKD3 in the setting of diabetic nephropathy. No acute events o/n. Patient is resting comfortably in bed, in no acute distress. Complains of general malaise, back pain. UO is adequate. Objective/     Vitals:    08/30/18 0600 08/30/18 0700 08/30/18 0800 08/30/18 0900   BP: (!) 136/46 (!) 138/59 (!) 142/51 107/63   Pulse: 80 81 85 82   Resp: 19 18 20 29   Temp:   100 °F (37.8 °C)    TempSrc:   Oral    SpO2:  97% 97% 97%   Weight:       Height:         24HR INTAKE/OUTPUT:    Intake/Output Summary (Last 24 hours) at 08/30/18 1009  Last data filed at 08/30/18 0615   Gross per 24 hour   Intake           1167.5 ml   Output              925 ml   Net            242.5 ml     Patient Vitals for the past 96 hrs (Last 3 readings):   Weight   08/28/18 1857 210 lb (95.3 kg)       Constitutional:  Alert, awake, no apparent distress  Cardiovascular:  S1, S2 without m/r/g  Respiratory:  CTA B without w/r/r  Abdomen: +bs, soft, nt  Ext: 3+ LE edema    Data/  Recent Labs      08/28/18   1934  08/29/18   0422  08/30/18   0410   WBC  14.8*  13.4*  15.8*   HGB  8.6*  7.4*  7.5*   HCT  26.2*  22.6*  23.0*   MCV  77.9*  77.9*  78.4*   PLT  495*  424  413     Recent Labs      08/29/18   0422  08/29/18   1159  08/30/18   0410   NA  129*  130*  131*   K  4.5  4.4  4.6   CL  95*  96*  95*   CO2  17*  19*  20   GLUCOSE  208*  84  103*   MG  2.5   --    --    BUN  101*  99*  100*   CREATININE  6.82*  6.65*  7.08*   LABGLOM  9*  9*  8*   GFRAA  10*  11*  10*       Assessment/   1. Acute kidney injury nonoliguric related to Prerenal azotemia -suggested by poor oral intake, nausea and low urine sodium. He may also have progression of chronic kidney disease with uremia. Renal ultrasound unremarkable for hydronephrosis      2.  Chronic kidney disease stage 3 possibly with progression

## 2018-08-30 NOTE — FLOWSHEET NOTE
08/29/18 2018   Encounter Summary   Services provided to: Patient not available   Referral/Consult From: Rounding   Routine   Type Initial   Assessment Sleeping

## 2018-08-30 NOTE — PLAN OF CARE
Problem: Falls - Risk of:  Goal: Will remain free from falls  Will remain free from falls    Outcome: Ongoing  Bed remains in lowest position, call light within reach. Patient remains free of falls at this time. RN will continue to monitor. Problem: Skin Integrity:  Goal: Will show no infection signs and symptoms  Will show no infection signs and symptoms   Outcome: Ongoing  Patient turned and repositioned every 2 hours and as needed for comfort. Skin remains dry and intact. No new skin breakdown noted. Problem: Tissue Perfusion - Renal, Altered:  Goal: Electrolytes within specified parameters  Electrolytes within specified parameters   Outcome: Ongoing  Labs within normal limits    Problem: Risk for Impaired Skin Integrity  Goal: Tissue integrity - skin and mucous membranes  Structural intactness and normal physiological function of skin and  mucous membranes. Outcome: Ongoing  Patient turned and repositioned every 2 hours and as needed for comfort. Skin remains dry and intact. No new skin breakdown noted. Problem: Pain:  Goal: Control of acute pain  Control of acute pain   Outcome: Ongoing  Pain assessed at regular intervals. Medications administered as requested for comfort. Pain remains at a tolerable level.

## 2018-08-30 NOTE — PROGRESS NOTES
[address] [facility]    PROGRESS NOTE             8/30/2018    9:33 AM    Name:   Priyanka Velasco  MRN:     [id number]     Acct:      [de-identified]   Room:   2010/2010-01  IP Day:  2  Admit Date:  8/28/2018  6:49 PM    PCP:   Severiano Albert MD  Code Status:  Full Code    Subjective:     C/C:   Chief Complaint   Patient presents with    Fatigue    Leg Swelling     bilat and right hand     Interval History Status: worsened. Mr. Bia Gupta was seen bedside in the ICU. Pt alert but disoriented this morning. Pt only oriented to self. No complaints per patient. Pt wincing in pain when moving but denies any pain when asked. Per nursing and notes: pt was hypoglycemic x2 overnight and corrected  With dextrose. Spoke with patient's partner at bedside. They acknowledge placement of [place] and need for dialysis today. Brief History:     Mr. Bia Gupta is a 47 M who presents with bilateral leg swelling, decreased urinary output, and elevated Cr (7.0), thus indicative of SUKHDEV on CKD 4. Pt additionally presents w/Rt hand erythema, swelling, and pain, likely due to cellulitis.      PMH significant for CHF, CKD 4, DMII, HTN, and HLD. Recently discharged on 8/9 after being treated for CHF and rt hand cellulitis. Review of Systems:     Review of Systems   Unable to perform ROS: Mental status change   Pt denies any pain or symptoms, but is only oriented to self. Pt is a poor historian this morning. Partner at bedside denies any issues overnight aside from episodes of hypoglycemia and disorientation. Medications: Allergies:     Allergies   Allergen Reactions    Metformin And Related Nausea And Vomiting       Current Meds:   Scheduled Meds:    atorvastatin  40 mg Oral Nightly    clopidogrel  75 mg Oral Daily    carvedilol  25 mg Oral BID WC    famotidine  20 mg Oral Daily    mupirocin   Topical Daily    linezolid  600 mg Oral 2 times per Urinary bladder is decompressed. Unremarkable ultrasound of the kidneys and urinary bladder. Xr Chest Portable    Result Date: 8/28/2018  EXAMINATION: SINGLE XRAY VIEW OF THE CHEST 8/28/2018 8:09 pm COMPARISON: Chest radiograph dated 08/06/2018. HISTORY: ORDERING SYSTEM PROVIDED HISTORY: Chest Pain TECHNOLOGIST PROVIDED HISTORY: Chest Pain Ordering Physician Provided Reason for Exam: Chest pain Acuity: Acute Type of Exam: Initial FINDINGS: Heart size is stable. No infiltrates. No effusions. No pneumothorax. No free air below the diaphragm. No acute findings in the chest.     Nm Myocardial Spect Rest Exercise Or Rx    Result Date: 8/8/2018  EXAMINATION: MYOCARDIAL PERFUSION IMAGING 8/8/2018 11:11 am TECHNIQUE: Rest dose:  10.7 mCi Tc-99m sestamibi intravenously Stress dose:  35.2  mCi Tc-99m sestamibi intravenously Under cardiology supervision, 0.4 mg Lexiscan was infused intravenously prior to injection of the stress dose. SPECT imaging was acquired following injection of the sestamibi. ECG gating was obtained following the stress acquisition. COMPARISON: 01/01/2012 HISTORY: ORDERING SYSTEM PROVIDED HISTORY: CHEST PAIN, ACUTE CORONARY SYNDROME SUSPECT TECHNOLOGIST PROVIDED HISTORY: Procedure Type->Rx Ordering Physician Provided Reason for Exam: chest pain acute coronay sydrome suspect Acuity: Unknown Type of Exam: Unknown 52-year-old male with chest pain ; suspected acute coronary syndrome FINDINGS: The patient achieved a maximum heart rate of 97 beats per minute, 57 % of the maximum age predicted heart rate of 169 beats per minute. Perfusion: Fixed perfusion defect involving the anterior and apical wall junction consistent with infarct. No reversible perfusion defect identified to suggest reversible ischemia. Function: The gated SPECT data demonstrates enlarged left ventricular size with global hypokinesis.  Left ventricular ejection fraction:  22% TID score:  1.01  (Threshold value of 1.39 is used 93.8, Uprotein: 114  -ESR > 130  -UA: 4+ protein  -Strict Is and Os    CHF w/sys dysfunction (EF 45-50%)  -Hold Lasix + Losartan     Cellulitis (Rt hand)  -WBCs: 1.4 --> 15.8  -DC'd Vanc  -Linezolid + Bactroban  -Prednisone  -BCx: neg x2     DMII  -ISS  -Hypoglycemia protocol  -BG 44 --> Dextrose --> 84 overnight  -BG 60 --> dextrose --> 102 this AM (0800)     HTN  -Coreg 25 mg BID     Hyponatremia  -Na: 126 --> 130 --> 131  -IVF 1/2 NS     Arrhythmia  -Digoxin --> held  -Monitor digoxin level     DVT PPx  -Heparin     GI PPx  -Pepcid    Alex Moran MD  8/30/2018  9:33 AM       IM Attending    Pt seen and examined today   I have discussed the care of pt , including pertinent history and exam findings,  with the resident. I have reviewed the key elements of all parts of the encounter with the resident. I agree with the assessment, plan and orders as documented by the resident.     Electronically signed by Tomasa Wiggins MD on 8/30/2018 at 10:34 AM

## 2018-08-30 NOTE — PROGRESS NOTES
(gilfriend and son, Son is 9 y.o.)  Type of Home: House  Home Layout: Two level  Home Access: Stairs to enter with rails  Entrance Stairs - Number of Steps: 2 (15 steps to upstairs bedroom.)  Entrance Stairs - Rails: Right  Bathroom Shower/Tub: Tub/Shower unit, Curtain  Bathroom Toilet: Standard  Bathroom Accessibility: Accessible  Receives Help From: Family (Girlfriend does not work, daughter willing to help out. )  ADL Assistance: Needs assistance  Homemaking Assistance: Needs assistance  Homemaking Responsibilities: Yes  Ambulation Assistance: Needs assistance (leg swelling caused immobilzation 2 days prior to [employer])  Transfer Assistance: Needs assistance  Active : Yes  Mode of Transportation: Car  Additional Comments: Pt's daughter states she is available to help and Pt's significant other is able to provide 24/7 assitance. Objective  Vision - Basic Assessment  Patient Visual Report: No visual complaint reported. Cognition  Overall Cognitive Status: WFL   Perception  Overall Perceptual Status: WFL  Sensation  Overall Sensation Status: Impaired  Light Touch: Severe deficits in the RLE, Severe deficits in the LLE   ADL  Feeding: Setup, Increased time to complete (Reports some spillage 2* dec'd coordination RUE)  Grooming: Minimal assistance (Some A for hair grooming)  UE Bathing: Minimal assistance, Moderate assistance  LE Bathing: Maximum assistance  UE Dressing: Moderate assistance  LE Dressing: Maximum assistance, Dependent/Total  Toileting: Dependent/Total ([place])  Additional Comments: Pt reports that he has been needing increased A for self-care tasks since last November and now needing more A 2* general weakness.     UE Function  Hand Dominance  Hand Dominance: Right     LUE Strength  Gross LUE Strength: WFL  L Hand Grasp: 4/5     LUE Tone: Normotonic     LUE AROM (degrees)  LUE AROM : WFL     Left Hand AROM (degrees)  Left Hand AROM: WFL  RUE Strength  Gross RUE Strength: WFL  R Hand Grasp: 4/5 Discharge  Short term goal 1: Pt will complete bed mobility with Min-Mod A and tolerate sitting EOB for 5-10 minutes unsupported while completing a functional task. Short term goal 2: Pt will actively participate in self-care routine and complete UB with CGA/Min A and LB with Mod A using AE as needed. Short term goal 3: Pt will complete toilet transfer with Mod A x1 and Good safety using appropriate DME. Short term goal 4: Pt will stand for 3+ minutes with 1-2 UE support and no LOB while completing a functional task. Short term goal 5: Pt will actively participate in 15-20 minutes of therapeutic exercise/activity with focus on RUE coordination to promote increased independence and safety with self-care and mobility.     Plan  Safety Devices  Safety Devices in place: Yes  Type of devices: Patient at risk for falls, Gait belt, Left in chair, Call light within reach, Nurse notified     Plan  Times per week: 5-7  Times per day: Daily  Current Treatment Recommendations: Balance Training, Functional Mobility Training, Endurance Training, Safety Education & Training, Patient/Caregiver Education & Training, Equipment Evaluation, Education, & procurement, Self-Care / ADL    Equipment Recommendations  Equipment Needed:  (TBD)  OT Individual Minutes  Time In: 3154  Time Out: 0269  Minutes: 34    Electronically signed by Corwin Ackerman on 8/30/18 at 3:24 PM

## 2018-08-30 NOTE — FLOWSHEET NOTE
08/30/18 0250   Provider Notification   Reason for Communication Review case;New orders   Provider Name Dr. Yaima Do   Provider Notification Physician   Method of Communication Call   Response See orders   Notification Time [phone]     Answering service connected writer with Dr. Yaima Do to discuss patient case. Updated on patient case. Orders received to change bicarb drip base fluid to D5 0.45 NS to run at the same rate.

## 2018-08-30 NOTE — PROGRESS NOTES
Rounded on patient with Dr. Arias Falk. Suspecting possible gout of the rt hand vs cellulitis due to elevated uric acid. Erythema improved. Still slightly swollen. Will continue to treat with abx and prednisone. Discussed gout and dialysis with patient's significant other. Pt drowsy at this time.

## 2018-08-30 NOTE — PROGRESS NOTES
Dialysis Safety Checks:    Patient ID Verified (Y/N) Y     -Hepatitis Test                   Date      Result  Hepatitis B Surface Antigen   18 Neg     Hepatitis B Surface Antibody 18 Pending     Hepatitis B Core Antibody  18 Pending     -Treatment Initiation  Blood Vol Processed Goal (Liters)  Pump Speed x Treatment Hours x 60 Minutes    Target Fluid Removal 1500     * Intra-treatment documented Safety Checks include the followin) Access and face visible at all times. 2) All connections and blood lines are secure with no kinks. 3) NVL alarm engaged. 4) Hemosafe device applied (if applicable). 5) No collapse of Arterial or Venous blood chambers. 6) All blood lines / pump segments in the air detectors.   --------------------------------------------------------------------------------    [facility]

## 2018-08-30 NOTE — CARE COORDINATION
ONGOING DISCHARGE PLAN:    Spoke with patient regarding discharge plan and patient confirms that plan is to return home with vns, has not decided who, sarahy started  Pt continues in ICU, disoriented, hypoglycemia x 2 overnight  To have sahara placed for HD today, , cr 7.08  Will have LSW follow for poss hd slot  Will continue to follow for additional discharge needs.     Electronically signed by Kary Flowers RN on 8/30/2018 at 10:07 AM

## 2018-08-30 NOTE — FLOWSHEET NOTE
08/30/18 0138   Provider Notification   Reason for Communication Review case   Provider Name Dr. Mario Simmons   Provider Notification Physician   Method of Communication Page   Response Waiting for response   Notification Time [phone]     Second page to Dr. Mario Simmons.

## 2018-08-31 NOTE — PROGRESS NOTES
Dialysis Post Treatment Report:     -Access Assessment  no s/s infection     -Ultrafiltration   UF Goal 2500   Prime (-) 400   NS Flush (-) 100   Other (-/+)    Total UF Removed 2000     -Medications / Blood Administration  Medications Given (Y/N) n   Blood Products (Y/N) n     Narrative:  Pt. tolerated dialysis well, no new c/o.

## 2018-08-31 NOTE — PLAN OF CARE
Problem: Falls - Risk of:  Goal: Will remain free from falls  Will remain free from falls    Outcome: Met This Shift  No falls, pt up to toilet with 2 assist and THAI Stedy without difficulty. Goal: Absence of physical injury  Absence of physical injury    Outcome: Met This Shift  No physicall injury noted. Problem: Skin Integrity:  Goal: Will show no infection signs and symptoms  Will show no infection signs and symptoms   Outcome: Ongoing  Labs as noted, pt remains on po Zyvox. Goal: Absence of new skin breakdown  Absence of new skin breakdown   Outcome: Ongoing  Skin assessment as charted, precautions continue    Problem: Tissue Perfusion - Renal, Altered:  Goal: Electrolytes within specified parameters  Electrolytes within specified parameters   Outcome: Ongoing  Labs as noted, pt on hemodialysis    Problem: Risk for Impaired Skin Integrity  Goal: Tissue integrity - skin and mucous membranes  Structural intactness and normal physiological function of skin and  mucous membranes.    Outcome: Ongoing  Skin assessment as charted, precautions continue    Problem: Pain:  Goal: Control of acute pain  Control of acute pain   Outcome: Met This Shift  No c/o pain

## 2018-08-31 NOTE — PROGRESS NOTES
[address].    PROGRESS NOTE             8/31/2018    9:08 AM    Name:   Calin Berg  MRN:     [id number]     Acct:      [de-identified]   Room:   2010/2010-01  IP Day:  3  Admit Date:  8/28/2018  6:49 PM    PCP:   Reema Vela MD  Code Status:  Full Code    Subjective:     C/C:   Chief Complaint   Patient presents with    Fatigue    Leg Swelling     bilat and right hand     Interval History Status: improved. Pt seen bedside in the ICU. Pt AOx4. Pt has no complaints overnight. Acknowledges significant improvement in pain, erythema, and swelling of the right hand as well as swelling of the b/l lower extremities. Partner at bedside and acknowledges significant improvement since dialysis yesterday. Brief History:     Mr. Gabriel Fleming is a 47 M who presents with bilateral leg swelling, decreased urinary output, and elevated Cr (7.0), thus indicative of SUKHDEV on CKD 4. Pt additionally presents w/Rt hand erythema, swelling, and pain, likely due to cellulitis vs gout.      PMH significant for CHF, CKD 4, DMII, HTN, and HLD. Recently discharged on 8/9 after being treated for CHF and rt hand cellulitis.        Review of Systems:     Review of Systems   Constitutional: Negative for chills and fever. HENT: Negative for ear pain and sore throat. Eyes: Negative for blurred vision and double vision. Respiratory: Negative for shortness of breath. Cardiovascular: Negative for chest pain. Gastrointestinal: Negative for abdominal pain, constipation, diarrhea, nausea and vomiting. Genitourinary: Negative for dysuria and hematuria. Musculoskeletal: Negative for falls. Skin: Negative for rash. Neurological: Negative for dizziness and headaches. Medications: Allergies:     Allergies   Allergen Reactions    Metformin And Related Nausea And Vomiting       Current Meds:   Scheduled Meds:    atorvastatin  40 mg Oral Nightly    clopidogrel  75 mg Oral Daily    carvedilol  25 mg Oral BID WC    famotidine  20 mg Oral Daily    mupirocin   Topical Daily    linezolid  600 mg Oral 2 times per day    predniSONE  20 mg Oral Daily    sodium chloride flush  10 mL Intravenous 2 times per day    docusate sodium  100 mg Oral BID    heparin (porcine)  5,000 Units Subcutaneous 3 times per day    insulin lispro  0-12 Units Subcutaneous TID WC    insulin lispro  0-6 Units Subcutaneous Nightly    miconazole   Topical BID     Continuous Infusions:    IV infusion builder 75 mL/hr at 18    dextrose       PRN Meds: anticoagulant sodium citrate, anticoagulant sodium citrate, HYDROcodone 5 mg - acetaminophen, sodium chloride flush, acetaminophen, ondansetron, nicotine, glucose, dextrose, glucagon (rDNA), dextrose    Data:     Past Medical History:   has a past medical history of Anemia; Arrhythmia; CHF (congestive heart failure) (HCC); CKD (chronic kidney disease) stage 4, GFR 15-29 ml/min ([address] Diabetic foot ulcer ([address] DM type 2, uncontrolled, with lower extremity ulcer ([address] Hyperlipidemia; Hypertension; Nephropathy; Neuropathy; and Renal insufficiency, mild. Social History:   reports that he has never smoked. He has never used smokeless tobacco. He reports that he does not drink alcohol or use drugs. Family History:   Family History   Problem Relation Age of Onset    Diabetes Mother     Heart Disease Mother     Diabetes Brother     Heart Disease Father        Vitals:  BP (!) 144/55   Pulse 85   Temp 98.2 °F (36.8 °C) (Oral)   Resp 19   Ht 6' (1.829 m)   Wt 224 lb 13.9 oz (102 kg)   SpO2 99%   BMI 30.50 kg/m²   Temp (24hrs), Av.3 °F (36.8 °C), Min:98.2 °F (36.8 °C), Max:98.8 °F (37.1 °C)    Recent Labs      18   1020  18   1822  18   2110  18   0800   POCGLU  100  210*  229*  208*       I/O (24Hr):     Intake/Output Summary (Last 24 hours) at 18 0123  Last data filed at 8/29/2018  EXAMINATION: RETROPERITONEAL ULTRASOUND OF THE KIDNEYS 8/29/2018 COMPARISON: August 7, 2018 HISTORY: ORDERING SYSTEM PROVIDED HISTORY: Elevated BUN and creatinine FINDINGS: Kidneys: The right kidney measures 12.4 cm in length and the left kidney measures 12 cm in length. Kidneys demonstrate normal cortical echogenicity. No evidence of hydronephrosis or intrarenal stones. Unremarkable ultrasound of the kidneys. Us Renal Complete    Result Date: 8/7/2018  EXAMINATION: RETROPERITONEAL ULTRASOUND OF THE KIDNEYS AND URINARY BLADDER 8/7/2018 COMPARISON: None HISTORY: ORDERING SYSTEM PROVIDED HISTORY: renal failure TECHNOLOGIST PROVIDED HISTORY: Reason for exam:->renal failure Acuity: Acute Type of Exam: Initial FINDINGS: Kidneys: The right kidney measures 10.0 cm in length and the left kidney measures 11.2 cm in length. Kidneys demonstrate normal cortical echogenicity. No evidence of hydronephrosis or intrarenal stones. Bladder: Urinary bladder is decompressed. Unremarkable ultrasound of the kidneys and urinary bladder. Xr Chest Portable    Result Date: 8/28/2018  EXAMINATION: SINGLE XRAY VIEW OF THE CHEST 8/28/2018 8:09 pm COMPARISON: Chest radiograph dated 08/06/2018. HISTORY: ORDERING SYSTEM PROVIDED HISTORY: Chest Pain TECHNOLOGIST PROVIDED HISTORY: Chest Pain Ordering Physician Provided Reason for Exam: Chest pain Acuity: Acute Type of Exam: Initial FINDINGS: Heart size is stable. No infiltrates. No effusions. No pneumothorax. No free air below the diaphragm. No acute findings in the chest.     Ir Nontunneled Vascular Catheter > 5 Years    Result Date: 8/30/2018  PROCEDURE: ULTRASOUND GUIDED VASCULAR ACCESS. FLUOROSCOPY GUIDED PLACEMENT OF A NON-TUNNELED CATHETER. 8/30/2018. HISTORY: ORDERING SYSTEM PROVIDED HISTORY: Dialysis TECHNOLOGIST PROVIDED HISTORY: Dialysis Acute renal failure.  SEDATION: Local lidocaine FLUOROSCOPY DOSE AND TYPE OR TIME AND EXPOSURES: 1 minute and 50 rebound and no guarding. Musculoskeletal: Normal range of motion. He exhibits edema (B/L LE edema, 2+. ). Rt hand presents with no erythema, swelling, or tenderness. ROM wNL. Strength 5/5. Neurological: He is alert and oriented to person, place, and time. Skin: Skin is warm and dry. He is not diaphoretic. Assessment:        Primary Problem  SUKHDEV (acute kidney injury) [facility])    Active Hospital Problems    Diagnosis Date Noted    Hyponatremia [E87.1] 08/29/2018    SUKHDEV (acute kidney injury) ([facility] [N17.9] 08/28/2018       Plan:        SUKHDEV on CKD IV  -Cr 7.08 --> 5.09  -IVF D5 w/ 1/2 NS and Bicarb  -Nephrology Consult  -Renal US negative  -Placement of Edinson cath by GS on 8/30  -Dialysis 1 L removed on 8/30  -Uric Acid: 11.6 (H) --> 8.3  -Arash 21, Uk 31.6, Ucl <20  -UCr 93.8, Uprotein: 114  -ESR > 130  -UA: 4+ protein  -Strict Is and Os     CHF w/sys dysfunction (EF 45-50%)  -Hold Lasix + Losartan     Rt Hand: Cellulitis vs Gout  -WBCs: 13.4 --> 15.8 --> 15.1  -DC'd Vanc  -Linezolid + Bactroban  -Prednisone  -BCx: neg x3     DMII  -ISS  -Hypoglycemia protocol     HTN  -Coreg 25 mg BID     Hyponatremia  -Na: 126 --> 130 --> 131 --> 135  -IVF D5 w/ 1/2 NS and Bicarb     Arrhythmia  -Digoxin --> held  -Monitor digoxin level     DVT PPx  -Heparin     GI PPx  -Pepcid    Vinay Betancourt MD  8/31/2018  9:08 AM       IM Attending    Pt seen and examined today   I have discussed the care of pt , including pertinent history and exam findings,  with the resident. I have reviewed the key elements of all parts of the encounter with the resident. I agree with the assessment, plan and orders as documented by the resident.     Electronically signed by Av Julien MD on 8/31/2018 at 11:00 AM

## 2018-08-31 NOTE — PLAN OF CARE
Problem: Falls - Risk of:  Goal: Will remain free from falls  Will remain free from falls    Outcome: Met This Shift  Patient remained free from falls. Call light within reach and bed in lowest position. Patient oriented to room and surroundings.      Goal: Absence of physical injury  Absence of physical injury    Outcome: Met This Shift  Absence of physical injury met this shift. Problem: Skin Integrity:  Goal: Will show no infection signs and symptoms  Will show no infection signs and symptoms   Outcome: Met This Shift    Goal: Absence of new skin breakdown  Absence of new skin breakdown   Outcome: Met This Shift  Pt remained free of new skin breakdown.      Problem: Pain:  Goal: Pain level will decrease  Pain level will decrease   Outcome: Met This Shift

## 2018-08-31 NOTE — PROGRESS NOTES
session. Pain Screening  Patient Currently in Pain: Yes  Pain Assessment  Pain Assessment: 0-10  Pain Level: 4  Pain Type: Acute pain  Pain Location: Leg  Pain Orientation: Right;Left  Pain Radiating Towards: R LE pain worse than L LE, pain generally worsens with LE movement and moblity  Pain Descriptors: Sharp  Pain Frequency: Intermittent  Clinical Progression: Gradually worsening  Pain Intervention(s): RN notified;Repositioned; Ambulation/Increased activity  Vital Signs  Patient Currently in Pain: Yes       Orientation  Orientation  Overall Orientation Status: Within Functional Limits  Objective   Bed mobility  Rolling to Right: Moderate assistance  Supine to Sit: Maximum assistance  Comment: improved bed mobility participation with roll to R side with LEs flexed  Transfers  Sit to Stand: Maximum Assistance  Stand to sit: Maximum Assistance (pt performed sit<>stand 3x at EOB, flexed posture, knees blocked, leans against bed, decreased control of descent without warning)  Stand Pivot Transfers: Maximum Assistance (max A x1 and mod A x1 stand pivot without device, bed>chair)  Ambulation  Ambulation?: No     Balance  Posture: Fair (fair sitting posture, poor standing)  Sitting - Static: Good;-  Sitting - Dynamic: Fair;+  Standing - Static: Poor  Standing - Dynamic: Poor  Other exercises  Other exercises?: Yes  Other exercises 1: supine LE AAROM 15x ea with breaks as needed, glut sets 15x                        Assessment   Body structures, Functions, Activity limitations: Decreased functional mobility ; Decreased strength;Decreased ROM; Decreased balance;Decreased sensation;Decreased safe awareness;Decreased endurance;Decreased coordination  Assessment: Pt demonstrated LE weakness, lack of sensation and B foot drop. Pt Max-A of 2 for transfers. Deferred use of RW and performed stand pivot rather than taking steps d/t severe LE weakness. Cont per POC, recommend d/c to rehab facility.  Spoke with Alejandra WARE re: PT's concerns for cause of symmetrical weakness although worse on R LE than L LE along with B foot drop and 0/5 strength. Also noted B hand weakness and flattening of contoures. RN states understands/agrees with concern, and is agreeable for need to  encourage further w/u. Treatment Diagnosis: Weakness  Specific instructions for Next Treatment: Monitor vitals, mobilize as appropriate given severe LE weakness  Prognosis: Fair  REQUIRES PT FOLLOW UP: Yes  Activity Tolerance  Activity Tolerance: Patient limited by pain; Patient limited by fatigue     G-Code     OutComes Score                                                    AM-PAC Score             Goals  Short term goals  Time Frame for Short term goals: 5-7 visits  Short term goal 1: Pt to perform bed mobility at [address] term goal 2: Pt to perform transfers at Mod-A. Short term goal 3: Pt to ambulate 50 ft, with a RW, at [address] level. Short term goal 4: Pt to demonstrate good technique with LE HEP for strengthening and balance activities. Short term goal 5: Pt to demonstrate increased strength by 1.5 MMT for hips and knees. Patient Goals   Patient goals : Pt did not state a goal.     Plan    Plan  Times per week: 5-7x per week  Times per day: Daily  Specific instructions for Next Treatment: Monitor vitals, mobilize as appropriate given severe LE weakness  Current Treatment Recommendations: Strengthening, ROM, Balance Training, Functional Mobility Training, Transfer Training, Gait Training, Stair training, Safety Education & Training, Home Exercise Program  Safety Devices  Type of devices: Gait belt, Left in chair, Patient at risk for falls, Call light within reach, Nurse notified (Pt is left sitting in recliner with LEs elevated.  Alejandra RN notifed that pt requires Assist x2)  Restraints  Initially in place: No     Therapy Time   Individual Concurrent Group Co-treatment   Time In 1474         Time Out 69 Jovanni Ivy, PT

## 2018-08-31 NOTE — PROGRESS NOTES
Scale Score: 15    Patient Active Problem List   Diagnosis Code    DM type 2 with diabetic peripheral neuropathy E11.42    Diabetic foot ulcer ([facility] E11.621, L97.509    CHF (congestive heart failure) I50.9    HTN (hypertension) I10    CRF (chronic renal failure) ([facility]) N18.9    Osteomyelitis ([facility]) M86.9    Diabetes mellitus due to underlying condition with complication, with long-term current use of insulin ([facility]) E08.8, Z79.4    Cellulitis L03.90    Shortness of breath R06.02    Chronic systolic congestive heart failure ([facility]) I50.22    Atypical chest pain R07.89    Essential hypertension I10    Chest pain R07.9    CKD (chronic kidney disease) stage 4, GFR 15-29 ml/min ([facility]) N18.4    Nephropathy N28.9    Unstable angina pectoris ([facility]) I20.0    SUKHDEV (acute kidney injury) ([facility] N17.9    Hyponatremia E87.1       Measurements:  Wound 08/28/18 Other (Comment) Scrotum (Active)   Wound Type Wound 8/31/2018 12:32 PM   Wound Pressure Stage  1 8/31/2018  8:00 AM   Dressing Status Clean;Dry; Intact; Changed 8/31/2018  8:00 AM   Dressing Changed Changed/New 8/31/2018  8:00 AM   Dressing/Treatment Antibacterial Ointment 8/31/2018 12:32 PM   Wound Cleansed Rinsed/Irrigated with saline 8/31/2018 12:32 PM   Wound Length (cm) 4 cm 8/29/2018  3:52 PM   Wound Width (cm) 1 cm 8/29/2018  3:52 PM   Calculated Wound Size (cm^2) (l*w) 4 cm^2 8/29/2018  3:52 PM   Change in Wound Size % (l*w) 55.56 8/29/2018  3:52 PM   Wound Assessment Yellow 8/31/2018 12:32 PM   Drainage Amount Scant 8/31/2018 12:32 PM   Drainage Description Serous 8/31/2018 12:32 PM   Odor None 8/31/2018 12:32 PM   Rebecca-wound Assessment Red 8/31/2018 12:32 PM   Culture Taken No 8/31/2018  8:00 AM   Number of days: 2       Wound 08/28/18 Other (Comment) Toe (Comment  which one) (Active)   Wound Type Wound 8/31/2018 12:32 PM   Wound Traumatic 8/31/2018 12:32 PM   Dressing/Treatment Open to air; Impregnated foam 8/31/2018 12:32 PM   Wound Length (cm) 0.5 cm 8/28/2018 11:00 PM   Wound Width (cm) 0.5 cm 8/28/2018 11:00 PM   Calculated Wound Size (cm^2) (l*w) 0.25 cm^2 8/28/2018 11:00 PM   Wound Assessment Clean;Dry 8/31/2018 12:32 PM   Drainage Amount None 8/31/2018 12:32 PM   Rebecca-wound Assessment Intact 8/31/2018 12:32 PM   Culture Taken No 8/31/2018  8:00 AM   Number of days: 2       Wound 08/28/18 Coccyx (Active)   Wound Type Wound 8/31/2018 12:33 PM   Wound Pressure Stage  2 8/31/2018 12:33 PM   Dressing/Treatment Moisture barrier 8/31/2018 12:33 PM   Wound Length (cm) 1 cm 8/31/2018 12:33 PM   Wound Width (cm) 1 cm 8/31/2018 12:33 PM   Wound Depth (cm)  0.1 8/31/2018 12:33 PM   Calculated Wound Size (cm^2) (l*w) 1 cm^2 8/31/2018 12:33 PM   Wound Assessment Clean;Pink 8/31/2018 12:33 PM   Drainage Amount None 8/31/2018 12:33 PM   Odor None 8/31/2018 12:33 PM   Irena%Wound Bed 100 8/31/2018 12:33 PM   Number of days: 2       Wound 08/31/18 Buttocks Left right and left medial buttocks (Active)   Wound Type Wound 8/31/2018 12:40 PM   Wound Diabetic 8/31/2018 12:40 PM   Dressing/Treatment Moisture barrier 8/31/2018 12:40 PM   Wound Assessment Clean;Painful;Pink 8/31/2018 12:40 PM   Drainage Amount None 8/31/2018 12:40 PM   Number of days: 0       Response to treatment:  Well tolerated by patient., With complaints of pain. Plan   Plan of Care: Wound 08/28/18 Coccyx-Dressing/Treatment: Moisture barrier  Wound 08/31/18 Buttocks Left right and left medial buttocks-Dressing/Treatment: Moisture barrier  Wound 08/28/18 Other (Comment) Scrotum-Dressing/Treatment: Antibacterial Ointment  [Wound 08/28/18 Other (Comment) Toe (Comment  which one)-Dressing/Treatment: Open to air,  (painted with betadine)    Foam dressings removed from coccyx as wound contaminated with stool that patient did not know he passed.  Cleansed of stool and moisture barrier applied    Specialty Bed Required : Yes   [] Low Air Loss   [x] Pressure Redistribution- air mattress under patient deflated- re inflated   [] Fluid Immersion  [] Bariatric  [] Total Pressure Relief  [] Other:     Current Diet: DIET RENAL;  Dietician consult:  Yes    Referrals:  []   [] [facility]  [] Supplies  [] Other    Patient/Caregiver Teaching:  Level of patient/caregiver understanding able to:   [] Indicates understanding       [x] Needs reinforcement  [] Unsuccessful      [] Verbal Understanding  [] Demonstrated understanding       [] No evidence of learning  [] Refused teaching         [] N/A       Electronically signed by EMILY Bello CNP, CWAGNES on 8/31/2018 at 12:41 PM

## 2018-09-01 NOTE — PROGRESS NOTES
Nephrology Progress Note    Subjective/   47y.o. year old male with PMH significant for uncontrolled DM2, CHF, who we are seeing in consultation for SUKHDEV on CKD4. . No acute events overnight. . Patient is resting comfortably in bed, in no acute distress. Denies malaise, back pain.  cc yesterday. Pt seen on hemodialysis. Objective/     Vitals:    08/31/18 2303 09/01/18 0600 09/01/18 0711 09/01/18 1208   BP: (!) 143/64  (!) 148/62 (!) 147/74   Pulse: 85  82 82   Resp: 17  14    Temp: 97.9 °F (36.6 °C)  97.3 °F (36.3 °C)    TempSrc: Oral  Oral    SpO2: 99%  100%    Weight:  218 lb 7.6 oz (99.1 kg)     Height:         24HR INTAKE/OUTPUT:      Intake/Output Summary (Last 24 hours) at 09/01/18 1231  Last data filed at 09/01/18 0606   Gross per 24 hour   Intake             1502 ml   Output              500 ml   Net             1002 ml     Patient Vitals for the past 96 hrs (Last 3 readings):   Weight   09/01/18 0600 218 lb 7.6 oz (99.1 kg)   08/31/18 1901 220 lb 0.3 oz (99.8 kg)   08/31/18 1610 224 lb 6.9 oz (101.8 kg)       Constitutional:  Alert, awake, no apparent distress  Cardiovascular:  S1, S2 without m/r/g  Respiratory:  CTA B without w/r/r  Abdomen: +bs, soft, nt  Ext: 2+ LE edema    Data/  Recent Labs      08/30/18   0410  08/31/18   0424  09/01/18   0442   WBC  15.8*  15.1*  12.9*   HGB  7.5*  7.5*  7.6*   HCT  23.0*  22.1*  23.6*   MCV  78.4*  77.2*  78.5*   PLT  413  451*  485*     Recent Labs      08/30/18   0410  08/31/18   0424  09/01/18   0442   NA  131*  135  136   K  4.6  4.4  3.8   CL  95*  97*  97*   CO2  20  25  27   GLUCOSE  103*  250*  144*   BUN  100*  71*  42*   CREATININE  7.08*  5.09*  3.87*   LABGLOM  8*  12*  17*   GFRAA  10*  15*  20*       Assessment/   1. Acute kidney injury nonoliguric related to Prerenal azotemia -suggested by poor oral intake, nausea and low urine sodium. He may also have progression of chronic kidney disease with uremia.   Renal ultrasound unremarkable for hydronephrosis      2. Chronic kidney disease stage 3/4 possibly with progression associated with diabetic nephropathy    3. Nephrotic syndrome secondary to diabetic nephropathy rule-7.8 grams protein UPC ratio August 2018   with severe hypoalbuminemia -membranous nephropathy is a differential.    4.  Chronic CHF with systolic dysfunction     5. Type 2 diabetes mellitus Insulin-dependent, Poorly controlled     6. Essential hypertension     7. Metabolic acidosis with increased anion gap     8. Hyponatremia with low urine sodium    9. Anemia of CKD with iron deficiency   Plan/   1. would need tunneled catheter for chronic dialysis. -possibly tuesday  2   cont PO zyvox  3.   cont to hold  Lasix and losartan  4. Strict I's and O  5. Aranesp 60 mcg weekly and IV iron venofer      SW to assess O/P chair for chronic HD.   Olga Dickey M.D, MARK  Nephrologist

## 2018-09-01 NOTE — CARE COORDINATION
ONGOING DISCHARGE PLAN:    Plan remains undetermined at this time. PT rec IP Rehab. LSW following for this. Otherwise, pt will be d/c'ed home with VNS (still needs to pick agency). Per renal, plan is for tunnel cath Monday or Tuesday for OP HD. Will need renal bx. LSW following for OP HD slot. On PO Zyvox. Will continue to follow for additional d/c needs.     Electronically signed by Pradeep Verde RN on 9/1/2018 at 4:12 PM

## 2018-09-01 NOTE — PROGRESS NOTES
Dialysis Post Treatment Report:     -Access Assessment  Wnl, citrate dwell     -Ultrafiltration   UF Goal 2400   Prime (-) 400   NS Flush (-)    Other (-/+)    Total UF Removed 2000     -Medications / Blood Administration  Medications Given (Y/N)    Blood Products (Y/N)      Narrative:

## 2018-09-01 NOTE — PROGRESS NOTES
Pt transferred to  2113 with RN and belongings at side. Pt connected to monitors and oriented to room.

## 2018-09-01 NOTE — PROGRESS NOTES
Dialysis Safety Checks:    Patient ID Verified (Y/N) y     -Hepatitis Test                   Date      Result  Hepatitis B Surface Antigen   18 neg     Hepatitis B Surface Antibody       Hepatitis B Core Antibody        -Treatment Initiation  Blood Vol Processed Goal (Liters)  Pump Speed x Treatment Hours x 60 Minutes    Target Fluid Removal 1 to 2     * Intra-treatment documented Safety Checks include the followin) Access and face visible at all times. 2) All connections and blood lines are secure with no kinks. 3) NVL alarm engaged. 4) Hemosafe device applied (if applicable). 5) No collapse of Arterial or Venous blood chambers. 6) All blood lines / pump segments in the air detectors.   --------------------------------------------------------------------------------

## 2018-09-01 NOTE — PROGRESS NOTES
[address] [facility].    PROGRESS NOTE             9/1/2018    8:09 AM    Name:   Virginie Palacio  MRN:     [id number]     Acct:      [de-identified]   Room:   [facility]  IP Day:  4  Admit Date:  8/28/2018  6:49 PM    PCP:   Ash Sesay MD  Code Status:  Full Code    Subjective:     C/C:   Chief Complaint   Patient presents with    Fatigue    Leg Swelling     bilat and right hand     Interval History Status: improved. Pt seen bedside. No complaints overnight. Feels much improved since admission. Acknowledges persistence but improvement of B/L LE edema. Pt acknowledges that he has dialysis today. Brief History:      Mr. Gustavo Dunn is a 47 M who presents with bilateral leg swelling, decreased urinary output, and elevated Cr (7.0), thus indicative of SUKHDEV on CKD 4. Pt additionally presents w/Rt hand erythema, swelling, and pain, likely due to cellulitis vs gout.      PMH significant for CHF, CKD 4, DMII, HTN, and HLD. Recently discharged on 8/9 after being treated for CHF and rt hand cellulitis. Review of Systems:     Review of Systems   Constitutional: Negative for chills, diaphoresis and fever. HENT: Negative for ear pain and sore throat. Eyes: Negative for blurred vision and double vision. Respiratory: Negative for shortness of breath. Cardiovascular: Negative for chest pain and leg swelling. Gastrointestinal: Negative for abdominal pain, constipation, diarrhea, nausea and vomiting. Genitourinary: Negative for dysuria and hematuria. Musculoskeletal: Negative for falls. Skin: Negative for rash. Neurological: Negative for dizziness and headaches. Medications: Allergies:     Allergies   Allergen Reactions    Metformin And Related Nausea And Vomiting       Current Meds:   Scheduled Meds:    iron sucrose  100 mg Intravenous Daily    atorvastatin  40 mg Oral Nightly    clopidogrel  75 mg Oral Daily    after a detailed explanation of the procedure including risks, benefits, and alternatives. Universal protocol was observed. The right neck and chest were prepped and draped in sterile fashion using maximum sterile barrier technique. Local anesthesia was achieved with lidocaine. A micropuncture needle was used to access the right internal jugular vein using ultrasound guidance. An ultrasound image demonstrating patency of the vein with needle tip located within it. An image was obtained and stored in PACs. A 0.035 guidewire was used to place a 13 Maltese x 15 cm non tunneled dialysis catheter using fluoroscopic guidance with tip in the right atrium after fascial tract dilation. The catheter flushed easily and there was a good blood return. The catheter was sutured to the skin. The catheter was locked with heparinized saline. The patient tolerated the procedure well and there were no immediate complications. FINDINGS: Fluoroscopic image demonstrates the tip of the catheter in the right atrium. Successful ultrasound and fluoroscopy guided non-tunneled 13 Maltese x 15 cm dialysis catheter placement. Nm Myocardial Spect Rest Exercise Or Rx    Result Date: 8/8/2018  EXAMINATION: MYOCARDIAL PERFUSION IMAGING 8/8/2018 11:11 am TECHNIQUE: Rest dose:  10.7 mCi Tc-99m sestamibi intravenously Stress dose:  35.2  mCi Tc-99m sestamibi intravenously Under cardiology supervision, 0.4 mg Lexiscan was infused intravenously prior to injection of the stress dose. SPECT imaging was acquired following injection of the sestamibi. ECG gating was obtained following the stress acquisition.  COMPARISON: 01/01/2012 HISTORY: ORDERING SYSTEM PROVIDED HISTORY: CHEST PAIN, ACUTE CORONARY SYNDROME SUSPECT TECHNOLOGIST PROVIDED HISTORY: Procedure Type->Rx Ordering Physician Provided Reason for Exam: chest pain acute coronay sydrome suspect Acuity: Unknown Type of Exam: Unknown 60-year-old male with chest pain ; suspected acute coronary syndrome FINDINGS: The patient achieved a maximum heart rate of 97 beats per minute, 57 % of the maximum age predicted heart rate of 169 beats per minute. Perfusion: Fixed perfusion defect involving the anterior and apical wall junction consistent with infarct. No reversible perfusion defect identified to suggest reversible ischemia. Function: The gated SPECT data demonstrates enlarged left ventricular size with global hypokinesis. Left ventricular ejection fraction:  22% TID score:  1.01  (Threshold value of 1.39 is used for Lexiscan stress with Tc-99m). There is no stress-induced cavitary dilatation to suggest compensated triple vessel disease. End diastolic volume:  062JW Scores are visually adjusted to account for potential artifact. Summed stress score:  9 Summed rest score:  9 Summed reversibility score:  0     1. Infarct of the anterior and apical wall junction. 2. No reversible perfusion defect to suggest reversible ischemia. 3. Enlarged left ventricular cavity with global hypokinesis. Left ventricular ejection fraction of 22%. Findings can be seen with a dilated cardiomyopathy. 4. EKG portion of the exam was uninterpretable according to the cardiologist. Please see report for EKG portion of the examination which will be performed separately by physician from cardiology. Risk stratification:  High risk Note:  Risk stratification incorporates both clinical history and test results. Final risk determination is the responsibility of the ordering provider as history and other test results may increase or decrease the risk stratification reported for this examination. Risk stratification criteria are adapted from \"Noninvasive Risk Stratification\" criteria from Al Ghotra. Al, ACC/AATS/AHA/ASE/ASNC/SCAI/SCCT/STS 2017 Appropriate Use Criteria For Coronary Revascularization in Patients With Stable Ischemic Heart Disease [facility] Volume 69, Issue 17, May 2017 High risk (>3% annual death or MI) 1.   Severe resting LV dysfunction (LVEF >35%) not readily explained by non coronary causes 2. Resting perfusion abnormalities greater than 10% of the myocardium in patients without prior history or evidence of MI 3. Stress-induced perfusion abnormalities encumbering greater than or equal to 10% myocardium or stress segmental scores indicating multiple vascular territories with abnormalities 4. Stress-induced LV dilatation (TID ratio greater than 1.19 for exercise and greater than 1.39 for regadenoson) Intermediate risk (1% to 3% annual death or MI) 1. Mild/moderate resting LV dysfunction (LVEF 35% to 49%) not readily explained by non coronary causes. 2.  Resting perfusion abnormalities in 5%-9.9% of the myocardium in patients without a history or prior evidence of MI 3. Stress-induced perfusion abnormality encumbering 5%-9.9% of the myocardium or stress segmental scores indicating 1 vascular territory with abnormalities but without LV dilation 4. Small wall motion abnormality involving 1-2 segments and only 1 coronary bed. Low Risk (Less than 1% annual death or MI) 1. Normal or small myocardial perfusion defect at rest or with stress encumbering less than 5% of the myocardium. Physical Examination:        Physical Exam   Constitutional: He is oriented to person, place, and time and well-developed, well-nourished, and in no distress. No distress. HENT:   Head: Normocephalic and atraumatic. Eyes: Conjunctivae and EOM are normal.   Neck: Normal range of motion. Neck supple. Cardiovascular: Normal rate, regular rhythm, normal heart sounds and intact distal pulses. Exam reveals no gallop and no friction rub. No murmur heard. Pulmonary/Chest: Effort normal and breath sounds normal. No respiratory distress. He has no wheezes. He has no rales. Abdominal: Soft. Bowel sounds are normal. He exhibits no distension. There is no tenderness. There is no rebound and no guarding. Musculoskeletal: Normal range of motion. He exhibits edema (B/L LE edema, 2+.). Neurological: He is alert and oriented to person, place, and time. Skin: Skin is warm and dry. He is not diaphoretic. No erythema. Psychiatric:   Pt AOx4.          Assessment:        Primary Problem  SUKHDEV (acute kidney injury) [facility])    Active Hospital Problems    Diagnosis Date Noted    Hyponatremia [E87.1] 08/29/2018    SUKHDEV (acute kidney injury) ([address] [N17.9] 08/28/2018       Plan:        SUKHDEV on CKD IV  -Cr 7.08 --> 5.09 --> 3.87  -IVF NS at 50 mL/hr; [address] bicarb  -Nephrology Consult  -Renal US negative  -Placement of [place] cath by [address] on 8/30  -Dialysis 1 L removed on 8/30, 2L on 8/31  -Uric Acid: 11.6 (H) --> 8.3  -Arash 21, Uk 31.6, Ucl <20  -UCr 93.8, Uprotein: 114  -ESR > 130  -UA: 4+ protein  -Strict Is and Os  -Plan for dialysis today     CHF w/sys dysfunction (EF 45-50%)  -Hold Lasix + Losartan     Rt Hand: Cellulitis vs Gout  -WBCs: 13.4 --> 15.8 --> 15.1 --> 12.9  -DC'd Vanc, Zosyn  -Linezolid + Bactroban  -Prednisone  -BCx: neg x4     DMII  -ISS  -Hypoglycemia protocol     HTN  -Coreg 25 mg BID     Hyponatremia  -Na: 126 --> 130 --> 131 --> 135 --> 136  -IVF 1/2 NS     Arrhythmia  -Digoxin --> held  -Monitor digoxin level     DVT PPx  -Heparin     GI PPx  -Pepcid      Eloina Eric MD  9/1/2018  8:09 AM

## 2018-09-01 NOTE — PLAN OF CARE
Problem: Falls - Risk of:  Goal: Will remain free from falls  Will remain free from falls    Outcome: Met This Shift  No falls noted this shift   Goal: Absence of physical injury  Absence of physical injury    Outcome: Met This Shift      Problem: Skin Integrity:  Goal: Will show no infection signs and symptoms  Will show no infection signs and symptoms   Outcome: Met This Shift  No open areas noted  Goal: Absence of new skin breakdown  Absence of new skin breakdown   Outcome: Met This Shift      Problem: Tissue Perfusion - Renal, Altered:  Goal: Electrolytes within specified parameters  Electrolytes within specified parameters   Outcome: Ongoing      Problem: Risk for Impaired Skin Integrity  Goal: Tissue integrity - skin and mucous membranes  Structural intactness and normal physiological function of skin and  mucous membranes.    Outcome: Met This Shift      Problem: Pain:  Goal: Control of acute pain  Control of acute pain   Outcome: Met This Shift

## 2018-09-01 NOTE — PROGRESS NOTES
Spoke with Dr. Tamela Mcneal. Plan is likely tunnel catheter Monday or Tuesday for outpatient dialysis. Next dialysis will be this Monday, 9/3/18. Pt will likely need renal bx, due to nephrotic syndrome, as outpatient. Will need ASA/Plavix/NSAIDs held for 5 days prior to bx.

## 2018-09-02 NOTE — CARE COORDINATION
ONGOING DISCHARGE PLAN:     Plan remains undetermined at this time. PT rec IP Rehab. LSW following for this. Otherwise, pt will be d/c'ed home with VNS (still needs to pick agency). Per renal, plan is for tunnel cath on Tuesday for OP HD. Will need renal bx. LSW following for OP HD slot. On PO Zyvox. Will continue to follow for additional d/c needs.     Electronically signed by Kathi Granado RN on 9/2/2018 at 2:28 PM

## 2018-09-02 NOTE — DISCHARGE INSTR - DIET

## 2018-09-02 NOTE — PLAN OF CARE
Problem: Falls - Risk of:  Goal: Will remain free from falls  Will remain free from falls    Outcome: Ongoing  Pt assessed as a fall risk this shift. Remains free from falls and accidental injury at this time. Fall precautions in place, including falling star sign and fall risk band on pt. Floor free from obstacles, and bed is locked and in lowest position. Adequate lighting provided. Pt encouraged to call before getting OOB for any need. Will continue to monitor needs during hourly rounding, and reinforce education on use of call light. Problem: Skin Integrity:  Goal: Will show no infection signs and symptoms  Will show no infection signs and symptoms   Outcome: Ongoing  Skin assessment complete. Waffle mattress in place. Coccyx reddened. Sensicare applied PRN. Turned and repositioned every two hours refused by pt. Area kept free from moisture. Proper nourishment and fluids encouraged, as appropriate. Will continue to monitor for additional needs and changes in skin breakdown. Problem: Tissue Perfusion - Renal, Altered:  Goal: Electrolytes within specified parameters  Electrolytes within specified parameters   Outcome: Ongoing  Pt Na 136, K 3.8, BUN 42, Cr 3.87; will continue to monitor electrolytes. Problem: Pain:  Goal: Control of acute pain  Control of acute pain   Outcome: Ongoing  No pain at this time. 0/10 pain scale.

## 2018-09-02 NOTE — PLAN OF CARE
Problem: Falls - Risk of:  Goal: Will remain free from falls  Will remain free from falls    Outcome: Met This Shift    Goal: Absence of physical injury  Absence of physical injury    Outcome: Met This Shift      Problem: Skin Integrity:  Goal: Will show no infection signs and symptoms  Will show no infection signs and symptoms   Outcome: Met This Shift    Goal: Absence of new skin breakdown  Absence of new skin breakdown   Outcome: Met This Shift      Problem: Tissue Perfusion - Renal, Altered:  Goal: Electrolytes within specified parameters  Electrolytes within specified parameters   Outcome: Ongoing      Problem: Risk for Impaired Skin Integrity  Goal: Tissue integrity - skin and mucous membranes  Structural intactness and normal physiological function of skin and  mucous membranes.    Outcome: Met This Shift      Problem: Pain:  Goal: Control of acute pain  Control of acute pain   Outcome: Met This Shift      Problem: Musculor/Skeletal Functional Status  Goal: Highest potential functional level  Outcome: Ongoing    Goal: Absence of falls  Outcome: Met This Shift

## 2018-09-03 NOTE — PROGRESS NOTES
Dialysis Safety Checks:    Patient ID Verified (Y/N) y     -Hepatitis Test                   Date      Result  Hepatitis B Surface Antigen   18 neg     Hepatitis B Surface Antibody       Hepatitis B Core Antibody        -Treatment Initiation  Blood Vol Processed Goal (Liters)  Pump Speed x Treatment Hours x 60 Minutes [id number]   Target Fluid Removal 3000     * Intra-treatment documented Safety Checks include the followin) Access and face visible at all times. 2) All connections and blood lines are secure with no kinks. 3) NVL alarm engaged. 4) Hemosafe device applied (if applicable). 5) No collapse of Arterial or Venous blood chambers. 6) All blood lines / pump segments in the air detectors.   --------------------------------------------------------------------------------

## 2018-09-03 NOTE — PLAN OF CARE
Problem: Falls - Risk of:  Goal: Will remain free from falls  Will remain free from falls    Outcome: Met This Shift  The patient remained free from falls this shift, call light within reach, bed in locked and lowest position. Side rails up x2. Continue to monitor closely.     Problem: Skin Integrity:  Goal: Will show no infection signs and symptoms  Will show no infection signs and symptoms   Outcome: Met This Shift    Goal: Absence of new skin breakdown  Absence of new skin breakdown   Outcome: Met This Shift  Pt has some redness on bottom and bhavana area    Problem: Pain:  Goal: Control of acute pain  Control of acute pain   Outcome: Met This Shift  Pt has not complained of pain, pain meds ordered

## 2018-09-03 NOTE — FLOWSHEET NOTE
09/03/18 1846   Encounter Summary   Services provided to: Patient   Referral/Consult From: Rounding   Complexity of Encounter Low   Length of Encounter 15 minutes   Spiritual/Hindu   Type Spiritual support   Assessment Sleeping   Intervention Prayer;Provided reading materials/devotional materials   Outcome Did not respond

## 2018-09-03 NOTE — PROGRESS NOTES
[address] [facility].    PROGRESS NOTE             9/3/2018    7:36 AM    Name:   Nolan Connell  MRN:     [id number]     Acct:      [de-identified]   Room:   2113/2113-01  IP Day:  6  Admit Date:  8/28/2018  6:49 PM    PCP:   Gonzalo Morris MD  Code Status:  Full Code    Subjective:     C/C:   Chief Complaint   Patient presents with    Fatigue    Leg Swelling     bilat and right hand     Interval History Status: improved. Pt seen bedside. No complaints overnight. Pt clinically improved, eating well, denies pain, N/V, F/C, muscle cramps, dizziness. Patient aware of plan to get tunnel catheter and dialysis. Will reassess after dialysis. Brief History:     Mr. Angelia Benitez is a 47 M who presents with bilateral leg swelling, decreased urinary output, and elevated Cr (7.0), thus indicative of SUKHDEV on CKD 4. Pt additionally presents w/Rt hand erythema, swelling, and pain, likely due to cellulitis vs gout.      PMH significant for CHF, CKD 4, DMII, HTN, and HLD. Recently discharged on 8/9 after being treated for CHF and rt hand cellulitis. Review of Systems:     Review of Systems   Constitutional: Negative for chills, fever and malaise/fatigue. HENT: Negative for ear pain and sore throat. Eyes: Negative for blurred vision and double vision. Respiratory: Negative for cough, shortness of breath and wheezing. Cardiovascular: Positive for leg swelling. Negative for chest pain. Gastrointestinal: Negative for abdominal pain, constipation, diarrhea, nausea and vomiting. Genitourinary: Negative for dysuria and hematuria. Musculoskeletal: Negative for falls. Skin: Negative for rash. Neurological: Negative for dizziness, weakness and headaches. Medications: Allergies:     Allergies   Allergen Reactions    Metformin And Related Nausea And Vomiting       Current Meds:   Scheduled Meds:    atorvastatin  40 mg Oral Nightly    clopidogrel  75 mg Oral Daily    carvedilol  25 mg Oral BID WC    famotidine  20 mg Oral Daily    mupirocin   Topical Daily    linezolid  600 mg Oral 2 times per day    sodium chloride flush  10 mL Intravenous 2 times per day    docusate sodium  100 mg Oral BID    heparin (porcine)  5,000 Units Subcutaneous 3 times per day    insulin lispro  0-12 Units Subcutaneous TID     insulin lispro  0-6 Units Subcutaneous Nightly    miconazole   Topical BID     Continuous Infusions:    sodium chloride 50 mL/hr at 18 0023    dextrose       PRN Meds: anticoagulant sodium citrate, anticoagulant sodium citrate, HYDROcodone 5 mg - acetaminophen, sodium chloride flush, acetaminophen, ondansetron, nicotine, glucose, dextrose, glucagon (rDNA), dextrose    Data:     Past Medical History:   has a past medical history of Anemia; Arrhythmia; CHF (congestive heart failure) ([facility]); CKD (chronic kidney disease) stage 4, GFR 15-29 ml/min ([address] Diabetic foot ulcer ([address] DM type 2, uncontrolled, with lower extremity ulcer ([address] Hyperlipidemia; Hypertension; Nephropathy; Neuropathy; and Renal insufficiency, mild. Social History:   reports that he has never smoked. He has never used smokeless tobacco. He reports that he does not drink alcohol or use drugs. Family History:   Family History   Problem Relation Age of Onset    Diabetes Mother     Heart Disease Mother     Diabetes Brother     Heart Disease Father        Vitals:  BP (!) 145/63   Pulse 82   Temp 97.5 °F (36.4 °C) (Oral)   Resp 16   Ht 6' (1.829 m)   Wt 222 lb 14.2 oz (101.1 kg)   SpO2 99%   BMI 30.23 kg/m²   Temp (24hrs), Av.3 °F (36.3 °C), Min:96.9 °F (36.1 °C), Max:97.7 °F (36.5 °C)    Recent Labs      18   0738  18   1109  18   1629  18   POCGLU  162*  151*  155*  153*       I/O (24Hr):     Intake/Output Summary (Last 24 hours) at 18 0736  Last data filed at 18 3282   Gross per 24 hour   Intake 1430 ml   Output              350 ml   Net             1080 ml       Labs:      Lab Results   Component Value Date/Time    SPECIAL NOT REPORTED 08/28/2018 07:41 PM     Lab Results   Component Value Date/Time    CULTURE NO GROWTH 6 DAYS 08/28/2018 07:41 PM         Radiology:    Shyrl Bath Chest Standard (2 Vw)    Result Date: 8/6/2018  EXAMINATION: TWO VIEWS OF THE CHEST 8/6/2018 5:29 pm COMPARISON: 11/21/2015. HISTORY: ORDERING SYSTEM PROVIDED HISTORY: CHF exacerbation possible TECHNOLOGIST PROVIDED HISTORY: Reason for exam:->CHF exacerbation possible Ordering Physician Provided Reason for Exam: PT CO SOB and fatigue X several days. HX CHF Acuity: Chronic Type of Exam: Ongoing FINDINGS: The heart size is enlarged but unchanged. The pulmonary vasculature is within normal limits. There is increased density involving the lower lung zones with blunting of the costophrenic angles. No pneumothoraces are seen. 1. Increased density involving the lower lung zones likely representing pleural effusions with lower lobe atelectasis. 2. Cardiomegaly without overt failure. Xr Shoulder Right (min 2 Views)    Result Date: 8/31/2018  EXAMINATION: 3 XRAY VIEWS OF THE RIGHT SHOULDER 8/31/2018 6:56 am COMPARISON: None. HISTORY: ORDERING SYSTEM PROVIDED HISTORY: pain & weakness TECHNOLOGIST PROVIDED HISTORY: pain & weakness Ordering Physician Provided Reason for Exam: pain, no recent injuries. Acuity: Acute Type of Exam: Initial FINDINGS: There is normal alignment at the glenohumeral and acromioclavicular joint. Minor degenerative change at the [facility] joint manifested by periarticular irregularity. No acute fracture. No aggressive lytic or blastic lesions. No soft tissue calcifications. Double-lumen central line right IJ noted. No acute osseous abnormality. Minor AC joint degenerative change.      Us Renal Complete    Result Date: 8/29/2018  EXAMINATION: RETROPERITONEAL ULTRASOUND OF THE KIDNEYS 8/29/2018 COMPARISON: August achieved a maximum heart rate of 97 beats per minute, 57 % of the maximum age predicted heart rate of 169 beats per minute. Perfusion: Fixed perfusion defect involving the anterior and apical wall junction consistent with infarct. No reversible perfusion defect identified to suggest reversible ischemia. Function: The gated SPECT data demonstrates enlarged left ventricular size with global hypokinesis. Left ventricular ejection fraction:  22% TID score:  1.01  (Threshold value of 1.39 is used for Lexiscan stress with Tc-99m). There is no stress-induced cavitary dilatation to suggest compensated triple vessel disease. End diastolic volume:  806RW Scores are visually adjusted to account for potential artifact. Summed stress score:  9 Summed rest score:  9 Summed reversibility score:  0     1. Infarct of the anterior and apical wall junction. 2. No reversible perfusion defect to suggest reversible ischemia. 3. Enlarged left ventricular cavity with global hypokinesis. Left ventricular ejection fraction of 22%. Findings can be seen with a dilated cardiomyopathy. 4. EKG portion of the exam was uninterpretable according to the cardiologist. Please see report for EKG portion of the examination which will be performed separately by physician from cardiology. Risk stratification:  High risk Note:  Risk stratification incorporates both clinical history and test results. Final risk determination is the responsibility of the ordering provider as history and other test results may increase or decrease the risk stratification reported for this examination. Risk stratification criteria are adapted from \"Noninvasive Risk Stratification\" criteria from lA [employer]hotra. Al, ACC/AATS/AHA/ASE/ASNC/SCAI/SCCT/STS 2017 Appropriate Use Criteria For Coronary Revascularization in Patients With Stable Ischemic Heart Disease [facility] Volume 69, Issue 17, May 2017 High risk (>3% annual death or MI) 1.   Severe resting LV dysfunction (LVEF >35%) not

## 2018-09-04 NOTE — PROGRESS NOTES
Pt returned from IR after tunnel cath placed, rt chest cath site dry/intact, vs stable and pt doing well

## 2018-09-04 NOTE — PLAN OF CARE
Problem: Falls - Risk of:  Goal: Will remain free from falls  Will remain free from falls    Outcome: Met This Shift  Gripper socks on, falling star in door frame, fall scale preformed, bed alarm on, bed in lowest position and brakes locked, educated to call out for help when needing to get up, call light and bedside table within reach    Problem: Skin Integrity:  Goal: Absence of new skin breakdown  Absence of new skin breakdown   Outcome: Met This Shift      Problem: Risk for Impaired Skin Integrity  Goal: Tissue integrity - skin and mucous membranes  Structural intactness and normal physiological function of skin and  mucous membranes.    Outcome: Met This Shift  Waffle mattress in place, skin assesses and barrier cream applied, claudia scale preform, skin care preformed    Problem: Pain:  Goal: Control of acute pain  Control of acute pain   Outcome: Met This Shift  Denies pain    Problem: Musculor/Skeletal Functional Status  Goal: Highest potential functional level  Outcome: Met This Shift  Promoted to walk with walker and staff assistance

## 2018-09-04 NOTE — BRIEF OP NOTE
Brief Postoperative Note    Rudy Kay  YOB: 1965  [id number]    Pre-operative Diagnosis: Renal failure     Post-operative Diagnosis: Same    Procedure: Fluoroscopic guided conversion of a non-tunneled to a tunneled dialysis catheter     Anesthesia: Local    Surgeons/Assistants: Eden Shine MD    Estimated Blood Loss: less than 50     Complications: None    Specimens: Was Not Obtained    Findings: 14.5 Fr x 19 cm Palindrome dialysis catheter through the right IJ vein     Electronically signed by Eden Shine MD on 9/4/2018 at 4:53 PM

## 2018-09-04 NOTE — PLAN OF CARE
Problem: Falls - Risk of:  Goal: Will remain free from falls  Will remain free from falls    Outcome: Met This Shift    Goal: Absence of physical injury  Absence of physical injury    Outcome: Met This Shift  Pt calls for help as needed, safe environment maintained this shift    Problem: Skin Integrity:  Goal: Will show no infection signs and symptoms  Will show no infection signs and symptoms   Outcome: Ongoing  Skin care given to current open areas, pt able to reposition self but needs reminded to do so. No new areas of skin breakdown noted  Goal: Absence of new skin breakdown  Absence of new skin breakdown   Outcome: Met This Shift      Problem: Tissue Perfusion - Renal, Altered:  Goal: Electrolytes within specified parameters  Electrolytes within specified parameters   Outcome: Met This Shift    Goal: Ability to achieve a balanced intake and output will improve  Ability to achieve a balanced intake and output will improve   Outcome: Ongoing  Pt has low urine output, continues to require hemodialysis and is scheduled for dialysis tomorrow    Problem: Risk for Impaired Skin Integrity  Goal: Tissue integrity - skin and mucous membranes  Structural intactness and normal physiological function of skin and  mucous membranes.    Outcome: Ongoing      Problem: Pain:  Goal: Control of acute pain  Control of acute pain   Outcome: Ongoing  Pt has not required pain medication this shift

## 2018-09-04 NOTE — PROGRESS NOTES
[address] [facility]    INPATIENT OCCUPATIONAL THERAPY  PROGRESS NOTE  Date: 2018  Patient Name: Ta Guerrero      Room: 3-01  MRN: [id number]    : 1965  (47 y.o.) Gender: male     Discharge Recommendations:  IP Rehab       Referring Practitioner: Dr. Jody Wiley  Diagnosis: SUKHDEV  General  Chart Reviewed: Yes  Patient assessed for rehabilitation services?: Yes  Additional Pertinent Hx: Pt reports increased difficulty with mobility and self-care after episode last November when he came to hospital for sudden LLE weakness (MRI showed small lacunar infarct). Pt now has B foot drop and impaired coordination of RUE. Family / Caregiver Present: Yes  Referring Practitioner: Dr. Jody Wiley  Diagnosis: SUKHDEV    Restrictions  Restrictions/Precautions: Fall Risk  Other position/activity restrictions: telemetry, high fall risk, B foot drop  Required Braces or Orthoses?: No      Subjective  Subjective: Pt states, \"I m going home. \"  Comments: Per pt and daughter pt is adamantly declining placement and will only d/c home despite recommendations. Per daughter she is a HHA and willing to A c all needs. Daughter appears supportive for pt to d/c to skilled or ARU but pt adamantly declining. Patient Currently in Pain: Denies (pt denies pain, except with R LE ROM)          Objective  Cognition  Overall Cognitive Status: WFL  Bed mobility  Comment: pt hoping to d/c c hospital bed d/t difficulty getting to bed and primary bedroom being upstairs  Balance  Sitting Balance: Supervision  Standing Balance:  (CGA/Ric c PT this AM)  Standing Balance  Comment: per PT note in AM pt was Ric for transfers      ADL  Feeding: Setup; Increased time to complete (Reports some spillage 2* dec'd coordination RUE)  Grooming: Minimal assistance (Some A for hair grooming)  UE Bathing: Minimal assistance; Moderate assistance  LE Bathing: Maximum assistance  UE Dressing:  Moderate assistance  LE Dressing: Maximum by fatigue  Safety Devices in place: Yes  Type of devices: Patient at risk for falls;Gait belt;Left in chair;Call light within reach;Nurse notified  Equipment Recommendations  Equipment Needed: Yes (TBD)  3-in-1 Commode: BSC for main floor  Hand Held Shower: once able to safely t/f in/out tub  Transfer Tub Bench: once able to safely t/f in/out tub  Grab bars: for shower/tub  Gait Belt: x  Reacher: x  Sock Aid: x  Long-handled Shoehorn: pt has          Patient Education:  Patient Education: OT POC, safety, activity promotion, transfer training, discharge recommendations  Learner:family and patient  Method: demonstration, explanation and handout       Outcome: needs reinforcement     Plan  Safety Devices  Safety Devices in place: Yes  Type of devices: Patient at risk for falls, Gait belt, Left in chair, Call light within reach, Nurse notified  Plan  Times per week: 5-7  Times per day: Daily  Current Treatment Recommendations: Balance Training, Functional Mobility Training, Endurance Training, Safety Education & Training, Patient/Caregiver Education & Training, Equipment Evaluation, Education, & procurement, Self-Care / ADL      Goals  Short term goals  Time Frame for Short term goals: By Discharge  Short term goal 1: Pt will complete bed mobility with Min-Mod A and tolerate sitting EOB for 5-10 minutes unsupported while completing a functional task. Short term goal 2: Pt will actively participate in self-care routine and complete UB with CGA/Min A and LB with Mod A using AE as needed. Short term goal 3: Pt will complete toilet transfer with Mod A x1 and Good safety using appropriate DME. Short term goal 4: Pt will stand for 3+ minutes with 1-2 UE support and no LOB while completing a functional task. Short term goal 5: Pt will actively participate in 15-20 minutes of therapeutic exercise/activity with focus on RUE coordination to promote increased independence and safety with self-care and mobility.     OT

## 2018-09-04 NOTE — CARE COORDINATION
ONGOING DISCHARGE PLAN:    Spoke with patient regarding discharge plan and patient confirms that plan is still to discharge to home pt refused ecf  Per PCP Resident     Plan:         SUKHDEV on CKD IV  -Cr 7.08 --> 5.09 --> 3.87 --> 3.48 --> 4.44->3.78  -Nephrology following, patient will need renal bx as outpt, plan for tunnel cath possibly 9/4  -Dialysis 1 L removed on 8/30, 2L on 8/31, 2L on 9/1, 3L on 9/3  -Strict Is and Os  - Nephro rec Aranesp 60 mcg weekly, s/p Venofer x 3 doses     CHF w/sys dysfunction (EF 45-50%)  -Hold Lasix + Losartan       Rt Hand: Cellulitis vs Gout  - Clinically improved, negative for erythema   - WBCs: 13.4 --> 15.8 --> 15.1 --> 12.9 --> 12.2  -DC'd Vanc, Zosyn  -Linezolid DC'd (5 days for uncomplicated cellulitis)  -Prednisone  -BCx: neg x5     DMII  -ISS  -Hypoglycemia protocol     HTN  -Coreg 25 mg BID     Hyponatremia --> corrected  -Na: 126 --> 130 --> 131 --> 135 --> 136->136     Arrhythmia  -Digoxin --> held  -Monitor digoxin level         Will continue to follow for additional discharge needs.     Electronically signed by Leanna Newby RN on 9/4/2018 at 12:44 PM

## 2018-09-04 NOTE — PROGRESS NOTES
Precert initiated with [facility] with pending ref [id number]. [facility] confirms member has no OOP expense for ARU. Writer noted that pt is refusing ARU/SNF and is adamant about going home (per chart notes).   Aforementioned info confirmed with Kristian, [facility]

## 2018-09-04 NOTE — PROGRESS NOTES
Jovani Zapien was evaluated today and a DME order was entered for a standard wheelchair because he requires this to successfully complete daily living tasks of ambulating into and out of the house. A standard manual wheelchair is necessary due to patient's impaired ambulation and mobility restrictions and would be unable to resolve these daily living tasks using a cane or walker. The patient is capable of using a standard wheelchair safely in their home and can maneuver within their home with adequate access. There is a caregiver available to provide necessary assistance. The need for this equipment was discussed with the patient and he understands, is in agreement, and has not expressed an unwillingness to use the wheelchair.

## 2018-09-04 NOTE — PROGRESS NOTES
strength;Decreased ROM; Decreased balance;Decreased sensation;Decreased safe awareness;Decreased endurance;Decreased coordination  Assessment: Improved LE strength noted today, but continues with foot drop, muscle wasting, and needs assist with mobility. Cont per POC. Discussed with pt and family PT's recommendation of d/c to IP Rehab facility. Pt is not agreeable, but family is understanding. Pt will require w/c, commode, hospital bed and RW for transfers. Pt is somewhat agreeable to home PT and home health care, although pt has family member(s) who are nsg aides and will assist as needed. Treatment Diagnosis: weakness  Specific instructions for Next Treatment: provide HEP, d/c determination  Patient Education: DME needs, d/c recommendations, risk of heel ulcers, LE ther exs  Barriers to Learning: none  REQUIRES PT FOLLOW UP: Yes  Activity Tolerance  Activity Tolerance: Patient limited by endurance     G-Code     OutComes Score                                                    AM-PAC Score             Goals  Short term goals  Time Frame for Short term goals: 5-7 visits  Short term goal 1: Pt to perform bed mobility at [address] term goal 2: Pt to perform transfers at Mod-A. Short term goal 3: Pt to ambulate 50 ft, with a RW, at [address] level. Short term goal 4: Pt to demonstrate good technique with LE HEP for strengthening and balance activities. Short term goal 5: Pt to demonstrate increased strength by 1.5 MMT for hips and knees.    Patient Goals   Patient goals : Pt did not state a goal.     Plan    Plan  Times per week: 5-7x per week  Times per day: Daily  Specific instructions for Next Treatment: provide HEP, d/c determination  Current Treatment Recommendations: Strengthening, ROM, Balance Training, Functional Mobility Training, Transfer Training, Gait Training, Stair training, Safety Education & Training, Home Exercise Program  Safety Devices  Type of devices: Gait belt, Left in chair, Patient at risk for falls, Call light within reach, Nurse notified (family present)  Restraints  Initially in place: No     Therapy Time   Individual Concurrent Group Co-treatment   Time In 56         Time Out 1130         Minutes [address] [place], [place]

## 2018-09-04 NOTE — PROGRESS NOTES
Petrona Reddy was evaluated today and a DME order was entered for variable height hospital bed because he requires assistance for positioning needs not possible in an ordinary bed. Patient needs variability of bed height to perform patient transfers and for ambulating. Current body Weight: 219 lb 5.7 oz (99.5 kg). The need for this equipment was discussed with the patient and he understands and is in agreement. Unable to ambulate to the second floor and will need one on the ground floor.

## 2018-09-04 NOTE — PROGRESS NOTES
Total Kcal: 0216-1035  · Estimated Daily Protein (g):     Estimated Intake vs Estimated Needs: Intake Less Than Needs    Nutrition Risk Level: High    Nutrition Interventions:   Continue current diet, Start ONS  Continued Inpatient Monitoring, Education Completed    Nutrition Evaluation:   · Evaluation: Goals set   · Goals: PO intakes greater than 75% at meals    · Monitoring: Meal Intake, Weight, Supplement Intake, Pertinent Labs, Diet Tolerance, Skin Integrity    See Adult Nutrition Doc Flowsheet for more detail.      Fay BADILLO, R.D, L.D,  Clinical Dietitian  Pager # [phone]

## 2018-09-04 NOTE — CARE COORDINATION
KALPANA received info from  testbirds who reported that this patient is saying adamantly that he is not going anywhere but home when he is DC'd. KALPANA informed CHAZ Stringer, [place] planner of this information. KALPANA still available if needed.

## 2018-09-05 NOTE — PROGRESS NOTES
9/5/2018 at 4:10PM attempted study. CHAZ Chopra said patient was in dialysis. RN said if it wasn't done today, tomorrow is OK too.

## 2018-09-05 NOTE — PROGRESS NOTES
Nephrology Progress Note    Subjective: This 52-year-old gentleman has a history of underlying type 2 diabetes mellitus with diabetic nephropathy and presented with acute kidney injury. He has required intermittent hemodialysis without evidence of renal functional recovery so far. He does not have any new complaints today. He however still feels weak and would need physical therapy. Seen on dialysis. He had a tunneled catheter placed yesterday. Objective/     Vitals:    09/05/18 1428 09/05/18 1445 09/05/18 1515 09/05/18 1531   BP: (!) 142/80 (!) 144/78 131/81 (!) 141/72   Pulse: 70 74 57 65   Resp:       Temp:       TempSrc:       SpO2:       Weight:       Height:         24HR INTAKE/OUTPUT:      Intake/Output Summary (Last 24 hours) at 09/05/18 1703  Last data filed at 09/05/18 1125   Gross per 24 hour   Intake              250 ml   Output               50 ml   Net              200 ml     Patient Vitals for the past 96 hrs (Last 3 readings):   Weight   09/05/18 0616 220 lb 14.4 oz (100.2 kg)   09/04/18 0550 219 lb 5.7 oz (99.5 kg)   09/03/18 1153 213 lb 6.5 oz (96.8 kg)       Constitutional:  Alert, awake, no apparent distress  Cardiovascular:  S1, S2 without m/r/g  Respiratory:  CTA B without w/r/r  Abdomen: +bs, soft, nt  Ext: 1+ LE edema    Data/  Recent Labs      09/03/18 0459 09/04/18 0513 09/05/18 0454   WBC  14.3*  15.1*  15.2*   HGB  8.5*  8.3*  8.2*   HCT  26.3*  26.1*  25.4*   MCV  78.1*  78.1*  78.8*   PLT  495*  471*  422     Recent Labs      09/03/18 0459 09/04/18 0513 09/05/18   0454   NA  136  136  135   K  4.4  4.0  3.9   CL  98  99  99   CO2  25  24  23   GLUCOSE  177*  160*  224*   BUN  42*  28*  37*   CREATININE  4.44*  3.78*  4.91*   LABGLOM  14*  17*  13*   GFRAA  17*  21*  15*       Assessment/Plan:     1. Chronic kidney disease stage V - no evidence of renal functional recovery so far. He will continue to receive hemodialysis on a Monday/Wednesday/Friday schedule.

## 2018-09-05 NOTE — PROGRESS NOTES
Provided Reason for Exam: chest pain acute coronay sydrome suspect Acuity: Unknown Type of Exam: Unknown 52-year-old male with chest pain ; suspected acute coronary syndrome FINDINGS: The patient achieved a maximum heart rate of 97 beats per minute, 57 % of the maximum age predicted heart rate of 169 beats per minute. Perfusion: Fixed perfusion defect involving the anterior and apical wall junction consistent with infarct. No reversible perfusion defect identified to suggest reversible ischemia. Function: The gated SPECT data demonstrates enlarged left ventricular size with global hypokinesis. Left ventricular ejection fraction:  22% TID score:  1.01  (Threshold value of 1.39 is used for Lexiscan stress with Tc-99m). There is no stress-induced cavitary dilatation to suggest compensated triple vessel disease. End diastolic volume:  810JU Scores are visually adjusted to account for potential artifact. Summed stress score:  9 Summed rest score:  9 Summed reversibility score:  0     1. Infarct of the anterior and apical wall junction. 2. No reversible perfusion defect to suggest reversible ischemia. 3. Enlarged left ventricular cavity with global hypokinesis. Left ventricular ejection fraction of 22%. Findings can be seen with a dilated cardiomyopathy. 4. EKG portion of the exam was uninterpretable according to the cardiologist. Please see report for EKG portion of the examination which will be performed separately by physician from cardiology. Risk stratification:  High risk Note:  Risk stratification incorporates both clinical history and test results. Final risk determination is the responsibility of the ordering provider as history and other test results may increase or decrease the risk stratification reported for this examination. Risk stratification criteria are adapted from \"Noninvasive Risk Stratification\" criteria from [employer].   Al, ACC/AATS/AHA/ASE/ASNC/SCAI/SCCT/STS 2017 Appropriate Use Criteria For

## 2018-09-05 NOTE — CONSULTS
Division of Vascular Surgery        New Consult      Physician Requesting Consult:  Estevan Gilford, MD    Reason for Consult:   Dialysis access    Chief Complaint:      No complaints, initiated on hemodialysis    History of Present Illness:      Jacklyn Chowdhury is a 47 y.o. right handed gentleman seen and examined in the dialysis unit. He developed acute on chronic renal failure and has now been transitioned to hemodialysis thru a tunneled dialysis catheter. This has been gradual and worsened recently, likely related to heart failure and diabetic nephropathy. He denies any numbness and tingling in his hands, but has intermittent swelling in both his legs and right upper extremity. With recent admission for cellulitis of his right hand.       Medical History:     Past Medical History:   Diagnosis Date    Anemia     Arrhythmia     CHF (congestive heart failure) ([facility] [address] [facility] 2013    CKD (chronic kidney disease) stage 4, GFR 15-29 ml/min ([facility])     Diabetic foot ulcer ([facility] [address] [facility]     DM type 2, uncontrolled, with lower extremity ulcer ([facility] [address] 7/28/2015    Hyperlipidemia     Hypertension     Nephropathy     Neuropathy     Renal insufficiency, mild 2013       Surgical History:     Past Surgical History:   Procedure Laterality Date    CARDIAC CATHETERIZATION Bilateral 8/5/13    no intervention, med management for non-obstructive CAD    ELBOW FRACTURE SURGERY      FINGER FRACTURE SURGERY Left     5TH DIGIT    [facility]OT DEBR[facility] 7/2015    [facility]     OTHER SURGICAL HISTORY Left 8/19/2015    Arthrodesis IP Joint Hallux       Family History:     Family History   Problem Relation Age of Onset    Diabetes Mother     Heart Disease Mother     Diabetes Brother     Heart Disease Father        Allergies:       Lipitor [atorvastatin] and Metformin and related    Medications:      Current Facility-Administered Medications   Medication Dose Route Frequency Provider Last Rate Last Dose    anticoagulant sodium Intramuscular PRN Ryan Hunter, APRN - CNP        dextrose 5 % solution  100 mL/hr Intravenous PRN Ryan HunterEMILY - CNP        insulin lispro (HUMALOG) injection vial 0-12 Units  0-12 Units Subcutaneous TID WC Ryan Hunter APRN - CNP   2 Units at 09/05/18 6715    insulin lispro (HUMALOG) injection vial 0-6 Units  0-6 Units Subcutaneous Nightly Ryan Hunter EMILY - CNP   3 Units at 09/04/18 2122    miconazole (MICOTIN) 2 % powder   Topical BID Francisco Bird           Social History:     Tobacco:    reports that he has never smoked. He has never used smokeless tobacco.  Alcohol:      reports that he does not drink alcohol. Drug Use:  reports that he does not use drugs. Occupation:      Review of Systems:     Review of Systems   Constitutional: Positive for fatigue. Negative for chills and fever. HENT: Negative. Eyes: Negative for visual disturbance. Respiratory: Positive for shortness of breath. Cardiovascular: Positive for leg swelling. Negative for chest pain. Gastrointestinal: Negative for abdominal pain. Endocrine: Negative. Genitourinary: Negative. Musculoskeletal: Negative. Skin: Positive for color change. Allergic/Immunologic: Negative. Neurological: Positive for weakness (generalized). Negative for facial asymmetry and numbness. Hematological: Negative. Psychiatric/Behavioral: Negative. Physical Exam:     Vitals:  BP (!) 141/72   Pulse 65   Temp 98.8 °F (37.1 °C) (Oral)   Resp 16   Ht 6' (1.829 m)   Wt 220 lb 14.4 oz (100.2 kg)   SpO2 97%   BMI 29.96 kg/m²     Physical Exam   Constitutional: He is oriented to person, place, and time. He appears well-developed and well-nourished. Eyes: Conjunctivae and EOM are normal.   Neck: Carotid bruit is not present. Cardiovascular: Normal rate and regular rhythm. Pulses:       Radial pulses are 2+ on the right side, and 2+ on the left side. Pulmonary/Chest: Effort normal. No respiratory distress. Abdominal: Soft. Normal appearance. Feet:   Right Foot:   Skin Integrity: Negative for ulcer or skin breakdown. Left Foot:   Skin Integrity: Negative for ulcer or skin breakdown. Lymphadenopathy:   Moderate lower extremity swelling   Neurological: He is alert and oriented to person, place, and time. He has normal strength. No sensory deficit. Skin: Skin is warm and intact. Capillary refill takes less than 2 seconds. Psychiatric: He has a normal mood and affect. His speech is normal and behavior is normal.     Imaging/Labs:     Labs reviewed in EMR    Assessment and Plan:     CKD stage V with no renal function recovery, initiated on hemodialysis MWF  · Discussed surgical options for dialysis access including using vein if of good size or graft if no good vein visualized. · Vein mapping ordered  · Will setup outpatient follow up, to discuss in detail of the surgery and its risks. This will also give him some time to recover and get stronger after this recent admission. Electronically signed by Bonnie Hill MD on 9/5/18 at 3:55 PM      [address] [place] [address],[address] [place] [address] North: [phone]  C: [phone]  Email: [email or web address]

## 2018-09-05 NOTE — PROGRESS NOTES
Dialysis Post Treatment Report:     -Access Assessment  wnl     -Ultrafiltration   UF Goal 2500   Prime (-) 500   NS Flush (-)    Other (-/+)    Total UF Removed 2000     -Medications / Blood Administration  Medications Given (Y/N)    Blood Products (Y/N)      Narrative:    wnl

## 2018-09-06 PROBLEM — E87.1 HYPONATREMIA: Chronic | Status: RESOLVED | Noted: 2018-01-01 | Resolved: 2018-01-01

## 2018-09-06 PROBLEM — N17.9 AKI (ACUTE KIDNEY INJURY) (HCC): Chronic | Status: RESOLVED | Noted: 2018-01-01 | Resolved: 2018-01-01

## 2018-09-06 NOTE — PLAN OF CARE
Problem: Falls - Risk of:  Goal: Will remain free from falls  Will remain free from falls    Outcome: Met This Shift  The patient remained free from falls this shift, call light within reach, bed in locked and lowest position. Side rails up x2. Continue to monitor closely. Problem: Skin Integrity:  Goal: Will show no infection signs and symptoms  Will show no infection signs and symptoms   Outcome: Met This Shift  Pt has groin redness and wound on bottom.  No new skin breakdown     Problem: Pain:  Goal: Control of acute pain  Control of acute pain   Outcome: Met This Shift  Pt has not complained of pain

## 2018-09-06 NOTE — PLAN OF CARE
Problem: Falls - Risk of:  Goal: Will remain free from falls  Will remain free from falls    Outcome: Ongoing  Patient remained free from falls. Call light within reach. Problem: Skin Integrity:  Goal: Absence of new skin breakdown  Absence of new skin breakdown   Outcome: Ongoing  No new alterations in skin integrity. Problem: Pain:  Goal: Control of acute pain  Control of acute pain   Outcome: Ongoing  Patient given pain medication as ordered.

## 2018-09-06 NOTE — PROGRESS NOTES
Physical Therapy  DATE: 2018    NAME: Akil Campbell  MRN: [id number]   : 1965    Patient not seen this date for Physical Therapy due to:  [] Blood transfusion in progress  [] Hemodialysis  []  Patient Declined  [] Spine Precautions   [] Strict Bedrest  [] Surgery/ Procedure  [] Testing      [x] Other: Attempted to see pt twice this date- Pt refusing therapy session both times. Pt stating that he is heading home today and \"doesn't need therapy\". [] PT being discontinued at this time. Patient independent. No further needs. [] PT being discontinued at this time as the patient has been transferred to palliative care. No further needs.     Raymond Alexander, PT

## 2018-09-06 NOTE — DISCHARGE SUMMARY
[address]    Discharge Summary     Patient ID: Bowen Dinero  :  1965   MRN: [id number]     ACCOUNT:  [de-identified]   Patient's PCP: Alyssa Aguilar MD  Admit Date: 2018   Discharge Date: 2018     Length of Stay: 9  Code Status:  Full Code  Admitting Physician: Alyssa Aguilar MD  Discharge Physician: Abelardo Rainey MD     Active Discharge Diagnoses:       Primary Problem  SUKHDEV (acute kidney injury) [facility])      [facility]    Diagnosis Date Noted    ESRD on hemodialysis ([facility] [address] [N18.6, Z99.2]        Admission Condition:  poor     Discharged Condition: stable    Hospital Stay:       Hospital Course:  Bowen Dinero is a 47 y.o. male who was admitted for the management of  SUKHDEV (acute kidney injury) ([facility] [address] on CKD (Cr elevated to 7 from 3.5 earlier that month) and also R hand cellulitis, presented to ER with Fatigue and Leg Swelling (bilat and right hand). R hand cellulitis resolved well on broad spectrum antibiotics. Patient required hemodialysis (MWF) because there was no recovery of kidney function, requiring a tunnel catheter for continue dialysis after admission. Patient improved clinically and was seen by vascular surgery, to be seen as outpatient to complete fistula planning/vein mapping. It was recommended to the patient not to be discharged to home because of his limitations in ADL, but the patient was adamant about home as the only option for him. On day of discharge, patient is tolerating PO for food and medications, expresses feeling ready to go home, and is clinically stable.     Significant therapeutic interventions: hemodialysis, ABx for cellulitis     Significant Diagnostic Studies:   Labs / Micro:  BMP:    Lab Results   Component Value Date    GLUCOSE 157 2018    GLUCOSE 229 2012     2018    K 3.9 2018     2018    CO2 28 2018 or intrarenal stones. Bladder: Urinary bladder is decompressed. Unremarkable ultrasound of the kidneys and urinary bladder. Xr Chest Portable    Result Date: 8/28/2018  EXAMINATION: SINGLE XRAY VIEW OF THE CHEST 8/28/2018 8:09 pm COMPARISON: Chest radiograph dated 08/06/2018. HISTORY: ORDERING SYSTEM PROVIDED HISTORY: Chest Pain TECHNOLOGIST PROVIDED HISTORY: Chest Pain Ordering Physician Provided Reason for Exam: Chest pain Acuity: Acute Type of Exam: Initial FINDINGS: Heart size is stable. No infiltrates. No effusions. No pneumothorax. No free air below the diaphragm. No acute findings in the chest.     Ir Tunneled Cvc Place Wo Sq Port/pump > 5 Years    Result Date: 9/5/2018  PROCEDURE: FLUOROSCOPY GUIDED conversion OF A non tunneled dialysis catheter into a TUNNELED CATHETER. 9/4/2018. HISTORY: ORDERING SYSTEM PROVIDED HISTORY: tunneled catheter for hemodialysis TECHNOLOGIST PROVIDED HISTORY: tunneled catheter for hemodialysis Renal failure; non tunneled dialysis catheter placed on 08/30/2018, patient returns to interventional radiology for conversion into a tunneled dialysis catheter. SEDATION: Local lidocaine FLUOROSCOPY DOSE AND TYPE OR TIME AND EXPOSURES: 22 seconds; dap 129 cGy cm2. TECHNIQUE: Informed consent was obtained after a detailed explanation of the procedure including risks, benefits, and alternatives. Time-out was performed prior to the procedure. [place] protocol was observed. The right neck and chest were prepped and draped in sterile fashion using maximum sterile barrier technique. Local anesthesia was achieved with lidocaine. A  image was obtained demonstrating the tip of the existing non tunneled dialysis catheter terminating within the proximal 3rd of the right atrium. An image was obtained and stored in PACs. A 0.035 guidewire was used to place a peel-away sheath.   A subcutaneous tunnel was created to the infraclavicular region and a tunneled 14.5 Frisian x 19 cm sutured to the skin. The catheter was locked with heparinized saline. The patient tolerated the procedure well and there were no immediate complications. FINDINGS: Fluoroscopic image demonstrates the tip of the catheter in the right atrium. Successful ultrasound and fluoroscopy guided non-tunneled 13 [place] x 15 cm dialysis catheter placement. Consultations:    Consults:     Final Specialist Recommendations/Findings:   IP CONSULT TO INTERNAL MEDICINE  IP CONSULT TO NEPHROLOGY  IP CONSULT TO GENERAL SURGERY  IP CONSULT TO VASCULAR SURGERY      The patient was seen and examined on day of discharge and this discharge summary is in conjunction with any daily progress note from day of discharge.     Discharge plan:       Disposition: Home    Physician Follow Up:     Luci Leyden, MD  [address]  [phone]          Requiring Further Evaluation/Follow Up POST HOSPITALIZATION/Incidental Findings: f/u Vascular for fistula planning, long term dialysis    Diet: renal diet    Activity: As tolerated, limited (DME orders)    Instructions to Patient:     Discharge Medications:      Medication List      START taking these medications    darbepoetin bronson-polysorbate 25 MCG/0.42ML Sosy injection  Commonly known as:  ARANESP  Inject 0.42 mLs into the skin once a week        CHANGE how you take these medications    carvedilol 25 MG tablet  Commonly known as:  COREG  Take 1 tablet by mouth 2 times daily (with meals)  What changed:  · how much to take  · how to take this  · when to take this  · additional instructions     clopidogrel 75 MG tablet  Commonly known as:  PLAVIX  Take 1 tablet by mouth daily  What changed:  when to take this        CONTINUE taking these medications    aspirin 81 MG EC tablet     atorvastatin 40 MG tablet  Commonly known as:  LIPITOR  Take 1 tablet by mouth nightly     digoxin 125 MCG tablet  Commonly known as:  LANOXIN  TAKE ONE TABLET BY MOUTH ONCE DAILY

## 2018-09-06 NOTE — FLOWSHEET NOTE
09/06/18 1510   Encounter Summary   Services provided to: Patient   Referral/Consult From: Rounding   Complexity of Encounter Low   Length of Encounter 15 minutes   Spiritual/Episcopalian   Type Spiritual support   Assessment Sleeping   Intervention Prayer   Outcome Did not respond

## 2018-09-06 NOTE — PROGRESS NOTES
ULTRASOUND OF THE KIDNEYS 8/29/2018 COMPARISON: August 7, 2018 HISTORY: ORDERING SYSTEM PROVIDED HISTORY: Elevated BUN and creatinine FINDINGS: Kidneys: The right kidney measures 12.4 cm in length and the left kidney measures 12 cm in length. Kidneys demonstrate normal cortical echogenicity. No evidence of hydronephrosis or intrarenal stones. Unremarkable ultrasound of the kidneys. Us Renal Complete    Result Date: 8/7/2018  EXAMINATION: RETROPERITONEAL ULTRASOUND OF THE KIDNEYS AND URINARY BLADDER 8/7/2018 COMPARISON: None HISTORY: ORDERING SYSTEM PROVIDED HISTORY: renal failure TECHNOLOGIST PROVIDED HISTORY: Reason for exam:->renal failure Acuity: Acute Type of Exam: Initial FINDINGS: Kidneys: The right kidney measures 10.0 cm in length and the left kidney measures 11.2 cm in length. Kidneys demonstrate normal cortical echogenicity. No evidence of hydronephrosis or intrarenal stones. Bladder: Urinary bladder is decompressed. Unremarkable ultrasound of the kidneys and urinary bladder. Xr Chest Portable    Result Date: 8/28/2018  EXAMINATION: SINGLE XRAY VIEW OF THE CHEST 8/28/2018 8:09 pm COMPARISON: Chest radiograph dated 08/06/2018. HISTORY: ORDERING SYSTEM PROVIDED HISTORY: Chest Pain TECHNOLOGIST PROVIDED HISTORY: Chest Pain Ordering Physician Provided Reason for Exam: Chest pain Acuity: Acute Type of Exam: Initial FINDINGS: Heart size is stable. No infiltrates. No effusions. No pneumothorax. No free air below the diaphragm. No acute findings in the chest.     Ir Nontunneled Vascular Catheter > 5 Years    Result Date: 8/30/2018  PROCEDURE: ULTRASOUND GUIDED VASCULAR ACCESS. FLUOROSCOPY GUIDED PLACEMENT OF A NON-TUNNELED CATHETER. 8/30/2018. HISTORY: ORDERING SYSTEM PROVIDED HISTORY: Dialysis TECHNOLOGIST PROVIDED HISTORY: Dialysis Acute renal failure. SEDATION: Local lidocaine FLUOROSCOPY DOSE AND TYPE OR TIME AND EXPOSURES: 1 minute and 50 seconds; dap 790 cGy cm2.  TECHNIQUE: Informed

## 2018-09-06 NOTE — PROGRESS NOTES
Nephrology Progress Note    Subjective: This 41-year-old gentleman has a history of underlying type 2 diabetes mellitus with diabetic nephropathy and presented with acute kidney injury. He has required intermittent hemodialysis without evidence of renal functional recovery so far. He does not have any new complaints today. He however still feels weak and would need physical therapy. Interval history    Patient seen and examined doing fairly well having venous vein mapping on the left arm. And had dialysis yesterday.   His next dialysis will be tomorrow    Objective/     Vitals:    09/05/18 1630 09/05/18 1916 09/06/18 0016 09/06/18 0731   BP:  (!) 144/64 (!) 141/61 (!) 144/55   Pulse:  75 91 65   Resp:  17 17 16   Temp:  98.4 °F (36.9 °C) 98.9 °F (37.2 °C) 97.9 °F (36.6 °C)   TempSrc:  Oral Oral Oral   SpO2:  98% 97% 98%   Weight: 215 lb 6.2 oz (97.7 kg)      Height:         24HR INTAKE/OUTPUT:      Intake/Output Summary (Last 24 hours) at 09/06/18 1311  Last data filed at 09/06/18 0509   Gross per 24 hour   Intake              130 ml   Output             2425 ml   Net            -2295 ml     Patient Vitals for the past 96 hrs (Last 3 readings):   Weight   09/05/18 1630 215 lb 6.2 oz (97.7 kg)   09/05/18 0616 220 lb 14.4 oz (100.2 kg)   09/04/18 0550 219 lb 5.7 oz (99.5 kg)       Constitutional:  Alert, awake, no apparent distress  Cardiovascular:  S1, S2 without m/r/g  Respiratory:  CTA B without w/r/r  Abdomen: +bs, soft, nt  Ext: 1+ LE edema    Data/  Recent Labs      09/04/18   0513  09/05/18   0454  09/06/18   0506   WBC  15.1*  15.2*  16.7*   HGB  8.3*  8.2*  7.4*   HCT  26.1*  25.4*  22.5*   MCV  78.1*  78.8*  78.8*   PLT  471*  422  400     Recent Labs      09/04/18   0513  09/05/18   0454  09/06/18   0506   NA  136  135  140   K  4.0  3.9  3.9   CL  99  99  101   CO2  24  23  28   GLUCOSE  160*  224*  157*   BUN  28*  37*  22*   CREATININE  3.78*  4.91*  3.70*   LABGLOM  17*  13*  17*   GFRAA  21* 15*  21*       Assessment/Plan:     1. Chronic kidney disease stage V - no evidence of renal functional recovery so far. He will continue to receive hemodialysis on a Monday/Wednesday/Friday schedule. Status post tunneled catheter placed on 9/4/2018. Renal diet,i.e 2-gram sodium,2-gram potassium,1500 ml fluid restriction,1-gram phosphorus, 1800 KCal and 1.2 gram protein per day.     2. Hypoalbuminemia [2.1 g/dL] - secondary to nephrotic syndrome complicating diabetic glomerulosclerosis. Continue high-protein diet and monitor closely. Possibly add Megace 400 mg daily appetite stimulant. 3.  Anemia of chronic kidney disease - Aranesp per protocol. 4.  Systemic hypertension - Continue current medications. Vascular surgery following for evaluation for Venous mapping and  AV fistula creation. PT/OT evaluation and treatment. Plan:  Continue dialysis Monday Wednesday Friday.  to arrange for outpatient dialysis chair.   No objections on discharge once outpatient dialysis is set up    [facility]    Nephrologist

## 2018-09-07 NOTE — PROGRESS NOTES
RN called and spoke with Dr. Pierce President and informed him that the patient had a dialysis slot. He stated that it was okay for the patient to be discharged home today and to start outpatient dialysis on Tuesday.

## 2018-09-07 NOTE — PROGRESS NOTES
Dialysis Post Treatment Report:     -Access Assessment  wnl     -Ultrafiltration   UF Goal 3400   Prime (-) 400   NS Flush (-)    Other (-/+)    Total UF Removed 3000     -Medications / Blood Administration  Medications Given (Y/N)    Blood Products (Y/N)      Narrative:

## 2018-09-07 NOTE — FLOWSHEET NOTE
09/07/18 1400   Encounter Summary   Services provided to: Patient   Referral/Consult From: [address] [facility] (9/7/2018)   Complexity of Encounter Low   Length of Encounter 15 minutes   Routine   Type Initial   Assessment Passive   Intervention [place]   Outcome Comfort   PT Ofelia Harman was eating lunch and was in some pain I offered a blessing to him and he thanked me for the visit. Chaplains remain available for spiritual or emotional support as needed.

## 2018-09-07 NOTE — PROGRESS NOTES
[address] [facility] [[email or web address]    PROGRESS NOTE             9/7/2018    9:57 AM    Name:   Bowen Dinero  MRN:     [id number]     Acct:      [de-identified]   Room:   2113/2113[facility] Day:  10  Admit Date:  8/28/2018  6:49 PM    PCP:   Alyssa Aguilar MD  Code Status:  Full Code    Subjective:     C/C:   Chief Complaint   Patient presents with    Fatigue    Leg Swelling     bilat and right hand     Interval History Status: improved. Pt seen bedside. No complaints overnight. Pt eating well, denies pain, N/V, F/C, muscle cramps, dizziness. Patient awaiting outpatient dialysis placement. Brief History:     Mr. Teri Vides is a 47 M who presents with bilateral leg swelling, decreased urinary output, and elevated Cr (7.0), thus indicative of SUKHDEV on CKD 4. Pt additionally presents w/Rt hand erythema, swelling, and pain, likely due to cellulitis vs gout.      PMH significant for CHF, CKD 4, DMII, HTN, and HLD. Recently discharged on 8/9 after being treated for CHF and rt hand cellulitis. Review of Systems:     Review of Systems   Constitutional: Negative for chills, fever and malaise/fatigue. HENT: Negative for ear pain and sore throat. Eyes: Negative for blurred vision and double vision. Respiratory: Negative for cough, shortness of breath and wheezing. Cardiovascular: Positive for leg swelling (improved ). Negative for chest pain. Gastrointestinal: Negative for abdominal pain, constipation, diarrhea, nausea and vomiting. Genitourinary: Negative for dysuria and hematuria. Musculoskeletal: Negative for falls. Skin: Negative for rash. Neurological: Negative for dizziness, weakness and headaches. Medications: Allergies:     Allergies   Allergen Reactions    Lipitor [Atorvastatin] Diarrhea    Metformin And Related Nausea And Vomiting       Current Meds:   Scheduled Meds:    tamsulosin  0.4 mg Oral Daily    darbepoetin Results   Component Value Date/Time    CULTURE NO GROWTH 6 DAYS 08/28/2018 07:41 PM       Radiology:    Xr Chest Standard (2 Vw)    Result Date: 8/6/2018  EXAMINATION: TWO VIEWS OF THE CHEST 8/6/2018 5:29 pm COMPARISON: 11/21/2015. HISTORY: ORDERING SYSTEM PROVIDED HISTORY: CHF exacerbation possible TECHNOLOGIST PROVIDED HISTORY: Reason for exam:->CHF exacerbation possible Ordering Physician Provided Reason for Exam: PT CO SOB and fatigue X several days. HX CHF Acuity: Chronic Type of Exam: Ongoing FINDINGS: The heart size is enlarged but unchanged. The pulmonary vasculature is within normal limits. There is increased density involving the lower lung zones with blunting of the costophrenic angles. No pneumothoraces are seen. 1. Increased density involving the lower lung zones likely representing pleural effusions with lower lobe atelectasis. 2. Cardiomegaly without overt failure. Xr Shoulder Right (min 2 Views)    Result Date: 8/31/2018  EXAMINATION: 3 XRAY VIEWS OF THE RIGHT SHOULDER 8/31/2018 6:56 am COMPARISON: None. HISTORY: ORDERING SYSTEM PROVIDED HISTORY: pain & weakness TECHNOLOGIST PROVIDED HISTORY: pain & weakness Ordering Physician Provided Reason for Exam: pain, no recent injuries. Acuity: Acute Type of Exam: Initial FINDINGS: There is normal alignment at the glenohumeral and acromioclavicular joint. Minor degenerative change at the [facility] joint manifested by periarticular irregularity. No acute fracture. No aggressive lytic or blastic lesions. No soft tissue calcifications. Double-lumen central line right IJ noted. No acute osseous abnormality. Minor AC joint degenerative change. Us Renal Complete    Result Date: 8/29/2018  EXAMINATION: RETROPERITONEAL ULTRASOUND OF THE KIDNEYS 8/29/2018 COMPARISON: August 7, 2018 HISTORY: ORDERING SYSTEM PROVIDED HISTORY: Elevated BUN and creatinine FINDINGS: Kidneys:  The right kidney measures 12.4 cm in length and the left kidney measures 12 cm in apical wall junction consistent with infarct. No reversible perfusion defect identified to suggest reversible ischemia. Function: The gated SPECT data demonstrates enlarged left ventricular size with global hypokinesis. Left ventricular ejection fraction:  22% TID score:  1.01  (Threshold value of 1.39 is used for Lexiscan stress with Tc-99m). There is no stress-induced cavitary dilatation to suggest compensated triple vessel disease. End diastolic volume:  679JJ Scores are visually adjusted to account for potential artifact. Summed stress score:  9 Summed rest score:  9 Summed reversibility score:  0     1. Infarct of the anterior and apical wall junction. 2. No reversible perfusion defect to suggest reversible ischemia. 3. Enlarged left ventricular cavity with global hypokinesis. Left ventricular ejection fraction of 22%. Findings can be seen with a dilated cardiomyopathy. 4. EKG portion of the exam was uninterpretable according to the cardiologist. Please see report for EKG portion of the examination which will be performed separately by physician from cardiology. Risk stratification:  High risk Note:  Risk stratification incorporates both clinical history and test results. Final risk determination is the responsibility of the ordering provider as history and other test results may increase or decrease the risk stratification reported for this examination. Risk stratification criteria are adapted from \"Noninvasive Risk Stratification\" criteria from [employer] [place]. Al, ACC/AATS/AHA/ASE/ASNC/SCAI/SCCT/STS 2017 Appropriate Use Criteria For Coronary Revascularization in Patients With Stable Ischemic Heart Disease [facility] Volume 69, Issue 17, May 2017 High risk (>3% annual death or MI) 1. Severe resting LV dysfunction (LVEF >35%) not readily explained by non coronary causes 2. Resting perfusion abnormalities greater than 10% of the myocardium in patients without prior history or evidence of MI 3.   Stress-induced reviewed. Assessment:        Primary Problem  SUKHDEV (acute kidney injury) [facility])    Active Hospital Problems    Diagnosis Date Noted    ESRD on hemodialysis ([facility] [address] [N18.6, Z99.2]        Plan:        SUKHDEV on CKD V, no evidence of renal functional recovery   - Cr 7.08 --> 5.09 --> 3.87 --> 3.48 --> 4.44 -> 3.78 --> 3.7  - Nephrology following, patient will need renal bx as outpt, s/p tunnel cath 9/4   - continue to receive HD on MWF schedule (no renal recovery)  - Strict Is and Os  - Nephro rec Aranesp 25 mcg SC weekly, s/p Venofer x 3 doses  - Diet Renal low sodium 2 g, fluid restrict 1500 ml daily   - Vascular following, will f/u as outpatient  - Social work notes acknowledged, patient could not be scheduled for outpatient dialysis yesterday. Will update discharge from 9/6 when established.     CHF w/sys dysfunction (EF 45-50%)  -Hold Lasix + Losartan       DMII  -ISS  -Hypoglycemia protocol     HTN  -Coreg 25 mg BID     Arrhythmia  -Digoxin --> held  -Monitor digoxin level    Hypoalbuminemia 2/2 nephrotic syndrome  - Nephro following, rec continue high-protein diet    Kev Stevens MD  9/7/2018  9:57 AM

## 2018-09-07 NOTE — PROGRESS NOTES
to receive hemodialysis on a Monday/Wednesday/Friday schedule. Status post tunneled catheter placed on 9/4/2018. Renal diet,i.e 2-gram sodium,2-gram potassium,1500 ml fluid restriction,1-gram phosphorus, 1800 KCal and 1.2 gram protein per day.     2. Hypoalbuminemia [2.1 g/dL] - secondary to nephrotic syndrome complicating diabetic glomerulosclerosis. Continue high-protein diet and monitor closely. Possibly add Megace 400 mg daily appetite stimulant. 3.  Anemia of chronic kidney disease - Aranesp per protocol. 4.  Systemic hypertension - Continue current medications. Vascular surgery following for evaluation for Venous mapping and  AV fistula creation. PT/OT evaluation and treatment. Plan:  Hemodialysis today as per schedule   to arrange for outpatient dialysis chair.   No objections on discharge once outpatient dialysis is set up    Paty Freeman    Nephrologist

## 2018-09-07 NOTE — CARE COORDINATION
ONGOING DISCHARGE PLAN:    Spoke with patient's family (patient sleeping) regarding discharge plan and the plan remains home with family and [address] working on obtaining an outpatient dialysis slot for the patient. Will continue to follow for additional discharge needs.     Electronically signed by Lukas Mejias RN on 9/7/2018 at 2:59 PM

## 2018-09-07 NOTE — CARE COORDINATION
[address] Encounter Date/Time: 2018 [address] Account: [de-identified]    MRN: [id number]    Patient:  Shreyas Pickard   Contact Serial #: [id number]            ENCOUNTER          Patient Class: I Private Enc? No Unit RM BD: [address] [facility] PROG    Hospital Service: Intermediate   ADM DX: SUKHDEV (acute kidney injury*   ADM Provider: Lino Umanzor MD   Procedure:     ATT Provider: Lino Umanzor MD   REF Provider:        PATIENT                 Name: Shreyas Pickard : 1965 (47 yrs)   Address: [address] [place] [address] Sex: Male   City: [place] [address]         Marital Status: Single   Employer: NOT EMPLOYED         Roman Catholic: None   Primary Care Provider: Lino Umanzor MD         Primary Phone: [phone]   EMERGENCY CONTACT   Contact Name Legal Guardian? Relationship to Patient Home Phone Work Phone   1. Severin,Sarah  2. Rudi Gaytan      Child  Other [phone]              GUARANTOR            Guarantor: Shreyas Pickard     : 1965   Address: [place] [address] Sex: Male   [place] [id number]     Relation to Patient: Self       Home Phone: [phone]   Guarantor ID: [id number]       Work Phone:     Guarantor Employer: NOT EMPLOYED         Status: NOT EMPLO*      COVERAGE        PRIMARY INSURANCE   Payor: Wizeline C* Plan: Tego*   Payor Address: [facility], [address] [place] [address],[address] [facility]       Group Number: [facility] Insurance Type: INDEMNITY   Subscriber Name: Issac Roque : 1965   Subscriber ID: [id number] Pat. Rel. to Sub: Self   SECONDARY INSURANCE   Payor:   Plan:     Payor Address:  ,           Group Number:   Insurance Type:     Subscriber Name:   Subscriber :     Subscriber ID:   Pat.  Rel. to Sub:           Continuity of Care Form    Patient Name: Shreyas Pickard   :  1965  MRN:  [id number]    Admit date:  2018  Discharge date:  18    Code Status Order: Full Code   Advance Directives: No    Admitting Physician:  Elli Castillo MD  PCP: Elli Castillo MD    Discharging Nurse: MADAY BYERS Unit/Room#: 2010/2010-01  Discharging Unit Phone Number: [phone]    Emergency Contact:        Past Surgical History:  Past Surgical History:   Procedure Laterality Date    CARDIAC CATHETERIZATION Bilateral 8/5/13    no intervention, med management for non-obstructive CAD    ELBOW FRACTURE SURGERY      FINGER FRACTURE SURGERY Left     5TH DIGIT    FOOT DEBRIDEMENT Left 7/2015    [facility]     OTHER SURGICAL HISTORY Left 8/19/2015    Arthrodesis IP Joint Hallux       Immunization History:   Immunization History   Administered Date(s) Administered    Influenza Virus Vaccine 10/31/2014, 10/12/2015    Influenza, Lorice Petr, 3 yrs and older, IM, PF (Fluzone 3 yrs and older or Afluria 5 yrs and older) 11/20/2017       Active Problems:  Patient Active Problem List   Diagnosis Code    DM type 2 with diabetic peripheral neuropathy E11.42    Diabetic foot ulcer ([facility] [address] E11.621, L97.509    CHF (congestive heart failure) I50.9    HTN (hypertension) I10    CRF (chronic renal failure) ([facility] [address] N18.9    Osteomyelitis ([facility] [address] M86.9    Diabetes mellitus due to underlying condition with complication, with long-term current use of insulin ([facility] [address] E08.8, Z79.4    Cellulitis L03.90    Shortness of breath R06.02    Chronic systolic congestive heart failure (HCC) I50.22    Atypical chest pain R07.89    Essential hypertension I10    Chest pain R07.9    CKD (chronic kidney disease) stage 4, GFR 15-29 ml/min (HCC) N18.4    Nephropathy N28.9    Unstable angina pectoris (HCC) I20.0    SUKHDEV (acute kidney injury) ([facility] [address] N17.9       Isolation/Infection:   Isolation          No Isolation            Nurse Assessment:  Last Vital Signs: BP (!) 135/57   Pulse 82   Temp 99.9 °F (37.7 °C) (Axillary)   Resp 22   Ht 6' (1.829 m)   Wt 210 lb (95.3 kg)   SpO2 98%   BMI 28.48 kg/m²     Last and he will need to arrive at least 15 minutes early or 5:45 am.    / signature: Electronically signed by Liz Alejo RN on 9/4/18 at 12:32 PM    PHYSICIAN SECTION    Prognosis: Good    Condition at Discharge: Stable    Rehab Potential (if transferring to Rehab): Good    Recommended Labs or Other Treatments After Discharge:     Physician Certification: I certify the above information and transfer of Jenae Lab  is necessary for the continuing treatment of the diagnosis listed and that he requires [address] for less 30 days.      Update Admission H&P: No change in H&P    PHYSICIAN SIGNATURE:  Electronically signed by Gisela Sheikh MD on 9/6/2018 at 1:19 PM

## 2018-09-07 NOTE — PROGRESS NOTES
Nutrition Note    Type and Reason for Visit: Follow-Up    Nutrition Recommendations: Provide Nepro twice daily. Continue current diet (consider liberalization based on labs and overall intake). · Subjective Assessment: Pt indicated PO intake fluctuates. Family indicates supplements may be helpful but pt is going home today. Full re-assessmnent not completed at this time due to likely discharge. Family aware of oral nutrition supplements to consider after discharge if PO intake does not improve. Jessica Galvan R.D., L.D.   Pager: [phone]'

## 2018-09-10 NOTE — TELEPHONE ENCOUNTER
Pt was called to schedule an appt to discuss surgical options for a new dialysis access creation. He did not answer so a detailed message was left to call us back.

## 2018-09-25 NOTE — TELEPHONE ENCOUNTER
Patient fell on Sunday. His left knee is very swollen and very painful. She would like to know if you could order an xray to make sure there is no serious injury? Please advise.

## 2018-10-30 NOTE — PROGRESS NOTES
Division of Vascular Surgery          New Consult - Venous Evaluation      Name: Jamia Ellis  MRN: [id number]       Physician Requesting Consult:  Gustavo BLACKBURN    Reason for Consult:   Evaluation of Hemodyalysis access    Chief Complaint:      \"I'm on dialysis\"      History of Present Illness:      Jamia Ellis is a 47 y.o.  male who presents with a history of very bad diabetes, he's developed peripheral neuropathy and now end-stage kidney disease which she states he's been on dialysis for about 3 months. Patient admits to having a previous stroke in the past and has minimal function on the right side. Patient is currently receiving his dialysis through a right tunneled dialysis catheter. Says his sessions go okay but he is been sent to my office to be evaluated for arteriovenous fistula options. Patient does not walk very well uses a wheelchair. Says he is right-hand dominant but due to his previous stroke he doesn't use his hand very much and thus doesn't consider himself right-handed anymore. He is accompanied by his family member who says that he continues to smoke. Patient says he is not interested in obtaining kidney transplantation. He is encouraged to move ahead and get an arteriovenous fistula placed       Hand dominance? [id number]  Currently on HD? yes  Previous Catheters?  yes  Previous history of access surgery? no    Past Medical History:     Past Medical History:   Diagnosis Date    Anemia     Arrhythmia     CHF (congestive heart failure) ([address] 2013    CKD (chronic kidney disease) stage 4, GFR 15-29 ml/min ([facility])     Diabetic foot ulcer ([address]     DM type 2, uncontrolled, with lower extremity ulcer ([address] 7/28/2015    Hyperlipidemia     Hypertension     Nephropathy     Neuropathy     Renal insufficiency, mild 2013        Past Surgical History:     Past Surgical History:   Procedure Laterality Date    CARDIAC CATHETERIZATION Bilateral 8/5/13    no intervention, med reports that he does not use drugs. Family History:     Family History   Problem Relation Age of Onset    Diabetes Mother     Heart Disease Mother     Diabetes Brother     Heart Disease Father        Review of Systems:     Positive and Negative as described in HPI      Constitutional:  negative for  fevers, chills, sweats, fatigue, and weight loss  HEENT:  negative for vision or hearing changes,   Respiratory:  negative for shortness of breath, cough, or congestion  Cardiovascular:  negative for  chest pain, palpitations  Gastrointestinal:  negative for nausea, vomiting, diarrhea, constipation, abdominal pain  Genitourinary:  negative for frequency, dysuria  Integument:  negative for rash, skin lesions  Chest/Breast:  No painful inspiration or expiration, no rib sternal pain  Musculoskeletal:  negative for muscle aches or joint pain  Neurological:  negative for headaches, dizziness, lightheadedness, numbness, pain and tingling extremities  Lymphatics: no lymphadenopathy or painful masses  Behavior/Psych:  negative for depression and anxiety    Physical Exam:     Vitals:  BP (!) 108/58 (Site: Left Upper Arm, Position: Sitting, Cuff Size: Medium Adult)   Pulse 73   Resp 19   Ht 6' 0.01\" (1.829 m)   Wt 210 lb 5.1 oz (95.4 kg)   SpO2 99%   BMI 28.52 kg/m²       General appearance - alert, ill appearing and in no acute distress, smells of smoke  Mental status - oriented to person, place and time with normal affect  Head - normocephalic and atraumatic  Neck - supple, no carotid bruits, thyroid not palpable, no JVD  Chest - clear to auscultation, normal effort, right TDC without infection  Heart - normal rate, regular rhythm, no murmurs  Abdomen - soft, non-tender, non-distended, bowel sounds present all four quadrants, no masses  Neurological - normal speech, patient has weakness of the right upper extremity with a 4 out of 5 handgrip strength. Left arm is stronger.     Extremities -  palpable radial brachial

## 2018-11-07 NOTE — PROGRESS NOTES
Chronic Disease Visit Information    BP Readings from Last 3 Encounters:   11/07/18 (!) 80/40   11/05/18 (!) 144/70   10/30/18 (!) 108/58          Hemoglobin A1C (%)   Date Value   11/07/2018 6.2   08/06/2018 13.0 (H)   11/14/2017 14     Microalb/Crt. Ratio (mcg/mg creat)   Date Value   11/14/2017 9,497 (H)     LDL Cholesterol (mg/dL)   Date Value   08/07/2018 202 (H)     HDL (mg/dL)   Date Value   08/07/2018 24 (L)     BUN (mg/dL)   Date Value   11/05/2018 27 (H)     CREATININE (mg/dL)   Date Value   11/05/2018 3.26 (H)     Glucose (mg/dL)   Date Value   11/05/2018 216 (H)   02/21/2012 229 (H)            Have you changed or started any medications since your last visit including any over-the-counter medicines, vitamins, or herbal medicines? no   Are you having any side effects from any of your medications? -  no  Have you stopped taking any of your medications? Is so, why? -  no    Have you seen any other physician or provider since your last visit? No  Have you had any other diagnostic tests since your last visit? No  Have you been seen in the emergency room and/or had an admission to a hospital since we last saw you? No  Have you had your annual diabetic retinal (eye) exam? No  Have you had your routine dental cleaning in the past 6 months? no    Have you activated your [employer] account? If not, what are your barriers?  Yes     Patient Care Team:  Lizbeth Armstrong MD as PCP - General (Internal Medicine)  Lizbeth Armstrong MD as PCP - S Attributed Provider         Medical History Review  Past Medical, Family, and Social History reviewed and does contribute to the patient presenting condition    Health Maintenance   Topic Date Due    HIV screen  10/06/1981    Pneumococcal highest risk (1 of 3 - PCV13) 10/06/1985    Diabetic foot exam  07/29/2016    Diabetic retinal exam  10/06/2016    Shingles Vaccine (1 of 2 - 2 Dose Series) 10/06/2016    Colon cancer screen colonoscopy  10/06/2016    Flu vaccine (1) Diabetes mellitus due to underlying condition with complication, with long-term current use of insulin (HCC)    Cellulitis    Shortness of breath    Chronic systolic congestive heart failure (HCC)    Atypical chest pain    Essential hypertension    Chest pain    Nephropathy    Unstable angina pectoris (HCC)    ESRD on hemodialysis (HCC)       Allergies   Allergen Reactions    Lipitor [Atorvastatin] Diarrhea    Metformin And Related Nausea And Vomiting         MEDICATIONS:     Current Outpatient Prescriptions   Medication Sig Dispense Refill    Continuous Blood Gluc  (FREESTYLE MARJAN 14 DAY READER) JONAH Use every 14 days with sensor use 1 Device 0    Continuous Blood Gluc Sensor (FREESTYLE MARJAN 14 DAY SENSOR) MISC Use one sensor every 14 days 10 each 3    Blood Pressure Monitoring (BLOOD PRESSURE CUFF) MISC Use daily 1 each 0    furosemide (LASIX) 40 MG tablet Take 1 tablet by mouth daily 30 tablet 1    carvedilol (COREG) 12.5 MG tablet Take 1 tablet by mouth 2 times daily 60 tablet 3    tamsulosin (FLOMAX) 0.4 MG capsule Take 1 capsule by mouth daily 30 capsule 3    clopidogrel (PLAVIX) 75 MG tablet Take 1 tablet by mouth daily (Patient taking differently: Take 75 mg by mouth daily PATIENT INSTRUCTED TO STOP TAKING 11/07/2018 FOR SURGERY.) 30 tablet 3    Blood Glucose Monitoring Suppl (TRUE METRIX AIR GLUCOSE METER) JONAH 1 Device by Does not apply route 3 times daily 1 Device 0    digoxin (LANOXIN) 125 MCG tablet TAKE ONE TABLET BY MOUTH ONCE DAILY 30 tablet 11    losartan (COZAAR) 50 MG tablet TAKE ONE TABLET BY MOUTH ONCE DAILY 30 tablet 11    glyBURIDE (DIABETA) 5 MG tablet TAKE TWO TABLETS BY MOUTH IN THE MORNING AND ONE TABLET IN THE EVENING (Patient taking differently: Take 5 mg by mouth 2 times daily TAKE TWO TABLETS BY MOUTH IN THE MORNING AND ONE TABLET IN THE EVENING.) 90 tablet 11    Lancets MISC Use three times daily 100 each 3    glucose blood VI test strips (ASCENSIA

## 2018-11-08 PROBLEM — A41.9 SEPSIS (HCC): Status: ACTIVE | Noted: 2018-01-01

## 2018-11-08 NOTE — PLAN OF CARE
Problem: Falls - Risk of:  Goal: Will remain free from falls  Will remain free from falls   Outcome: Ongoing  Pt remains free of falls this shift. Approprate safety measures in place    Problem: Infection, Septic Shock:  Goal: Will show no infection signs and symptoms  Will show no infection signs and symptoms  Outcome: Ongoing  Pt had septic protocol. Pt dialysis not all fluids given pe MD    Problem: Serum Glucose Level - Abnormal:  Goal: Ability to maintain appropriate glucose levels will improve  Ability to maintain appropriate glucose levels will improve  Outcome: Ongoing  Pt BS stable.  Pt not able to eat much    Problem: Tissue Perfusion, Altered:  Goal: Circulatory function within specified parameters  Circulatory function within specified parameters  Outcome: Ongoing  Pt VSS

## 2018-11-09 PROBLEM — A41.9 SEPSIS (HCC): Chronic | Status: ACTIVE | Noted: 2018-01-01

## 2018-11-09 NOTE — PROGRESS NOTES
% Ideal Body 116%  · BMI Classification: BMI 25.0 - 29.9 Overweight    Nutrition Interventions:   Modify current diet  Continued Inpatient Monitoring    Nutrition Evaluation:   · Evaluation: Goals set   · Goals: PO intake % of most meals    · Monitoring: Meal Intake, Diet Tolerance, Skin Integrity, Weight, Pertinent Labs, Nausea or Vomiting, Monitor Hemodynamic Status    Christina Miguel R.D., L.D.   Pager: [phone]

## 2018-11-09 NOTE — PROGRESS NOTES
[address] [facility].    PROGRESS NOTE             11/9/2018    8:24 AM    Name:   Chelsie Cox  MRN:     [id number]     Acct:      [de-identified]   Room:   2011/2011-01  IP Day:  1  Admit Date:  11/8/2018 10:48 AM    PCP:  Alexey Figueroa MD  Code Status:  Full Code    Subjective:     C/C:   Chief Complaint   Patient presents with    Fever    Cough     Interval History Status: not changed. Patient seen and examined at bedside. Girlfriend at bedside. Patient having chills and shaking. Patient complains of fevers, chills, headache, cough (with sputum production yesterday), and decreased appetite. Max measured temp overnight 100.3 F. Discussed blood culture results and informed patient and girlfriend that we are currently treating with antibiotics. Brief History:     Mr. Marcelle Bravo is a 47 M presenting with likely staph aureus induced sepsis. PMH significant for ESRD, DMII, HTN, and Afibb. Patient presented to the ED with fevers and chills of one day duration as well as complaints of cough, dizziness, weakness, light-headedness, and headaches. ED: T 101.7, , RR 16, /89, O2 95% on admission   WBC 10.4, ESR 47, UA 2+ gluc & 4+ protein, few bacteria, small hb; CXR: negative    Review of Systems:     Review of Systems   Constitutional: Positive for appetite change (Decreased appetite), chills and fever. HENT: Negative for rhinorrhea, sore throat and tinnitus. Eyes: Negative for visual disturbance. Respiratory: Positive for cough (Minimal sputum production. ). Negative for shortness of breath. Cardiovascular: Negative for chest pain. Gastrointestinal: Negative for abdominal pain, constipation, diarrhea, nausea and vomiting. Genitourinary: Negative for dysuria and hematuria. Musculoskeletal: Negative for neck stiffness. Skin: Negative for rash. Neurological: Positive for headaches.          Medications: Allergies: Allergies   Allergen Reactions    Lipitor [Atorvastatin] Diarrhea    Metformin And Related Nausea And Vomiting       Current Meds:   Scheduled Meds:    [START ON 11/10/2018] vancomycin  10 mg/kg Intravenous Once    piperacillin-tazobactam  2.25 g Intravenous Q12H    vancomycin (VANCOCIN) intermittent dosing (placeholder)   Other RX Placeholder    tamsulosin  0.4 mg Oral Daily    sodium chloride flush  10 mL Intravenous 2 times per day    famotidine  20 mg Oral BID    insulin lispro  0-6 Units Subcutaneous TID WC    insulin lispro  0-3 Units Subcutaneous Nightly     Continuous Infusions:    sodium chloride 75 mL/hr at 11/09/18 0808    dextrose       PRN Meds: sodium chloride flush, magnesium hydroxide, ondansetron, acetaminophen, PIPERACILLIN-TAZOBACTAM (ZOSYN) 0.75 GM IVPB, glucose, dextrose, glucagon (rDNA), dextrose    Data:     Past Medical History:   has a past medical history of Anemia; Arrhythmia; CAD (coronary artery disease); Carotid artery stenosis; Cerebral artery occlusion with cerebral infarction [facility]); CHF (congestive heart failure) ([facility]); CKD (chronic kidney disease) stage 4, GFR 15-29 ml/min ([facility] [address] Diabetic foot ulcer ([facility] DM type 2, uncontrolled, with lower extremity ulcer ([facility] Hemodialysis patient [facility]); Hyperlipidemia; Hypertension; Nephropathy; Neuropathy; No natural teeth; Presence of permanent central venous catheter; Renal insufficiency, mild; Uses roller walker; Wears glasses; and Wheelchair dependence. Social History:   reports that he has never smoked. He has never used smokeless tobacco. He reports that he does not drink alcohol or use drugs.      Family History:   Family History   Problem Relation Age of Onset    Diabetes Mother     Heart Disease Mother     High Blood Pressure Mother     Diabetes Brother     Heart Disease Father     Diabetes Father        Vitals:  BP (!) 130/49   Pulse 103   Temp 100.3 °F (37.9 °C) (Oral)   Resp 25   Ht 6'

## 2018-11-09 NOTE — PLAN OF CARE
Problem: Falls - Risk of:  Goal: Will remain free from falls  Will remain free from falls   Outcome: Ongoing  Pt remains free of falls this shift. Approprate safety measures in place    Problem: Infection, Septic Shock:  Goal: Will show no infection signs and symptoms  Will show no infection signs and symptoms   Outcome: Ongoing  Pt septic.  On antibiotics, tunneled cath removed and temp cath placed    Problem: Serum Glucose Level - Abnormal:  Goal: Ability to maintain appropriate glucose levels will improve  Ability to maintain appropriate glucose levels will improve   Outcome: Ongoing  BS maintained this shift    Problem: Tissue Perfusion, Altered:  Goal: Circulatory function within specified parameters  Circulatory function within specified parameters   Outcome: Ongoing  Pt VSS this shift

## 2018-11-09 NOTE — PROGRESS NOTES
[facility]   OCCUPATIONAL THERAPY MISSED TREATMENT NOTE   INPATIENT   Date: 18  Patient Name: Didier Go       Room:   MRN: [id number]   Account #: [de-identified]    : 1965  (47 y.o.)  Gender: male           REASON FOR MISSED TREATMENT:  Patient at testing and/or off the floor   -   Hemodialysis       Teena Lopez OT

## 2018-11-09 NOTE — CONSULTS
PLACEMENT  08/2018    RIGHT CHEST PERM CATH       Medications:      [START ON 11/10/2018] vancomycin  10 mg/kg Intravenous Once    piperacillin-tazobactam  2.25 g Intravenous Q12H    vancomycin (VANCOCIN) intermittent dosing (placeholder)   Other RX Placeholder    tamsulosin  0.4 mg Oral Daily    sodium chloride flush  10 mL Intravenous 2 times per day    famotidine  20 mg Oral BID    insulin lispro  0-6 Units Subcutaneous TID WC    insulin lispro  0-3 Units Subcutaneous Nightly       Social History:     Social History     Social History    Marital status: Single     Spouse name: N/A    Number of children: N/A    Years of education: N/A     Occupational History    Not on file. Social History Main Topics    Smoking status: Never Smoker    Smokeless tobacco: Never Used    Alcohol use No    Drug use: No    Sexual activity: Yes     Partners: Female     Other Topics Concern    Not on file     Social History Narrative    No narrative on file       Family History:     Family History   Problem Relation Age of Onset    Diabetes Mother     Heart Disease Mother     High Blood Pressure Mother     Diabetes Brother     Heart Disease Father     Diabetes Father         Allergies:   Lipitor [atorvastatin] and Metformin and related     Review of Systems:     Review of Systems   Constitutional: Positive for chills, diaphoresis, fatigue and fever. HENT: Positive for congestion and sore throat. Eyes: Negative. Respiratory: Positive for cough. Negative for shortness of breath and wheezing. Cardiovascular: Negative for chest pain and leg swelling. Gastrointestinal: Positive for diarrhea, nausea and vomiting. Negative for abdominal pain, anal bleeding and constipation. Endocrine: Negative. Genitourinary: Negative for difficulty urinating, dysuria, frequency and urgency. Musculoskeletal: Negative. Skin: Negative. Allergic/Immunologic: Negative.     Neurological: Negative for dizziness, Hepatic Function Panel: Recent Labs      11/08/18   1110   PROT  7.6   LABALBU  3.9   BILIDIR  0.17   IBILI  0.77   BILITOT  0.94   ALKPHOS  80   ALT  11   AST  13     No results for input(s): RPR in the last 72 hours. No results for input(s): HIV in the last 72 hours. No results for input(s): BC in the last 72 hours. Lab Results   Component Value Date    CREATININE 4.14 11/09/2018    GLUCOSE 128 11/09/2018    GLUCOSE 229 02/21/2012       Detailed results: Thank you for allowing us to participate in the care of this patient. Please call with questions. This note is created with the assistance of a speech recognition program.  While intending to generate adocument that actually reflects the content of the visit, the document can still have some errors including those of syntax and sound a like substitutions which may escape proof reading. It such instances, actual meaningcan be extrapolated by contextual diversion. Modesta Macias MD  Office: [phone]      I have discussed the care of the patient, including pertinent history and exam findings,  with the resident. I have seen and examined the patient and the key elements of all parts of the encounter have been performed by me. I agree with the assessment, plan and orders as documented by the resident.     Reba Romero, Infectious Diseases

## 2018-11-09 NOTE — CONSULTS
History:        Procedure Laterality Date    CARDIAC CATHETERIZATION Bilateral 8/5/13    no intervention, med management for non-obstructive CAD    FINGER FRACTURE SURGERY Right     5TH DIGIT    [facility] 7/2015    [facility]     FRACTURE SURGERY Right 1975    ELBOW    OTHER SURGICAL HISTORY Left 8/19/2015    Arthrodesis IP Joint Hallux    TUNNELED VENOUS CATHETER PLACEMENT  08/2018    RIGHT CHEST PERM CATH       Current Medications:      [START ON 11/10/2018] vancomycin (VANCOCIN) 750 mg in dextrose 5 % 250 mL IVPB Once   piperacillin-tazobactam (ZOSYN) 2.25 g in dextrose 5 % 50 mL IVPB (mini-bag) Q12H   [START ON 11/10/2018] famotidine (PEPCID) tablet 20 mg Daily   vancomycin (VANCOCIN) intermittent dosing (placeholder) RX Placeholder   tamsulosin (FLOMAX) capsule 0.4 mg Daily   sodium chloride flush 0.9 % injection 10 mL 2 times per day   sodium chloride flush 0.9 % injection 10 mL PRN   magnesium hydroxide (MILK OF MAGNESIA) 400 MG/5ML suspension 30 mL Daily PRN   ondansetron (ZOFRAN) injection 4 mg Q6H PRN   acetaminophen (TYLENOL) tablet 650 mg Q4H PRN   piperacillin-tazobactam (ZOSYN) 0.75 g in dextrose 5 % 50 mL IVPB PRN   insulin lispro (HUMALOG) injection vial 0-6 Units TID WC   insulin lispro (HUMALOG) injection vial 0-3 Units Nightly   glucose (GLUTOSE) 40 % oral gel 15 g PRN   dextrose 50 % solution 12.5 g PRN   glucagon (rDNA) injection 1 mg PRN   dextrose 5 % solution PRN       Allergies:  Lipitor [atorvastatin] and Metformin and related    Social History:   Social History     Social History    Marital status: Single     Spouse name: N/A    Number of children: N/A    Years of education: N/A     Occupational History    Not on file.      Social History Main Topics    Smoking status: Never Smoker    Smokeless tobacco: Never Used    Alcohol use No    Drug use: No    Sexual activity: Yes     Partners: Female     Other Topics Concern    Not on file     Social History Narrative pharynx normal. Moist  CARDIAC: Normal S1 and S2. No S3, S4 or murmurs. Rhythm is regular. LUNGS: Clear to auscultation and percussion without rales, rhonchi, wheezing or diminished breath sounds. NECK: Neck supple, non-tender without lymphadenopathy, masses or thyromegaly. BACK: Examination of the spine reveals normal gait and posture, no spinal deformity, symmetry of spinal muscles, without tenderness, decreased range of motion or muscular spasm  MUSKULOSKELETAL: Adequately aligned spine. No joint erythema or tenderness. EXTREMITIES: No edema. Peripheral pulses intact. NEURO:Nonfocal      Labs:   CBC:  Recent Labs      11/08/18   1110  11/09/18   0408   WBC  10.4  15.3*   RBC  4.94  4.31*   HGB  12.7*  11.0*   HCT  38.8*  34.2*   MCV  78.4*  79.4*   MCH  25.8*  25.6*   MCHC  32.9  32.3   RDW  18.2*  19.0*   PLT  292  225   MPV  7.8  7.8      BMP: Recent Labs      11/08/18   1110  11/09/18   0408   NA  137  136   K  4.2  3.9   CL  100  102   CO2  23  17*   BUN  28*  35*   CREATININE  2.89*  4.14*   GLUCOSE  170*  128*   CALCIUM  9.3  8.1*      Phosphorus:  No results for input(s): PHOS in the last 72 hours. Magnesium: No results for input(s): MG in the last 72 hours. Albumin:   Recent Labs      11/08/18   1110   LABALBU  3.9       IRON:  No results for input(s): IRON in the last 72 hours. Iron Saturation:  Invalid input(s): PERCENTFE  TIBC:  No results for input(s): TIBC in the last 72 hours. FERRITIN:  No results for input(s): FERRITIN in the last 72 hours. SPEP: No results for input(s): SPEP in the last 72 hours. Recent Labs      11/08/18   1110   PROT  7.6     UPEP: No results for input(s): TPU in the last 72 hours. Urine Sodium:  No results for input(s): FELI in the last 72 hours. Urine Potassium: No results for input(s): KUR in the last 72 hours. Urine Chloride:   Invalid input(s): CLU  Urine Ph:  Invalid input(s): PO4U  Urine Osmolarity: No results for input(s): OSMOU in the last 72

## 2018-11-10 NOTE — PROGRESS NOTES
 tamsulosin  0.4 mg Oral Daily    sodium chloride flush  10 mL Intravenous 2 times per day    insulin lispro  0-6 Units Subcutaneous TID WC    insulin lispro  0-3 Units Subcutaneous Nightly     Continuous Infusions:    heparin (porcine) 12 Units/kg/hr (11/10/18 0532)    dextrose       PRN Meds: heparin (porcine), heparin (porcine), sodium chloride flush, magnesium hydroxide, ondansetron, acetaminophen, PIPERACILLIN-TAZOBACTAM (ZOSYN) 0.75 GM IVPB, glucose, dextrose, glucagon (rDNA), dextrose    Data:     Past Medical History:   has a past medical history of Anemia; Arrhythmia; CAD (coronary artery disease); Carotid artery stenosis; Cerebral artery occlusion with cerebral infarction [facility]); CHF (congestive heart failure) ([facility]); CKD (chronic kidney disease) stage 4, GFR 15-29 ml/min ([facility] [address] Diabetic foot ulcer ([facility] DM type 2, uncontrolled, with lower extremity ulcer ([facility] Hemodialysis patient [facility]); Hyperlipidemia; Hypertension; Nephropathy; Neuropathy; No natural teeth; Presence of permanent central venous catheter; Renal insufficiency, mild; Uses roller walker; Wears glasses; and Wheelchair dependence. Social History:   reports that he has never smoked. He has never used smokeless tobacco. He reports that he does not drink alcohol or use drugs. Family History:   Family History   Problem Relation Age of Onset    Diabetes Mother     Heart Disease Mother     High Blood Pressure Mother     Diabetes Brother     Heart Disease Father     Diabetes Father        Vitals:  /68   Pulse 92   Temp 97.9 °F (36.6 °C)   Resp 20   Ht 6' (1.829 m)   Wt 208 lb 8.9 oz (94.6 kg)   SpO2 98%   BMI 28.29 kg/m²   Temp (24hrs), Av.2 °F (36.8 °C), Min:96.6 °F (35.9 °C), Max:99.4 °F (37.4 °C)    Recent Labs      18   0734  18   1150  18   1643  18   2137   POCGLU  129*  171*  99  113*       I/O(24Hr):     Intake/Output Summary (Last 24 hours) at 11/10/18 1252  Last data filed at 11/10/18 0533   Gross per 24 hour   Intake           887.31 ml   Output                0 ml   Net           887.31 ml       Labs:  [unfilled]    Lab Results   Component Value Date/Time    SPECIAL NOT REPORTED 11/09/2018 03:00 PM     Lab Results   Component Value Date/Time    CULTURE (A) 11/09/2018 03:00 PM     STAPHYLOCOCCUS AUREUS >100 CFU/ML This culture is clinically meaningful only in    CULTURE (A) 11/09/2018 03:00 PM      the context of a blood culture positive for the same organism. [facility]    Radiology:    Xr Chest Standard (2 Vw)    Result Date: 11/8/2018  EXAMINATION: TWO VIEWS OF THE CHEST 11/8/2018 12:10 pm COMPARISON: 08/28/2018 HISTORY: ORDERING SYSTEM PROVIDED HISTORY: cough Ordering Physician Provided Reason for Exam: chest pain Acuity: Acute Type of Exam: Initial Relevant Medical/Surgical History: HTN, CHF, DM FINDINGS: Interval placement of right-sided dialysis catheter. Heart size is within normal limits. No consolidation or pneumothorax. No definite effusion. Mild degenerative changes of the thoracic spine. No focal airspace consolidation. Ir Efren Jonathan Device Placement    Result Date: 11/9/2018  PROCEDURE: ULTRASOUND GUIDED VASCULAR ACCESS. FLUOROSCOPY GUIDED PLACEMENT OF A NON-TUNNELED dialysis CATHETER. IR removal of tunneled dialysis catheter without subcutaneous port 11/9/2018. HISTORY: ORDERING SYSTEM PROVIDED HISTORY: kishor shoemaker TECHNOLOGIST PROVIDED HISTORY: kishor shoemaker Lumen?->Double Lumen Ordering Physician Provided Reason for Exam: ESRD TEMP CATH INSERTION Acuity: Unknown Type of Exam: Unknown Catheter related blood infection SEDATION: Local lidocaine FLUOROSCOPY DOSE AND TYPE OR TIME AND EXPOSURES: 37 seconds; dap 231 cGy cm2. TECHNIQUE: Informed consent was obtained after a detailed explanation of the procedure including risks, benefits, and alternatives. Universal protocol was observed.  The left neck and chest were prepped and draped in sterile of the existing right IJ vein tunneled dialysis catheter. The catheter tip was sent for culture. Physical Examination:        Physical Exam   Constitutional: He is oriented to person, place, and time. He appears well-developed. No distress. HENT:   Head: Normocephalic and atraumatic. Cardiovascular: Normal rate. Exam reveals no gallop. No murmur heard. Pulmonary/Chest: Effort normal and breath sounds normal. No respiratory distress. He has no wheezes. He has no rales. Abdominal: Soft. He exhibits no distension. There is no tenderness. There is no guarding. Musculoskeletal: He exhibits no edema. Neurological: He is alert and oriented to person, place, and time. Skin: Skin is warm and dry. He is not diaphoretic. Vitals reviewed. Assessment:        Primary Problem  Sepsis [facility])    Active Hospital Problems    Diagnosis Date Noted    MSSA (methicillin susceptible Staphylococcus aureus) septicemia ([facility] [address] [A41.01]     Bandemia [D72.825]     Sepsis ([facility] [address] [A41.9] 11/08/2018    Fever [R50.9]      Plan:        1. MSSA septicemia 2/2 tunnel cath   - Tunnel cath removed, Edinson placed,   - leukocytosis resolved, BP stable, afebrile    - BCx: positive for staph aureus   - CXR: negative   - ID following - Vanco/Zosyn dc'd, started on Ancef 2g every dialysis, 3g on last dialysis of the week   - f/u ECHO      2. Acute watery diarrhea, r/o C.diff    - f/u C.diff tox     - Contact precautions     3. DM2   - HbA1C 6.2   - ISS low dose sliding scale   - Hypoglycemia protocol   - POCT glucose     4. HTN   - home Losartan, Lasix, Coreg held      5. Afib, new onset   - f/u ECHO (completed, f/u result)   - heparin drip, 12U/kg/hr   - no rapid vent. response, HR mostly 80s      6.  ESRD on HD    - Cr 4.76, GFR 13    - Tunnel cath removed, Edinson placed   - plan for dialysis today    - Nephrology following     Pearl Curran MD  11/10/2018  12:52 PM   Attending Physician Statement  Patient seen and

## 2018-11-10 NOTE — DISCHARGE INSTR - COC
Continuity of Care Form    Patient Name: Lisa Argueta   :  1965  MRN:  [id number]    Admit date:  2018  Discharge date:  2018    Code Status Order: Full Code   Advance Directives:   Advance Care Flowsheet Documentation     Date/Time Healthcare Directive Type of Healthcare Directive Copy in [address] Agent's Name Healthcare Agent's Phone Number    [phone]  No, patient does not have an advance directive for healthcare treatment -- -- -- -- --          Admitting Physician:  Chito Carvalho MD  PCP: Morenita Coy MD    Discharging Nurse: [facility] Unit/Room#:   Discharging Unit Phone Number: [phone]    Emergency Contact:   Extended Emergency Contact Information  Primary Emergency Contact: Lady Aguilar, [address] [facility] Phone: [phone]  Relation: Child  Secondary Emergency Contact: [address] [place] [address] Phone: [phone]  Relation: Other    Past Surgical History:  Past Surgical History:   Procedure Laterality Date    CARDIAC CATHETERIZATION Bilateral 13    no intervention, med management for non-obstructive CAD    FINGER FRACTURE SURGERY Right     5TH DIGIT    [facility] Left [facility]    [facility]     FRACTURE SURGERY Right 1975    ELBOW    OTHER SURGICAL HISTORY Left 2015    Arthrodesis IP Joint Hallux    TUNNELED VENOUS CATHETER PLACEMENT  2018    RIGHT CHEST PERM CATH       Immunization History:   Immunization History   Administered Date(s) Administered    Influenza Virus Vaccine 10/31/2014, 10/12/2015    Influenza, Shelby Quarry, 3 yrs and older, IM, PF (Fluzone 3 yrs and older or Afluria 5 yrs and older) 2017       Active Problems:  Patient Active Problem List   Diagnosis Code    DM type 2 with diabetic peripheral neuropathy E11.42    Di[facility] ([facility] E11.621, L97.509    CHF (congestive heart failure) I50.9    HTN (hypertension) I10    CRF (chronic renal failure) ([facility]) N18.9    Osteomyelitis ([facility]) M86.9    Diabetes mellitus due to underlying condition with complication, with long-term current use of insulin ([facility]) E08.8, Z79.4    Cellulitis L03.90    Shortness of breath R06.02    Chronic systolic congestive heart failure ([facility]) I50.22    Atypical chest pain R07.89    Essential hypertension I10    Chest pain R07.9    Nephropathy N28.9    Unstable angina pectoris ([facility]) I20.0    ESRD on hemodialysis ([facility]) N18.6, Z99.2    Sepsis ([facility]) A41.9    Fever R50.9    MSSA (methicillin susceptible Staphylococcus aureus) septicemia ([facility]) A41.01    Bandemia D72.825       Isolation/Infection:   Isolation          No Isolation            Nurse Assessment:  Last Vital Signs: BP (!) 146/77   Pulse 96   Temp 98 °F (36.7 °C)   Resp 20   Ht 6' (1.829 m)   Wt 204 lb 2.3 oz (92.6 kg)   SpO2 98%   BMI 27.69 kg/m²     Last documented pain score (0-10 scale): Pain Level: 0  Last Weight:   Wt Readings from Last 1 Encounters:   11/10/18 204 lb 2.3 oz (92.6 kg)     Mental Status:  oriented and alert    IV Access:  - Dialysis Catheter  - site  left and internal jugular, insertion date: 11/13/2018    Nursing Mobility/ADLs:  Walking   Independent  Transfer  Independent  Bathing  Independent  Dressing  Independent  Toileting  Independent  Feeding  Independent  Med Admin  Independent  Med Delivery   whole    Wound Care Documentation and Therapy:  Wound 08/28/18 Other (Comment) Scrotum (Active)   Number of days: 73       Wound 08/28/18 Other (Comment) Toe (Comment  which one) (Active)   Number of days: 73       Wound 08/28/18 Coccyx (Active)   Number of days: 73        Elimination:  Continence:   · Bowel:  Yes  · Bladder: Yes  Urinary Catheter: None   Colostomy/Ileostomy/Ileal Conduit: No       Date of Last BM: 11/13/2018    Intake/Output Summary (Last 24 hours) at 11/10/18 0244  Last data filed at 11/10/18 0581   Gross per 24

## 2018-11-10 NOTE — PROGRESS NOTES
08/2018    Hyperlipidemia     Hypertension     Nephropathy     Neuropathy     No natural teeth     Presence of permanent central venous catheter     RIGHT CHEST PERM CATH    Renal insufficiency, mild 2013    Uses roller walker     Wears glasses     Wheelchair dependence        Past Surgical History:        Procedure Laterality Date    CARDIAC CATHETERIZATION Bilateral 8/5/13    no intervention, med management for non-obstructive CAD    FINGER FRACTURE SURGERY Right     5TH DIGIT    [facility] 7/2015    [facility]     FRACTURE SURGERY Right 1975    ELBOW    OTHER SURGICAL HISTORY Left 8/19/2015    Arthrodesis IP Joint Hallux    TUNNELED VENOUS CATHETER PLACEMENT  08/2018    RIGHT CHEST PERM CATH       Current Medications:      [START ON 11/13/2018] ceFAZolin (ANCEF) 2 g in dextrose 5 % 50 mL IVPB Once per day on Tue Thu   ceFAZolin (ANCEF) 3 g in dextrose 5 % 100 mL IVPB Once per day on Sat   famotidine (PEPCID) tablet 20 mg Daily   heparin (porcine) injection 4,000 Units PRN   heparin (porcine) injection 2,000 Units PRN   heparin 25,000 units in dextrose 5% 250 mL infusion Continuous   tamsulosin (FLOMAX) capsule 0.4 mg Daily   sodium chloride flush 0.9 % injection 10 mL 2 times per day   sodium chloride flush 0.9 % injection 10 mL PRN   magnesium hydroxide (MILK OF MAGNESIA) 400 MG/5ML suspension 30 mL Daily PRN   ondansetron (ZOFRAN) injection 4 mg Q6H PRN   acetaminophen (TYLENOL) tablet 650 mg Q4H PRN   piperacillin-tazobactam (ZOSYN) 0.75 g in dextrose 5 % 50 mL IVPB PRN   insulin lispro (HUMALOG) injection vial 0-6 Units TID WC   insulin lispro (HUMALOG) injection vial 0-3 Units Nightly   glucose (GLUTOSE) 40 % oral gel 15 g PRN   dextrose 50 % solution 12.5 g PRN   glucagon (rDNA) injection 1 mg PRN   dextrose 5 % solution PRN       Allergies:  Lipitor [atorvastatin] and Metformin and related    Social History:       Objective:  CURRENT TEMPERATURE:  Temp: 98.5 °F (36.9

## 2018-11-10 NOTE — PLAN OF CARE
Problem: Falls - Risk of:  Goal: Will remain free from falls  Will remain free from falls   Outcome: Ongoing  Bed remains in lowest position, call light within reach. Patient remains free of falls at this time. RN will continue to monitor. Problem: Infection, Septic Shock:  Goal: Will show no infection signs and symptoms  Will show no infection signs and symptoms   Outcome: Ongoing      Problem: Serum Glucose Level - Abnormal:  Goal: Ability to maintain appropriate glucose levels will improve  Ability to maintain appropriate glucose levels will improve   Outcome: Ongoing  BG monitored ACHS. Sliding scale insulin in place.     Problem: Tissue Perfusion, Altered:  Goal: Circulatory function within specified parameters  Circulatory function within specified parameters   Outcome: Ongoing

## 2018-11-11 NOTE — PROGRESS NOTES
(36.4 °C) (Oral)   Resp 16   Ht 6' (1.829 m)   Wt 185 lb 13.6 oz (84.3 kg)   SpO2 97%   BMI 25.21 kg/m²   Temp (24hrs), Av.4 °F (36.9 °C), Min:97.5 °F (36.4 °C), Max:99.3 °F (37.4 °C)    Recent Labs      11/10/18   1616  11/10/18   2049  11/10/18   2320  18   0942   POCGLU  92  71*  104  118*       I/O(24Hr): Intake/Output Summary (Last 24 hours) at 18 1534  Last data filed at 18 0437   Gross per 24 hour   Intake           260.65 ml   Output                0 ml   Net           260.65 ml       Labs:    [unfilled]    Lab Results   Component Value Date/Time    SPECIAL NOT REPORTED 11/10/2018 12:26 PM    SPECIAL NOT REPORTED 11/10/2018 12:26 PM     Lab Results   Component Value Date/Time    CULTURE NO GROWTH 22 HOURS 11/10/2018 12:26 PM    CULTURE NO GROWTH 22 HOURS 11/10/2018 12:26 PM       [unfilled]    Radiology:    Xr Chest Standard (2 Vw)    Result Date: 2018  EXAMINATION: TWO VIEWS OF THE CHEST 2018 12:10 pm COMPARISON: 2018 HISTORY: ORDERING SYSTEM PROVIDED HISTORY: cough Ordering Physician Provided Reason for Exam: chest pain Acuity: Acute Type of Exam: Initial Relevant Medical/Surgical History: HTN, CHF, DM FINDINGS: Interval placement of right-sided dialysis catheter. Heart size is within normal limits. No consolidation or pneumothorax. No definite effusion. Mild degenerative changes of the thoracic spine. No focal airspace consolidation. Ir Anselm Borer Device Placement    Result Date: 2018  PROCEDURE: ULTRASOUND GUIDED VASCULAR ACCESS. FLUOROSCOPY GUIDED PLACEMENT OF A NON-TUNNELED dialysis CATHETER. IR removal of tunneled dialysis catheter without subcutaneous port 2018.  HISTORY: ORDERING SYSTEM PROVIDED HISTORY: kishor shoemaker TECHNOLOGIST PROVIDED HISTORY: kishor shoemaker Lumen?->Double Lumen Ordering Physician Provided Reason for Exam: ESRD TEMP CATH INSERTION Acuity: Unknown Type of Exam: Unknown Catheter related blood infection SEDATION: Local lidocaine FLUOROSCOPY DOSE AND TYPE OR TIME AND EXPOSURES: 37 seconds; dap 231 cGy cm2. TECHNIQUE: Informed consent was obtained after a detailed explanation of the procedure including risks, benefits, and alternatives. Universal protocol was observed. The left neck and chest were prepped and draped in sterile fashion using maximum sterile barrier technique. Local anesthesia was achieved with lidocaine. A micropuncture needle was used to access the left internal jugular vein using ultrasound guidance. An ultrasound image demonstrating patency of the vein with needle tip located within it. An image was obtained and stored in PACs. A 0.035 guidewire was used to place a 13 Nigerian x 20 cm non tunneled dialysis catheter using fluoroscopic guidance with tip in the right atrium after fascial tract dilation. The catheter flushed easily and there was a good blood return. The catheter was sutured to the skin. The catheter was locked with heparinized saline. The patient tolerated the procedure well and there were no immediate complications. The right upper chest wall was prepped and draped in the usual sterile fashion using maximal sterile barrier technique. A  image demonstrated the tip of the existing catheter within the proximal 3rd of the right atrium. The retention sutures were cut and released. While applying manual pressure over the catheter exit site, the existing catheter was pulled with ease. The catheter tip was cut and sent for culture. Occlusive dressing was applied over the access site and skin glue. FINDINGS: Fluoroscopic image demonstrates the tip of the catheter in the right atrium. Successful ultrasound and fluoroscopy guided placement of a 13 Nigerian x 20 cm non tunneled dialysis catheter through the left IJ vein. Successful removal of the existing right IJ vein tunneled dialysis catheter. The catheter tip was sent for culture.      Yang ColónReina Jubil Device

## 2018-11-11 NOTE — PROGRESS NOTES
Renal insufficiency, mild 2013    Uses roller walker     Wears glasses     Wheelchair dependence        Past Surgical History:        Procedure Laterality Date    CARDIAC CATHETERIZATION Bilateral 8/5/13    no intervention, med management for non-obstructive CAD    FINGER FRACTURE SURGERY Right     5TH DIGIT    [facility]    [facility]     FRACTURE SURGERY Right 1975    ELBOW    OTHER SURGICAL HISTORY Left 8/19/2015    Arthrodesis IP Joint Hallux    TUNNELED VENOUS CATHETER PLACEMENT  08/2018    RIGHT CHEST PERM CATH       Current Medications:      carvedilol (COREG) tablet 12.5 mg BID WC   digoxin (LANOXIN) tablet 125 mcg Daily   furosemide (LASIX) tablet 40 mg BID WC   losartan (COZAAR) tablet 50 mg Daily   [START ON 11/12/2018] lactobacillus (CULTURELLE) capsule 1 capsule Daily with breakfast   vancomycin (FIRVANQ) 50 MG/ML oral solution 125 mg 4 times per day   [START ON 11/13/2018] ceFAZolin (ANCEF) 2 g in dextrose 5 % 50 mL IVPB Once per day on Tue Thu   ceFAZolin (ANCEF) 3 g in dextrose 5 % 100 mL IVPB Once per day on Sat   famotidine (PEPCID) tablet 20 mg Daily   heparin (porcine) injection 4,000 Units PRN   heparin (porcine) injection 2,000 Units PRN   heparin 25,000 units in dextrose 5% 250 mL infusion Continuous   tamsulosin (FLOMAX) capsule 0.4 mg Daily   sodium chloride flush 0.9 % injection 10 mL 2 times per day   sodium chloride flush 0.9 % injection 10 mL PRN   magnesium hydroxide (MILK OF MAGNESIA) 400 MG/5ML suspension 30 mL Daily PRN   ondansetron (ZOFRAN) injection 4 mg Q6H PRN   acetaminophen (TYLENOL) tablet 650 mg Q4H PRN   piperacillin-tazobactam (ZOSYN) 0.75 g in dextrose 5 % 50 mL IVPB PRN   insulin lispro (HUMALOG) injection vial 0-6 Units TID WC   insulin lispro (HUMALOG) injection vial 0-3 Units Nightly   glucose (GLUTOSE) 40 % oral gel 15 g PRN   dextrose 50 % solution 12.5 g PRN   glucagon (rDNA) injection 1 mg PRN   dextrose 5 % solution PRN

## 2018-11-11 NOTE — PROGRESS NOTES
Pt arrived to rm 2106. Telemetry applied, vitals taken, and pt oriented to new room. Bed in lowest position with wheels locked and side rails up x 2. Pt denies further needs at this time.

## 2018-11-12 NOTE — CONSULTS
Results (from the past 24 hour(s))   POC Glucose Fingerstick    Collection Time: 11/11/18  6:52 PM   Result Value Ref Range    POC Glucose 175 (H) 75 - 110 mg/dL   POC Glucose Fingerstick    Collection Time: 11/11/18  8:43 PM   Result Value Ref Range    POC Glucose 176 (H) 75 - 110 mg/dL   Basic Metabolic Panel w/ Reflex to MG    Collection Time: 11/12/18  5:20 AM   Result Value Ref Range    Glucose 163 (H) 70 - 99 mg/dL    BUN 29 (H) 6 - 20 mg/dL    CREATININE 4.32 (H) 0.70 - 1.20 mg/dL    Bun/Cre Ratio NOT REPORTED 9 - 20    Calcium 8.1 (L) 8.6 - 10.4 mg/dL    Sodium 136 135 - 144 mmol/L    Potassium 3.8 3.7 - 5.3 mmol/L    Chloride 99 98 - 107 mmol/L    CO2 24 20 - 31 mmol/L    Anion Gap 13 9 - 17 mmol/L    GFR Non-African American 15 (L) >60 mL/min    GFR  18 (L) >60 mL/min    GFR Comment          GFR Staging NOT REPORTED    CBC auto differential    Collection Time: 11/12/18  5:20 AM   Result Value Ref Range    WBC 6.4 3.5 - 11.0 k/uL    RBC 3.69 (L) 4.5 - 5.9 m/uL    Hemoglobin 9.4 (L) 13.5 - 17.5 g/dL    Hematocrit 29.0 (L) 41 - 53 %    MCV 78.5 (L) 80 - 100 fL    MCH 25.6 (L) 26 - 34 pg    MCHC 32.6 31 - 37 g/dL    RDW 18.4 (H) 11.5 - 14.9 %    Platelets 350 875 - 329 k/uL    MPV 8.8 6.0 - 12.0 fL    NRBC Automated NOT REPORTED per 100 WBC    Differential Type NOT REPORTED     Immature Granulocytes NOT REPORTED 0 %    Absolute Immature Granulocyte NOT REPORTED 0.00 - 0.30 k/uL    WBC Morphology NOT REPORTED     RBC Morphology NOT REPORTED     Platelet Estimate NOT REPORTED     Seg Neutrophils 60 36 - 66 %    Lymphocytes 22 (L) 24 - 44 %    Monocytes 11 (H) 1 - 7 %    Eosinophils % 6 (H) 0 - 4 %    Basophils 1 0 - 2 %    Segs Absolute 3.85 1.3 - 9.1 k/uL    Absolute Lymph # 1.41 1.0 - 4.8 k/uL    Absolute Mono # 0.70 0.1 - 1.3 k/uL    Absolute Eos # 0.38 0.0 - 0.4 k/uL    Basophils # 0.06 0.0 - 0.2 k/uL    Morphology ANISOCYTOSIS PRESENT    APTT    Collection Time: 11/12/18  5:20 AM   Result

## 2018-11-12 NOTE — PROGRESS NOTES
decreased EF. F/u BE and cardio c/s   -Tunnel Catheter when cleared by ID     2. C Diff              - cont po vanc              - Contact precautions      3. DM2    -Controlled. HbA1C 6.2              - ISS   -Hypoglycemia     4. HTN   -Losartan    -Coreg     5. Afib, chronic               - Resumed digoxin               - Cont heparin drip. Discuss switching to NOAC   -F/U Cardiology     6. ESRD on HD    -MWF dialysis with ancef      7. DVT ppx    -On heparin for AF     8. Discharge planning - will consider upon further cardiac evaluation               To d/c with oral vancomycin and f/u for c-diff    Sue Cheung MD  11/12/2018  9:47 AM     Attending Physician Statement  Patient seen and examined  I have discussed the care of the patient, including pertinent history and exam findings,  with the resident. I have reviewed the key elements of all parts of the encounter with the resident. I agree with the assessment, plan and orders as documented by the resident.     Jayesh Mehta

## 2018-11-12 NOTE — PROGRESS NOTES
insulin lispro  0-6 Units Subcutaneous TID WC    insulin lispro  0-3 Units Subcutaneous Nightly       Social History:     Social History     Social History    Marital status: Single     Spouse name: N/A    Number of children: N/A    Years of education: N/A     Occupational History    Not on file. Social History Main Topics    Smoking status: Never Smoker    Smokeless tobacco: Never Used    Alcohol use No    Drug use: No    Sexual activity: Yes     Partners: Female     Other Topics Concern    Not on file     Social History Narrative    No narrative on file       Family History:     Family History   Problem Relation Age of Onset    Diabetes Mother     Heart Disease Mother     High Blood Pressure Mother     Diabetes Brother     Heart Disease Father     Diabetes Father         Allergies:   Lipitor [atorvastatin] and Metformin and related     Review of Systems:     Review of Systems   Constitutional: Negative. HENT: Negative. Eyes: Negative. Respiratory: Negative. Cardiovascular: Negative. Gastrointestinal: Negative. Endocrine: Negative. Genitourinary: Negative. Musculoskeletal: Negative. Skin: Negative. Allergic/Immunologic: Negative. Neurological: Negative. Hematological: Negative. Psychiatric/Behavioral: Negative. Physical Examination :   Patient Vitals for the past 8 hrs:   BP Temp Temp src Pulse Resp SpO2 Weight   11/12/18 0621 (!) 147/69 97.8 °F (36.6 °C) Oral 80 20 98 % -   11/12/18 0515 - - - - - - 190 lb 4.1 oz (86.3 kg)       Physical Exam   Constitutional: He is oriented to person, place, and time. He appears well-developed and well-nourished. HENT:   Head: Normocephalic and atraumatic. Mouth/Throat: No oropharyngeal exudate. Eyes: Pupils are equal, round, and reactive to light. EOM are normal. No scleral icterus. Neck: Normal range of motion. Neck supple. No JVD present. Cardiovascular: Normal rate and regular rhythm.   Exam reveals

## 2018-11-13 NOTE — PROGRESS NOTES
Spoke with Dr. Yuliana Gordon in regards to oral anticoagulation for low EF and paroxsymal afib,currently in NSR, ok to start on coumadin daily, patient chronic dialysis ok to give lovenox 1mg/kg daily for bridging. AMELIA Cheung notified.

## 2018-11-13 NOTE — DISCHARGE SUMMARY
[address]    Discharge Summary     Patient ID: Dionne Jerome  :  1965   MRN: [id number]     ACCOUNT:  [de-identified]   Patient's PCP: Isael Klein MD  Admit Date: 2018   Discharge Date: 2018    Length of Stay: 5  Code Status:  Full Code  Admitting Physician: Oswald Malik MD  Discharge Physician: Reba Barajas MD     Active Discharge Diagnoses:       Primary Problem  Sepsis [facility])      [place] Problems    Diagnosis Date Noted    C. difficile colitis [A04.72]     MSSA (methicillin susceptible Staphylococcus aureus) septicemia ([facility] [place] [address] [A41.01]     Bandemia [D72.825]     Sepsis ([facility] [place] [address] [A41.9] 2018    Fever [R50.9]        Admission Condition:  poor     Discharged Condition: fair    Hospital Stay:       Hospital Course:  Dionne Jerome is a 47 y.o. male who was admitted for the management of   Sepsis ([facility] [place] [address] , presented to ER with *Fever and Cough    48 y/o m presented with Fever and Cough. Admitted to the hospital for the management of sepsis likely 2/2 staph bacteremia (MRSA) from his central cath for dialysis. Removal of right IJ vein tunnel catheter and replacement with non-tunneled catheter into left IJ vein on . Patient improved given vanc/zosyn until  and started ancef 2g Tues/Thurs and 3 g Saturday with dialysis. Repeat blood cultures until negative x 3 days. Tunneled catheter replaced on .     Patient began experiencing 5-6 episodes diarrhea/day. Dx with c-diff. Started oral vancomycin . Afibb treated with digoxin and heparin gtt.      Echo with severe decrease in LVEF from 45% to 25%. Cardiology was c/s. BE as outpatient. Continue antibiotics, ancef at dialysis, and PO vancomycin for 1 week per ID.     Significant therapeutic interventions: Removal of tunnel catheter, placement of Edinson, removal of Edinson, replacement of tunneled catheter    Significant Diagnostic Studies:   Labs / Micro:  CBC:   Lab Results   Component Value Date    WBC 6.5 11/13/2018    RBC 3.83 11/13/2018    RBC 3.98 02/27/2012    HGB 9.6 11/13/2018    HCT 30.0 11/13/2018    MCV 78.3 11/13/2018    MCH 25.1 11/13/2018    MCHC 32.1 11/13/2018    RDW 17.8 11/13/2018     11/13/2018     02/27/2012     BMP:    Lab Results   Component Value Date    GLUCOSE 175 11/13/2018    GLUCOSE 229 02/21/2012     11/13/2018    K 3.6 11/13/2018    CL 97 11/13/2018    CO2 24 11/13/2018    ANIONGAP 14 11/13/2018    BUN 31 11/13/2018    CREATININE 4.37 11/13/2018    BUNCRER NOT REPORTED 11/13/2018    CALCIUM 8.1 11/13/2018    LABGLOM 14 11/13/2018    GFRAA 17 11/13/2018    GFR      11/13/2018    GFR NOT REPORTED 11/13/2018     CMP:    Lab Results   Component Value Date    GLUCOSE 175 11/13/2018    GLUCOSE 229 02/21/2012     11/13/2018    K 3.6 11/13/2018    CL 97 11/13/2018    CO2 24 11/13/2018    BUN 31 11/13/2018    CREATININE 4.37 11/13/2018    ANIONGAP 14 11/13/2018    ALKPHOS 80 11/08/2018    ALT 11 11/08/2018    AST 13 11/08/2018    BILITOT 0.94 11/08/2018    LABALBU 3.9 11/08/2018    LABALBU 3.6 01/16/2012    ALBUMIN NOT REPORTED 11/08/2018    LABGLOM 14 11/13/2018    GFRAA 17 11/13/2018    GFR      11/13/2018    GFR NOT REPORTED 11/13/2018    PROT 7.6 11/08/2018    CALCIUM 8.1 11/13/2018     PT/INR:  No results found for: PTINR  PTT:   Lab Results   Component Value Date    APTT 44.7 11/13/2018     U/A:    Lab Results   Component Value Date    COLORU YELLOW 11/08/2018    TURBIDITY CLEAR 11/08/2018    SPECGRAV 1.018 11/08/2018    HGBUR SMALL 11/08/2018    PHUR 6.5 11/08/2018    PROTEINU 4+ 11/08/2018    GLUCOSEU 2+ 11/08/2018    GLUCOSEU 2+ 12/24/2011    KETUA NEGATIVE 11/08/2018    BILIRUBINUR NEGATIVE 11/08/2018    BILIRUBINUR NEGATIVE 12/24/2011    UROBILINOGEN Normal 11/08/2018    NITRU NEGATIVE 11/08/2018    LEUKOCYTESUR NEGATIVE 11/08/2018     TSH:    Lab MD Emily  11/13/2018  6:13 PM      Thank you Dr. Katina Castillo MD for the opportunity to be involved in this patient's care.

## 2018-11-13 NOTE — PROGRESS NOTES
Message sent to Dr. Felisha Gutiérrez in regards to note to approve tunneled cath placement, she stated she will place note.

## 2018-11-13 NOTE — PROGRESS NOTES
NEPHROLOGY CONSULT     Patient :  Jaimie Taylor; 47 y.o. MRN# [id number]  Location:  2106/2106-01  Attending:  Eric Castle MD  Admit Date:  11/8/2018   Hospital Day: 5      Reason for Consult: ESRD/Dialysis      Chief Complaint:  Fever  History Obtained From:  Patient    History of Present Illness: This is a 47 y.o. male With past medical history of type 2 diabetes,ESRD secondary to diabetic nephropathy,On dialysis Tuesday Thursday Saturday at [address] [place] [address] dialysis unitPresented with complains of fever chills cough for about 1 day patient has a tunneled dialysis catheter and was started on dialysis about 3 months ago  Chest x-ray did not show any pneumonia. Blood cultures were positive for gram-positive cocci,Staph aureus  Patient was supposed to get dialysis yesterday but was very sick and did not get dialysis. Patient continues to have fever chills and shivering right now patient seen and examined during dialysis, Patient is not feeling well and would like to come off of dialysisPatient is tachycardic blood pressure is controlledWBC counts have trended up  Patient is febrile. On IV antibiotics    Subjective/interval history. Patient is feeling better. Pt seen on hemodialysis. No fever chills  No plans for BE- repeat blood cult sent.     Current Medications:      carvedilol (COREG) tablet 12.5 mg BID WC   digoxin (LANOXIN) tablet 125 mcg Daily   furosemide (LASIX) tablet 40 mg BID WC   losartan (COZAAR) tablet 50 mg Daily   lactobacillus (CULTURELLE) capsule 1 capsule Daily with breakfast   vancomycin (FIRVANQ) 50 MG/ML oral solution 125 mg 4 times per day   ceFAZolin (ANCEF) 2 g in dextrose 5 % 50 mL IVPB Once per day on Tue Thu   ceFAZolin (ANCEF) 3 g in dextrose 5 % 100 mL IVPB Once per day on Sat   famotidine (PEPCID) tablet 20 mg Daily   heparin (porcine) injection 4,000 Units PRN   heparin (porcine) injection 2,000 Units PRN   heparin 25,000 units in dextrose 5% 250 mL infusion Continuous   tamsulosin today as O/P but this will need to be rescheduled. By Dr Marshall Byrd  3. Continue IV  Ancef 2g every dialysis, 3 g on last dialysis of the week  When cleared by ID we'll schedule for a tunneled dialysis catheter placement  4. Renal diet 2 gm Na, 2 gm potassium, 1 gm po4 and 1.2 gm/kg/bw protein per day and 1500 mls fluid restriction  Thank you for the consultation.       Electronically signed by Elizabet Neville MD on 11/13/2018 at 11:11 AM

## 2018-11-13 NOTE — PROGRESS NOTES
Nutrition Assessment    Type and Reason for Visit: Reassess    Nutrition Recommendations: Continue diet as ordered. Consider offering milkshake and Magic Cup. Nutrition Assessment: Patient at risk of malnutrition due to poor oral intake, increased nutrient needs due to dialysis. Patient did not eat breakfast or lunch today because having tunnel cather placed. Patient reports he lost almost 40 lbs since June 2018 due to starting dialysis. Declined oral nutrition supplements. Will monitor oral intake and implement nutrition intervention as appropriate. Malnutrition Assessment:  · Malnutrition Status: At risk for malnutrition  · Context: Acute illness or injury  · Findings of the 6 clinical characteristics of malnutrition (Minimum of 2 out of 6 clinical characteristics is required to make the diagnosis of moderate or severe Protein Calorie Malnutrition based on AND/ASPEN Guidelines):  1. Energy Intake-Less than 50% of estimated energy requirement for greater than or equal to 5 days, greater than or equal to 5 days    2. Weight Loss-10% loss or greater, in 6 months  3. Fat Loss-No significant subcutaneous fat loss,    4. Muscle Loss-Unable to assess,    5. Fluid Accumulation-No significant fluid accumulation,    6.  Strength-Not measured    Nutrition Risk Level: High    Nutrient Needs:  · Estimated Daily Total Kcal: 8535-8004 based on 22-25 kilocalories per kg  · Estimated Daily Protein (g):  based on 1.2-1.4 gm protein per kg    Nutrition Diagnosis:   · Problem: Inadequate oral intake  · Etiology: related to  (Acute illness)     Signs and symptoms:  as evidenced by Nausea, Vomiting, Intake 0-25%    Objective Information:  · Nutrition-Focused Physical Findings:  Trace non-pitting edema generalized and lower extremities.   · Wound Type: Surgical Wound  · Current Nutrition Therapies:  · Oral Diet Orders: Carb Control 4 Carbs/Meal   · Oral Diet intake: 51-75%  · ONS intake: Refused  · Anthropometric

## 2018-11-13 NOTE — PLAN OF CARE
Problem: Falls - Risk of:  Goal: Will remain free from falls  Will remain free from falls   Outcome: Met This Shift  No falls experienced during shift. Patient able to use call light appropriately for help. Patient is standby assist.    Problem: Serum Glucose Level - Abnormal:  Goal: Ability to maintain appropriate glucose levels will improve  Ability to maintain appropriate glucose levels will improve   Outcome: Ongoing  Patient refuses insulin because \" he has had a poor appetite lately\". Patient blood sugars have been 166 at dinner and 187 at hour of sleep.

## 2018-11-13 NOTE — PLAN OF CARE
Problem: Nutrition  Goal: Optimal nutrition therapy  Outcome: Ongoing  Nutrition Problem: Inadequate oral intake  Intervention: Food and/or Nutrient Delivery: Continue current diet  Nutritional Goals: PO intake % of most meals

## 2018-11-13 NOTE — PROGRESS NOTES
of the procedure including risks, benefits, and alternatives. Universal protocol was observed. The left neck and chest were prepped and draped in sterile fashion using maximum sterile barrier technique. Local anesthesia was achieved with lidocaine. A micropuncture needle was used to access the left internal jugular vein using ultrasound guidance. An ultrasound image demonstrating patency of the vein with needle tip located within it. An image was obtained and stored in PACs. A 0.035 guidewire was used to place a 13 [place] x 20 cm non tunneled dialysis catheter using fluoroscopic guidance with tip in the right atrium after fascial tract dilation. The catheter flushed easily and there was a good blood return. The catheter was sutured to the skin. The catheter was locked with heparinized saline. The patient tolerated the procedure well and there were no immediate complications. The right upper chest wall was prepped and draped in the usual sterile fashion using maximal sterile barrier technique. A  image demonstrated the tip of the existing catheter within the proximal 3rd of the right atrium. The retention sutures were cut and released. While applying manual pressure over the catheter exit site, the existing catheter was pulled with ease. The catheter tip was cut and sent for culture. Occlusive dressing was applied over the access site and skin glue. FINDINGS: Fluoroscopic image demonstrates the tip of the catheter in the right atrium. Successful ultrasound and fluoroscopy guided placement of a 13 Gib[place] x 20 cm non tunneled dialysis catheter through the left IJ vein. Successful removal of the existing right IJ vein tunneled dialysis catheter. The catheter tip was sent for culture. Ir Hometica Horse Device Placement    Result Date: 11/9/2018  PROCEDURE: ULTRASOUND GUIDED VASCULAR ACCESS. FLUOROSCOPY GUIDED PLACEMENT OF A NON-TUNNELED dialysis CATHETER.  IR removal of tunneled dialysis catheter

## 2018-11-13 NOTE — PLAN OF CARE
Problem: Tissue Perfusion, Altered:  Goal: Circulatory function within specified parameters  Circulatory function within specified parameters   Outcome: Met This Shift

## 2018-11-14 NOTE — PROGRESS NOTES
Coumadin prescription called into pharmacist Sera Borrero at [facility].

## 2018-11-19 NOTE — TELEPHONE ENCOUNTER
Writer spoke w/ pt regarding no show appt peg'd w/ Rayfield Oppenheim, PA. Pt states there was an error and he has an appt peg'd Mon 11/26/18 w/ Piter Palacios.

## 2018-11-26 NOTE — TELEPHONE ENCOUNTER
Called and left voice mail to inform of no show appt 11/26/2018 with Bhargavi Maldonado and to return call at earliest convenience to reschedule.

## 2019-01-01 ENCOUNTER — APPOINTMENT (OUTPATIENT)
Dept: DIALYSIS | Age: 54
DRG: 305 | End: 2019-01-01
Attending: SURGERY
Payer: MEDICAID

## 2019-01-01 ENCOUNTER — HOSPITAL ENCOUNTER (INPATIENT)
Dept: CARDIAC CATH/INVASIVE PROCEDURES | Age: 54
LOS: 2 days | Discharge: HOME HEALTH | DRG: 181 | End: 2019-06-13
Attending: SURGERY | Admitting: INTERNAL MEDICINE
Payer: MEDICAID

## 2019-01-01 ENCOUNTER — ANESTHESIA (OUTPATIENT)
Dept: OPERATING ROOM | Age: 54
DRG: 305 | End: 2019-01-01
Payer: MEDICAID

## 2019-01-01 ENCOUNTER — APPOINTMENT (OUTPATIENT)
Dept: GENERAL RADIOLOGY | Age: 54
DRG: 181 | End: 2019-01-01
Payer: MEDICAID

## 2019-01-01 ENCOUNTER — HOSPITAL ENCOUNTER (OUTPATIENT)
Age: 54
Setting detail: OUTPATIENT SURGERY
Discharge: HOME | End: 2019-01-16
Attending: SURGERY | Admitting: SURGERY
Payer: MEDICAID

## 2019-01-01 ENCOUNTER — TELEPHONE (OUTPATIENT)
Dept: INTERNAL MEDICINE CLINIC | Age: 54
End: 2019-01-01

## 2019-01-01 ENCOUNTER — ANESTHESIA (OUTPATIENT)
Dept: OPERATING ROOM | Age: 54
DRG: 181 | End: 2019-01-01
Payer: MEDICAID

## 2019-01-01 ENCOUNTER — APPOINTMENT (OUTPATIENT)
Dept: INTERVENTIONAL RADIOLOGY/VASCULAR | Age: 54
DRG: 305 | End: 2019-01-01
Attending: SURGERY
Payer: MEDICAID

## 2019-01-01 ENCOUNTER — TELEPHONE (OUTPATIENT)
Dept: VASCULAR SURGERY | Age: 54
End: 2019-01-01

## 2019-01-01 ENCOUNTER — HOSPITAL ENCOUNTER (INPATIENT)
Age: 54
LOS: 3 days | Discharge: NURSING, REHAB OR LONG-TERM CARE | DRG: 181 | End: 2019-06-11
Attending: EMERGENCY MEDICINE | Admitting: INTERNAL MEDICINE
Payer: MEDICAID

## 2019-01-01 ENCOUNTER — ANESTHESIA EVENT (OUTPATIENT)
Dept: OPERATING ROOM | Age: 54
DRG: 181 | End: 2019-01-01
Payer: MEDICAID

## 2019-01-01 ENCOUNTER — APPOINTMENT (OUTPATIENT)
Dept: GENERAL RADIOLOGY | Age: 54
DRG: 305 | End: 2019-01-01
Attending: SURGERY
Payer: MEDICAID

## 2019-01-01 ENCOUNTER — ANESTHESIA EVENT (OUTPATIENT)
Dept: OPERATING ROOM | Age: 54
End: 2019-01-01
Payer: MEDICAID

## 2019-01-01 ENCOUNTER — ANESTHESIA EVENT (OUTPATIENT)
Dept: OPERATING ROOM | Age: 54
DRG: 305 | End: 2019-01-01
Payer: MEDICAID

## 2019-01-01 ENCOUNTER — OFFICE VISIT (OUTPATIENT)
Dept: INTERNAL MEDICINE CLINIC | Age: 54
End: 2019-01-01
Payer: MEDICAID

## 2019-01-01 ENCOUNTER — HOSPITAL ENCOUNTER (OUTPATIENT)
Dept: INTERVENTIONAL RADIOLOGY/VASCULAR | Age: 54
Discharge: HOME | End: 2019-03-27
Payer: MEDICAID

## 2019-01-01 ENCOUNTER — ANESTHESIA (OUTPATIENT)
Dept: OPERATING ROOM | Age: 54
End: 2019-01-01
Payer: MEDICAID

## 2019-01-01 ENCOUNTER — APPOINTMENT (OUTPATIENT)
Dept: ULTRASOUND IMAGING | Age: 54
DRG: 305 | End: 2019-01-01
Attending: SURGERY
Payer: MEDICAID

## 2019-01-01 ENCOUNTER — HOSPITAL ENCOUNTER (INPATIENT)
Age: 54
LOS: 18 days | DRG: 305 | End: 2019-07-17
Attending: SURGERY | Admitting: SURGERY
Payer: MEDICAID

## 2019-01-01 ENCOUNTER — OFFICE VISIT (OUTPATIENT)
Dept: VASCULAR SURGERY | Age: 54
End: 2019-01-01

## 2019-01-01 ENCOUNTER — HOSPITAL ENCOUNTER (OUTPATIENT)
Dept: INTERVENTIONAL RADIOLOGY/VASCULAR | Age: 54
Discharge: HOME | End: 2019-04-26
Payer: MEDICAID

## 2019-01-01 VITALS
RESPIRATION RATE: 18 BRPM | HEART RATE: 100 BPM | DIASTOLIC BLOOD PRESSURE: 75 MMHG | SYSTOLIC BLOOD PRESSURE: 146 MMHG | WEIGHT: 173.06 LBS | OXYGEN SATURATION: 98 % | BODY MASS INDEX: 23.44 KG/M2 | TEMPERATURE: 97.8 F | HEIGHT: 72 IN

## 2019-01-01 VITALS
SYSTOLIC BLOOD PRESSURE: 107 MMHG | TEMPERATURE: 95.4 F | OXYGEN SATURATION: 98 % | DIASTOLIC BLOOD PRESSURE: 62 MMHG | RESPIRATION RATE: 6 BRPM

## 2019-01-01 VITALS
OXYGEN SATURATION: 96 % | DIASTOLIC BLOOD PRESSURE: 18 MMHG | TEMPERATURE: 99 F | WEIGHT: 219.36 LBS | SYSTOLIC BLOOD PRESSURE: 63 MMHG | RESPIRATION RATE: 28 BRPM | BODY MASS INDEX: 29.71 KG/M2 | HEART RATE: 45 BPM | HEIGHT: 72 IN

## 2019-01-01 VITALS
WEIGHT: 188.71 LBS | OXYGEN SATURATION: 99 % | SYSTOLIC BLOOD PRESSURE: 111 MMHG | DIASTOLIC BLOOD PRESSURE: 66 MMHG | TEMPERATURE: 97.3 F | BODY MASS INDEX: 25.56 KG/M2 | HEART RATE: 99 BPM | RESPIRATION RATE: 17 BRPM | HEIGHT: 72 IN

## 2019-01-01 VITALS
OXYGEN SATURATION: 98 % | SYSTOLIC BLOOD PRESSURE: 132 MMHG | BODY MASS INDEX: 24.79 KG/M2 | WEIGHT: 183 LBS | HEIGHT: 72 IN | HEART RATE: 85 BPM | DIASTOLIC BLOOD PRESSURE: 70 MMHG

## 2019-01-01 VITALS — OXYGEN SATURATION: 100 % | DIASTOLIC BLOOD PRESSURE: 61 MMHG | SYSTOLIC BLOOD PRESSURE: 122 MMHG

## 2019-01-01 VITALS — OXYGEN SATURATION: 99 % | SYSTOLIC BLOOD PRESSURE: 123 MMHG | DIASTOLIC BLOOD PRESSURE: 63 MMHG | TEMPERATURE: 96.6 F

## 2019-01-01 VITALS
OXYGEN SATURATION: 98 % | SYSTOLIC BLOOD PRESSURE: 119 MMHG | RESPIRATION RATE: 20 BRPM | TEMPERATURE: 98.3 F | HEART RATE: 91 BPM | DIASTOLIC BLOOD PRESSURE: 50 MMHG | WEIGHT: 187.61 LBS | BODY MASS INDEX: 25.41 KG/M2 | HEIGHT: 72 IN

## 2019-01-01 VITALS
BODY MASS INDEX: 26.58 KG/M2 | DIASTOLIC BLOOD PRESSURE: 76 MMHG | SYSTOLIC BLOOD PRESSURE: 118 MMHG | HEIGHT: 72 IN | RESPIRATION RATE: 16 BRPM

## 2019-01-01 VITALS
SYSTOLIC BLOOD PRESSURE: 107 MMHG | OXYGEN SATURATION: 96 % | BODY MASS INDEX: 25.65 KG/M2 | RESPIRATION RATE: 17 BRPM | TEMPERATURE: 98.4 F | HEIGHT: 72 IN | HEART RATE: 100 BPM | DIASTOLIC BLOOD PRESSURE: 57 MMHG | WEIGHT: 189.38 LBS

## 2019-01-01 VITALS — TEMPERATURE: 99.7 F | OXYGEN SATURATION: 100 % | SYSTOLIC BLOOD PRESSURE: 94 MMHG | DIASTOLIC BLOOD PRESSURE: 61 MMHG

## 2019-01-01 VITALS
SYSTOLIC BLOOD PRESSURE: 82 MMHG | TEMPERATURE: 95.6 F | OXYGEN SATURATION: 100 % | DIASTOLIC BLOOD PRESSURE: 54 MMHG | RESPIRATION RATE: 14 BRPM

## 2019-01-01 VITALS
RESPIRATION RATE: 20 BRPM | BODY MASS INDEX: 24.78 KG/M2 | HEART RATE: 97 BPM | SYSTOLIC BLOOD PRESSURE: 130 MMHG | DIASTOLIC BLOOD PRESSURE: 75 MMHG | HEIGHT: 72 IN | TEMPERATURE: 97.5 F | WEIGHT: 182.98 LBS | OXYGEN SATURATION: 95 %

## 2019-01-01 DIAGNOSIS — I73.9 PAD (PERIPHERAL ARTERY DISEASE) (HCC): Primary | ICD-10-CM

## 2019-01-01 DIAGNOSIS — N18.6 ESRD ON DIALYSIS (HCC): ICD-10-CM

## 2019-01-01 DIAGNOSIS — Z99.2 ESRD ON HEMODIALYSIS (HCC): ICD-10-CM

## 2019-01-01 DIAGNOSIS — G89.18 POSTOPERATIVE PAIN: Primary | ICD-10-CM

## 2019-01-01 DIAGNOSIS — E08.8 DIABETES MELLITUS DUE TO UNDERLYING CONDITION WITH COMPLICATION, WITH LONG-TERM CURRENT USE OF INSULIN (HCC): ICD-10-CM

## 2019-01-01 DIAGNOSIS — I95.9 HYPOTENSION, UNSPECIFIED HYPOTENSION TYPE: ICD-10-CM

## 2019-01-01 DIAGNOSIS — N18.6 ESRD (END STAGE RENAL DISEASE) ON DIALYSIS (HCC): ICD-10-CM

## 2019-01-01 DIAGNOSIS — I50.22 CHRONIC SYSTOLIC CONGESTIVE HEART FAILURE (HCC): ICD-10-CM

## 2019-01-01 DIAGNOSIS — Z99.2 ESRD (END STAGE RENAL DISEASE) ON DIALYSIS (HCC): ICD-10-CM

## 2019-01-01 DIAGNOSIS — N18.6 ESRD ON HEMODIALYSIS (HCC): ICD-10-CM

## 2019-01-01 DIAGNOSIS — Z79.4 DIABETES MELLITUS DUE TO UNDERLYING CONDITION WITH COMPLICATION, WITH LONG-TERM CURRENT USE OF INSULIN (HCC): ICD-10-CM

## 2019-01-01 DIAGNOSIS — N18.6 ESRD (END STAGE RENAL DISEASE) (HCC): ICD-10-CM

## 2019-01-01 DIAGNOSIS — E11.42 DM TYPE 2 WITH DIABETIC PERIPHERAL NEUROPATHY (HCC): ICD-10-CM

## 2019-01-01 DIAGNOSIS — E11.621 DIABETIC ULCER OF OTHER PART OF RIGHT FOOT ASSOCIATED WITH TYPE 2 DIABETES MELLITUS, LIMITED TO BREAKDOWN OF SKIN (HCC): ICD-10-CM

## 2019-01-01 DIAGNOSIS — L97.511 DIABETIC ULCER OF OTHER PART OF RIGHT FOOT ASSOCIATED WITH TYPE 2 DIABETES MELLITUS, LIMITED TO BREAKDOWN OF SKIN (HCC): ICD-10-CM

## 2019-01-01 DIAGNOSIS — Z99.2 ESRD ON DIALYSIS (HCC): ICD-10-CM

## 2019-01-01 DIAGNOSIS — I50.9 CONGESTIVE HEART FAILURE, UNSPECIFIED HF CHRONICITY, UNSPECIFIED HEART FAILURE TYPE (HCC): ICD-10-CM

## 2019-01-01 DIAGNOSIS — I77.0 AVF (ARTERIOVENOUS FISTULA) (HCC): Primary | ICD-10-CM

## 2019-01-01 DIAGNOSIS — Z98.890 S/P ANGIOGRAM OF EXTREMITY: ICD-10-CM

## 2019-01-01 LAB
% CKMB: 4 % (ref 0–3.5)
-: ABNORMAL
-: ABNORMAL
-: NORMAL
7-AMINOCLONAZEPAM: <5 NG/ML
ABO/RH: NORMAL
ABO/RH: NORMAL
ABSOLUTE EOS #: 0 K/UL (ref 0–0.4)
ABSOLUTE EOS #: 0.1 K/UL (ref 0–0.4)
ABSOLUTE EOS #: 0.2 K/UL (ref 0–0.4)
ABSOLUTE EOS #: 0.29 K/UL (ref 0–0.44)
ABSOLUTE EOS #: 0.52 K/UL (ref 0–0.4)
ABSOLUTE IMMATURE GRANULOCYTE: 0 K/UL (ref 0–0.3)
ABSOLUTE IMMATURE GRANULOCYTE: 0.07 K/UL (ref 0–0.3)
ABSOLUTE IMMATURE GRANULOCYTE: 0.51 K/UL (ref 0–0.3)
ABSOLUTE IMMATURE GRANULOCYTE: 0.68 K/UL (ref 0–0.3)
ABSOLUTE IMMATURE GRANULOCYTE: 0.79 K/UL (ref 0–0.3)
ABSOLUTE IMMATURE GRANULOCYTE: 0.98 K/UL (ref 0–0.3)
ABSOLUTE IMMATURE GRANULOCYTE: 2.2 K/UL (ref 0–0.3)
ABSOLUTE IMMATURE GRANULOCYTE: 2.77 K/UL (ref 0–0.3)
ABSOLUTE IMMATURE GRANULOCYTE: 3.22 K/UL (ref 0–0.3)
ABSOLUTE IMMATURE GRANULOCYTE: ABNORMAL K/UL (ref 0–0.3)
ABSOLUTE LYMPH #: 0.68 K/UL (ref 1–4.8)
ABSOLUTE LYMPH #: 0.85 K/UL (ref 1–4.8)
ABSOLUTE LYMPH #: 1.14 K/UL (ref 1–4.8)
ABSOLUTE LYMPH #: 1.2 K/UL (ref 1–4.8)
ABSOLUTE LYMPH #: 1.23 K/UL (ref 1–4.8)
ABSOLUTE LYMPH #: 1.24 K/UL (ref 1–4.8)
ABSOLUTE LYMPH #: 1.5 K/UL (ref 1.1–3.7)
ABSOLUTE LYMPH #: 1.76 K/UL (ref 1–4.8)
ABSOLUTE LYMPH #: 1.77 K/UL (ref 1–4.8)
ABSOLUTE LYMPH #: 2.06 K/UL (ref 1–4.8)
ABSOLUTE LYMPH #: 2.38 K/UL (ref 1–4.8)
ABSOLUTE LYMPH #: 2.76 K/UL (ref 1–4.8)
ABSOLUTE MONO #: 0.21 K/UL (ref 0.1–0.8)
ABSOLUTE MONO #: 0.25 K/UL (ref 0.1–0.8)
ABSOLUTE MONO #: 0.28 K/UL (ref 0.1–0.8)
ABSOLUTE MONO #: 0.44 K/UL (ref 0.1–0.8)
ABSOLUTE MONO #: 0.46 K/UL (ref 0.1–0.8)
ABSOLUTE MONO #: 0.51 K/UL (ref 0.1–0.8)
ABSOLUTE MONO #: 0.52 K/UL (ref 0.1–0.8)
ABSOLUTE MONO #: 0.79 K/UL (ref 0.1–0.8)
ABSOLUTE MONO #: 0.8 K/UL (ref 0.1–1.2)
ABSOLUTE MONO #: 1 K/UL (ref 0.1–1.3)
ABSOLUTE MONO #: 1.02 K/UL (ref 0.1–0.8)
ABSOLUTE MONO #: 1.19 K/UL (ref 0.1–0.8)
ACETAMINOPHEN LEVEL: <5 UG/ML (ref 10–30)
ACTION: NORMAL
ALBUMIN SERPL-MCNC: 1.8 G/DL (ref 3.5–5.2)
ALBUMIN SERPL-MCNC: 2.1 G/DL (ref 3.5–5.2)
ALBUMIN SERPL-MCNC: 2.2 G/DL (ref 3.5–5.2)
ALBUMIN SERPL-MCNC: 2.4 G/DL (ref 3.5–5.2)
ALBUMIN SERPL-MCNC: 2.4 G/DL (ref 3.5–5.2)
ALBUMIN SERPL-MCNC: 2.5 G/DL (ref 3.5–5.2)
ALBUMIN SERPL-MCNC: 2.5 G/DL (ref 3.5–5.2)
ALBUMIN SERPL-MCNC: 4 G/DL (ref 3.5–5.2)
ALBUMIN/GLOBULIN RATIO: 0.6 (ref 1–2.5)
ALBUMIN/GLOBULIN RATIO: 0.8 (ref 1–2.5)
ALBUMIN/GLOBULIN RATIO: 0.9 (ref 1–2.5)
ALBUMIN/GLOBULIN RATIO: 1 (ref 1–2.5)
ALBUMIN/GLOBULIN RATIO: 1 (ref 1–2.5)
ALBUMIN/GLOBULIN RATIO: 1.1 (ref 1–2.5)
ALBUMIN/GLOBULIN RATIO: 1.3 (ref 1–2.5)
ALBUMIN/GLOBULIN RATIO: ABNORMAL (ref 1–2.5)
ALLEN TEST: ABNORMAL
ALP BLD-CCNC: 144 U/L (ref 40–129)
ALP BLD-CCNC: 152 U/L (ref 40–129)
ALP BLD-CCNC: 202 U/L (ref 40–129)
ALP BLD-CCNC: 212 U/L (ref 40–129)
ALP BLD-CCNC: 227 U/L (ref 40–129)
ALP BLD-CCNC: 248 U/L (ref 40–129)
ALP BLD-CCNC: 249 U/L (ref 40–129)
ALP BLD-CCNC: 68 U/L (ref 40–129)
ALP BLD-CCNC: 79 U/L (ref 40–129)
ALP BLD-CCNC: 84 U/L (ref 40–129)
ALPHA HYDROXYALPRAZOLAM: <5 NG/ML
ALPRAZOLAM: <5 NG/ML
ALT SERPL-CCNC: 10 U/L (ref 5–41)
ALT SERPL-CCNC: 1583 U/L (ref 5–41)
ALT SERPL-CCNC: 260 U/L (ref 5–41)
ALT SERPL-CCNC: 2647 U/L (ref 5–41)
ALT SERPL-CCNC: 454 U/L (ref 5–41)
ALT SERPL-CCNC: 510 U/L (ref 5–41)
ALT SERPL-CCNC: 5269 U/L (ref 5–41)
ALT SERPL-CCNC: 6490 U/L (ref 5–41)
ALT SERPL-CCNC: 748 U/L (ref 5–41)
ALT SERPL-CCNC: <5 U/L (ref 5–41)
AMORPHOUS: ABNORMAL
AMORPHOUS: ABNORMAL
AMORPHOUS: NORMAL
AMPHETAMINE: NEGATIVE NG/ML
ANION GAP SERPL CALCULATED.3IONS-SCNC: 12 MMOL/L (ref 9–17)
ANION GAP SERPL CALCULATED.3IONS-SCNC: 14 MMOL/L (ref 9–17)
ANION GAP SERPL CALCULATED.3IONS-SCNC: 14 MMOL/L (ref 9–17)
ANION GAP SERPL CALCULATED.3IONS-SCNC: 16 MMOL/L (ref 9–17)
ANION GAP SERPL CALCULATED.3IONS-SCNC: 17 MMOL/L (ref 9–17)
ANION GAP SERPL CALCULATED.3IONS-SCNC: 18 MMOL/L (ref 9–17)
ANION GAP SERPL CALCULATED.3IONS-SCNC: 19 MMOL/L (ref 9–17)
ANION GAP SERPL CALCULATED.3IONS-SCNC: 20 MMOL/L (ref 9–17)
ANION GAP SERPL CALCULATED.3IONS-SCNC: 21 MMOL/L (ref 9–17)
ANION GAP SERPL CALCULATED.3IONS-SCNC: 22 MMOL/L (ref 9–17)
ANION GAP SERPL CALCULATED.3IONS-SCNC: 23 MMOL/L (ref 9–17)
ANION GAP SERPL CALCULATED.3IONS-SCNC: 23 MMOL/L (ref 9–17)
ANION GAP SERPL CALCULATED.3IONS-SCNC: 24 MMOL/L (ref 9–17)
ANION GAP SERPL CALCULATED.3IONS-SCNC: 24 MMOL/L (ref 9–17)
ANION GAP SERPL CALCULATED.3IONS-SCNC: 25 MMOL/L (ref 9–17)
ANION GAP SERPL CALCULATED.3IONS-SCNC: 25 MMOL/L (ref 9–17)
ANION GAP SERPL CALCULATED.3IONS-SCNC: 27 MMOL/L (ref 9–17)
ANION GAP SERPL CALCULATED.3IONS-SCNC: 28 MMOL/L (ref 9–17)
ANION GAP SERPL CALCULATED.3IONS-SCNC: 35 MMOL/L (ref 9–17)
ANION GAP SERPL CALCULATED.3IONS-SCNC: ABNORMAL MMOL/L (ref 9–17)
ANION GAP: 14 MMOL/L (ref 7–16)
ANTI-XA UNFRAC HEPARIN: 0.1 IU/L (ref 0.3–0.7)
ANTI-XA UNFRAC HEPARIN: 0.13 IU/L (ref 0.3–0.7)
ANTI-XA UNFRAC HEPARIN: 0.13 IU/L (ref 0.3–0.7)
ANTI-XA UNFRAC HEPARIN: 0.26 IU/L (ref 0.3–0.7)
ANTI-XA UNFRAC HEPARIN: 0.3 IU/L (ref 0.3–0.7)
ANTI-XA UNFRAC HEPARIN: 0.33 IU/L (ref 0.3–0.7)
ANTI-XA UNFRAC HEPARIN: 0.91 IU/L (ref 0.3–0.7)
ANTI-XA UNFRAC HEPARIN: <0.1 IU/L (ref 0.3–0.7)
ANTIBODY SCREEN: NEGATIVE
ANTIBODY SCREEN: NEGATIVE
ARM BAND NUMBER: NORMAL
ARM BAND NUMBER: NORMAL
AST SERPL-CCNC: 10 U/L
AST SERPL-CCNC: 10 U/L
AST SERPL-CCNC: 1061 U/L
AST SERPL-CCNC: 1148 U/L
AST SERPL-CCNC: 141 U/L
AST SERPL-CCNC: 172 U/L
AST SERPL-CCNC: 317 U/L
AST SERPL-CCNC: 5009 U/L
AST SERPL-CCNC: >7000 U/L
AST SERPL-CCNC: >7000 U/L
ATYPICAL LYMPHOCYTE ABSOLUTE COUNT: 0.26 K/UL
ATYPICAL LYMPHOCYTES: 1 %
BACTERIA: ABNORMAL
BACTERIA: ABNORMAL
BACTERIA: NORMAL
BARBITURATES: NEGATIVE NG/ML
BASOPHILS # BLD: 0 % (ref 0–2)
BASOPHILS # BLD: 1 % (ref 0–2)
BASOPHILS # BLD: 1 % (ref 0–2)
BASOPHILS ABSOLUTE: 0 K/UL (ref 0–0.2)
BASOPHILS ABSOLUTE: 0.07 K/UL (ref 0–0.2)
BASOPHILS ABSOLUTE: 0.1 K/UL (ref 0–0.2)
BENZODIAZEPINES: POSITIVE NG/ML
BILIRUB SERPL-MCNC: 0.34 MG/DL (ref 0.3–1.2)
BILIRUB SERPL-MCNC: 0.86 MG/DL (ref 0.3–1.2)
BILIRUB SERPL-MCNC: 1.41 MG/DL (ref 0.3–1.2)
BILIRUB SERPL-MCNC: 1.42 MG/DL (ref 0.3–1.2)
BILIRUB SERPL-MCNC: 10.64 MG/DL (ref 0.3–1.2)
BILIRUB SERPL-MCNC: 2.94 MG/DL (ref 0.3–1.2)
BILIRUB SERPL-MCNC: 4.22 MG/DL (ref 0.3–1.2)
BILIRUB SERPL-MCNC: 5.1 MG/DL (ref 0.3–1.2)
BILIRUB SERPL-MCNC: 6.89 MG/DL (ref 0.3–1.2)
BILIRUB SERPL-MCNC: 8.33 MG/DL (ref 0.3–1.2)
BILIRUBIN DIRECT: 0.18 MG/DL
BILIRUBIN DIRECT: 0.92 MG/DL
BILIRUBIN DIRECT: 2.14 MG/DL
BILIRUBIN DIRECT: 2.96 MG/DL
BILIRUBIN DIRECT: 3.54 MG/DL
BILIRUBIN DIRECT: 5.05 MG/DL
BILIRUBIN DIRECT: 6.59 MG/DL
BILIRUBIN DIRECT: 7.85 MG/DL
BILIRUBIN URINE: ABNORMAL
BILIRUBIN URINE: NEGATIVE
BILIRUBIN URINE: NEGATIVE
BILIRUBIN, INDIRECT: 0.16 MG/DL (ref 0–1)
BILIRUBIN, INDIRECT: 0.5 MG/DL (ref 0–1)
BILIRUBIN, INDIRECT: 0.8 MG/DL (ref 0–1)
BILIRUBIN, INDIRECT: 1.26 MG/DL (ref 0–1)
BILIRUBIN, INDIRECT: 1.56 MG/DL (ref 0–1)
BILIRUBIN, INDIRECT: 1.74 MG/DL (ref 0–1)
BILIRUBIN, INDIRECT: 1.84 MG/DL (ref 0–1)
BILIRUBIN, INDIRECT: 2.79 MG/DL (ref 0–1)
BLD PROD TYP BPU: NORMAL
BLOOD BANK SPECIMEN: NORMAL
BUN BLDV-MCNC: 118 MG/DL (ref 6–20)
BUN BLDV-MCNC: 21 MG/DL (ref 6–20)
BUN BLDV-MCNC: 22 MG/DL (ref 6–20)
BUN BLDV-MCNC: 22 MG/DL (ref 6–20)
BUN BLDV-MCNC: 23 MG/DL (ref 6–20)
BUN BLDV-MCNC: 24 MG/DL (ref 6–20)
BUN BLDV-MCNC: 25 MG/DL (ref 6–20)
BUN BLDV-MCNC: 25 MG/DL (ref 6–20)
BUN BLDV-MCNC: 26 MG/DL (ref 6–20)
BUN BLDV-MCNC: 27 MG/DL (ref 6–20)
BUN BLDV-MCNC: 28 MG/DL (ref 6–20)
BUN BLDV-MCNC: 29 MG/DL (ref 6–20)
BUN BLDV-MCNC: 29 MG/DL (ref 6–20)
BUN BLDV-MCNC: 33 MG/DL (ref 6–20)
BUN BLDV-MCNC: 33 MG/DL (ref 6–20)
BUN BLDV-MCNC: 34 MG/DL (ref 6–20)
BUN BLDV-MCNC: 35 MG/DL (ref 6–20)
BUN BLDV-MCNC: 38 MG/DL (ref 6–20)
BUN BLDV-MCNC: 38 MG/DL (ref 6–20)
BUN BLDV-MCNC: 41 MG/DL (ref 6–20)
BUN BLDV-MCNC: 42 MG/DL (ref 6–20)
BUN BLDV-MCNC: 46 MG/DL (ref 6–20)
BUN BLDV-MCNC: 49 MG/DL (ref 6–20)
BUN BLDV-MCNC: 49 MG/DL (ref 6–20)
BUN BLDV-MCNC: 54 MG/DL (ref 6–20)
BUN BLDV-MCNC: 55 MG/DL (ref 6–20)
BUN BLDV-MCNC: 57 MG/DL (ref 6–20)
BUN BLDV-MCNC: 57 MG/DL (ref 6–20)
BUN BLDV-MCNC: 58 MG/DL (ref 6–20)
BUN BLDV-MCNC: 59 MG/DL (ref 6–20)
BUN BLDV-MCNC: 60 MG/DL (ref 6–20)
BUN BLDV-MCNC: 62 MG/DL (ref 6–20)
BUN BLDV-MCNC: 72 MG/DL (ref 6–20)
BUN BLDV-MCNC: 78 MG/DL (ref 6–20)
BUN BLDV-MCNC: 92 MG/DL (ref 6–20)
BUN/CREAT BLD: ABNORMAL (ref 9–20)
BUPRENORPHINE: NEGATIVE NG/ML
C-REACTIVE PROTEIN: 258 MG/L (ref 0–5)
CALCIUM IONIZED: 0.95 MMOL/L (ref 1.13–1.33)
CALCIUM IONIZED: 0.96 MMOL/L (ref 1.13–1.33)
CALCIUM IONIZED: 0.96 MMOL/L (ref 1.13–1.33)
CALCIUM IONIZED: 0.99 MMOL/L (ref 1.13–1.33)
CALCIUM IONIZED: 0.99 MMOL/L (ref 1.13–1.33)
CALCIUM IONIZED: 1 MMOL/L (ref 1.13–1.33)
CALCIUM IONIZED: 1.01 MMOL/L (ref 1.13–1.33)
CALCIUM IONIZED: 1.04 MMOL/L (ref 1.13–1.33)
CALCIUM IONIZED: 1.08 MMOL/L (ref 1.13–1.33)
CALCIUM IONIZED: 1.1 MMOL/L (ref 1.13–1.33)
CALCIUM IONIZED: 1.1 MMOL/L (ref 1.13–1.33)
CALCIUM IONIZED: 1.11 MMOL/L (ref 1.13–1.33)
CALCIUM IONIZED: 1.13 MMOL/L (ref 1.13–1.33)
CALCIUM IONIZED: 1.14 MMOL/L (ref 1.13–1.33)
CALCIUM IONIZED: 1.15 MMOL/L (ref 1.13–1.33)
CALCIUM IONIZED: 1.16 MMOL/L (ref 1.13–1.33)
CALCIUM IONIZED: 1.17 MMOL/L (ref 1.13–1.33)
CALCIUM SERPL-MCNC: 7.1 MG/DL (ref 8.6–10.4)
CALCIUM SERPL-MCNC: 7.1 MG/DL (ref 8.6–10.4)
CALCIUM SERPL-MCNC: 7.5 MG/DL (ref 8.6–10.4)
CALCIUM SERPL-MCNC: 7.6 MG/DL (ref 8.6–10.4)
CALCIUM SERPL-MCNC: 7.7 MG/DL (ref 8.6–10.4)
CALCIUM SERPL-MCNC: 7.8 MG/DL (ref 8.6–10.4)
CALCIUM SERPL-MCNC: 7.9 MG/DL (ref 8.6–10.4)
CALCIUM SERPL-MCNC: 8 MG/DL (ref 8.6–10.4)
CALCIUM SERPL-MCNC: 8 MG/DL (ref 8.6–10.4)
CALCIUM SERPL-MCNC: 8.1 MG/DL (ref 8.6–10.4)
CALCIUM SERPL-MCNC: 8.2 MG/DL (ref 8.6–10.4)
CALCIUM SERPL-MCNC: 8.3 MG/DL (ref 8.6–10.4)
CALCIUM SERPL-MCNC: 8.3 MG/DL (ref 8.6–10.4)
CALCIUM SERPL-MCNC: 8.4 MG/DL (ref 8.6–10.4)
CALCIUM SERPL-MCNC: 8.5 MG/DL (ref 8.6–10.4)
CALCIUM SERPL-MCNC: 8.6 MG/DL (ref 8.6–10.4)
CALCIUM SERPL-MCNC: 8.8 MG/DL (ref 8.6–10.4)
CALCIUM SERPL-MCNC: 8.8 MG/DL (ref 8.6–10.4)
CARBOXYHEMOGLOBIN: 1.3 % (ref 0–5)
CASTS UA: ABNORMAL /LPF
CASTS UA: ABNORMAL /LPF
CASTS UA: ABNORMAL /LPF (ref 0–8)
CASTS UA: NORMAL /LPF (ref 0–8)
CHLORDIAZEPOXIDE: <20 NG/ML
CHLORIDE BLD-SCNC: 100 MMOL/L (ref 98–107)
CHLORIDE BLD-SCNC: 100 MMOL/L (ref 98–107)
CHLORIDE BLD-SCNC: 101 MMOL/L (ref 98–107)
CHLORIDE BLD-SCNC: 101 MMOL/L (ref 98–107)
CHLORIDE BLD-SCNC: 86 MMOL/L (ref 98–107)
CHLORIDE BLD-SCNC: 87 MMOL/L (ref 98–107)
CHLORIDE BLD-SCNC: 89 MMOL/L (ref 98–107)
CHLORIDE BLD-SCNC: 89 MMOL/L (ref 98–107)
CHLORIDE BLD-SCNC: 90 MMOL/L (ref 98–107)
CHLORIDE BLD-SCNC: 90 MMOL/L (ref 98–107)
CHLORIDE BLD-SCNC: 91 MMOL/L (ref 98–107)
CHLORIDE BLD-SCNC: 92 MMOL/L (ref 98–107)
CHLORIDE BLD-SCNC: 93 MMOL/L (ref 98–107)
CHLORIDE BLD-SCNC: 94 MMOL/L (ref 98–107)
CHLORIDE BLD-SCNC: 95 MMOL/L (ref 98–107)
CHLORIDE BLD-SCNC: 96 MMOL/L (ref 98–107)
CHLORIDE BLD-SCNC: 97 MMOL/L (ref 98–107)
CHLORIDE BLD-SCNC: 98 MMOL/L (ref 98–107)
CHLORIDE BLD-SCNC: 99 MMOL/L (ref 98–107)
CHLORIDE, WHOLE BLOOD: 100 MMOL/L (ref 98–110)
CK MB: 9.2 NG/ML
CKMB INTERPRETATION: ABNORMAL
CLONAZEPAM: <5 NG/ML
CO2: 10 MMOL/L (ref 20–31)
CO2: 10 MMOL/L (ref 20–31)
CO2: 12 MMOL/L (ref 20–31)
CO2: 13 MMOL/L (ref 20–31)
CO2: 14 MMOL/L (ref 20–31)
CO2: 14 MMOL/L (ref 20–31)
CO2: 15 MMOL/L (ref 20–31)
CO2: 16 MMOL/L (ref 20–31)
CO2: 17 MMOL/L (ref 20–31)
CO2: 18 MMOL/L (ref 20–31)
CO2: 19 MMOL/L (ref 20–31)
CO2: 22 MMOL/L (ref 20–31)
CO2: 23 MMOL/L (ref 20–31)
CO2: 24 MMOL/L (ref 20–31)
CO2: 25 MMOL/L (ref 20–31)
CO2: 26 MMOL/L (ref 20–31)
CO2: 26 MMOL/L (ref 20–31)
CO2: 27 MMOL/L (ref 20–31)
CO2: 8 MMOL/L (ref 20–31)
CO2: <6 MMOL/L (ref 20–31)
COCAINE: NEGATIVE NG/ML
CODEINE: <2 NG/ML
COLOR: ABNORMAL
COLOR: ABNORMAL
COLOR: YELLOW
COMMENT UA: ABNORMAL
CORTISOL COLLECTION INFO: NORMAL
CORTISOL: 11.2 UG/DL (ref 2.7–18.4)
CREAT SERPL-MCNC: 1.26 MG/DL (ref 0.7–1.2)
CREAT SERPL-MCNC: 1.27 MG/DL (ref 0.7–1.2)
CREAT SERPL-MCNC: 1.33 MG/DL (ref 0.7–1.2)
CREAT SERPL-MCNC: 1.48 MG/DL (ref 0.7–1.2)
CREAT SERPL-MCNC: 1.49 MG/DL (ref 0.7–1.2)
CREAT SERPL-MCNC: 1.56 MG/DL (ref 0.7–1.2)
CREAT SERPL-MCNC: 1.59 MG/DL (ref 0.7–1.2)
CREAT SERPL-MCNC: 1.63 MG/DL (ref 0.7–1.2)
CREAT SERPL-MCNC: 1.66 MG/DL (ref 0.7–1.2)
CREAT SERPL-MCNC: 1.68 MG/DL (ref 0.7–1.2)
CREAT SERPL-MCNC: 1.73 MG/DL (ref 0.7–1.2)
CREAT SERPL-MCNC: 1.76 MG/DL (ref 0.7–1.2)
CREAT SERPL-MCNC: 1.96 MG/DL (ref 0.7–1.2)
CREAT SERPL-MCNC: 2 MG/DL (ref 0.7–1.2)
CREAT SERPL-MCNC: 2.01 MG/DL (ref 0.7–1.2)
CREAT SERPL-MCNC: 2.08 MG/DL (ref 0.7–1.2)
CREAT SERPL-MCNC: 2.24 MG/DL (ref 0.7–1.2)
CREAT SERPL-MCNC: 2.46 MG/DL (ref 0.7–1.2)
CREAT SERPL-MCNC: 2.48 MG/DL (ref 0.7–1.2)
CREAT SERPL-MCNC: 2.51 MG/DL (ref 0.7–1.2)
CREAT SERPL-MCNC: 2.91 MG/DL (ref 0.7–1.2)
CREAT SERPL-MCNC: 3.26 MG/DL (ref 0.7–1.2)
CREAT SERPL-MCNC: 3.46 MG/DL (ref 0.7–1.2)
CREAT SERPL-MCNC: 3.55 MG/DL (ref 0.7–1.2)
CREAT SERPL-MCNC: 3.65 MG/DL (ref 0.7–1.2)
CREAT SERPL-MCNC: 3.69 MG/DL (ref 0.7–1.2)
CREAT SERPL-MCNC: 3.71 MG/DL (ref 0.7–1.2)
CREAT SERPL-MCNC: 4.1 MG/DL (ref 0.7–1.2)
CREAT SERPL-MCNC: 4.34 MG/DL (ref 0.7–1.2)
CREAT SERPL-MCNC: 4.5 MG/DL (ref 0.7–1.2)
CREAT SERPL-MCNC: 4.51 MG/DL (ref 0.7–1.2)
CREAT SERPL-MCNC: 4.78 MG/DL (ref 0.7–1.2)
CREAT SERPL-MCNC: 4.8 MG/DL (ref 0.7–1.2)
CREAT SERPL-MCNC: 5.08 MG/DL (ref 0.7–1.2)
CREAT SERPL-MCNC: 5.15 MG/DL (ref 0.7–1.2)
CREAT SERPL-MCNC: 5.5 MG/DL (ref 0.7–1.2)
CREAT SERPL-MCNC: 5.61 MG/DL (ref 0.7–1.2)
CREAT SERPL-MCNC: 5.7 MG/DL (ref 0.7–1.2)
CREAT SERPL-MCNC: 5.82 MG/DL (ref 0.7–1.2)
CREAT SERPL-MCNC: 5.86 MG/DL (ref 0.7–1.2)
CREAT SERPL-MCNC: 6.02 MG/DL (ref 0.7–1.2)
CREAT SERPL-MCNC: 6.37 MG/DL (ref 0.7–1.2)
CREAT SERPL-MCNC: 6.66 MG/DL (ref 0.7–1.2)
CREAT SERPL-MCNC: 7.49 MG/DL (ref 0.7–1.2)
CROSSMATCH RESULT: NORMAL
CRYSTALS, UA: ABNORMAL /HPF
CRYSTALS, UA: ABNORMAL /HPF
CRYSTALS, UA: NORMAL /HPF
CULTURE: ABNORMAL
CULTURE: NO GROWTH
CULTURE: NORMAL
DATE AND TIME: NORMAL
DATE, STOOL #1: ABNORMAL
DATE, STOOL #2: ABNORMAL
DATE, STOOL #3: ABNORMAL
DIAZEPAM: 14 NG/ML
DIFFERENTIAL TYPE: ABNORMAL
DIGOXIN DATE LAST DOSE: ABNORMAL
DIGOXIN DOSE AMOUNT: ABNORMAL
DIGOXIN DOSE TIME: ABNORMAL
DIGOXIN LEVEL: 4.5 NG/ML (ref 0.5–2)
DISPENSE STATUS BLOOD BANK: NORMAL
DRUGS OF ABUSE COMMENT: ABNORMAL
EKG ATRIAL RATE: 102 BPM
EKG ATRIAL RATE: 104 BPM
EKG ATRIAL RATE: 108 BPM
EKG ATRIAL RATE: 119 BPM
EKG ATRIAL RATE: 119 BPM
EKG ATRIAL RATE: 122 BPM
EKG ATRIAL RATE: 87 BPM
EKG ATRIAL RATE: 96 BPM
EKG P AXIS: 24 DEGREES
EKG P AXIS: 44 DEGREES
EKG P AXIS: 47 DEGREES
EKG P AXIS: 51 DEGREES
EKG P AXIS: 57 DEGREES
EKG P AXIS: 71 DEGREES
EKG P-R INTERVAL: 144 MS
EKG P-R INTERVAL: 160 MS
EKG P-R INTERVAL: 166 MS
EKG P-R INTERVAL: 170 MS
EKG P-R INTERVAL: 170 MS
EKG P-R INTERVAL: 172 MS
EKG P-R INTERVAL: 184 MS
EKG Q-T INTERVAL: 336 MS
EKG Q-T INTERVAL: 352 MS
EKG Q-T INTERVAL: 370 MS
EKG Q-T INTERVAL: 372 MS
EKG Q-T INTERVAL: 388 MS
EKG Q-T INTERVAL: 398 MS
EKG Q-T INTERVAL: 400 MS
EKG Q-T INTERVAL: 400 MS
EKG QRS DURATION: 106 MS
EKG QRS DURATION: 140 MS
EKG QRS DURATION: 146 MS
EKG QRS DURATION: 156 MS
EKG QRS DURATION: 156 MS
EKG QRS DURATION: 162 MS
EKG QRS DURATION: 162 MS
EKG QRS DURATION: 164 MS
EKG QTC CALCULATION (BAZETT): 423 MS
EKG QTC CALCULATION (BAZETT): 450 MS
EKG QTC CALCULATION (BAZETT): 502 MS
EKG QTC CALCULATION (BAZETT): 507 MS
EKG QTC CALCULATION (BAZETT): 520 MS
EKG QTC CALCULATION (BAZETT): 521 MS
EKG QTC CALCULATION (BAZETT): 526 MS
EKG QTC CALCULATION (BAZETT): 541 MS
EKG R AXIS: 21 DEGREES
EKG R AXIS: 23 DEGREES
EKG R AXIS: 32 DEGREES
EKG R AXIS: 38 DEGREES
EKG R AXIS: 46 DEGREES
EKG R AXIS: 66 DEGREES
EKG R AXIS: 7 DEGREES
EKG R AXIS: 92 DEGREES
EKG T AXIS: -173 DEGREES
EKG T AXIS: -89 DEGREES
EKG T AXIS: -92 DEGREES
EKG T AXIS: 117 DEGREES
EKG T AXIS: 148 DEGREES
EKG T AXIS: 150 DEGREES
EKG T AXIS: 168 DEGREES
EKG T AXIS: 98 DEGREES
EKG VENTRICULAR RATE: 102 BPM
EKG VENTRICULAR RATE: 104 BPM
EKG VENTRICULAR RATE: 108 BPM
EKG VENTRICULAR RATE: 112 BPM
EKG VENTRICULAR RATE: 117 BPM
EKG VENTRICULAR RATE: 119 BPM
EKG VENTRICULAR RATE: 87 BPM
EKG VENTRICULAR RATE: 96 BPM
EOSINOPHILS RELATIVE PERCENT: 0 % (ref 1–4)
EOSINOPHILS RELATIVE PERCENT: 1 % (ref 0–4)
EOSINOPHILS RELATIVE PERCENT: 1 % (ref 1–4)
EOSINOPHILS RELATIVE PERCENT: 3 % (ref 1–4)
EOSINOPHILS RELATIVE PERCENT: 5 % (ref 1–4)
EPITHELIAL CELLS UA: ABNORMAL /HPF
EPITHELIAL CELLS UA: ABNORMAL /HPF (ref 0–5)
EPITHELIAL CELLS UA: NORMAL /HPF (ref 0–5)
ESTIMATED AVERAGE GLUCOSE: 128 MG/DL
ESTIMATED AVERAGE GLUCOSE: 131 MG/DL
ESTIMATED AVERAGE GLUCOSE: 157 MG/DL
ETHANOL PERCENT: <0.01 %
ETHANOL: <10 MG/DL
EXPIRATION DATE: NORMAL
EXPIRATION DATE: NORMAL
FIO2: 100
FIO2: 100
FIO2: 30
FIO2: 35
FIO2: 40
FIO2: ABNORMAL
GFR AFRICAN AMERICAN: 11 ML/MIN
GFR AFRICAN AMERICAN: 11 ML/MIN
GFR AFRICAN AMERICAN: 12 ML/MIN
GFR AFRICAN AMERICAN: 13 ML/MIN
GFR AFRICAN AMERICAN: 14 ML/MIN
GFR AFRICAN AMERICAN: 15 ML/MIN
GFR AFRICAN AMERICAN: 16 ML/MIN
GFR AFRICAN AMERICAN: 16 ML/MIN
GFR AFRICAN AMERICAN: 17 ML/MIN
GFR AFRICAN AMERICAN: 19 ML/MIN
GFR AFRICAN AMERICAN: 21 ML/MIN
GFR AFRICAN AMERICAN: 22 ML/MIN
GFR AFRICAN AMERICAN: 23 ML/MIN
GFR AFRICAN AMERICAN: 24 ML/MIN
GFR AFRICAN AMERICAN: 28 ML/MIN
GFR AFRICAN AMERICAN: 33 ML/MIN
GFR AFRICAN AMERICAN: 33 ML/MIN
GFR AFRICAN AMERICAN: 34 ML/MIN
GFR AFRICAN AMERICAN: 38 ML/MIN
GFR AFRICAN AMERICAN: 41 ML/MIN
GFR AFRICAN AMERICAN: 42 ML/MIN
GFR AFRICAN AMERICAN: 43 ML/MIN
GFR AFRICAN AMERICAN: 44 ML/MIN
GFR AFRICAN AMERICAN: 50 ML/MIN
GFR AFRICAN AMERICAN: 51 ML/MIN
GFR AFRICAN AMERICAN: 52 ML/MIN
GFR AFRICAN AMERICAN: 53 ML/MIN
GFR AFRICAN AMERICAN: 54 ML/MIN
GFR AFRICAN AMERICAN: 56 ML/MIN
GFR AFRICAN AMERICAN: 57 ML/MIN
GFR AFRICAN AMERICAN: 9 ML/MIN
GFR AFRICAN AMERICAN: >60 ML/MIN
GFR NON-AFRICAN AMERICAN: 10 ML/MIN
GFR NON-AFRICAN AMERICAN: 11 ML/MIN
GFR NON-AFRICAN AMERICAN: 12 ML/MIN
GFR NON-AFRICAN AMERICAN: 12 ML/MIN
GFR NON-AFRICAN AMERICAN: 13 ML/MIN
GFR NON-AFRICAN AMERICAN: 13 ML/MIN
GFR NON-AFRICAN AMERICAN: 14 ML/MIN
GFR NON-AFRICAN AMERICAN: 15 ML/MIN
GFR NON-AFRICAN AMERICAN: 16 ML/MIN
GFR NON-AFRICAN AMERICAN: 17 ML/MIN
GFR NON-AFRICAN AMERICAN: 17 ML/MIN
GFR NON-AFRICAN AMERICAN: 18 ML/MIN
GFR NON-AFRICAN AMERICAN: 18 ML/MIN
GFR NON-AFRICAN AMERICAN: 19 ML/MIN
GFR NON-AFRICAN AMERICAN: 20 ML/MIN
GFR NON-AFRICAN AMERICAN: 20 ML/MIN
GFR NON-AFRICAN AMERICAN: 23 ML/MIN
GFR NON-AFRICAN AMERICAN: 27 ML/MIN
GFR NON-AFRICAN AMERICAN: 28 ML/MIN
GFR NON-AFRICAN AMERICAN: 28 ML/MIN
GFR NON-AFRICAN AMERICAN: 31 ML/MIN
GFR NON-AFRICAN AMERICAN: 34 ML/MIN
GFR NON-AFRICAN AMERICAN: 35 ML/MIN
GFR NON-AFRICAN AMERICAN: 35 ML/MIN
GFR NON-AFRICAN AMERICAN: 36 ML/MIN
GFR NON-AFRICAN AMERICAN: 41 ML/MIN
GFR NON-AFRICAN AMERICAN: 42 ML/MIN
GFR NON-AFRICAN AMERICAN: 43 ML/MIN
GFR NON-AFRICAN AMERICAN: 44 ML/MIN
GFR NON-AFRICAN AMERICAN: 45 ML/MIN
GFR NON-AFRICAN AMERICAN: 46 ML/MIN
GFR NON-AFRICAN AMERICAN: 47 ML/MIN
GFR NON-AFRICAN AMERICAN: 50 ML/MIN
GFR NON-AFRICAN AMERICAN: 50 ML/MIN
GFR NON-AFRICAN AMERICAN: 56 ML/MIN
GFR NON-AFRICAN AMERICAN: 60 ML/MIN
GFR NON-AFRICAN AMERICAN: 8 ML/MIN
GFR NON-AFRICAN AMERICAN: 9 ML/MIN
GFR NON-AFRICAN AMERICAN: 9 ML/MIN
GFR NON-AFRICAN AMERICAN: >60 ML/MIN
GFR SERPL CREATININE-BSD FRML MDRD: 19 ML/MIN
GFR SERPL CREATININE-BSD FRML MDRD: 24 ML/MIN
GFR SERPL CREATININE-BSD FRML MDRD: ABNORMAL ML/MIN/{1.73_M2}
GLOBULIN: ABNORMAL G/DL (ref 1.5–3.8)
GLUCOSE BLD-MCNC: 10 MG/DL (ref 75–110)
GLUCOSE BLD-MCNC: 101 MG/DL (ref 75–110)
GLUCOSE BLD-MCNC: 102 MG/DL (ref 70–99)
GLUCOSE BLD-MCNC: 102 MG/DL (ref 75–110)
GLUCOSE BLD-MCNC: 103 MG/DL (ref 75–110)
GLUCOSE BLD-MCNC: 103 MG/DL (ref 75–110)
GLUCOSE BLD-MCNC: 104 MG/DL (ref 75–110)
GLUCOSE BLD-MCNC: 105 MG/DL (ref 70–99)
GLUCOSE BLD-MCNC: 106 MG/DL (ref 75–110)
GLUCOSE BLD-MCNC: 107 MG/DL (ref 75–110)
GLUCOSE BLD-MCNC: 108 MG/DL (ref 75–110)
GLUCOSE BLD-MCNC: 110 MG/DL (ref 70–99)
GLUCOSE BLD-MCNC: 110 MG/DL (ref 75–110)
GLUCOSE BLD-MCNC: 110 MG/DL (ref 75–110)
GLUCOSE BLD-MCNC: 112 MG/DL (ref 75–110)
GLUCOSE BLD-MCNC: 115 MG/DL (ref 70–99)
GLUCOSE BLD-MCNC: 116 MG/DL (ref 74–100)
GLUCOSE BLD-MCNC: 118 MG/DL (ref 70–99)
GLUCOSE BLD-MCNC: 118 MG/DL (ref 75–110)
GLUCOSE BLD-MCNC: 120 MG/DL (ref 70–99)
GLUCOSE BLD-MCNC: 120 MG/DL (ref 75–110)
GLUCOSE BLD-MCNC: 121 MG/DL (ref 75–110)
GLUCOSE BLD-MCNC: 122 MG/DL (ref 70–99)
GLUCOSE BLD-MCNC: 123 MG/DL (ref 75–110)
GLUCOSE BLD-MCNC: 125 MG/DL (ref 75–110)
GLUCOSE BLD-MCNC: 126 MG/DL (ref 70–99)
GLUCOSE BLD-MCNC: 126 MG/DL (ref 75–110)
GLUCOSE BLD-MCNC: 126 MG/DL (ref 75–110)
GLUCOSE BLD-MCNC: 127 MG/DL (ref 75–110)
GLUCOSE BLD-MCNC: 129 MG/DL (ref 75–110)
GLUCOSE BLD-MCNC: 129 MG/DL (ref 75–110)
GLUCOSE BLD-MCNC: 132 MG/DL (ref 74–100)
GLUCOSE BLD-MCNC: 132 MG/DL (ref 75–110)
GLUCOSE BLD-MCNC: 133 MG/DL (ref 70–99)
GLUCOSE BLD-MCNC: 133 MG/DL (ref 70–99)
GLUCOSE BLD-MCNC: 135 MG/DL (ref 75–110)
GLUCOSE BLD-MCNC: 137 MG/DL (ref 75–110)
GLUCOSE BLD-MCNC: 138 MG/DL (ref 75–110)
GLUCOSE BLD-MCNC: 140 MG/DL (ref 70–99)
GLUCOSE BLD-MCNC: 142 MG/DL (ref 75–110)
GLUCOSE BLD-MCNC: 144 MG/DL (ref 70–99)
GLUCOSE BLD-MCNC: 145 MG/DL (ref 75–110)
GLUCOSE BLD-MCNC: 146 MG/DL (ref 75–110)
GLUCOSE BLD-MCNC: 149 MG/DL (ref 75–110)
GLUCOSE BLD-MCNC: 150 MG/DL (ref 75–110)
GLUCOSE BLD-MCNC: 150 MG/DL (ref 75–110)
GLUCOSE BLD-MCNC: 151 MG/DL (ref 75–110)
GLUCOSE BLD-MCNC: 155 MG/DL (ref 70–99)
GLUCOSE BLD-MCNC: 155 MG/DL (ref 70–99)
GLUCOSE BLD-MCNC: 158 MG/DL (ref 75–110)
GLUCOSE BLD-MCNC: 159 MG/DL (ref 75–110)
GLUCOSE BLD-MCNC: 160 MG/DL (ref 75–110)
GLUCOSE BLD-MCNC: 161 MG/DL (ref 70–99)
GLUCOSE BLD-MCNC: 161 MG/DL (ref 75–110)
GLUCOSE BLD-MCNC: 162 MG/DL (ref 75–110)
GLUCOSE BLD-MCNC: 163 MG/DL (ref 74–100)
GLUCOSE BLD-MCNC: 166 MG/DL (ref 75–110)
GLUCOSE BLD-MCNC: 166 MG/DL (ref 75–110)
GLUCOSE BLD-MCNC: 170 MG/DL (ref 75–110)
GLUCOSE BLD-MCNC: 171 MG/DL (ref 75–110)
GLUCOSE BLD-MCNC: 172 MG/DL (ref 70–99)
GLUCOSE BLD-MCNC: 176 MG/DL (ref 75–110)
GLUCOSE BLD-MCNC: 176 MG/DL (ref 75–110)
GLUCOSE BLD-MCNC: 178 MG/DL (ref 75–110)
GLUCOSE BLD-MCNC: 180 MG/DL (ref 75–110)
GLUCOSE BLD-MCNC: 180 MG/DL (ref 75–110)
GLUCOSE BLD-MCNC: 185 MG/DL (ref 70–99)
GLUCOSE BLD-MCNC: 185 MG/DL (ref 75–110)
GLUCOSE BLD-MCNC: 187 MG/DL (ref 75–110)
GLUCOSE BLD-MCNC: 188 MG/DL (ref 70–99)
GLUCOSE BLD-MCNC: 189 MG/DL (ref 75–110)
GLUCOSE BLD-MCNC: 190 MG/DL (ref 75–110)
GLUCOSE BLD-MCNC: 194 MG/DL (ref 70–99)
GLUCOSE BLD-MCNC: 195 MG/DL (ref 75–110)
GLUCOSE BLD-MCNC: 196 MG/DL (ref 75–110)
GLUCOSE BLD-MCNC: 199 MG/DL (ref 75–110)
GLUCOSE BLD-MCNC: 200 MG/DL (ref 75–110)
GLUCOSE BLD-MCNC: 204 MG/DL (ref 75–110)
GLUCOSE BLD-MCNC: 211 MG/DL (ref 75–110)
GLUCOSE BLD-MCNC: 213 MG/DL (ref 70–99)
GLUCOSE BLD-MCNC: 213 MG/DL (ref 70–99)
GLUCOSE BLD-MCNC: 214 MG/DL (ref 75–110)
GLUCOSE BLD-MCNC: 221 MG/DL (ref 75–110)
GLUCOSE BLD-MCNC: 223 MG/DL (ref 70–99)
GLUCOSE BLD-MCNC: 227 MG/DL (ref 75–110)
GLUCOSE BLD-MCNC: 230 MG/DL (ref 75–110)
GLUCOSE BLD-MCNC: 233 MG/DL (ref 75–110)
GLUCOSE BLD-MCNC: 234 MG/DL (ref 75–110)
GLUCOSE BLD-MCNC: 238 MG/DL (ref 74–100)
GLUCOSE BLD-MCNC: 238 MG/DL (ref 75–110)
GLUCOSE BLD-MCNC: 239 MG/DL (ref 75–110)
GLUCOSE BLD-MCNC: 240 MG/DL (ref 75–110)
GLUCOSE BLD-MCNC: 247 MG/DL (ref 70–99)
GLUCOSE BLD-MCNC: 247 MG/DL (ref 75–110)
GLUCOSE BLD-MCNC: 251 MG/DL (ref 70–99)
GLUCOSE BLD-MCNC: 258 MG/DL (ref 70–99)
GLUCOSE BLD-MCNC: 261 MG/DL (ref 75–110)
GLUCOSE BLD-MCNC: 262 MG/DL (ref 75–110)
GLUCOSE BLD-MCNC: 269 MG/DL (ref 70–99)
GLUCOSE BLD-MCNC: 272 MG/DL (ref 74–100)
GLUCOSE BLD-MCNC: 278 MG/DL (ref 74–100)
GLUCOSE BLD-MCNC: 278 MG/DL (ref 75–110)
GLUCOSE BLD-MCNC: 278 MG/DL (ref 75–110)
GLUCOSE BLD-MCNC: 281 MG/DL (ref 75–110)
GLUCOSE BLD-MCNC: 285 MG/DL (ref 74–100)
GLUCOSE BLD-MCNC: 288 MG/DL (ref 75–110)
GLUCOSE BLD-MCNC: 316 MG/DL (ref 70–99)
GLUCOSE BLD-MCNC: 319 MG/DL (ref 70–99)
GLUCOSE BLD-MCNC: 319 MG/DL (ref 70–99)
GLUCOSE BLD-MCNC: 327 MG/DL (ref 70–99)
GLUCOSE BLD-MCNC: 333 MG/DL (ref 75–110)
GLUCOSE BLD-MCNC: 336 MG/DL (ref 75–110)
GLUCOSE BLD-MCNC: 338 MG/DL (ref 70–99)
GLUCOSE BLD-MCNC: 343 MG/DL (ref 75–110)
GLUCOSE BLD-MCNC: 350 MG/DL (ref 75–110)
GLUCOSE BLD-MCNC: 352 MG/DL (ref 70–99)
GLUCOSE BLD-MCNC: 361 MG/DL (ref 75–110)
GLUCOSE BLD-MCNC: 374 MG/DL (ref 70–99)
GLUCOSE BLD-MCNC: 382 MG/DL (ref 74–100)
GLUCOSE BLD-MCNC: 386 MG/DL (ref 75–110)
GLUCOSE BLD-MCNC: 52 MG/DL (ref 75–110)
GLUCOSE BLD-MCNC: 54 MG/DL (ref 75–110)
GLUCOSE BLD-MCNC: 59 MG/DL (ref 70–99)
GLUCOSE BLD-MCNC: 64 MG/DL (ref 75–110)
GLUCOSE BLD-MCNC: 64 MG/DL (ref 75–110)
GLUCOSE BLD-MCNC: 68 MG/DL (ref 75–110)
GLUCOSE BLD-MCNC: 69 MG/DL (ref 75–110)
GLUCOSE BLD-MCNC: 69 MG/DL (ref 75–110)
GLUCOSE BLD-MCNC: 73 MG/DL (ref 75–110)
GLUCOSE BLD-MCNC: 75 MG/DL (ref 75–110)
GLUCOSE BLD-MCNC: 77 MG/DL (ref 75–110)
GLUCOSE BLD-MCNC: 78 MG/DL (ref 75–110)
GLUCOSE BLD-MCNC: 78 MG/DL (ref 75–110)
GLUCOSE BLD-MCNC: 81 MG/DL (ref 75–110)
GLUCOSE BLD-MCNC: 82 MG/DL (ref 70–99)
GLUCOSE BLD-MCNC: 82 MG/DL (ref 70–99)
GLUCOSE BLD-MCNC: 82 MG/DL (ref 75–110)
GLUCOSE BLD-MCNC: 82 MG/DL (ref 75–110)
GLUCOSE BLD-MCNC: 86 MG/DL (ref 70–99)
GLUCOSE BLD-MCNC: 87 MG/DL (ref 70–99)
GLUCOSE BLD-MCNC: 87 MG/DL (ref 70–99)
GLUCOSE BLD-MCNC: 91 MG/DL (ref 75–110)
GLUCOSE BLD-MCNC: 93 MG/DL (ref 74–100)
GLUCOSE BLD-MCNC: 94 MG/DL (ref 70–99)
GLUCOSE BLD-MCNC: 94 MG/DL (ref 75–110)
GLUCOSE BLD-MCNC: 94 MG/DL (ref 75–110)
GLUCOSE BLD-MCNC: 96 MG/DL (ref 70–99)
GLUCOSE BLD-MCNC: 97 MG/DL (ref 75–110)
GLUCOSE BLD-MCNC: 98 MG/DL (ref 70–99)
GLUCOSE BLD-MCNC: 98 MG/DL (ref 75–110)
GLUCOSE BLD-MCNC: 99 MG/DL (ref 70–99)
GLUCOSE URINE: ABNORMAL
GLUCOSE URINE: NEGATIVE
GLUCOSE URINE: NEGATIVE
HBA1C MFR BLD: 6.1 % (ref 4–6)
HBA1C MFR BLD: 6.2 % (ref 4–6)
HBA1C MFR BLD: 7.1 % (ref 4–6)
HCO3 ARTERIAL: 17 MMOL/L (ref 22–27)
HCT VFR BLD CALC: 15.3 % (ref 40.7–50.3)
HCT VFR BLD CALC: 20.7 % (ref 40.7–50.3)
HCT VFR BLD CALC: 20.8 % (ref 40.7–50.3)
HCT VFR BLD CALC: 21.3 % (ref 40.7–50.3)
HCT VFR BLD CALC: 22.2 %
HCT VFR BLD CALC: 22.5 % (ref 40.7–50.3)
HCT VFR BLD CALC: 22.8 % (ref 40.7–50.3)
HCT VFR BLD CALC: 23 % (ref 40.7–50.3)
HCT VFR BLD CALC: 23.4 % (ref 40.7–50.3)
HCT VFR BLD CALC: 23.5 % (ref 40.7–50.3)
HCT VFR BLD CALC: 23.6 % (ref 40.7–50.3)
HCT VFR BLD CALC: 23.7 % (ref 40.7–50.3)
HCT VFR BLD CALC: 23.9 % (ref 40.7–50.3)
HCT VFR BLD CALC: 24 % (ref 40.7–50.3)
HCT VFR BLD CALC: 24.2 % (ref 40.7–50.3)
HCT VFR BLD CALC: 24.3 % (ref 40.7–50.3)
HCT VFR BLD CALC: 24.6 % (ref 40.7–50.3)
HCT VFR BLD CALC: 24.9 % (ref 40.7–50.3)
HCT VFR BLD CALC: 25.2 % (ref 40.7–50.3)
HCT VFR BLD CALC: 25.7 % (ref 40.7–50.3)
HCT VFR BLD CALC: 25.7 % (ref 40.7–50.3)
HCT VFR BLD CALC: 25.9 % (ref 40.7–50.3)
HCT VFR BLD CALC: 26 % (ref 40.7–50.3)
HCT VFR BLD CALC: 26.1 % (ref 40.7–50.3)
HCT VFR BLD CALC: 26.1 % (ref 40.7–50.3)
HCT VFR BLD CALC: 26.5 % (ref 40.7–50.3)
HCT VFR BLD CALC: 26.6 % (ref 40.7–50.3)
HCT VFR BLD CALC: 26.6 % (ref 40.7–50.3)
HCT VFR BLD CALC: 27 % (ref 40.7–50.3)
HCT VFR BLD CALC: 27.5 % (ref 40.7–50.3)
HCT VFR BLD CALC: 28.1 % (ref 40.7–50.3)
HCT VFR BLD CALC: 28.2 % (ref 40.7–50.3)
HCT VFR BLD CALC: 28.2 % (ref 40.7–50.3)
HCT VFR BLD CALC: 28.5 % (ref 40.7–50.3)
HCT VFR BLD CALC: 28.7 % (ref 40.7–50.3)
HCT VFR BLD CALC: 28.9 % (ref 40.7–50.3)
HCT VFR BLD CALC: 28.9 % (ref 40.7–50.3)
HCT VFR BLD CALC: 30.3 % (ref 40.7–50.3)
HCT VFR BLD CALC: 30.5 % (ref 40.7–50.3)
HCT VFR BLD CALC: 30.9 % (ref 41–53)
HCT VFR BLD CALC: 32.6 % (ref 40.7–50.3)
HCT VFR BLD CALC: 33.6 % (ref 41–53)
HEMOCCULT SP1 STL QL: POSITIVE
HEMOCCULT SP2 STL QL: ABNORMAL
HEMOCCULT SP3 STL QL: ABNORMAL
HEMOGLOBIN: 10 G/DL (ref 13.5–17.5)
HEMOGLOBIN: 10.1 G/DL (ref 13–17)
HEMOGLOBIN: 11 G/DL (ref 13.5–17.5)
HEMOGLOBIN: 4.7 G/DL (ref 13–17)
HEMOGLOBIN: 6.2 G/DL (ref 13–17)
HEMOGLOBIN: 6.3 G/DL (ref 13–17)
HEMOGLOBIN: 6.7 G/DL (ref 13–17)
HEMOGLOBIN: 6.7 G/DL (ref 13–17)
HEMOGLOBIN: 7.1 GM/DL
HEMOGLOBIN: 7.2 G/DL (ref 13–17)
HEMOGLOBIN: 7.3 G/DL (ref 13–17)
HEMOGLOBIN: 7.4 G/DL (ref 13–17)
HEMOGLOBIN: 7.4 G/DL (ref 13–17)
HEMOGLOBIN: 7.5 G/DL (ref 13–17)
HEMOGLOBIN: 7.5 G/DL (ref 13–17)
HEMOGLOBIN: 7.6 G/DL (ref 13–17)
HEMOGLOBIN: 7.8 G/DL (ref 13–17)
HEMOGLOBIN: 8 G/DL (ref 13–17)
HEMOGLOBIN: 8.2 G/DL (ref 13–17)
HEMOGLOBIN: 8.3 G/DL (ref 13–17)
HEMOGLOBIN: 8.4 G/DL (ref 13–17)
HEMOGLOBIN: 8.5 G/DL (ref 13–17)
HEMOGLOBIN: 8.5 G/DL (ref 13–17)
HEMOGLOBIN: 8.7 G/DL (ref 13–17)
HEMOGLOBIN: 8.9 G/DL (ref 13–17)
HEMOGLOBIN: 8.9 G/DL (ref 13–17)
HEMOGLOBIN: 9 G/DL (ref 13–17)
HEMOGLOBIN: 9 G/DL (ref 13–17)
HEMOGLOBIN: 9.1 G/DL (ref 13–17)
HEMOGLOBIN: 9.5 G/DL (ref 13–17)
HYDROCODONE: <2 NG/ML
HYDROMORPHONE: <2 NG/ML
IMMATURE GRANULOCYTES: 0 %
IMMATURE GRANULOCYTES: 1 %
IMMATURE GRANULOCYTES: 2 %
IMMATURE GRANULOCYTES: 4 %
IMMATURE GRANULOCYTES: 5 %
IMMATURE GRANULOCYTES: 7 %
IMMATURE GRANULOCYTES: 7 %
IMMATURE GRANULOCYTES: ABNORMAL %
INR BLD: 1.2
INR BLD: 1.2
INR BLD: 1.7
INR BLD: 1.8
INR BLD: 1.9
INR BLD: 1.9
INR BLD: 2.1
INR BLD: 2.3
INR BLD: 2.5
INR BLD: 2.8
INR BLD: 3.2
INR BLD: 3.4
INR BLD: 3.5
INR BLD: 3.8
INR BLD: 3.9
INR BLD: 6.4
INR BLD: 6.8
INTERVENTION: NORMAL
INTERVENTION: NORMAL
KETONES, URINE: NEGATIVE
LACTIC ACID, WHOLE BLOOD: 12.2 MMOL/L (ref 0.7–2.1)
LACTIC ACID, WHOLE BLOOD: 21 MMOL/L (ref 0.7–2.1)
LACTIC ACID, WHOLE BLOOD: 22 MMOL/L (ref 0.7–2.1)
LACTIC ACID, WHOLE BLOOD: 22 MMOL/L (ref 0.7–2.1)
LACTIC ACID, WHOLE BLOOD: 8.5 MMOL/L (ref 0.7–2.1)
LACTIC ACID, WHOLE BLOOD: 9.1 MMOL/L (ref 0.7–2.1)
LACTIC ACID: 1 MMOL/L (ref 0.5–2.2)
LACTIC ACID: 2.2 MMOL/L (ref 0.5–2.2)
LEUKOCYTE ESTERASE, URINE: ABNORMAL
LEUKOCYTE ESTERASE, URINE: ABNORMAL
LEUKOCYTE ESTERASE, URINE: NEGATIVE
LORAZEPAM: <20 NG/ML
LV EF: 22 %
LVEF MODALITY: NORMAL
LYMPHOCYTES # BLD: 11 % (ref 24–44)
LYMPHOCYTES # BLD: 13 % (ref 24–43)
LYMPHOCYTES # BLD: 4 % (ref 24–44)
LYMPHOCYTES # BLD: 4 % (ref 24–44)
LYMPHOCYTES # BLD: 5 % (ref 24–44)
LYMPHOCYTES # BLD: 6 % (ref 24–44)
LYMPHOCYTES # BLD: 8 % (ref 24–44)
LYMPHOCYTES # BLD: 8 % (ref 24–44)
LYMPHOCYTES # BLD: 9 % (ref 24–44)
Lab: ABNORMAL
Lab: NORMAL
MAGNESIUM: 1.7 MG/DL (ref 1.6–2.6)
MAGNESIUM: 1.7 MG/DL (ref 1.6–2.6)
MAGNESIUM: 1.8 MG/DL (ref 1.6–2.6)
MAGNESIUM: 1.9 MG/DL (ref 1.6–2.6)
MAGNESIUM: 1.9 MG/DL (ref 1.6–2.6)
MAGNESIUM: 2 MG/DL (ref 1.6–2.6)
MAGNESIUM: 2.1 MG/DL (ref 1.6–2.6)
MAGNESIUM: 2.2 MG/DL (ref 1.6–2.6)
MAGNESIUM: 2.2 MG/DL (ref 1.6–2.6)
MAGNESIUM: 2.5 MG/DL (ref 1.6–2.6)
MCH RBC QN AUTO: 25.2 PG (ref 25.2–33.5)
MCH RBC QN AUTO: 25.4 PG (ref 25.2–33.5)
MCH RBC QN AUTO: 25.5 PG (ref 25.2–33.5)
MCH RBC QN AUTO: 25.6 PG (ref 25.2–33.5)
MCH RBC QN AUTO: 25.7 PG (ref 25.2–33.5)
MCH RBC QN AUTO: 25.7 PG (ref 25.2–33.5)
MCH RBC QN AUTO: 25.8 PG (ref 25.2–33.5)
MCH RBC QN AUTO: 26.1 PG (ref 25.2–33.5)
MCH RBC QN AUTO: 26.3 PG (ref 25.2–33.5)
MCH RBC QN AUTO: 26.3 PG (ref 26–34)
MCH RBC QN AUTO: 26.4 PG (ref 25.2–33.5)
MCH RBC QN AUTO: 26.8 PG (ref 26–34)
MCH RBC QN AUTO: 27.2 PG (ref 25.2–33.5)
MCH RBC QN AUTO: 27.2 PG (ref 25.2–33.5)
MCH RBC QN AUTO: 27.3 PG (ref 25.2–33.5)
MCH RBC QN AUTO: 27.7 PG (ref 25.2–33.5)
MCH RBC QN AUTO: 27.8 PG (ref 25.2–33.5)
MCH RBC QN AUTO: 28.2 PG (ref 25.2–33.5)
MCH RBC QN AUTO: 28.3 PG (ref 25.2–33.5)
MCH RBC QN AUTO: 28.6 PG (ref 25.2–33.5)
MCHC RBC AUTO-ENTMCNC: 28.8 G/DL (ref 28.4–34.8)
MCHC RBC AUTO-ENTMCNC: 28.8 G/DL (ref 28.4–34.8)
MCHC RBC AUTO-ENTMCNC: 29 G/DL (ref 28.4–34.8)
MCHC RBC AUTO-ENTMCNC: 29.3 G/DL (ref 28.4–34.8)
MCHC RBC AUTO-ENTMCNC: 29.4 G/DL (ref 28.4–34.8)
MCHC RBC AUTO-ENTMCNC: 29.6 G/DL (ref 28.4–34.8)
MCHC RBC AUTO-ENTMCNC: 29.6 G/DL (ref 28.4–34.8)
MCHC RBC AUTO-ENTMCNC: 29.7 G/DL (ref 28.4–34.8)
MCHC RBC AUTO-ENTMCNC: 29.8 G/DL (ref 28.4–34.8)
MCHC RBC AUTO-ENTMCNC: 29.8 G/DL (ref 28.4–34.8)
MCHC RBC AUTO-ENTMCNC: 30.4 G/DL (ref 28.4–34.8)
MCHC RBC AUTO-ENTMCNC: 30.7 G/DL (ref 28.4–34.8)
MCHC RBC AUTO-ENTMCNC: 31.3 G/DL (ref 28.4–34.8)
MCHC RBC AUTO-ENTMCNC: 31.4 G/DL (ref 28.4–34.8)
MCHC RBC AUTO-ENTMCNC: 31.6 G/DL (ref 28.4–34.8)
MCHC RBC AUTO-ENTMCNC: 31.7 G/DL (ref 28.4–34.8)
MCHC RBC AUTO-ENTMCNC: 32.4 G/DL (ref 31–37)
MCHC RBC AUTO-ENTMCNC: 32.7 G/DL (ref 31–37)
MCHC RBC AUTO-ENTMCNC: 33.1 G/DL (ref 28.4–34.8)
MCHC RBC AUTO-ENTMCNC: 33.3 G/DL (ref 28.4–34.8)
MCV RBC AUTO: 81.2 FL (ref 80–100)
MCV RBC AUTO: 81.9 FL (ref 80–100)
MCV RBC AUTO: 83.9 FL (ref 82.6–102.9)
MCV RBC AUTO: 84.6 FL (ref 82.6–102.9)
MCV RBC AUTO: 84.6 FL (ref 82.6–102.9)
MCV RBC AUTO: 85.5 FL (ref 82.6–102.9)
MCV RBC AUTO: 86 FL (ref 82.6–102.9)
MCV RBC AUTO: 86.7 FL (ref 82.6–102.9)
MCV RBC AUTO: 86.8 FL (ref 82.6–102.9)
MCV RBC AUTO: 87.2 FL (ref 82.6–102.9)
MCV RBC AUTO: 87.2 FL (ref 82.6–102.9)
MCV RBC AUTO: 87.8 FL (ref 82.6–102.9)
MCV RBC AUTO: 88.1 FL (ref 82.6–102.9)
MCV RBC AUTO: 88.4 FL (ref 82.6–102.9)
MCV RBC AUTO: 88.6 FL (ref 82.6–102.9)
MCV RBC AUTO: 88.6 FL (ref 82.6–102.9)
MCV RBC AUTO: 89.2 FL (ref 82.6–102.9)
MCV RBC AUTO: 89.2 FL (ref 82.6–102.9)
MCV RBC AUTO: 90.5 FL (ref 82.6–102.9)
MCV RBC AUTO: 95.9 FL (ref 82.6–102.9)
METHADONE: NEGATIVE NG/ML
METHAMPHETAMINE: NEGATIVE NG/ML
METHEMOGLOBIN: ABNORMAL % (ref 0–1.5)
MIDAZOLAM: <20 NG/ML
MIDAZOLAM: <20 NG/ML
MODE: ABNORMAL
MONOCYTES # BLD: 1 % (ref 1–7)
MONOCYTES # BLD: 2 % (ref 1–7)
MONOCYTES # BLD: 2 % (ref 1–7)
MONOCYTES # BLD: 3 % (ref 1–7)
MONOCYTES # BLD: 4 % (ref 1–7)
MONOCYTES # BLD: 5 % (ref 1–7)
MONOCYTES # BLD: 6 % (ref 1–7)
MONOCYTES # BLD: 7 % (ref 1–7)
MONOCYTES # BLD: 7 % (ref 3–12)
MORPHINE: <2 NG/ML
MORPHOLOGY: ABNORMAL
MRSA, DNA, NASAL: NORMAL
MUCUS: ABNORMAL
MUCUS: ABNORMAL
MUCUS: NORMAL
NEGATIVE BASE EXCESS, ART: 13 (ref 0–2)
NEGATIVE BASE EXCESS, ART: 13 (ref 0–2)
NEGATIVE BASE EXCESS, ART: 17 (ref 0–2)
NEGATIVE BASE EXCESS, ART: 2 (ref 0–2)
NEGATIVE BASE EXCESS, ART: 20 (ref 0–2)
NEGATIVE BASE EXCESS, ART: 4 (ref 0–2)
NEGATIVE BASE EXCESS, ART: 4 (ref 0–2)
NEGATIVE BASE EXCESS, ART: 6 (ref 0–2)
NEGATIVE BASE EXCESS, ART: 7 (ref 0–2)
NEGATIVE BASE EXCESS, ART: 9.7 MMOL/L (ref 0–2)
NITRITE, URINE: NEGATIVE
NORDIAZEPAM: <20 NG/ML
NOTIFICATION TIME: ABNORMAL
NOTIFICATION: ABNORMAL
NOTIFY: NORMAL
NRBC AUTOMATED: 0 PER 100 WBC
NRBC AUTOMATED: 0.1 PER 100 WBC
NRBC AUTOMATED: 0.6 PER 100 WBC
NRBC AUTOMATED: 0.7 PER 100 WBC
NRBC AUTOMATED: 1 PER 100 WBC
NRBC AUTOMATED: 1.4 PER 100 WBC
NRBC AUTOMATED: 1.4 PER 100 WBC
NRBC AUTOMATED: 1.6 PER 100 WBC
NRBC AUTOMATED: 2.1 PER 100 WBC
NRBC AUTOMATED: ABNORMAL PER 100 WBC
NRBC AUTOMATED: ABNORMAL PER 100 WBC
NUCLEATED RED BLOOD CELLS: 1 PER 100 WBC
NUCLEATED RED BLOOD CELLS: 2 PER 100 WBC
NUCLEATED RED BLOOD CELLS: 2 PER 100 WBC
O2 DEVICE/FLOW/%: ABNORMAL
O2 SAT, ARTERIAL: 99.8 % (ref 94–100)
OPIATES, BLOOD: <2 NG/ML
OPIATES: POSITIVE NG/ML
OTHER OBSERVATIONS UA: ABNORMAL
OTHER OBSERVATIONS UA: ABNORMAL
OTHER OBSERVATIONS UA: NORMAL
OXAZEPAM: <20 NG/ML
OXYCODONE: 2 NG/ML
OXYCODONE: POSITIVE NG/ML
OXYHEMOGLOBIN: ABNORMAL % (ref 95–98)
OXYMORPHONE: <2 NG/ML
PARTIAL THROMBOPLASTIN TIME: 24.6 SEC (ref 20.5–30.5)
PARTIAL THROMBOPLASTIN TIME: 26.4 SEC (ref 20.5–30.5)
PARTIAL THROMBOPLASTIN TIME: 27.4 SEC (ref 20.5–30.5)
PARTIAL THROMBOPLASTIN TIME: 27.9 SEC (ref 20.5–30.5)
PARTIAL THROMBOPLASTIN TIME: 31.5 SEC (ref 20.5–30.5)
PARTIAL THROMBOPLASTIN TIME: 34.8 SEC (ref 20.5–30.5)
PARTIAL THROMBOPLASTIN TIME: 35 SEC (ref 20.5–30.5)
PARTIAL THROMBOPLASTIN TIME: 35.8 SEC (ref 20.5–30.5)
PARTIAL THROMBOPLASTIN TIME: 40.3 SEC (ref 20.5–30.5)
PARTIAL THROMBOPLASTIN TIME: 41.7 SEC (ref 20.5–30.5)
PARTIAL THROMBOPLASTIN TIME: 48.1 SEC (ref 20.5–30.5)
PARTIAL THROMBOPLASTIN TIME: 48.1 SEC (ref 20.5–30.5)
PARTIAL THROMBOPLASTIN TIME: 49.6 SEC (ref 24–36)
PARTIAL THROMBOPLASTIN TIME: 54.1 SEC (ref 24–36)
PARTIAL THROMBOPLASTIN TIME: 56.2 SEC (ref 20.5–30.5)
PARTIAL THROMBOPLASTIN TIME: 63.6 SEC (ref 20.5–30.5)
PARTIAL THROMBOPLASTIN TIME: 64.4 SEC (ref 20.5–30.5)
PATIENT TEMP: 37
PATIENT TEMP: ABNORMAL
PCO2 ARTERIAL: 44.5 MMHG (ref 32–45)
PCO2, ART, TEMP ADJ: ABNORMAL (ref 32–45)
PDW BLD-RTO: 16.3 % (ref 11.8–14.4)
PDW BLD-RTO: 16.5 % (ref 11.8–14.4)
PDW BLD-RTO: 16.6 % (ref 11.8–14.4)
PDW BLD-RTO: 16.9 % (ref 11.8–14.4)
PDW BLD-RTO: 16.9 % (ref 11.8–14.4)
PDW BLD-RTO: 17.2 % (ref 11.8–14.4)
PDW BLD-RTO: 17.7 % (ref 11.8–14.4)
PDW BLD-RTO: 17.7 % (ref 11.8–14.4)
PDW BLD-RTO: 17.8 % (ref 11.5–14.9)
PDW BLD-RTO: 17.8 % (ref 11.8–14.4)
PDW BLD-RTO: 17.9 % (ref 11.8–14.4)
PDW BLD-RTO: 17.9 % (ref 11.8–14.4)
PDW BLD-RTO: 18 % (ref 11.5–14.9)
PDW BLD-RTO: 18 % (ref 11.8–14.4)
PDW BLD-RTO: 18 % (ref 11.8–14.4)
PDW BLD-RTO: 18.5 % (ref 11.8–14.4)
PDW BLD-RTO: 18.8 % (ref 11.8–14.4)
PDW BLD-RTO: 21.4 % (ref 11.8–14.4)
PEEP/CPAP: ABNORMAL
PH ARTERIAL: 7.21 (ref 7.35–7.45)
PH UA: 7 (ref 5–8)
PH UA: 7.5 (ref 5–8)
PH UA: 7.5 (ref 5–8)
PH, ART, TEMP ADJ: ABNORMAL (ref 7.35–7.45)
PHENCYCLIDINE: NEGATIVE NG/ML
PHOSPHORUS: 10.2 MG/DL (ref 2.5–4.5)
PHOSPHORUS: 11.3 MG/DL (ref 2.5–4.5)
PHOSPHORUS: 2.5 MG/DL (ref 2.5–4.5)
PHOSPHORUS: 2.7 MG/DL (ref 2.5–4.5)
PHOSPHORUS: 2.8 MG/DL (ref 2.5–4.5)
PHOSPHORUS: 2.9 MG/DL (ref 2.5–4.5)
PHOSPHORUS: 3 MG/DL (ref 2.5–4.5)
PHOSPHORUS: 3 MG/DL (ref 2.5–4.5)
PHOSPHORUS: 3.4 MG/DL (ref 2.5–4.5)
PHOSPHORUS: 3.7 MG/DL (ref 2.5–4.5)
PHOSPHORUS: 3.8 MG/DL (ref 2.5–4.5)
PHOSPHORUS: 4 MG/DL (ref 2.5–4.5)
PHOSPHORUS: 4 MG/DL (ref 2.5–4.5)
PHOSPHORUS: 4.4 MG/DL (ref 2.5–4.5)
PHOSPHORUS: 4.8 MG/DL (ref 2.5–4.5)
PHOSPHORUS: 5.3 MG/DL (ref 2.5–4.5)
PHOSPHORUS: 6.4 MG/DL (ref 2.5–4.5)
PHOSPHORUS: 6.4 MG/DL (ref 2.5–4.5)
PHOSPHORUS: 8.6 MG/DL (ref 2.5–4.5)
PLATELET # BLD: 124 K/UL (ref 138–453)
PLATELET # BLD: 222 K/UL (ref 138–453)
PLATELET # BLD: 238 K/UL (ref 138–453)
PLATELET # BLD: 240 K/UL (ref 138–453)
PLATELET # BLD: 241 K/UL (ref 138–453)
PLATELET # BLD: 243 K/UL (ref 150–450)
PLATELET # BLD: 250 K/UL (ref 138–453)
PLATELET # BLD: 261 K/UL (ref 138–453)
PLATELET # BLD: 265 K/UL (ref 138–453)
PLATELET # BLD: 268 K/UL (ref 150–450)
PLATELET # BLD: 270 K/UL (ref 138–453)
PLATELET # BLD: 272 K/UL (ref 138–453)
PLATELET # BLD: 272 K/UL (ref 138–453)
PLATELET # BLD: 275 K/UL (ref 138–453)
PLATELET # BLD: 277 K/UL (ref 138–453)
PLATELET # BLD: 279 K/UL (ref 138–453)
PLATELET # BLD: 289 K/UL (ref 138–453)
PLATELET # BLD: 309 K/UL (ref 138–453)
PLATELET # BLD: 331 K/UL (ref 138–453)
PLATELET # BLD: 360 K/UL (ref 138–453)
PLATELET # BLD: 57 K/UL (ref 138–453)
PLATELET # BLD: 60 K/UL (ref 138–453)
PLATELET # BLD: 60 K/UL (ref 138–453)
PLATELET # BLD: 78 K/UL (ref 138–453)
PLATELET # BLD: 97 K/UL (ref 138–453)
PLATELET ESTIMATE: ABNORMAL
PMV BLD AUTO: 10.8 FL (ref 8.1–13.5)
PMV BLD AUTO: 11 FL (ref 8.1–13.5)
PMV BLD AUTO: 11.1 FL (ref 8.1–13.5)
PMV BLD AUTO: 11.2 FL (ref 8.1–13.5)
PMV BLD AUTO: 11.2 FL (ref 8.1–13.5)
PMV BLD AUTO: 11.3 FL (ref 8.1–13.5)
PMV BLD AUTO: 11.3 FL (ref 8.1–13.5)
PMV BLD AUTO: 11.5 FL (ref 8.1–13.5)
PMV BLD AUTO: 11.8 FL (ref 8.1–13.5)
PMV BLD AUTO: 11.8 FL (ref 8.1–13.5)
PMV BLD AUTO: 11.9 FL (ref 8.1–13.5)
PMV BLD AUTO: 12 FL (ref 8.1–13.5)
PMV BLD AUTO: 12.1 FL (ref 8.1–13.5)
PMV BLD AUTO: 12.2 FL (ref 8.1–13.5)
PMV BLD AUTO: 12.5 FL (ref 8.1–13.5)
PMV BLD AUTO: 7.8 FL (ref 6–12)
PMV BLD AUTO: 8.3 FL (ref 6–12)
PO2 ARTERIAL: 283 MMHG (ref 75–95)
PO2, ART, TEMP ADJ: ABNORMAL MMHG (ref 75–95)
POC CHLORIDE: 100 MMOL/L (ref 98–107)
POC CHLORIDE: 103 MMOL/L (ref 98–107)
POC CREATININE: 3.32 MG/DL (ref 0.51–1.19)
POC CREATININE: 4.07 MG/DL (ref 0.51–1.19)
POC HCO3: 14.6 MMOL/L (ref 21–28)
POC HCO3: 16 MMOL/L (ref 21–28)
POC HCO3: 17.7 MMOL/L (ref 21–28)
POC HCO3: 20.6 MMOL/L (ref 21–28)
POC HCO3: 21.5 MMOL/L (ref 21–28)
POC HCO3: 6 MMOL/L (ref 21–28)
POC HCO3: 8.3 MMOL/L (ref 21–28)
POC HCO3: 9.9 MMOL/L (ref 21–28)
POC HCO3: 9.9 MMOL/L (ref 21–28)
POC HEMATOCRIT: 28 % (ref 41–53)
POC HEMATOCRIT: 38 % (ref 41–53)
POC HEMOGLOBIN: 12.8 G/DL (ref 13.5–17.5)
POC HEMOGLOBIN: 9.7 G/DL (ref 13.5–17.5)
POC INR: 1.6
POC INR: 1.6
POC IONIZED CALCIUM: 1.05 MMOL/L (ref 1.15–1.33)
POC LACTIC ACID: 2.44 MMOL/L (ref 0.56–1.39)
POC LACTIC ACID: 7.74 MMOL/L (ref 0.56–1.39)
POC O2 SATURATION: 100 % (ref 94–98)
POC O2 SATURATION: 97 % (ref 94–98)
POC O2 SATURATION: 97 % (ref 94–98)
POC O2 SATURATION: 98 % (ref 94–98)
POC O2 SATURATION: 98 % (ref 94–98)
POC O2 SATURATION: 99 % (ref 94–98)
POC O2 SATURATION: 99 % (ref 94–98)
POC PCO2 TEMP: ABNORMAL MM HG
POC PCO2: 14.8 MM HG (ref 35–48)
POC PCO2: 14.8 MM HG (ref 35–48)
POC PCO2: 16.2 MM HG (ref 35–48)
POC PCO2: 17.6 MM HG (ref 35–48)
POC PCO2: 18.3 MM HG (ref 35–48)
POC PCO2: 18.7 MM HG (ref 35–48)
POC PCO2: 21.6 MM HG (ref 35–48)
POC PCO2: 25.3 MM HG (ref 35–48)
POC PCO2: 41.3 MM HG (ref 35–48)
POC PH TEMP: ABNORMAL
POC PH: 7.22 (ref 7.35–7.45)
POC PH: 7.26 (ref 7.35–7.45)
POC PH: 7.32 (ref 7.35–7.45)
POC PH: 7.4 (ref 7.35–7.45)
POC PH: 7.43 (ref 7.35–7.45)
POC PH: 7.52 (ref 7.35–7.45)
POC PH: 7.52 (ref 7.35–7.45)
POC PH: 7.53 (ref 7.35–7.45)
POC PH: 7.54 (ref 7.35–7.45)
POC PO2 TEMP: ABNORMAL MM HG
POC PO2: 100.1 MM HG (ref 83–108)
POC PO2: 107.5 MM HG (ref 83–108)
POC PO2: 132.2 MM HG (ref 83–108)
POC PO2: 177.6 MM HG (ref 83–108)
POC PO2: 439.9 MM HG (ref 83–108)
POC PO2: 469.2 MM HG (ref 83–108)
POC PO2: 81.8 MM HG (ref 83–108)
POC PO2: 82.3 MM HG (ref 83–108)
POC PO2: 96 MM HG (ref 83–108)
POC POTASSIUM: 3.9 MMOL/L (ref 3.5–4.5)
POC POTASSIUM: 4.1 MMOL/L (ref 3.5–4.5)
POC POTASSIUM: 4.5 MMOL/L (ref 3.5–4.5)
POC SODIUM: 135 MMOL/L (ref 138–146)
POC SODIUM: 137 MMOL/L (ref 138–146)
POSITIVE BASE EXCESS, ART: ABNORMAL (ref 0–3)
POSITIVE BASE EXCESS, ART: ABNORMAL MMOL/L (ref 0–2)
POTASSIUM SERPL-SCNC: 3.4 MMOL/L (ref 3.7–5.3)
POTASSIUM SERPL-SCNC: 3.4 MMOL/L (ref 3.7–5.3)
POTASSIUM SERPL-SCNC: 3.5 MMOL/L (ref 3.7–5.3)
POTASSIUM SERPL-SCNC: 3.5 MMOL/L (ref 3.7–5.3)
POTASSIUM SERPL-SCNC: 3.6 MMOL/L (ref 3.7–5.3)
POTASSIUM SERPL-SCNC: 3.7 MMOL/L (ref 3.7–5.3)
POTASSIUM SERPL-SCNC: 3.8 MMOL/L (ref 3.7–5.3)
POTASSIUM SERPL-SCNC: 3.9 MMOL/L (ref 3.7–5.3)
POTASSIUM SERPL-SCNC: 4.1 MMOL/L (ref 3.7–5.3)
POTASSIUM SERPL-SCNC: 4.2 MMOL/L (ref 3.7–5.3)
POTASSIUM SERPL-SCNC: 4.2 MMOL/L (ref 3.7–5.3)
POTASSIUM SERPL-SCNC: 4.4 MMOL/L (ref 3.7–5.3)
POTASSIUM SERPL-SCNC: 4.4 MMOL/L (ref 3.7–5.3)
POTASSIUM SERPL-SCNC: 4.5 MMOL/L (ref 3.7–5.3)
POTASSIUM SERPL-SCNC: 4.6 MMOL/L (ref 3.7–5.3)
POTASSIUM SERPL-SCNC: 4.7 MMOL/L (ref 3.7–5.3)
POTASSIUM SERPL-SCNC: 4.8 MMOL/L (ref 3.7–5.3)
POTASSIUM SERPL-SCNC: 4.9 MMOL/L (ref 3.7–5.3)
POTASSIUM SERPL-SCNC: 4.9 MMOL/L (ref 3.7–5.3)
POTASSIUM SERPL-SCNC: 5 MMOL/L (ref 3.7–5.3)
POTASSIUM SERPL-SCNC: 5.1 MMOL/L (ref 3.7–5.3)
POTASSIUM SERPL-SCNC: 5.2 MMOL/L (ref 3.7–5.3)
POTASSIUM SERPL-SCNC: 5.4 MMOL/L (ref 3.7–5.3)
POTASSIUM SERPL-SCNC: 5.7 MMOL/L (ref 3.7–5.3)
POTASSIUM SERPL-SCNC: 5.7 MMOL/L (ref 3.7–5.3)
POTASSIUM SERPL-SCNC: 5.9 MMOL/L (ref 3.7–5.3)
POTASSIUM SERPL-SCNC: 6.1 MMOL/L (ref 3.7–5.3)
POTASSIUM SERPL-SCNC: 6.2 MMOL/L (ref 3.7–5.3)
POTASSIUM SERPL-SCNC: 6.7 MMOL/L (ref 3.7–5.3)
POTASSIUM SERPL-SCNC: 7.1 MMOL/L (ref 3.7–5.3)
POTASSIUM, WHOLE BLOOD: 4.2 MMOL/L (ref 3.6–5)
PROCALCITONIN: 6.32 NG/ML
PROTEIN UA: ABNORMAL
PROTHROMBIN TIME, POC: 18.3 SEC (ref 10.4–14.2)
PROTHROMBIN TIME, POC: 18.9 SEC (ref 10.4–14.2)
PROTHROMBIN TIME: 12.4 SEC (ref 9–12)
PROTHROMBIN TIME: 12.5 SEC (ref 9–12)
PROTHROMBIN TIME: 18.1 SEC (ref 9–12)
PROTHROMBIN TIME: 19.4 SEC (ref 9–12)
PROTHROMBIN TIME: 20.4 SEC (ref 11.8–14.6)
PROTHROMBIN TIME: 20.5 SEC (ref 9–12)
PROTHROMBIN TIME: 20.6 SEC (ref 9–12)
PROTHROMBIN TIME: 20.9 SEC (ref 9–12)
PROTHROMBIN TIME: 22.1 SEC (ref 11.8–14.6)
PROTHROMBIN TIME: 23.3 SEC (ref 11.8–14.6)
PROTHROMBIN TIME: 24.9 SEC (ref 9–12)
PROTHROMBIN TIME: 25.6 SEC (ref 11.8–14.6)
PROTHROMBIN TIME: 27.1 SEC (ref 9–12)
PROTHROMBIN TIME: 30.6 SEC (ref 9–12)
PROTHROMBIN TIME: 32.6 SEC (ref 9–12)
PROTHROMBIN TIME: 33.5 SEC (ref 9–12)
PROTHROMBIN TIME: 36.2 SEC (ref 9–12)
PROTHROMBIN TIME: 36.7 SEC (ref 9–12)
PROTHROMBIN TIME: 36.9 SEC (ref 9–12)
PROTHROMBIN TIME: 36.9 SEC (ref 9–12)
PROTHROMBIN TIME: 37.1 SEC (ref 9–12)
PROTHROMBIN TIME: 58.4 SEC (ref 9–12)
PROTHROMBIN TIME: 61.5 SEC (ref 9–12)
PSV: ABNORMAL
PT. POSITION: ABNORMAL
PTH INTACT: 141.3 PG/ML (ref 15–65)
RBC # BLD: 1.73 M/UL (ref 4.21–5.77)
RBC # BLD: 2.32 M/UL (ref 4.21–5.77)
RBC # BLD: 2.63 M/UL (ref 4.21–5.77)
RBC # BLD: 2.66 M/UL (ref 4.21–5.77)
RBC # BLD: 2.71 M/UL (ref 4.21–5.77)
RBC # BLD: 2.76 M/UL (ref 4.21–5.77)
RBC # BLD: 2.8 M/UL (ref 4.21–5.77)
RBC # BLD: 2.84 M/UL (ref 4.21–5.77)
RBC # BLD: 2.87 M/UL (ref 4.21–5.77)
RBC # BLD: 2.88 M/UL (ref 4.21–5.77)
RBC # BLD: 2.94 M/UL (ref 4.21–5.77)
RBC # BLD: 3.05 M/UL (ref 4.21–5.77)
RBC # BLD: 3.08 M/UL (ref 4.21–5.77)
RBC # BLD: 3.11 M/UL (ref 4.21–5.77)
RBC # BLD: 3.2 M/UL (ref 4.21–5.77)
RBC # BLD: 3.25 M/UL (ref 4.21–5.77)
RBC # BLD: 3.29 M/UL (ref 4.21–5.77)
RBC # BLD: 3.42 M/UL (ref 4.21–5.77)
RBC # BLD: 3.81 M/UL (ref 4.5–5.9)
RBC # BLD: 4.1 M/UL (ref 4.5–5.9)
RBC # BLD: ABNORMAL 10*6/UL
RBC UA: ABNORMAL /HPF
RBC UA: ABNORMAL /HPF (ref 0–2)
RBC UA: NORMAL /HPF (ref 0–4)
READ BACK: YES
REASON FOR REJECTION: NORMAL
REASON FOR REJECTION: NORMAL
RENAL EPITHELIAL, UA: ABNORMAL /HPF
RENAL EPITHELIAL, UA: ABNORMAL /HPF
RENAL EPITHELIAL, UA: NORMAL /HPF
RESPIRATORY RATE: ABNORMAL
SALICYLATE LEVEL: <1 MG/DL (ref 3–10)
SAMPLE SITE: ABNORMAL
SEDIMENTATION RATE, ERYTHROCYTE: 114 MM (ref 0–15)
SEG NEUTROPHILS: 77 % (ref 36–65)
SEG NEUTROPHILS: 79 % (ref 36–66)
SEG NEUTROPHILS: 82 % (ref 36–66)
SEG NEUTROPHILS: 83 % (ref 36–66)
SEG NEUTROPHILS: 84 % (ref 36–66)
SEG NEUTROPHILS: 86 % (ref 36–66)
SEG NEUTROPHILS: 86 % (ref 36–66)
SEG NEUTROPHILS: 87 % (ref 36–66)
SEG NEUTROPHILS: 90 % (ref 36–66)
SEG NEUTROPHILS: 90 % (ref 36–66)
SEG NEUTROPHILS: 92 % (ref 36–66)
SEG NEUTROPHILS: 93 % (ref 36–66)
SEGMENTED NEUTROPHILS ABSOLUTE COUNT: 11.5 K/UL (ref 1.3–9.1)
SEGMENTED NEUTROPHILS ABSOLUTE COUNT: 13.07 K/UL (ref 1.8–7.7)
SEGMENTED NEUTROPHILS ABSOLUTE COUNT: 14.62 K/UL (ref 1.8–7.7)
SEGMENTED NEUTROPHILS ABSOLUTE COUNT: 16.15 K/UL (ref 1.8–7.7)
SEGMENTED NEUTROPHILS ABSOLUTE COUNT: 19.25 K/UL (ref 1.8–7.7)
SEGMENTED NEUTROPHILS ABSOLUTE COUNT: 22.14 K/UL (ref 1.8–7.7)
SEGMENTED NEUTROPHILS ABSOLUTE COUNT: 22.36 K/UL (ref 1.8–7.7)
SEGMENTED NEUTROPHILS ABSOLUTE COUNT: 33.26 K/UL (ref 1.8–7.7)
SEGMENTED NEUTROPHILS ABSOLUTE COUNT: 39.5 K/UL (ref 1.8–7.7)
SEGMENTED NEUTROPHILS ABSOLUTE COUNT: 39.56 K/UL (ref 1.8–7.7)
SEGMENTED NEUTROPHILS ABSOLUTE COUNT: 8.22 K/UL (ref 1.8–7.7)
SEGMENTED NEUTROPHILS ABSOLUTE COUNT: 9.09 K/UL (ref 1.5–8.1)
SET RATE: ABNORMAL
SODIUM BLD-SCNC: 130 MMOL/L (ref 135–144)
SODIUM BLD-SCNC: 131 MMOL/L (ref 135–144)
SODIUM BLD-SCNC: 132 MMOL/L (ref 135–144)
SODIUM BLD-SCNC: 133 MMOL/L (ref 135–144)
SODIUM BLD-SCNC: 134 MMOL/L (ref 135–144)
SODIUM BLD-SCNC: 135 MMOL/L (ref 135–144)
SODIUM BLD-SCNC: 136 MMOL/L (ref 135–144)
SODIUM BLD-SCNC: 137 MMOL/L (ref 135–144)
SODIUM BLD-SCNC: 138 MMOL/L (ref 135–144)
SODIUM BLD-SCNC: 140 MMOL/L (ref 135–144)
SODIUM BLD-SCNC: 140 MMOL/L (ref 135–144)
SODIUM, WHOLE BLOOD: 135 MMOL/L (ref 136–145)
SPECIFIC GRAVITY UA: 1.02 (ref 1–1.03)
SPECIMEN DESCRIPTION: ABNORMAL
SPECIMEN DESCRIPTION: NORMAL
SURGICAL PATHOLOGY REPORT: NORMAL
TCO2 (CALC), ART: 10 MMOL/L (ref 22–29)
TCO2 (CALC), ART: 10 MMOL/L (ref 22–29)
TCO2 (CALC), ART: 15 MMOL/L (ref 22–29)
TCO2 (CALC), ART: 17 MMOL/L (ref 22–29)
TCO2 (CALC), ART: 18 MMOL/L (ref 22–29)
TCO2 (CALC), ART: 21 MMOL/L (ref 22–29)
TCO2 (CALC), ART: 23 MMOL/L (ref 22–29)
TCO2 (CALC), ART: 7 MMOL/L (ref 22–29)
TCO2 (CALC), ART: 9 MMOL/L (ref 22–29)
TEMAZEPAM: <20 NG/ML
TEXT FOR RESPIRATORY: ABNORMAL
THC: NEGATIVE NG/ML
TIME, STOOL #1: ABNORMAL
TIME, STOOL #2: ABNORMAL
TIME, STOOL #3: ABNORMAL
TOTAL CK: 228 U/L (ref 39–308)
TOTAL CK: 467 U/L (ref 39–308)
TOTAL HB: ABNORMAL G/DL (ref 12–16)
TOTAL PROTEIN: 3.8 G/DL (ref 6.4–8.3)
TOTAL PROTEIN: 4.1 G/DL (ref 6.4–8.3)
TOTAL PROTEIN: 4.1 G/DL (ref 6.4–8.3)
TOTAL PROTEIN: 4.3 G/DL (ref 6.4–8.3)
TOTAL PROTEIN: 4.3 G/DL (ref 6.4–8.3)
TOTAL PROTEIN: 4.4 G/DL (ref 6.4–8.3)
TOTAL PROTEIN: 4.7 G/DL (ref 6.4–8.3)
TOTAL PROTEIN: 5.6 G/DL (ref 6.4–8.3)
TOTAL PROTEIN: 6.2 G/DL (ref 6.4–8.3)
TOTAL PROTEIN: 8.1 G/DL (ref 6.4–8.3)
TOTAL RATE: ABNORMAL
TOXIC TRICYCLIC SC,BLOOD: NEGATIVE
TRANSFUSION STATUS: NORMAL
TRICHOMONAS: ABNORMAL
TRICHOMONAS: ABNORMAL
TRICHOMONAS: NORMAL
TROPONIN INTERP: ABNORMAL
TROPONIN T: ABNORMAL NG/ML
TROPONIN, HIGH SENSITIVITY: 1869 NG/L (ref 0–22)
TROPONIN, HIGH SENSITIVITY: 1996 NG/L (ref 0–22)
TROPONIN, HIGH SENSITIVITY: 2452 NG/L (ref 0–22)
TROPONIN, HIGH SENSITIVITY: 2597 NG/L (ref 0–22)
TROPONIN, HIGH SENSITIVITY: 285 NG/L (ref 0–22)
TROPONIN, HIGH SENSITIVITY: 285 NG/L (ref 0–22)
TROPONIN, HIGH SENSITIVITY: 2965 NG/L (ref 0–22)
TROPONIN, HIGH SENSITIVITY: 3085 NG/L (ref 0–22)
TROPONIN, HIGH SENSITIVITY: 345 NG/L (ref 0–22)
TROPONIN, HIGH SENSITIVITY: 405 NG/L (ref 0–22)
TROPONIN, HIGH SENSITIVITY: 425 NG/L (ref 0–22)
TROPONIN, HIGH SENSITIVITY: 434 NG/L (ref 0–22)
TROPONIN, HIGH SENSITIVITY: 435 NG/L (ref 0–22)
TROPONIN, HIGH SENSITIVITY: 475 NG/L (ref 0–22)
TROPONIN, HIGH SENSITIVITY: 475 NG/L (ref 0–22)
TROPONIN, HIGH SENSITIVITY: 478 NG/L (ref 0–22)
TURBIDITY: ABNORMAL
TURBIDITY: CLEAR
TURBIDITY: CLEAR
UNIT DIVISION: 0
UNIT NUMBER: NORMAL
URINE HGB: ABNORMAL
URINE HGB: NEGATIVE
URINE HGB: NEGATIVE
UROBILINOGEN, URINE: NORMAL
VT: ABNORMAL
WBC # BLD: 10.4 K/UL (ref 3.5–11.3)
WBC # BLD: 10.4 K/UL (ref 3.5–11.3)
WBC # BLD: 11 K/UL (ref 3.5–11.3)
WBC # BLD: 11.8 K/UL (ref 3.5–11.3)
WBC # BLD: 12.8 K/UL (ref 3.5–11)
WBC # BLD: 12.9 K/UL (ref 3.5–11.3)
WBC # BLD: 13.8 K/UL (ref 3.5–11)
WBC # BLD: 14.2 K/UL (ref 3.5–11.3)
WBC # BLD: 15.5 K/UL (ref 3.5–11.3)
WBC # BLD: 17 K/UL (ref 3.5–11.3)
WBC # BLD: 17.4 K/UL (ref 3.5–11.3)
WBC # BLD: 18.8 K/UL (ref 3.5–11.3)
WBC # BLD: 19.7 K/UL (ref 3.5–11.3)
WBC # BLD: 20.7 K/UL (ref 3.5–11.3)
WBC # BLD: 24.6 K/UL (ref 3.5–11.3)
WBC # BLD: 25.7 K/UL (ref 3.5–11.3)
WBC # BLD: 39.6 K/UL (ref 3.5–11.3)
WBC # BLD: 4.8 K/UL (ref 3.5–11.3)
WBC # BLD: 43.9 K/UL (ref 3.5–11.3)
WBC # BLD: 46 K/UL (ref 3.5–11.3)
WBC # BLD: ABNORMAL 10*3/UL
WBC UA: ABNORMAL /HPF
WBC UA: ABNORMAL /HPF (ref 0–5)
WBC UA: NORMAL /HPF (ref 0–5)
YEAST: ABNORMAL
YEAST: ABNORMAL
YEAST: NORMAL
ZZ NTE CLEAN UP: ORDERED TEST: NORMAL
ZZ NTE CLEAN UP: ORDERED TEST: NORMAL
ZZ NTE WITH NAME CLEAN UP: SPECIMEN SOURCE: NORMAL
ZZ NTE WITH NAME CLEAN UP: SPECIMEN SOURCE: NORMAL

## 2019-01-01 PROCEDURE — 6370000000 HC RX 637 (ALT 250 FOR IP): Performed by: FAMILY MEDICINE

## 2019-01-01 PROCEDURE — 6360000002 HC RX W HCPCS: Performed by: INTERNAL MEDICINE

## 2019-01-01 PROCEDURE — 2580000003 HC RX 258: Performed by: ANESTHESIOLOGY

## 2019-01-01 PROCEDURE — 82435 ASSAY OF BLOOD CHLORIDE: CPT

## 2019-01-01 PROCEDURE — 2000000000 HC ICU R&B

## 2019-01-01 PROCEDURE — 2580000003 HC RX 258: Performed by: STUDENT IN AN ORGANIZED HEALTH CARE EDUCATION/TRAINING PROGRAM

## 2019-01-01 PROCEDURE — 99232 SBSQ HOSP IP/OBS MODERATE 35: CPT | Performed by: INTERNAL MEDICINE

## 2019-01-01 PROCEDURE — 99024 POSTOP FOLLOW-UP VISIT: CPT | Performed by: SURGERY

## 2019-01-01 PROCEDURE — 2580000003 HC RX 258: Performed by: EMERGENCY MEDICINE

## 2019-01-01 PROCEDURE — 75774 ARTERY X-RAY EACH VESSEL: CPT | Performed by: SURGERY

## 2019-01-01 PROCEDURE — 83735 ASSAY OF MAGNESIUM: CPT

## 2019-01-01 PROCEDURE — 6370000000 HC RX 637 (ALT 250 FOR IP): Performed by: SURGERY

## 2019-01-01 PROCEDURE — 94761 N-INVAS EAR/PLS OXIMETRY MLT: CPT

## 2019-01-01 PROCEDURE — 85610 PROTHROMBIN TIME: CPT

## 2019-01-01 PROCEDURE — 80048 BASIC METABOLIC PNL TOTAL CA: CPT

## 2019-01-01 PROCEDURE — 84484 ASSAY OF TROPONIN QUANT: CPT

## 2019-01-01 PROCEDURE — 6370000000 HC RX 637 (ALT 250 FOR IP): Performed by: STUDENT IN AN ORGANIZED HEALTH CARE EDUCATION/TRAINING PROGRAM

## 2019-01-01 PROCEDURE — 85049 AUTOMATED PLATELET COUNT: CPT

## 2019-01-01 PROCEDURE — 86850 RBC ANTIBODY SCREEN: CPT

## 2019-01-01 PROCEDURE — C1769 GUIDE WIRE: HCPCS

## 2019-01-01 PROCEDURE — 36415 COLL VENOUS BLD VENIPUNCTURE: CPT

## 2019-01-01 PROCEDURE — 2580000003 HC RX 258: Performed by: INTERNAL MEDICINE

## 2019-01-01 PROCEDURE — 99233 SBSQ HOSP IP/OBS HIGH 50: CPT | Performed by: INTERNAL MEDICINE

## 2019-01-01 PROCEDURE — 6360000002 HC RX W HCPCS: Performed by: STUDENT IN AN ORGANIZED HEALTH CARE EDUCATION/TRAINING PROGRAM

## 2019-01-01 PROCEDURE — 87040 BLOOD CULTURE FOR BACTERIA: CPT

## 2019-01-01 PROCEDURE — 82803 BLOOD GASES ANY COMBINATION: CPT

## 2019-01-01 PROCEDURE — 36430 TRANSFUSION BLD/BLD COMPNT: CPT

## 2019-01-01 PROCEDURE — 75716 ARTERY X-RAYS ARMS/LEGS: CPT | Performed by: SURGERY

## 2019-01-01 PROCEDURE — 99232 SBSQ HOSP IP/OBS MODERATE 35: CPT | Performed by: FAMILY MEDICINE

## 2019-01-01 PROCEDURE — 84132 ASSAY OF SERUM POTASSIUM: CPT

## 2019-01-01 PROCEDURE — 80076 HEPATIC FUNCTION PANEL: CPT

## 2019-01-01 PROCEDURE — 3700000000 HC ANESTHESIA ATTENDED CARE: Performed by: SURGERY

## 2019-01-01 PROCEDURE — 93010 ELECTROCARDIOGRAM REPORT: CPT | Performed by: INTERNAL MEDICINE

## 2019-01-01 PROCEDURE — 2709999900 HC NON-CHARGEABLE SUPPLY

## 2019-01-01 PROCEDURE — 6370000000 HC RX 637 (ALT 250 FOR IP): Performed by: INTERNAL MEDICINE

## 2019-01-01 PROCEDURE — G8417 CALC BMI ABV UP PARAM F/U: HCPCS | Performed by: INTERNAL MEDICINE

## 2019-01-01 PROCEDURE — 2060000000 HC ICU INTERMEDIATE R&B

## 2019-01-01 PROCEDURE — 83605 ASSAY OF LACTIC ACID: CPT

## 2019-01-01 PROCEDURE — 7100000010 HC PHASE II RECOVERY - FIRST 15 MIN

## 2019-01-01 PROCEDURE — 99291 CRITICAL CARE FIRST HOUR: CPT | Performed by: INTERNAL MEDICINE

## 2019-01-01 PROCEDURE — 86927 PLASMA FRESH FROZEN: CPT

## 2019-01-01 PROCEDURE — 85018 HEMOGLOBIN: CPT

## 2019-01-01 PROCEDURE — 93306 TTE W/DOPPLER COMPLETE: CPT

## 2019-01-01 PROCEDURE — 85730 THROMBOPLASTIN TIME PARTIAL: CPT

## 2019-01-01 PROCEDURE — 6360000002 HC RX W HCPCS: Performed by: SURGERY

## 2019-01-01 PROCEDURE — 7100000011 HC PHASE II RECOVERY - ADDTL 15 MIN: Performed by: SURGERY

## 2019-01-01 PROCEDURE — 85027 COMPLETE CBC AUTOMATED: CPT

## 2019-01-01 PROCEDURE — 2500000003 HC RX 250 WO HCPCS: Performed by: STUDENT IN AN ORGANIZED HEALTH CARE EDUCATION/TRAINING PROGRAM

## 2019-01-01 PROCEDURE — 97530 THERAPEUTIC ACTIVITIES: CPT

## 2019-01-01 PROCEDURE — 97166 OT EVAL MOD COMPLEX 45 MIN: CPT

## 2019-01-01 PROCEDURE — 85014 HEMATOCRIT: CPT

## 2019-01-01 PROCEDURE — 82550 ASSAY OF CK (CPK): CPT

## 2019-01-01 PROCEDURE — 0Y6H0Z1 DETACHMENT AT RIGHT LOWER LEG, HIGH, OPEN APPROACH: ICD-10-PCS | Performed by: SURGERY

## 2019-01-01 PROCEDURE — 2700000000 HC OXYGEN THERAPY PER DAY

## 2019-01-01 PROCEDURE — 2500000003 HC RX 250 WO HCPCS: Performed by: NURSE ANESTHETIST, CERTIFIED REGISTERED

## 2019-01-01 PROCEDURE — 6370000000 HC RX 637 (ALT 250 FOR IP): Performed by: NURSE PRACTITIONER

## 2019-01-01 PROCEDURE — 86901 BLOOD TYPING SEROLOGIC RH(D): CPT

## 2019-01-01 PROCEDURE — 27882 AMPUTATION OF LOWER LEG: CPT | Performed by: SURGERY

## 2019-01-01 PROCEDURE — 97535 SELF CARE MNGMENT TRAINING: CPT

## 2019-01-01 PROCEDURE — 75710 ARTERY X-RAYS ARM/LEG: CPT | Performed by: SURGERY

## 2019-01-01 PROCEDURE — 5A1D70Z PERFORMANCE OF URINARY FILTRATION, INTERMITTENT, LESS THAN 6 HOURS PER DAY: ICD-10-PCS | Performed by: INTERNAL MEDICINE

## 2019-01-01 PROCEDURE — 82947 ASSAY GLUCOSE BLOOD QUANT: CPT

## 2019-01-01 PROCEDURE — C1894 INTRO/SHEATH, NON-LASER: HCPCS

## 2019-01-01 PROCEDURE — 94770 HC ETCO2 MONITOR DAILY: CPT

## 2019-01-01 PROCEDURE — C1757 CATH, THROMBECTOMY/EMBOLECT: HCPCS

## 2019-01-01 PROCEDURE — 6360000002 HC RX W HCPCS: Performed by: NURSE PRACTITIONER

## 2019-01-01 PROCEDURE — 80307 DRUG TEST PRSMV CHEM ANLYZR: CPT

## 2019-01-01 PROCEDURE — C1887 CATHETER, GUIDING: HCPCS

## 2019-01-01 PROCEDURE — 82330 ASSAY OF CALCIUM: CPT

## 2019-01-01 PROCEDURE — 37252 INTRVASC US NONCORONARY 1ST: CPT | Performed by: SURGERY

## 2019-01-01 PROCEDURE — 85025 COMPLETE CBC W/AUTO DIFF WBC: CPT

## 2019-01-01 PROCEDURE — 2022F DILAT RTA XM EVC RTNOPTHY: CPT | Performed by: INTERNAL MEDICINE

## 2019-01-01 PROCEDURE — 99212 OFFICE O/P EST SF 10 MIN: CPT

## 2019-01-01 PROCEDURE — 3600000013 HC SURGERY LEVEL 3 ADDTL 15MIN: Performed by: SURGERY

## 2019-01-01 PROCEDURE — 76937 US GUIDE VASCULAR ACCESS: CPT | Performed by: SURGERY

## 2019-01-01 PROCEDURE — C1757 CATH, THROMBECTOMY/EMBOLECT: HCPCS | Performed by: SURGERY

## 2019-01-01 PROCEDURE — 86900 BLOOD TYPING SEROLOGIC ABO: CPT

## 2019-01-01 PROCEDURE — 99284 EMERGENCY DEPT VISIT MOD MDM: CPT

## 2019-01-01 PROCEDURE — 3017F COLORECTAL CA SCREEN DOC REV: CPT | Performed by: INTERNAL MEDICINE

## 2019-01-01 PROCEDURE — 95819 EEG AWAKE AND ASLEEP: CPT

## 2019-01-01 PROCEDURE — 36247 INS CATH ABD/L-EXT ART 3RD: CPT | Performed by: SURGERY

## 2019-01-01 PROCEDURE — 94760 N-INVAS EAR/PLS OXIMETRY 1: CPT

## 2019-01-01 PROCEDURE — 3600000004 HC SURGERY LEVEL 4 BASE: Performed by: SURGERY

## 2019-01-01 PROCEDURE — 2580000003 HC RX 258: Performed by: SURGERY

## 2019-01-01 PROCEDURE — 51701 INSERT BLADDER CATHETER: CPT

## 2019-01-01 PROCEDURE — C9113 INJ PANTOPRAZOLE SODIUM, VIA: HCPCS | Performed by: INTERNAL MEDICINE

## 2019-01-01 PROCEDURE — 2709999900 HC NON-CHARGEABLE SUPPLY: Performed by: SURGERY

## 2019-01-01 PROCEDURE — 87086 URINE CULTURE/COLONY COUNT: CPT

## 2019-01-01 PROCEDURE — 3700000001 HC ADD 15 MINUTES (ANESTHESIA): Performed by: SURGERY

## 2019-01-01 PROCEDURE — 84295 ASSAY OF SERUM SODIUM: CPT

## 2019-01-01 PROCEDURE — 37799 UNLISTED PX VASCULAR SURGERY: CPT

## 2019-01-01 PROCEDURE — 37184 PRIM ART M-THRMBC 1ST VSL: CPT | Performed by: SURGERY

## 2019-01-01 PROCEDURE — C9113 INJ PANTOPRAZOLE SODIUM, VIA: HCPCS | Performed by: STUDENT IN AN ORGANIZED HEALTH CARE EDUCATION/TRAINING PROGRAM

## 2019-01-01 PROCEDURE — 7100000001 HC PACU RECOVERY - ADDTL 15 MIN: Performed by: SURGERY

## 2019-01-01 PROCEDURE — 81001 URINALYSIS AUTO W/SCOPE: CPT

## 2019-01-01 PROCEDURE — 99223 1ST HOSP IP/OBS HIGH 75: CPT | Performed by: PSYCHIATRY & NEUROLOGY

## 2019-01-01 PROCEDURE — P9040 RBC LEUKOREDUCED IRRADIATED: HCPCS

## 2019-01-01 PROCEDURE — P9016 RBC LEUKOCYTES REDUCED: HCPCS

## 2019-01-01 PROCEDURE — 06HY33Z INSERTION OF INFUSION DEVICE INTO LOWER VEIN, PERCUTANEOUS APPROACH: ICD-10-PCS | Performed by: INTERNAL MEDICINE

## 2019-01-01 PROCEDURE — 97162 PT EVAL MOD COMPLEX 30 MIN: CPT

## 2019-01-01 PROCEDURE — 73630 X-RAY EXAM OF FOOT: CPT

## 2019-01-01 PROCEDURE — 6360000002 HC RX W HCPCS: Performed by: NURSE ANESTHETIST, CERTIFIED REGISTERED

## 2019-01-01 PROCEDURE — 37253 INTRVASC US NONCORONARY ADDL: CPT | Performed by: SURGERY

## 2019-01-01 PROCEDURE — 90935 HEMODIALYSIS ONE EVALUATION: CPT

## 2019-01-01 PROCEDURE — G0480 DRUG TEST DEF 1-7 CLASSES: HCPCS

## 2019-01-01 PROCEDURE — 2500000003 HC RX 250 WO HCPCS: Performed by: NURSE PRACTITIONER

## 2019-01-01 PROCEDURE — 6370000000 HC RX 637 (ALT 250 FOR IP): Performed by: EMERGENCY MEDICINE

## 2019-01-01 PROCEDURE — 3600000015 HC SURGERY LEVEL 5 ADDTL 15MIN: Performed by: SURGERY

## 2019-01-01 PROCEDURE — 2580000003 HC RX 258: Performed by: FAMILY MEDICINE

## 2019-01-01 PROCEDURE — 88307 TISSUE EXAM BY PATHOLOGIST: CPT

## 2019-01-01 PROCEDURE — 6360000004 HC RX CONTRAST MEDICATION

## 2019-01-01 PROCEDURE — 99233 SBSQ HOSP IP/OBS HIGH 50: CPT | Performed by: FAMILY MEDICINE

## 2019-01-01 PROCEDURE — C1725 CATH, TRANSLUMIN NON-LASER: HCPCS

## 2019-01-01 PROCEDURE — 83036 HEMOGLOBIN GLYCOSYLATED A1C: CPT

## 2019-01-01 PROCEDURE — 36558 INSERT TUNNELED CV CATH: CPT | Performed by: SURGERY

## 2019-01-01 PROCEDURE — 93005 ELECTROCARDIOGRAM TRACING: CPT | Performed by: INTERNAL MEDICINE

## 2019-01-01 PROCEDURE — 86920 COMPATIBILITY TEST SPIN: CPT

## 2019-01-01 PROCEDURE — P9047 ALBUMIN (HUMAN), 25%, 50ML: HCPCS | Performed by: STUDENT IN AN ORGANIZED HEALTH CARE EDUCATION/TRAINING PROGRAM

## 2019-01-01 PROCEDURE — 87641 MR-STAPH DNA AMP PROBE: CPT

## 2019-01-01 PROCEDURE — 6360000002 HC RX W HCPCS: Performed by: RADIOLOGY

## 2019-01-01 PROCEDURE — 6360000002 HC RX W HCPCS: Performed by: EMERGENCY MEDICINE

## 2019-01-01 PROCEDURE — 2580000003 HC RX 258: Performed by: NURSE PRACTITIONER

## 2019-01-01 PROCEDURE — 99214 OFFICE O/P EST MOD 30 MIN: CPT | Performed by: INTERNAL MEDICINE

## 2019-01-01 PROCEDURE — B41F1ZZ FLUOROSCOPY OF RIGHT LOWER EXTREMITY ARTERIES USING LOW OSMOLAR CONTRAST: ICD-10-PCS | Performed by: SURGERY

## 2019-01-01 PROCEDURE — 99253 IP/OBS CNSLTJ NEW/EST LOW 45: CPT | Performed by: INTERNAL MEDICINE

## 2019-01-01 PROCEDURE — 6360000002 HC RX W HCPCS

## 2019-01-01 PROCEDURE — 0BH17EZ INSERTION OF ENDOTRACHEAL AIRWAY INTO TRACHEA, VIA NATURAL OR ARTIFICIAL OPENING: ICD-10-PCS | Performed by: RADIOLOGY

## 2019-01-01 PROCEDURE — 2500000003 HC RX 250 WO HCPCS

## 2019-01-01 PROCEDURE — 94762 N-INVAS EAR/PLS OXIMTRY CONT: CPT

## 2019-01-01 PROCEDURE — 2500000003 HC RX 250 WO HCPCS: Performed by: ANESTHESIOLOGY

## 2019-01-01 PROCEDURE — 2580000003 HC RX 258

## 2019-01-01 PROCEDURE — 93005 ELECTROCARDIOGRAM TRACING: CPT | Performed by: STUDENT IN AN ORGANIZED HEALTH CARE EDUCATION/TRAINING PROGRAM

## 2019-01-01 PROCEDURE — 3046F HEMOGLOBIN A1C LEVEL >9.0%: CPT | Performed by: INTERNAL MEDICINE

## 2019-01-01 PROCEDURE — 97110 THERAPEUTIC EXERCISES: CPT

## 2019-01-01 PROCEDURE — 80162 ASSAY OF DIGOXIN TOTAL: CPT

## 2019-01-01 PROCEDURE — 84145 PROCALCITONIN (PCT): CPT

## 2019-01-01 PROCEDURE — 84100 ASSAY OF PHOSPHORUS: CPT

## 2019-01-01 PROCEDURE — 1200000000 HC SEMI PRIVATE

## 2019-01-01 PROCEDURE — 36620 INSERTION CATHETER ARTERY: CPT | Performed by: SURGERY

## 2019-01-01 PROCEDURE — 80053 COMPREHEN METABOLIC PANEL: CPT

## 2019-01-01 PROCEDURE — 5A1935Z RESPIRATORY VENTILATION, LESS THAN 24 CONSECUTIVE HOURS: ICD-10-PCS | Performed by: SURGERY

## 2019-01-01 PROCEDURE — 04CK3ZZ EXTIRPATION OF MATTER FROM RIGHT FEMORAL ARTERY, PERCUTANEOUS APPROACH: ICD-10-PCS | Performed by: SURGERY

## 2019-01-01 PROCEDURE — 94002 VENT MGMT INPAT INIT DAY: CPT

## 2019-01-01 PROCEDURE — 51798 US URINE CAPACITY MEASURE: CPT

## 2019-01-01 PROCEDURE — 6360000002 HC RX W HCPCS: Performed by: FAMILY MEDICINE

## 2019-01-01 PROCEDURE — 2500000003 HC RX 250 WO HCPCS: Performed by: INTERNAL MEDICINE

## 2019-01-01 PROCEDURE — 99254 IP/OBS CNSLTJ NEW/EST MOD 60: CPT | Performed by: PHYSICAL MEDICINE & REHABILITATION

## 2019-01-01 PROCEDURE — 2500000003 HC RX 250 WO HCPCS: Performed by: SURGERY

## 2019-01-01 PROCEDURE — 71045 X-RAY EXAM CHEST 1 VIEW: CPT

## 2019-01-01 PROCEDURE — 93005 ELECTROCARDIOGRAM TRACING: CPT

## 2019-01-01 PROCEDURE — 6360000004 HC RX CONTRAST MEDICATION: Performed by: INTERNAL MEDICINE

## 2019-01-01 PROCEDURE — 82805 BLOOD GASES W/O2 SATURATION: CPT

## 2019-01-01 PROCEDURE — G0328 FECAL BLOOD SCRN IMMUNOASSAY: HCPCS

## 2019-01-01 PROCEDURE — 82553 CREATINE MB FRACTION: CPT

## 2019-01-01 PROCEDURE — 93005 ELECTROCARDIOGRAM TRACING: CPT | Performed by: EMERGENCY MEDICINE

## 2019-01-01 PROCEDURE — 2500000003 HC RX 250 WO HCPCS: Performed by: RADIOLOGY

## 2019-01-01 PROCEDURE — 86140 C-REACTIVE PROTEIN: CPT

## 2019-01-01 PROCEDURE — G8427 DOCREV CUR MEDS BY ELIG CLIN: HCPCS | Performed by: INTERNAL MEDICINE

## 2019-01-01 PROCEDURE — 85520 HEPARIN ASSAY: CPT

## 2019-01-01 PROCEDURE — 3600000003 HC SURGERY LEVEL 3 BASE: Performed by: SURGERY

## 2019-01-01 PROCEDURE — 5A1955Z RESPIRATORY VENTILATION, GREATER THAN 96 CONSECUTIVE HOURS: ICD-10-PCS | Performed by: RADIOLOGY

## 2019-01-01 PROCEDURE — 3600000005 HC SURGERY LEVEL 5 BASE: Performed by: SURGERY

## 2019-01-01 PROCEDURE — 04CM3ZZ EXTIRPATION OF MATTER FROM RIGHT POPLITEAL ARTERY, PERCUTANEOUS APPROACH: ICD-10-PCS | Performed by: SURGERY

## 2019-01-01 PROCEDURE — 82565 ASSAY OF CREATININE: CPT

## 2019-01-01 PROCEDURE — 99254 IP/OBS CNSLTJ NEW/EST MOD 60: CPT | Performed by: INTERNAL MEDICINE

## 2019-01-01 PROCEDURE — 04CT3ZZ EXTIRPATION OF MATTER FROM RIGHT PERONEAL ARTERY, PERCUTANEOUS APPROACH: ICD-10-PCS | Performed by: SURGERY

## 2019-01-01 PROCEDURE — 2580000003 HC RX 258: Performed by: NURSE ANESTHETIST, CERTIFIED REGISTERED

## 2019-01-01 PROCEDURE — 27886 AMPUTATION FOLLOW-UP SURGERY: CPT | Performed by: SURGERY

## 2019-01-01 PROCEDURE — 94003 VENT MGMT INPAT SUBQ DAY: CPT

## 2019-01-01 PROCEDURE — 87205 SMEAR GRAM STAIN: CPT

## 2019-01-01 PROCEDURE — 99232 SBSQ HOSP IP/OBS MODERATE 35: CPT | Performed by: PHYSICAL MEDICINE & REHABILITATION

## 2019-01-01 PROCEDURE — 0Q8G0ZZ DIVISION OF RIGHT TIBIA, OPEN APPROACH: ICD-10-PCS | Performed by: SURGERY

## 2019-01-01 PROCEDURE — 5A1D90Z PERFORMANCE OF URINARY FILTRATION, CONTINUOUS, GREATER THAN 18 HOURS PER DAY: ICD-10-PCS | Performed by: INTERNAL MEDICINE

## 2019-01-01 PROCEDURE — 7100000000 HC PACU RECOVERY - FIRST 15 MIN: Performed by: SURGERY

## 2019-01-01 PROCEDURE — 76705 ECHO EXAM OF ABDOMEN: CPT

## 2019-01-01 PROCEDURE — 99222 1ST HOSP IP/OBS MODERATE 55: CPT | Performed by: FAMILY MEDICINE

## 2019-01-01 PROCEDURE — 27590 AMPUTATE LEG AT THIGH: CPT | Performed by: SURGERY

## 2019-01-01 PROCEDURE — 34201 REMOVAL OF ARTERY CLOT: CPT | Performed by: SURGERY

## 2019-01-01 PROCEDURE — C1760 CLOSURE DEV, VASC: HCPCS

## 2019-01-01 PROCEDURE — B41D1ZZ FLUOROSCOPY OF AORTA AND BILATERAL LOWER EXTREMITY ARTERIES USING LOW OSMOLAR CONTRAST: ICD-10-PCS | Performed by: SURGERY

## 2019-01-01 PROCEDURE — 82533 TOTAL CORTISOL: CPT

## 2019-01-01 PROCEDURE — 7100000000 HC PACU RECOVERY - FIRST 15 MIN

## 2019-01-01 PROCEDURE — 99213 OFFICE O/P EST LOW 20 MIN: CPT

## 2019-01-01 PROCEDURE — P9017 PLASMA 1 DONOR FRZ W/IN 8 HR: HCPCS

## 2019-01-01 PROCEDURE — 36821 AV FUSION DIRECT ANY SITE: CPT | Performed by: SURGERY

## 2019-01-01 PROCEDURE — 7100000001 HC PACU RECOVERY - ADDTL 15 MIN

## 2019-01-01 PROCEDURE — 36902 INTRO CATH DIALYSIS CIRCUIT: CPT

## 2019-01-01 PROCEDURE — 96368 THER/DIAG CONCURRENT INF: CPT

## 2019-01-01 PROCEDURE — 76937 US GUIDE VASCULAR ACCESS: CPT

## 2019-01-01 PROCEDURE — 80051 ELECTROLYTE PANEL: CPT

## 2019-01-01 PROCEDURE — 36589 REMOVAL TUNNELED CV CATH: CPT | Performed by: RADIOLOGY

## 2019-01-01 PROCEDURE — 36907 BALO ANGIOP CTR DIALYSIS SEG: CPT

## 2019-01-01 PROCEDURE — 85651 RBC SED RATE NONAUTOMATED: CPT

## 2019-01-01 PROCEDURE — 3E043XZ INTRODUCTION OF VASOPRESSOR INTO CENTRAL VEIN, PERCUTANEOUS APPROACH: ICD-10-PCS | Performed by: STUDENT IN AN ORGANIZED HEALTH CARE EDUCATION/TRAINING PROGRAM

## 2019-01-01 PROCEDURE — 84520 ASSAY OF UREA NITROGEN: CPT

## 2019-01-01 PROCEDURE — 06HM33Z INSERTION OF INFUSION DEVICE INTO RIGHT FEMORAL VEIN, PERCUTANEOUS APPROACH: ICD-10-PCS | Performed by: SURGERY

## 2019-01-01 PROCEDURE — 3600000014 HC SURGERY LEVEL 4 ADDTL 15MIN: Performed by: SURGERY

## 2019-01-01 PROCEDURE — 75625 CONTRAST EXAM ABDOMINL AORTA: CPT | Performed by: SURGERY

## 2019-01-01 PROCEDURE — 96365 THER/PROPH/DIAG IV INF INIT: CPT

## 2019-01-01 PROCEDURE — 83970 ASSAY OF PARATHORMONE: CPT

## 2019-01-01 PROCEDURE — 99233 SBSQ HOSP IP/OBS HIGH 50: CPT | Performed by: PSYCHIATRY & NEUROLOGY

## 2019-01-01 PROCEDURE — 36902 INTRO CATH DIALYSIS CIRCUIT: CPT | Performed by: RADIOLOGY

## 2019-01-01 PROCEDURE — 95816 EEG AWAKE AND DROWSY: CPT | Performed by: PSYCHIATRY & NEUROLOGY

## 2019-01-01 PROCEDURE — 99222 1ST HOSP IP/OBS MODERATE 55: CPT | Performed by: INTERNAL MEDICINE

## 2019-01-01 PROCEDURE — 99254 IP/OBS CNSLTJ NEW/EST MOD 60: CPT | Performed by: SURGERY

## 2019-01-01 PROCEDURE — 0Y6C0Z3 DETACHMENT AT RIGHT UPPER LEG, LOW, OPEN APPROACH: ICD-10-PCS | Performed by: SURGERY

## 2019-01-01 PROCEDURE — 87150 DNA/RNA AMPLIFIED PROBE: CPT

## 2019-01-01 PROCEDURE — 97116 GAIT TRAINING THERAPY: CPT

## 2019-01-01 PROCEDURE — 99223 1ST HOSP IP/OBS HIGH 75: CPT | Performed by: INTERNAL MEDICINE

## 2019-01-01 PROCEDURE — 96366 THER/PROPH/DIAG IV INF ADDON: CPT

## 2019-01-01 PROCEDURE — 7100000010 HC PHASE II RECOVERY - FIRST 15 MIN: Performed by: SURGERY

## 2019-01-01 PROCEDURE — 0HXKXZZ TRANSFER RIGHT LOWER LEG SKIN, EXTERNAL APPROACH: ICD-10-PCS | Performed by: SURGERY

## 2019-01-01 PROCEDURE — B51W1ZZ FLUOROSCOPY OF DIALYSIS SHUNT/FISTULA USING LOW OSMOLAR CONTRAST: ICD-10-PCS | Performed by: RADIOLOGY

## 2019-01-01 PROCEDURE — 99239 HOSP IP/OBS DSCHRG MGMT >30: CPT | Performed by: FAMILY MEDICINE

## 2019-01-01 PROCEDURE — 5A2204Z RESTORATION OF CARDIAC RHYTHM, SINGLE: ICD-10-PCS | Performed by: SURGERY

## 2019-01-01 PROCEDURE — G8598 ASA/ANTIPLAT THER USED: HCPCS | Performed by: INTERNAL MEDICINE

## 2019-01-01 PROCEDURE — 3E05317 INTRODUCTION OF OTHER THROMBOLYTIC INTO PERIPHERAL ARTERY, PERCUTANEOUS APPROACH: ICD-10-PCS | Performed by: SURGERY

## 2019-01-01 PROCEDURE — 34203 REMOVAL OF LEG ARTERY CLOT: CPT | Performed by: SURGERY

## 2019-01-01 PROCEDURE — 1036F TOBACCO NON-USER: CPT | Performed by: INTERNAL MEDICINE

## 2019-01-01 PROCEDURE — 7100000011 HC PHASE II RECOVERY - ADDTL 15 MIN

## 2019-01-01 PROCEDURE — 0BH17EZ INSERTION OF ENDOTRACHEAL AIRWAY INTO TRACHEA, VIA NATURAL OR ARTIFICIAL OPENING: ICD-10-PCS | Performed by: SURGERY

## 2019-01-01 PROCEDURE — 0Q8J0ZZ DIVISION OF RIGHT FIBULA, OPEN APPROACH: ICD-10-PCS | Performed by: SURGERY

## 2019-01-01 PROCEDURE — 6360000002 HC RX W HCPCS: Performed by: ANESTHESIOLOGY

## 2019-01-01 RX ORDER — HEPARIN SODIUM 1000 [USP'U]/ML
INJECTION, SOLUTION INTRAVENOUS; SUBCUTANEOUS PRN
Status: DISCONTINUED | OUTPATIENT
Start: 2019-01-01 | End: 2019-01-01 | Stop reason: SDUPTHER

## 2019-01-01 RX ORDER — PROPOFOL 10 MG/ML
INJECTION, EMULSION INTRAVENOUS CONTINUOUS PRN
Status: DISCONTINUED | OUTPATIENT
Start: 2019-01-01 | End: 2019-01-01 | Stop reason: SDUPTHER

## 2019-01-01 RX ORDER — MAGNESIUM SULFATE 1 G/100ML
2 INJECTION INTRAVENOUS PRN
Status: DISCONTINUED | OUTPATIENT
Start: 2019-01-01 | End: 2019-01-01

## 2019-01-01 RX ORDER — KETAMINE HYDROCHLORIDE 10 MG/ML
INJECTION, SOLUTION INTRAMUSCULAR; INTRAVENOUS PRN
Status: DISCONTINUED | OUTPATIENT
Start: 2019-01-01 | End: 2019-01-01 | Stop reason: SDUPTHER

## 2019-01-01 RX ORDER — PREDNISOLONE ACETATE 10 MG/ML
1 SUSPENSION/ DROPS OPHTHALMIC EVERY 4 HOURS
Status: DISCONTINUED | OUTPATIENT
Start: 2019-01-01 | End: 2019-01-01 | Stop reason: ALTCHOICE

## 2019-01-01 RX ORDER — WARFARIN SODIUM 5 MG/1
5 TABLET ORAL DAILY
Status: DISCONTINUED | OUTPATIENT
Start: 2019-01-01 | End: 2019-01-01

## 2019-01-01 RX ORDER — KETOROLAC TROMETHAMINE 5 MG/ML
1 SOLUTION OPHTHALMIC 4 TIMES DAILY
Status: DISCONTINUED | OUTPATIENT
Start: 2019-01-01 | End: 2019-01-01

## 2019-01-01 RX ORDER — ROCURONIUM BROMIDE 10 MG/ML
INJECTION, SOLUTION INTRAVENOUS PRN
Status: DISCONTINUED | OUTPATIENT
Start: 2019-01-01 | End: 2019-01-01 | Stop reason: SDUPTHER

## 2019-01-01 RX ORDER — 0.9 % SODIUM CHLORIDE 0.9 %
250 INTRAVENOUS SOLUTION INTRAVENOUS ONCE
Status: COMPLETED | OUTPATIENT
Start: 2019-01-01 | End: 2019-01-01

## 2019-01-01 RX ORDER — ACETAMINOPHEN 325 MG/1
650 TABLET ORAL EVERY 4 HOURS PRN
Status: DISCONTINUED | OUTPATIENT
Start: 2019-01-01 | End: 2019-01-01 | Stop reason: HOSPADM

## 2019-01-01 RX ORDER — DEXTROSE MONOHYDRATE 25 G/50ML
25 INJECTION, SOLUTION INTRAVENOUS ONCE
Status: COMPLETED | OUTPATIENT
Start: 2019-01-01 | End: 2019-01-01

## 2019-01-01 RX ORDER — MAGNESIUM HYDROXIDE 1200 MG/15ML
LIQUID ORAL CONTINUOUS PRN
Status: COMPLETED | OUTPATIENT
Start: 2019-01-01 | End: 2019-01-01

## 2019-01-01 RX ORDER — MORPHINE SULFATE 2 MG/ML
2 INJECTION, SOLUTION INTRAMUSCULAR; INTRAVENOUS
Status: DISCONTINUED | OUTPATIENT
Start: 2019-01-01 | End: 2019-01-01

## 2019-01-01 RX ORDER — MAGNESIUM HYDROXIDE 1200 MG/15ML
LIQUID ORAL CONTINUOUS PRN
Status: DISCONTINUED | OUTPATIENT
Start: 2019-01-01 | End: 2019-01-01 | Stop reason: HOSPADM

## 2019-01-01 RX ORDER — DEXTROSE MONOHYDRATE 50 MG/ML
100 INJECTION, SOLUTION INTRAVENOUS PRN
Status: DISCONTINUED | OUTPATIENT
Start: 2019-01-01 | End: 2019-01-01 | Stop reason: HOSPADM

## 2019-01-01 RX ORDER — HEPARIN SODIUM 5000 [USP'U]/ML
4000 INJECTION, SOLUTION INTRAVENOUS; SUBCUTANEOUS ONCE
Status: COMPLETED | OUTPATIENT
Start: 2019-01-01 | End: 2019-01-01

## 2019-01-01 RX ORDER — HEPARIN SODIUM 1000 [USP'U]/ML
80 INJECTION, SOLUTION INTRAVENOUS; SUBCUTANEOUS PRN
Status: DISCONTINUED | OUTPATIENT
Start: 2019-01-01 | End: 2019-01-01

## 2019-01-01 RX ORDER — DOCUSATE SODIUM 100 MG/1
100 CAPSULE, LIQUID FILLED ORAL DAILY
Status: DISCONTINUED | OUTPATIENT
Start: 2019-01-01 | End: 2019-01-01

## 2019-01-01 RX ORDER — MOXIFLOXACIN 5 MG/ML
1 SOLUTION/ DROPS OPHTHALMIC EVERY 4 HOURS
COMMUNITY

## 2019-01-01 RX ORDER — OXYCODONE HYDROCHLORIDE AND ACETAMINOPHEN 5; 325 MG/1; MG/1
1 TABLET ORAL EVERY 4 HOURS PRN
Status: DISCONTINUED | OUTPATIENT
Start: 2019-01-01 | End: 2019-01-01

## 2019-01-01 RX ORDER — AMOXICILLIN AND CLAVULANATE POTASSIUM 875; 125 MG/1; MG/1
1 TABLET, FILM COATED ORAL 2 TIMES DAILY
Qty: 14 TABLET | Refills: 0 | Status: SHIPPED | OUTPATIENT
Start: 2019-01-01 | End: 2019-01-01

## 2019-01-01 RX ORDER — PREDNISOLONE ACETATE 10 MG/ML
1 SUSPENSION/ DROPS OPHTHALMIC EVERY 4 HOURS
Status: ON HOLD | COMMUNITY
End: 2019-01-01 | Stop reason: ALTCHOICE

## 2019-01-01 RX ORDER — WARFARIN SODIUM 5 MG/1
2.5 TABLET ORAL
COMMUNITY

## 2019-01-01 RX ORDER — WARFARIN SODIUM 5 MG/1
5 TABLET ORAL
COMMUNITY

## 2019-01-01 RX ORDER — EPINEPHRINE 1 MG/ML
INJECTION, SOLUTION, CONCENTRATE INTRAVENOUS PRN
Status: DISCONTINUED | OUTPATIENT
Start: 2019-01-01 | End: 2019-01-01 | Stop reason: SDUPTHER

## 2019-01-01 RX ORDER — ONDANSETRON 2 MG/ML
4 INJECTION INTRAMUSCULAR; INTRAVENOUS EVERY 6 HOURS PRN
Status: DISCONTINUED | OUTPATIENT
Start: 2019-01-01 | End: 2019-01-01 | Stop reason: HOSPADM

## 2019-01-01 RX ORDER — MIDAZOLAM HYDROCHLORIDE 1 MG/ML
INJECTION INTRAMUSCULAR; INTRAVENOUS PRN
Status: DISCONTINUED | OUTPATIENT
Start: 2019-01-01 | End: 2019-01-01 | Stop reason: SDUPTHER

## 2019-01-01 RX ORDER — VANCOMYCIN HYDROCHLORIDE 1 G/200ML
1000 INJECTION, SOLUTION INTRAVENOUS ONCE
Status: COMPLETED | OUTPATIENT
Start: 2019-01-01 | End: 2019-01-01

## 2019-01-01 RX ORDER — GLIPIZIDE 5 MG/1
2.5 TABLET ORAL
Status: DISCONTINUED | OUTPATIENT
Start: 2019-01-01 | End: 2019-01-01 | Stop reason: HOSPADM

## 2019-01-01 RX ORDER — HEPARIN SODIUM AND DEXTROSE 10000; 5 [USP'U]/100ML; G/100ML
12 INJECTION INTRAVENOUS CONTINUOUS
Status: DISCONTINUED | OUTPATIENT
Start: 2019-01-01 | End: 2019-01-01

## 2019-01-01 RX ORDER — HEPARIN SODIUM 1000 [USP'U]/ML
80 INJECTION, SOLUTION INTRAVENOUS; SUBCUTANEOUS ONCE
Status: COMPLETED | OUTPATIENT
Start: 2019-01-01 | End: 2019-01-01

## 2019-01-01 RX ORDER — GABAPENTIN 300 MG/1
300 CAPSULE ORAL 3 TIMES DAILY
Status: DISCONTINUED | OUTPATIENT
Start: 2019-01-01 | End: 2019-01-01

## 2019-01-01 RX ORDER — PREDNISOLONE ACETATE 10 MG/ML
1 SUSPENSION/ DROPS OPHTHALMIC EVERY 4 HOURS
Status: DISCONTINUED | OUTPATIENT
Start: 2019-01-01 | End: 2019-01-01 | Stop reason: HOSPADM

## 2019-01-01 RX ORDER — MAGNESIUM SULFATE 1 G/100ML
1 INJECTION INTRAVENOUS PRN
Status: DISCONTINUED | OUTPATIENT
Start: 2019-01-01 | End: 2019-01-01 | Stop reason: HOSPADM

## 2019-01-01 RX ORDER — VANCOMYCIN HYDROCHLORIDE 1 G/200ML
1000 INJECTION, SOLUTION INTRAVENOUS
Status: DISCONTINUED | OUTPATIENT
Start: 2019-01-01 | End: 2019-01-01 | Stop reason: HOSPADM

## 2019-01-01 RX ORDER — GLYCOPYRROLATE 1 MG/5 ML
SYRINGE (ML) INTRAVENOUS PRN
Status: DISCONTINUED | OUTPATIENT
Start: 2019-01-01 | End: 2019-01-01 | Stop reason: SDUPTHER

## 2019-01-01 RX ORDER — DEXTROSE MONOHYDRATE 25 G/50ML
12.5 INJECTION, SOLUTION INTRAVENOUS PRN
Status: DISCONTINUED | OUTPATIENT
Start: 2019-01-01 | End: 2019-01-01 | Stop reason: HOSPADM

## 2019-01-01 RX ORDER — MIDODRINE HYDROCHLORIDE 5 MG/1
5 TABLET ORAL 3 TIMES DAILY PRN
Status: DISCONTINUED | OUTPATIENT
Start: 2019-01-01 | End: 2019-01-01

## 2019-01-01 RX ORDER — FENTANYL CITRATE 50 UG/ML
INJECTION, SOLUTION INTRAMUSCULAR; INTRAVENOUS PRN
Status: DISCONTINUED | OUTPATIENT
Start: 2019-01-01 | End: 2019-01-01 | Stop reason: SDUPTHER

## 2019-01-01 RX ORDER — POTASSIUM CHLORIDE 29.8 MG/ML
20 INJECTION INTRAVENOUS PRN
Status: DISCONTINUED | OUTPATIENT
Start: 2019-01-01 | End: 2019-01-01

## 2019-01-01 RX ORDER — SODIUM CHLORIDE 9 MG/ML
INJECTION, SOLUTION INTRAVENOUS CONTINUOUS PRN
Status: DISCONTINUED | OUTPATIENT
Start: 2019-01-01 | End: 2019-01-01 | Stop reason: SDUPTHER

## 2019-01-01 RX ORDER — CARVEDILOL 3.12 MG/1
3.12 TABLET ORAL 2 TIMES DAILY
Status: DISCONTINUED | OUTPATIENT
Start: 2019-01-01 | End: 2019-01-01

## 2019-01-01 RX ORDER — SODIUM CHLORIDE, SODIUM LACTATE, POTASSIUM CHLORIDE, CALCIUM CHLORIDE 600; 310; 30; 20 MG/100ML; MG/100ML; MG/100ML; MG/100ML
INJECTION, SOLUTION INTRAVENOUS CONTINUOUS
Status: DISCONTINUED | OUTPATIENT
Start: 2019-01-01 | End: 2019-01-01 | Stop reason: HOSPADM

## 2019-01-01 RX ORDER — CARVEDILOL 12.5 MG/1
12.5 TABLET ORAL 2 TIMES DAILY
Status: DISCONTINUED | OUTPATIENT
Start: 2019-01-01 | End: 2019-01-01 | Stop reason: HOSPADM

## 2019-01-01 RX ORDER — HEPARIN SODIUM 10000 [USP'U]/100ML
12 INJECTION, SOLUTION INTRAVENOUS CONTINUOUS
Status: DISCONTINUED | OUTPATIENT
Start: 2019-01-01 | End: 2019-01-01

## 2019-01-01 RX ORDER — ALBUMIN (HUMAN) 12.5 G/50ML
25 SOLUTION INTRAVENOUS ONCE
Status: COMPLETED | OUTPATIENT
Start: 2019-01-01 | End: 2019-01-01

## 2019-01-01 RX ORDER — MIDAZOLAM HYDROCHLORIDE 1 MG/ML
1 INJECTION INTRAMUSCULAR; INTRAVENOUS EVERY 10 MIN PRN
Status: CANCELLED | OUTPATIENT
Start: 2019-01-01

## 2019-01-01 RX ORDER — WARFARIN SODIUM 2.5 MG/1
2.5 TABLET ORAL
Status: DISCONTINUED | OUTPATIENT
Start: 2019-01-01 | End: 2019-01-01

## 2019-01-01 RX ORDER — SODIUM CHLORIDE, SODIUM LACTATE, POTASSIUM CHLORIDE, CALCIUM CHLORIDE 600; 310; 30; 20 MG/100ML; MG/100ML; MG/100ML; MG/100ML
INJECTION, SOLUTION INTRAVENOUS CONTINUOUS
Status: DISCONTINUED | OUTPATIENT
Start: 2019-01-01 | End: 2019-01-01

## 2019-01-01 RX ORDER — FUROSEMIDE 40 MG/1
40 TABLET ORAL 2 TIMES DAILY
Status: DISCONTINUED | OUTPATIENT
Start: 2019-01-01 | End: 2019-01-01

## 2019-01-01 RX ORDER — NICOTINE POLACRILEX 4 MG
15 LOZENGE BUCCAL PRN
Status: DISCONTINUED | OUTPATIENT
Start: 2019-01-01 | End: 2019-01-01

## 2019-01-01 RX ORDER — PREDNISOLONE ACETATE 10 MG/ML
1 SUSPENSION/ DROPS OPHTHALMIC EVERY 6 HOURS SCHEDULED
Status: DISCONTINUED | OUTPATIENT
Start: 2019-01-01 | End: 2019-01-01

## 2019-01-01 RX ORDER — ETOMIDATE 2 MG/ML
INJECTION INTRAVENOUS PRN
Status: DISCONTINUED | OUTPATIENT
Start: 2019-01-01 | End: 2019-01-01 | Stop reason: SDUPTHER

## 2019-01-01 RX ORDER — SODIUM CHLORIDE 0.9 % (FLUSH) 0.9 %
10 SYRINGE (ML) INJECTION PRN
Status: DISCONTINUED | OUTPATIENT
Start: 2019-01-01 | End: 2019-01-01 | Stop reason: HOSPADM

## 2019-01-01 RX ORDER — ASPIRIN 81 MG/1
81 TABLET ORAL DAILY
Qty: 30 TABLET | Refills: 2 | Status: SHIPPED | OUTPATIENT
Start: 2019-01-01 | End: 2019-01-01

## 2019-01-01 RX ORDER — FAMOTIDINE 20 MG/1
20 TABLET, FILM COATED ORAL 2 TIMES DAILY
Status: DISCONTINUED | OUTPATIENT
Start: 2019-01-01 | End: 2019-01-01

## 2019-01-01 RX ORDER — MIDODRINE HYDROCHLORIDE 5 MG/1
10 TABLET ORAL 3 TIMES DAILY PRN
Status: DISCONTINUED | OUTPATIENT
Start: 2019-01-01 | End: 2019-01-01

## 2019-01-01 RX ORDER — TAMSULOSIN HYDROCHLORIDE 0.4 MG/1
0.4 CAPSULE ORAL DAILY
Status: DISCONTINUED | OUTPATIENT
Start: 2019-01-01 | End: 2019-01-01 | Stop reason: HOSPADM

## 2019-01-01 RX ORDER — HEPARIN SODIUM 1000 [USP'U]/ML
40 INJECTION, SOLUTION INTRAVENOUS; SUBCUTANEOUS PRN
Status: DISCONTINUED | OUTPATIENT
Start: 2019-01-01 | End: 2019-01-01

## 2019-01-01 RX ORDER — FENTANYL CITRATE 50 UG/ML
50 INJECTION, SOLUTION INTRAMUSCULAR; INTRAVENOUS
Status: DISCONTINUED | OUTPATIENT
Start: 2019-01-01 | End: 2019-01-01

## 2019-01-01 RX ORDER — KETOROLAC TROMETHAMINE 5 MG/ML
1 SOLUTION OPHTHALMIC 4 TIMES DAILY
Status: DISCONTINUED | OUTPATIENT
Start: 2019-01-01 | End: 2019-01-01 | Stop reason: HOSPADM

## 2019-01-01 RX ORDER — POTASSIUM CHLORIDE 7.45 MG/ML
10 INJECTION INTRAVENOUS PRN
Status: CANCELLED | OUTPATIENT
Start: 2019-01-01

## 2019-01-01 RX ORDER — TAMSULOSIN HYDROCHLORIDE 0.4 MG/1
0.4 CAPSULE ORAL DAILY
Status: DISCONTINUED | OUTPATIENT
Start: 2019-01-01 | End: 2019-01-01

## 2019-01-01 RX ORDER — ASPIRIN 81 MG/1
81 TABLET ORAL DAILY
Status: CANCELLED | OUTPATIENT
Start: 2019-01-01

## 2019-01-01 RX ORDER — NICOTINE POLACRILEX 4 MG
15 LOZENGE BUCCAL PRN
Status: DISCONTINUED | OUTPATIENT
Start: 2019-01-01 | End: 2019-01-01 | Stop reason: HOSPADM

## 2019-01-01 RX ORDER — HEPARIN SODIUM 5000 [USP'U]/ML
4000 INJECTION, SOLUTION INTRAVENOUS; SUBCUTANEOUS PRN
Status: DISCONTINUED | OUTPATIENT
Start: 2019-01-01 | End: 2019-01-01 | Stop reason: HOSPADM

## 2019-01-01 RX ORDER — DEXTROSE MONOHYDRATE 25 G/50ML
25 INJECTION, SOLUTION INTRAVENOUS PRN
Status: DISCONTINUED | OUTPATIENT
Start: 2019-01-01 | End: 2019-01-01

## 2019-01-01 RX ORDER — POTASSIUM CHLORIDE 7.45 MG/ML
10 INJECTION INTRAVENOUS PRN
Status: DISCONTINUED | OUTPATIENT
Start: 2019-01-01 | End: 2019-01-01 | Stop reason: HOSPADM

## 2019-01-01 RX ORDER — CLOPIDOGREL BISULFATE 75 MG/1
75 TABLET ORAL DAILY
Status: DISCONTINUED | OUTPATIENT
Start: 2019-01-01 | End: 2019-01-01

## 2019-01-01 RX ORDER — 0.9 % SODIUM CHLORIDE 0.9 %
250 INTRAVENOUS SOLUTION INTRAVENOUS ONCE
Status: DISCONTINUED | OUTPATIENT
Start: 2019-01-01 | End: 2019-01-01 | Stop reason: HOSPADM

## 2019-01-01 RX ORDER — POLYETHYLENE GLYCOL 3350 17 G/17G
17 POWDER, FOR SOLUTION ORAL 2 TIMES DAILY
Status: DISCONTINUED | OUTPATIENT
Start: 2019-01-01 | End: 2019-01-01

## 2019-01-01 RX ORDER — LIDOCAINE HYDROCHLORIDE 10 MG/ML
INJECTION, SOLUTION EPIDURAL; INFILTRATION; INTRACAUDAL; PERINEURAL PRN
Status: DISCONTINUED | OUTPATIENT
Start: 2019-01-01 | End: 2019-01-01 | Stop reason: SDUPTHER

## 2019-01-01 RX ORDER — DEXTROSE MONOHYDRATE 25 G/50ML
25 INJECTION, SOLUTION INTRAVENOUS ONCE
Status: DISCONTINUED | OUTPATIENT
Start: 2019-01-01 | End: 2019-01-01

## 2019-01-01 RX ORDER — SODIUM CHLORIDE 0.9 % (FLUSH) 0.9 %
10 SYRINGE (ML) INJECTION PRN
Status: CANCELLED | OUTPATIENT
Start: 2019-01-01

## 2019-01-01 RX ORDER — POTASSIUM CHLORIDE 20 MEQ/1
40 TABLET, EXTENDED RELEASE ORAL PRN
Status: CANCELLED | OUTPATIENT
Start: 2019-01-01

## 2019-01-01 RX ORDER — 0.9 % SODIUM CHLORIDE 0.9 %
10 VIAL (ML) INJECTION DAILY
Status: DISCONTINUED | OUTPATIENT
Start: 2019-01-01 | End: 2019-01-01

## 2019-01-01 RX ORDER — MIDAZOLAM HYDROCHLORIDE 1 MG/ML
2 INJECTION INTRAMUSCULAR; INTRAVENOUS ONCE
Status: DISCONTINUED | OUTPATIENT
Start: 2019-01-01 | End: 2019-01-01

## 2019-01-01 RX ORDER — HEPARIN SODIUM 1000 [USP'U]/ML
4000 INJECTION, SOLUTION INTRAVENOUS; SUBCUTANEOUS PRN
Status: DISCONTINUED | OUTPATIENT
Start: 2019-01-01 | End: 2019-01-01

## 2019-01-01 RX ORDER — VANCOMYCIN HYDROCHLORIDE 1 G/20ML
INJECTION, POWDER, LYOPHILIZED, FOR SOLUTION INTRAVENOUS PRN
Status: DISCONTINUED | OUTPATIENT
Start: 2019-01-01 | End: 2019-01-01 | Stop reason: SDUPTHER

## 2019-01-01 RX ORDER — DIGOXIN 0.25 MG/ML
250 INJECTION INTRAMUSCULAR; INTRAVENOUS DAILY
Status: DISCONTINUED | OUTPATIENT
Start: 2019-01-01 | End: 2019-01-01

## 2019-01-01 RX ORDER — ACETAMINOPHEN 500 MG
1000 TABLET ORAL ONCE
Status: COMPLETED | OUTPATIENT
Start: 2019-01-01 | End: 2019-01-01

## 2019-01-01 RX ORDER — FUROSEMIDE 40 MG/1
40 TABLET ORAL 2 TIMES DAILY
Status: DISCONTINUED | OUTPATIENT
Start: 2019-01-01 | End: 2019-01-01 | Stop reason: HOSPADM

## 2019-01-01 RX ORDER — MOXIFLOXACIN 5 MG/ML
1 SOLUTION/ DROPS OPHTHALMIC 3 TIMES DAILY
Status: DISCONTINUED | OUTPATIENT
Start: 2019-01-01 | End: 2019-01-01 | Stop reason: ALTCHOICE

## 2019-01-01 RX ORDER — PROPOFOL 10 MG/ML
INJECTION, EMULSION INTRAVENOUS PRN
Status: DISCONTINUED | OUTPATIENT
Start: 2019-01-01 | End: 2019-01-01 | Stop reason: SDUPTHER

## 2019-01-01 RX ORDER — SODIUM CHLORIDE 9 MG/ML
INJECTION, SOLUTION INTRAVENOUS CONTINUOUS
Status: DISCONTINUED | OUTPATIENT
Start: 2019-01-01 | End: 2019-01-01

## 2019-01-01 RX ORDER — NICOTINE POLACRILEX 4 MG
15 LOZENGE BUCCAL PRN
Status: DISCONTINUED | OUTPATIENT
Start: 2019-01-01 | End: 2019-01-01 | Stop reason: SDUPTHER

## 2019-01-01 RX ORDER — GLIPIZIDE 2.5 MG/1
2.5 TABLET, EXTENDED RELEASE ORAL DAILY
Qty: 30 TABLET | Refills: 3
Start: 2019-01-01

## 2019-01-01 RX ORDER — DIAZEPAM 5 MG/1
2.5 TABLET ORAL EVERY 8 HOURS
Status: DISCONTINUED | OUTPATIENT
Start: 2019-01-01 | End: 2019-01-01

## 2019-01-01 RX ORDER — TAMSULOSIN HYDROCHLORIDE 0.4 MG/1
CAPSULE ORAL
Qty: 30 CAPSULE | Refills: 3 | Status: SHIPPED | OUTPATIENT
Start: 2019-01-01 | End: 2019-01-01 | Stop reason: SDUPTHER

## 2019-01-01 RX ORDER — GLIPIZIDE 5 MG/1
2.5 TABLET ORAL
Status: DISCONTINUED | OUTPATIENT
Start: 2019-01-01 | End: 2019-01-01

## 2019-01-01 RX ORDER — VANCOMYCIN HYDROCHLORIDE 1 G/200ML
INJECTION, SOLUTION INTRAVENOUS PRN
Status: DISCONTINUED | OUTPATIENT
Start: 2019-01-01 | End: 2019-01-01 | Stop reason: SDUPTHER

## 2019-01-01 RX ORDER — FENTANYL CITRATE 50 UG/ML
INJECTION, SOLUTION INTRAMUSCULAR; INTRAVENOUS
Status: COMPLETED | OUTPATIENT
Start: 2019-01-01 | End: 2019-01-01

## 2019-01-01 RX ORDER — SODIUM CHLORIDE 0.9 % (FLUSH) 0.9 %
10 SYRINGE (ML) INJECTION EVERY 12 HOURS SCHEDULED
Status: DISCONTINUED | OUTPATIENT
Start: 2019-01-01 | End: 2019-01-01 | Stop reason: HOSPADM

## 2019-01-01 RX ORDER — NITROGLYCERIN 20 MG/100ML
200 INJECTION INTRAVENOUS ONCE
Status: COMPLETED | OUTPATIENT
Start: 2019-01-01 | End: 2019-01-01

## 2019-01-01 RX ORDER — MAGNESIUM SULFATE 1 G/100ML
1 INJECTION INTRAVENOUS PRN
Status: DISCONTINUED | OUTPATIENT
Start: 2019-01-01 | End: 2019-01-01

## 2019-01-01 RX ORDER — ASPIRIN 81 MG/1
81 TABLET ORAL DAILY
Status: DISCONTINUED | OUTPATIENT
Start: 2019-01-01 | End: 2019-01-01 | Stop reason: HOSPADM

## 2019-01-01 RX ORDER — ONDANSETRON 2 MG/ML
INJECTION INTRAMUSCULAR; INTRAVENOUS PRN
Status: DISCONTINUED | OUTPATIENT
Start: 2019-01-01 | End: 2019-01-01 | Stop reason: SDUPTHER

## 2019-01-01 RX ORDER — PHENYLEPHRINE HCL IN 0.9% NACL 0.5 MG/5ML
SYRINGE (ML) INTRAVENOUS PRN
Status: DISCONTINUED | OUTPATIENT
Start: 2019-01-01 | End: 2019-01-01 | Stop reason: SDUPTHER

## 2019-01-01 RX ORDER — HEPARIN SODIUM 5000 [USP'U]/ML
INJECTION, SOLUTION INTRAVENOUS; SUBCUTANEOUS PRN
Status: DISCONTINUED | OUTPATIENT
Start: 2019-01-01 | End: 2019-01-01 | Stop reason: HOSPADM

## 2019-01-01 RX ORDER — KETOROLAC TROMETHAMINE 5 MG/ML
1 SOLUTION OPHTHALMIC
COMMUNITY

## 2019-01-01 RX ORDER — CHLORHEXIDINE GLUCONATE 0.12 MG/ML
15 RINSE ORAL 2 TIMES DAILY
Status: DISCONTINUED | OUTPATIENT
Start: 2019-01-01 | End: 2019-01-01

## 2019-01-01 RX ORDER — GABAPENTIN 100 MG/1
100 CAPSULE ORAL 3 TIMES DAILY
Status: DISCONTINUED | OUTPATIENT
Start: 2019-01-01 | End: 2019-01-01

## 2019-01-01 RX ORDER — ACETAMINOPHEN 500 MG
1000 TABLET ORAL EVERY 8 HOURS
Status: DISCONTINUED | OUTPATIENT
Start: 2019-01-01 | End: 2019-01-01

## 2019-01-01 RX ORDER — KETOROLAC TROMETHAMINE 5 MG/ML
1 SOLUTION OPHTHALMIC
Status: DISCONTINUED | OUTPATIENT
Start: 2019-01-01 | End: 2019-01-01

## 2019-01-01 RX ORDER — 0.9 % SODIUM CHLORIDE 0.9 %
250 INTRAVENOUS SOLUTION INTRAVENOUS ONCE
Status: DISCONTINUED | OUTPATIENT
Start: 2019-01-01 | End: 2019-01-01

## 2019-01-01 RX ORDER — DEXTROSE MONOHYDRATE 50 MG/ML
100 INJECTION, SOLUTION INTRAVENOUS PRN
Status: DISCONTINUED | OUTPATIENT
Start: 2019-01-01 | End: 2019-01-01 | Stop reason: SDUPTHER

## 2019-01-01 RX ORDER — SEVELAMER CARBONATE 800 MG/1
1600 TABLET, FILM COATED ORAL
Status: DISCONTINUED | OUTPATIENT
Start: 2019-01-01 | End: 2019-01-01 | Stop reason: HOSPADM

## 2019-01-01 RX ORDER — 0.9 % SODIUM CHLORIDE 0.9 %
500 INTRAVENOUS SOLUTION INTRAVENOUS ONCE
Status: COMPLETED | OUTPATIENT
Start: 2019-01-01 | End: 2019-01-01

## 2019-01-01 RX ORDER — SODIUM CHLORIDE 0.9 % (FLUSH) 0.9 %
10 SYRINGE (ML) INJECTION EVERY 12 HOURS SCHEDULED
Status: CANCELLED | OUTPATIENT
Start: 2019-01-01

## 2019-01-01 RX ORDER — OXYCODONE HYDROCHLORIDE 5 MG/1
5 TABLET ORAL EVERY 4 HOURS PRN
Status: DISCONTINUED | OUTPATIENT
Start: 2019-01-01 | End: 2019-01-01

## 2019-01-01 RX ORDER — POTASSIUM CHLORIDE 20 MEQ/1
40 TABLET, EXTENDED RELEASE ORAL PRN
Status: DISCONTINUED | OUTPATIENT
Start: 2019-01-01 | End: 2019-01-01 | Stop reason: HOSPADM

## 2019-01-01 RX ORDER — MAGNESIUM SULFATE 1 G/100ML
1 INJECTION INTRAVENOUS ONCE
Status: COMPLETED | OUTPATIENT
Start: 2019-01-01 | End: 2019-01-01

## 2019-01-01 RX ORDER — HEPARIN SODIUM 5000 [USP'U]/ML
2000 INJECTION, SOLUTION INTRAVENOUS; SUBCUTANEOUS PRN
Status: DISCONTINUED | OUTPATIENT
Start: 2019-01-01 | End: 2019-01-01 | Stop reason: HOSPADM

## 2019-01-01 RX ORDER — PANTOPRAZOLE SODIUM 40 MG/10ML
40 INJECTION, POWDER, LYOPHILIZED, FOR SOLUTION INTRAVENOUS 2 TIMES DAILY
Status: DISCONTINUED | OUTPATIENT
Start: 2019-01-01 | End: 2019-01-01

## 2019-01-01 RX ORDER — DEXTROSE, SODIUM CHLORIDE, AND POTASSIUM CHLORIDE 5; .45; .15 G/100ML; G/100ML; G/100ML
INJECTION INTRAVENOUS CONTINUOUS
Status: CANCELLED | OUTPATIENT
Start: 2019-01-01

## 2019-01-01 RX ORDER — BLOOD-GLUCOSE METER
EACH MISCELLANEOUS
COMMUNITY
Start: 2019-01-01

## 2019-01-01 RX ORDER — HEPARIN SODIUM 1000 [USP'U]/ML
2000 INJECTION, SOLUTION INTRAVENOUS; SUBCUTANEOUS PRN
Status: DISCONTINUED | OUTPATIENT
Start: 2019-01-01 | End: 2019-01-01

## 2019-01-01 RX ORDER — OXYCODONE HYDROCHLORIDE AND ACETAMINOPHEN 5; 325 MG/1; MG/1
2 TABLET ORAL EVERY 4 HOURS PRN
Status: DISCONTINUED | OUTPATIENT
Start: 2019-01-01 | End: 2019-01-01

## 2019-01-01 RX ORDER — LIDOCAINE HYDROCHLORIDE 10 MG/ML
INJECTION, SOLUTION INFILTRATION; PERINEURAL PRN
Status: DISCONTINUED | OUTPATIENT
Start: 2019-01-01 | End: 2019-01-01 | Stop reason: HOSPADM

## 2019-01-01 RX ORDER — KETOROLAC TROMETHAMINE 15 MG/ML
15 INJECTION, SOLUTION INTRAMUSCULAR; INTRAVENOUS EVERY 6 HOURS
Status: DISCONTINUED | OUTPATIENT
Start: 2019-01-01 | End: 2019-01-01

## 2019-01-01 RX ORDER — MAGNESIUM SULFATE 1 G/100ML
1 INJECTION INTRAVENOUS
Status: COMPLETED | OUTPATIENT
Start: 2019-01-01 | End: 2019-01-01

## 2019-01-01 RX ORDER — HEPARIN SODIUM 10000 [USP'U]/100ML
1000 INJECTION, SOLUTION INTRAVENOUS CONTINUOUS
Status: DISCONTINUED | OUTPATIENT
Start: 2019-01-01 | End: 2019-01-01 | Stop reason: HOSPADM

## 2019-01-01 RX ORDER — LORAZEPAM 2 MG/ML
1 INJECTION INTRAMUSCULAR
Status: DISCONTINUED | OUTPATIENT
Start: 2019-01-01 | End: 2019-01-01 | Stop reason: HOSPADM

## 2019-01-01 RX ORDER — NICOTINE 21 MG/24HR
1 PATCH, TRANSDERMAL 24 HOURS TRANSDERMAL DAILY PRN
Status: DISCONTINUED | OUTPATIENT
Start: 2019-01-01 | End: 2019-01-01 | Stop reason: HOSPADM

## 2019-01-01 RX ORDER — MIDAZOLAM HYDROCHLORIDE 1 MG/ML
INJECTION INTRAMUSCULAR; INTRAVENOUS
Status: COMPLETED | OUTPATIENT
Start: 2019-01-01 | End: 2019-01-01

## 2019-01-01 RX ORDER — 0.9 % SODIUM CHLORIDE 0.9 %
10 VIAL (ML) INJECTION 2 TIMES DAILY
Status: DISCONTINUED | OUTPATIENT
Start: 2019-01-01 | End: 2019-01-01

## 2019-01-01 RX ORDER — LIDOCAINE HYDROCHLORIDE 10 MG/ML
INJECTION, SOLUTION EPIDURAL; INFILTRATION; INTRACAUDAL; PERINEURAL PRN
Status: DISCONTINUED | OUTPATIENT
Start: 2019-01-01 | End: 2019-01-01 | Stop reason: HOSPADM

## 2019-01-01 RX ORDER — DEXTROSE MONOHYDRATE 25 G/50ML
12.5 INJECTION, SOLUTION INTRAVENOUS PRN
Status: DISCONTINUED | OUTPATIENT
Start: 2019-01-01 | End: 2019-01-01 | Stop reason: SDUPTHER

## 2019-01-01 RX ORDER — CLOPIDOGREL BISULFATE 75 MG/1
75 TABLET ORAL DAILY
Status: DISCONTINUED | OUTPATIENT
Start: 2019-01-01 | End: 2019-01-01 | Stop reason: HOSPADM

## 2019-01-01 RX ORDER — FAMOTIDINE 20 MG/1
20 TABLET, FILM COATED ORAL DAILY
Status: DISCONTINUED | OUTPATIENT
Start: 2019-01-01 | End: 2019-01-01

## 2019-01-01 RX ORDER — PREDNISONE 20 MG/1
20 TABLET ORAL DAILY
Status: DISCONTINUED | OUTPATIENT
Start: 2019-01-01 | End: 2019-01-01

## 2019-01-01 RX ORDER — ACETAMINOPHEN 325 MG/1
650 TABLET ORAL EVERY 4 HOURS PRN
Status: CANCELLED | OUTPATIENT
Start: 2019-01-01

## 2019-01-01 RX ORDER — FLUCONAZOLE 2 MG/ML
400 INJECTION, SOLUTION INTRAVENOUS EVERY 24 HOURS
Status: DISCONTINUED | OUTPATIENT
Start: 2019-01-01 | End: 2019-01-01

## 2019-01-01 RX ORDER — DEXAMETHASONE SODIUM PHOSPHATE 10 MG/ML
INJECTION INTRAMUSCULAR; INTRAVENOUS PRN
Status: DISCONTINUED | OUTPATIENT
Start: 2019-01-01 | End: 2019-01-01 | Stop reason: SDUPTHER

## 2019-01-01 RX ORDER — BENZONATATE 100 MG/1
200 CAPSULE ORAL 3 TIMES DAILY PRN
Status: DISCONTINUED | OUTPATIENT
Start: 2019-01-01 | End: 2019-01-01 | Stop reason: HOSPADM

## 2019-01-01 RX ORDER — DEXTROSE MONOHYDRATE 50 MG/ML
100 INJECTION, SOLUTION INTRAVENOUS PRN
Status: DISCONTINUED | OUTPATIENT
Start: 2019-01-01 | End: 2019-01-01

## 2019-01-01 RX ORDER — CARVEDILOL 6.25 MG/1
6.25 TABLET ORAL 2 TIMES DAILY
Status: DISCONTINUED | OUTPATIENT
Start: 2019-01-01 | End: 2019-01-01

## 2019-01-01 RX ORDER — CALCIUM CHLORIDE 100 MG/ML
INJECTION INTRAVENOUS; INTRAVENTRICULAR PRN
Status: DISCONTINUED | OUTPATIENT
Start: 2019-01-01 | End: 2019-01-01 | Stop reason: SDUPTHER

## 2019-01-01 RX ORDER — LIDOCAINE HYDROCHLORIDE 10 MG/ML
1 INJECTION, SOLUTION EPIDURAL; INFILTRATION; INTRACAUDAL; PERINEURAL
Status: CANCELLED | OUTPATIENT
Start: 2019-01-01 | End: 2019-01-01

## 2019-01-01 RX ORDER — SODIUM CHLORIDE 0.9 % (FLUSH) 0.9 %
10 SYRINGE (ML) INJECTION PRN
Status: DISCONTINUED | OUTPATIENT
Start: 2019-01-01 | End: 2019-01-01

## 2019-01-01 RX ORDER — MEPERIDINE HYDROCHLORIDE 50 MG/ML
12.5 INJECTION INTRAMUSCULAR; INTRAVENOUS; SUBCUTANEOUS EVERY 5 MIN PRN
Status: DISCONTINUED | OUTPATIENT
Start: 2019-01-01 | End: 2019-01-01

## 2019-01-01 RX ORDER — AMIODARONE HYDROCHLORIDE 50 MG/ML
INJECTION, SOLUTION INTRAVENOUS PRN
Status: DISCONTINUED | OUTPATIENT
Start: 2019-01-01 | End: 2019-01-01 | Stop reason: SDUPTHER

## 2019-01-01 RX ORDER — HEPARIN SODIUM 10000 [USP'U]/100ML
18 INJECTION, SOLUTION INTRAVENOUS CONTINUOUS
Status: DISCONTINUED | OUTPATIENT
Start: 2019-01-01 | End: 2019-01-01

## 2019-01-01 RX ORDER — MOXIFLOXACIN 5 MG/ML
1 SOLUTION/ DROPS OPHTHALMIC EVERY 4 HOURS
Status: DISCONTINUED | OUTPATIENT
Start: 2019-01-01 | End: 2019-01-01 | Stop reason: HOSPADM

## 2019-01-01 RX ORDER — SODIUM CHLORIDE, SODIUM LACTATE, POTASSIUM CHLORIDE, CALCIUM CHLORIDE 600; 310; 30; 20 MG/100ML; MG/100ML; MG/100ML; MG/100ML
INJECTION, SOLUTION INTRAVENOUS CONTINUOUS
Status: CANCELLED | OUTPATIENT
Start: 2019-01-01

## 2019-01-01 RX ORDER — SODIUM CHLORIDE 9 MG/ML
INJECTION, SOLUTION INTRAVENOUS CONTINUOUS
Status: ACTIVE | OUTPATIENT
Start: 2019-01-01 | End: 2019-01-01

## 2019-01-01 RX ORDER — MORPHINE SULFATE 4 MG/ML
4 INJECTION, SOLUTION INTRAMUSCULAR; INTRAVENOUS
Status: DISCONTINUED | OUTPATIENT
Start: 2019-01-01 | End: 2019-01-01

## 2019-01-01 RX ORDER — ONDANSETRON 2 MG/ML
4 INJECTION INTRAMUSCULAR; INTRAVENOUS EVERY 6 HOURS PRN
Status: DISCONTINUED | OUTPATIENT
Start: 2019-01-01 | End: 2019-01-01

## 2019-01-01 RX ORDER — FUROSEMIDE 40 MG/1
40 TABLET ORAL 2 TIMES DAILY
Qty: 60 TABLET | Refills: 0 | DISCHARGE
Start: 2019-01-01

## 2019-01-01 RX ORDER — ONDANSETRON 2 MG/ML
4 INJECTION INTRAMUSCULAR; INTRAVENOUS EVERY 6 HOURS PRN
Status: CANCELLED | OUTPATIENT
Start: 2019-01-01

## 2019-01-01 RX ORDER — CARVEDILOL 12.5 MG/1
12.5 TABLET ORAL 2 TIMES DAILY
Status: DISCONTINUED | OUTPATIENT
Start: 2019-01-01 | End: 2019-01-01

## 2019-01-01 RX ORDER — MORPHINE SULFATE 4 MG/ML
4 INJECTION, SOLUTION INTRAMUSCULAR; INTRAVENOUS
Status: DISCONTINUED | OUTPATIENT
Start: 2019-01-01 | End: 2019-01-01 | Stop reason: HOSPADM

## 2019-01-01 RX ORDER — FLUCONAZOLE 2 MG/ML
200 INJECTION, SOLUTION INTRAVENOUS EVERY 24 HOURS
Status: DISCONTINUED | OUTPATIENT
Start: 2019-01-01 | End: 2019-01-01

## 2019-01-01 RX ORDER — WARFARIN SODIUM 5 MG/1
5 TABLET ORAL
Status: DISCONTINUED | OUTPATIENT
Start: 2019-01-01 | End: 2019-01-01 | Stop reason: HOSPADM

## 2019-01-01 RX ORDER — TAMSULOSIN HYDROCHLORIDE 0.4 MG/1
CAPSULE ORAL
Qty: 90 CAPSULE | Refills: 1 | Status: SHIPPED | OUTPATIENT
Start: 2019-01-01

## 2019-01-01 RX ORDER — SODIUM CHLORIDE 0.9 % (FLUSH) 0.9 %
10 SYRINGE (ML) INJECTION EVERY 12 HOURS SCHEDULED
Status: DISCONTINUED | OUTPATIENT
Start: 2019-01-01 | End: 2019-01-01

## 2019-01-01 RX ORDER — ACETAMINOPHEN 325 MG/1
650 TABLET ORAL EVERY 4 HOURS PRN
Status: DISCONTINUED | OUTPATIENT
Start: 2019-01-01 | End: 2019-01-01

## 2019-01-01 RX ORDER — ASPIRIN 81 MG/1
81 TABLET ORAL DAILY
Qty: 30 TABLET | Refills: 0 | OUTPATIENT
Start: 2019-01-01 | End: 2019-01-01

## 2019-01-01 RX ORDER — LOSARTAN POTASSIUM 50 MG/1
50 TABLET ORAL DAILY
Status: DISCONTINUED | OUTPATIENT
Start: 2019-01-01 | End: 2019-01-01 | Stop reason: HOSPADM

## 2019-01-01 RX ORDER — SODIUM CHLORIDE 9 MG/ML
INJECTION, SOLUTION INTRAVENOUS CONTINUOUS
Status: DISCONTINUED | OUTPATIENT
Start: 2019-01-01 | End: 2019-01-01 | Stop reason: HOSPADM

## 2019-01-01 RX ORDER — ASPIRIN 81 MG/1
81 TABLET ORAL DAILY
Status: DISCONTINUED | OUTPATIENT
Start: 2019-01-01 | End: 2019-01-01

## 2019-01-01 RX ORDER — WARFARIN SODIUM 5 MG/1
5 TABLET ORAL
Status: DISCONTINUED | OUTPATIENT
Start: 2019-01-01 | End: 2019-01-01

## 2019-01-01 RX ORDER — HEPARIN SODIUM 1000 [USP'U]/ML
INJECTION, SOLUTION INTRAVENOUS; SUBCUTANEOUS PRN
Status: DISCONTINUED | OUTPATIENT
Start: 2019-01-01 | End: 2019-01-01 | Stop reason: HOSPADM

## 2019-01-01 RX ORDER — MIDAZOLAM HYDROCHLORIDE 1 MG/ML
INJECTION INTRAMUSCULAR; INTRAVENOUS
Status: DISCONTINUED
Start: 2019-01-01 | End: 2019-01-01

## 2019-01-01 RX ORDER — OXYCODONE HYDROCHLORIDE AND ACETAMINOPHEN 5; 325 MG/1; MG/1
1 TABLET ORAL EVERY 6 HOURS PRN
Qty: 28 TABLET | Refills: 0 | Status: SHIPPED | OUTPATIENT
Start: 2019-01-01 | End: 2019-01-01

## 2019-01-01 RX ORDER — MORPHINE SULFATE 2 MG/ML
2 INJECTION, SOLUTION INTRAMUSCULAR; INTRAVENOUS
Status: DISCONTINUED | OUTPATIENT
Start: 2019-01-01 | End: 2019-01-01 | Stop reason: HOSPADM

## 2019-01-01 RX ORDER — HEPARIN SODIUM 10000 [USP'U]/100ML
1000 INJECTION, SOLUTION INTRAVENOUS CONTINUOUS
Status: DISCONTINUED | OUTPATIENT
Start: 2019-01-01 | End: 2019-01-01

## 2019-01-01 RX ORDER — CARVEDILOL 12.5 MG/1
TABLET ORAL
Qty: 180 TABLET | Refills: 1 | Status: SHIPPED | OUTPATIENT
Start: 2019-01-01

## 2019-01-01 RX ORDER — FENTANYL CITRATE 50 UG/ML
25 INJECTION, SOLUTION INTRAMUSCULAR; INTRAVENOUS EVERY 5 MIN PRN
Status: DISCONTINUED | OUTPATIENT
Start: 2019-01-01 | End: 2019-01-01

## 2019-01-01 RX ORDER — HEPARIN SODIUM 10000 [USP'U]/100ML
INJECTION, SOLUTION INTRAVENOUS
Status: DISPENSED
Start: 2019-01-01 | End: 2019-01-01

## 2019-01-01 RX ORDER — FENTANYL CITRATE 50 UG/ML
25 INJECTION, SOLUTION INTRAMUSCULAR; INTRAVENOUS EVERY 5 MIN PRN
Status: CANCELLED | OUTPATIENT
Start: 2019-01-01

## 2019-01-01 RX ORDER — DEXTROSE MONOHYDRATE 25 G/50ML
12.5 INJECTION, SOLUTION INTRAVENOUS PRN
Status: DISCONTINUED | OUTPATIENT
Start: 2019-01-01 | End: 2019-01-01

## 2019-01-01 RX ORDER — PHENYLEPHRINE HYDROCHLORIDE 10 MG/ML
INJECTION INTRAVENOUS PRN
Status: DISCONTINUED | OUTPATIENT
Start: 2019-01-01 | End: 2019-01-01 | Stop reason: SDUPTHER

## 2019-01-01 RX ORDER — ACETAMINOPHEN 500 MG
1000 TABLET ORAL EVERY 6 HOURS PRN
Status: DISCONTINUED | OUTPATIENT
Start: 2019-01-01 | End: 2019-01-01 | Stop reason: HOSPADM

## 2019-01-01 RX ORDER — OXYCODONE HYDROCHLORIDE 5 MG/1
10 TABLET ORAL EVERY 4 HOURS PRN
Status: DISCONTINUED | OUTPATIENT
Start: 2019-01-01 | End: 2019-01-01

## 2019-01-01 RX ORDER — POTASSIUM CHLORIDE 7.45 MG/ML
10 INJECTION INTRAVENOUS PRN
Status: DISCONTINUED | OUTPATIENT
Start: 2019-01-01 | End: 2019-01-01

## 2019-01-01 RX ADMIN — HEPARIN SODIUM 2000 UNITS: 5000 INJECTION INTRAVENOUS; SUBCUTANEOUS at 16:31

## 2019-01-01 RX ADMIN — KETOROLAC TROMETHAMINE 1 DROP: 5 SOLUTION OPHTHALMIC at 18:08

## 2019-01-01 RX ADMIN — Medication 10 ML: at 21:20

## 2019-01-01 RX ADMIN — KETAMINE HYDROCHLORIDE 20 MG: 10 INJECTION INTRAMUSCULAR; INTRAVENOUS at 16:40

## 2019-01-01 RX ADMIN — AMIODARONE HYDROCHLORIDE 1 MG/MIN: 50 INJECTION, SOLUTION INTRAVENOUS at 12:05

## 2019-01-01 RX ADMIN — MAGNESIUM SULFATE HEPTAHYDRATE 1 G: 1 INJECTION, SOLUTION INTRAVENOUS at 02:35

## 2019-01-01 RX ADMIN — ACETAMINOPHEN 1000 MG: 500 TABLET ORAL at 10:09

## 2019-01-01 RX ADMIN — Medication 10 ML: at 15:07

## 2019-01-01 RX ADMIN — GABAPENTIN 100 MG: 100 CAPSULE ORAL at 19:47

## 2019-01-01 RX ADMIN — Medication 15 ML: at 21:10

## 2019-01-01 RX ADMIN — INSULIN LISPRO 3 UNITS: 100 INJECTION, SOLUTION INTRAVENOUS; SUBCUTANEOUS at 21:32

## 2019-01-01 RX ADMIN — Medication 15 ML: at 20:00

## 2019-01-01 RX ADMIN — GLIPIZIDE 2.5 MG: 5 TABLET ORAL at 05:56

## 2019-01-01 RX ADMIN — KETOROLAC TROMETHAMINE 1 DROP: 5 SOLUTION OPHTHALMIC at 21:30

## 2019-01-01 RX ADMIN — PREDNISOLONE ACETATE 1 DROP: 10 SUSPENSION/ DROPS OPHTHALMIC at 05:56

## 2019-01-01 RX ADMIN — SODIUM CHLORIDE: 9 INJECTION, SOLUTION INTRAVENOUS at 18:22

## 2019-01-01 RX ADMIN — PREDNISOLONE ACETATE 1 DROP: 10 SUSPENSION/ DROPS OPHTHALMIC at 16:04

## 2019-01-01 RX ADMIN — INSULIN LISPRO 3 UNITS: 100 INJECTION, SOLUTION INTRAVENOUS; SUBCUTANEOUS at 21:45

## 2019-01-01 RX ADMIN — INSULIN LISPRO 15 UNITS: 100 INJECTION, SOLUTION INTRAVENOUS; SUBCUTANEOUS at 17:44

## 2019-01-01 RX ADMIN — PHENYLEPHRINE HYDROCHLORIDE 300 MCG/MIN: 10 INJECTION INTRAVENOUS at 00:00

## 2019-01-01 RX ADMIN — Medication 200 MCG: at 12:45

## 2019-01-01 RX ADMIN — SODIUM CHLORIDE: 900 INJECTION, SOLUTION INTRAVENOUS at 16:22

## 2019-01-01 RX ADMIN — GABAPENTIN 300 MG: 300 CAPSULE ORAL at 14:43

## 2019-01-01 RX ADMIN — MIDAZOLAM HYDROCHLORIDE 2 MG: 1 INJECTION, SOLUTION INTRAMUSCULAR; INTRAVENOUS at 16:40

## 2019-01-01 RX ADMIN — PREDNISOLONE ACETATE 1 DROP: 10 SUSPENSION/ DROPS OPHTHALMIC at 12:01

## 2019-01-01 RX ADMIN — KETOROLAC TROMETHAMINE 1 DROP: 5 SOLUTION OPHTHALMIC at 12:18

## 2019-01-01 RX ADMIN — PIPERACILLIN SODIUM,TAZOBACTAM SODIUM 2.25 G: 2; .25 INJECTION, POWDER, FOR SOLUTION INTRAVENOUS at 05:37

## 2019-01-01 RX ADMIN — HYDROCORTISONE SODIUM SUCCINATE 50 MG: 100 INJECTION, POWDER, FOR SOLUTION INTRAMUSCULAR; INTRAVENOUS at 10:52

## 2019-01-01 RX ADMIN — HEPARIN SODIUM AND DEXTROSE 18 UNITS/KG/HR: 10000; 5 INJECTION INTRAVENOUS at 00:37

## 2019-01-01 RX ADMIN — GABAPENTIN 100 MG: 100 CAPSULE ORAL at 13:39

## 2019-01-01 RX ADMIN — HEPARIN SODIUM 5000 UNITS: 1000 INJECTION INTRAVENOUS; SUBCUTANEOUS at 20:27

## 2019-01-01 RX ADMIN — KETOROLAC TROMETHAMINE 1 DROP: 5 SOLUTION OPHTHALMIC at 21:10

## 2019-01-01 RX ADMIN — HEPARIN SODIUM 12 UNITS/KG/HR: 10000 INJECTION, SOLUTION INTRAVENOUS at 09:25

## 2019-01-01 RX ADMIN — FUROSEMIDE 40 MG: 40 TABLET ORAL at 10:22

## 2019-01-01 RX ADMIN — CARVEDILOL 12.5 MG: 12.5 TABLET, FILM COATED ORAL at 19:54

## 2019-01-01 RX ADMIN — CLOPIDOGREL 75 MG: 75 TABLET, FILM COATED ORAL at 08:52

## 2019-01-01 RX ADMIN — KETOROLAC TROMETHAMINE 1 DROP: 5 SOLUTION/ DROPS OPHTHALMIC at 18:03

## 2019-01-01 RX ADMIN — LOSARTAN POTASSIUM 50 MG: 50 TABLET, FILM COATED ORAL at 15:32

## 2019-01-01 RX ADMIN — HYDROCORTISONE SODIUM SUCCINATE 50 MG: 100 INJECTION, POWDER, FOR SOLUTION INTRAMUSCULAR; INTRAVENOUS at 04:12

## 2019-01-01 RX ADMIN — CEFEPIME HYDROCHLORIDE 1 G: 1 INJECTION, POWDER, FOR SOLUTION INTRAMUSCULAR; INTRAVENOUS at 15:51

## 2019-01-01 RX ADMIN — Medication 1500 ML/HR: at 19:07

## 2019-01-01 RX ADMIN — GABAPENTIN 300 MG: 300 CAPSULE ORAL at 08:55

## 2019-01-01 RX ADMIN — KETAMINE HYDROCHLORIDE 10 MG: 10 INJECTION INTRAMUSCULAR; INTRAVENOUS at 16:48

## 2019-01-01 RX ADMIN — SODIUM CHLORIDE 250 ML: 0.9 INJECTION, SOLUTION INTRAVENOUS at 23:00

## 2019-01-01 RX ADMIN — CLOPIDOGREL 75 MG: 75 TABLET, FILM COATED ORAL at 10:52

## 2019-01-01 RX ADMIN — HEPARIN SODIUM 6850 UNITS: 1000 INJECTION INTRAVENOUS; SUBCUTANEOUS at 00:37

## 2019-01-01 RX ADMIN — KETOROLAC TROMETHAMINE 15 MG: 15 INJECTION, SOLUTION INTRAMUSCULAR; INTRAVENOUS at 15:06

## 2019-01-01 RX ADMIN — GABAPENTIN 100 MG: 100 CAPSULE ORAL at 22:14

## 2019-01-01 RX ADMIN — PHENYLEPHRINE HYDROCHLORIDE 300 MCG/MIN: 10 INJECTION INTRAVENOUS at 20:55

## 2019-01-01 RX ADMIN — GLIPIZIDE 2.5 MG: 5 TABLET ORAL at 07:00

## 2019-01-01 RX ADMIN — FENTANYL CITRATE 50 MCG: 50 INJECTION INTRAMUSCULAR; INTRAVENOUS at 10:10

## 2019-01-01 RX ADMIN — ACETAMINOPHEN 1000 MG: 500 TABLET ORAL at 13:33

## 2019-01-01 RX ADMIN — KETOROLAC TROMETHAMINE 1 DROP: 5 SOLUTION OPHTHALMIC at 16:51

## 2019-01-01 RX ADMIN — FENTANYL CITRATE 50 MCG: 50 INJECTION INTRAMUSCULAR; INTRAVENOUS at 09:36

## 2019-01-01 RX ADMIN — ASPIRIN 81 MG: 81 TABLET ORAL at 18:02

## 2019-01-01 RX ADMIN — KETOROLAC TROMETHAMINE 1 DROP: 5 SOLUTION/ DROPS OPHTHALMIC at 16:15

## 2019-01-01 RX ADMIN — MIDAZOLAM HYDROCHLORIDE 2 MG: 1 INJECTION, SOLUTION INTRAMUSCULAR; INTRAVENOUS at 20:01

## 2019-01-01 RX ADMIN — GABAPENTIN 100 MG: 100 CAPSULE ORAL at 13:15

## 2019-01-01 RX ADMIN — TAMSULOSIN HYDROCHLORIDE 0.4 MG: 0.4 CAPSULE ORAL at 08:52

## 2019-01-01 RX ADMIN — GABAPENTIN 100 MG: 100 CAPSULE ORAL at 20:52

## 2019-01-01 RX ADMIN — FLUCONAZOLE 200 MG: 200 INJECTION, SOLUTION INTRAVENOUS at 20:51

## 2019-01-01 RX ADMIN — KETOROLAC TROMETHAMINE 1 DROP: 5 SOLUTION/ DROPS OPHTHALMIC at 17:57

## 2019-01-01 RX ADMIN — MORPHINE SULFATE 2 MG: 2 INJECTION, SOLUTION INTRAMUSCULAR; INTRAVENOUS at 14:12

## 2019-01-01 RX ADMIN — TAMSULOSIN HYDROCHLORIDE 0.4 MG: 0.4 CAPSULE ORAL at 09:10

## 2019-01-01 RX ADMIN — PREDNISOLONE ACETATE 1 DROP: 10 SUSPENSION/ DROPS OPHTHALMIC at 15:39

## 2019-01-01 RX ADMIN — Medication 10 ML: at 21:05

## 2019-01-01 RX ADMIN — SODIUM CHLORIDE: 9 INJECTION, SOLUTION INTRAVENOUS at 05:59

## 2019-01-01 RX ADMIN — POLYETHYLENE GLYCOL 3350 17 G: 17 POWDER, FOR SOLUTION ORAL at 08:03

## 2019-01-01 RX ADMIN — Medication 1500 ML/HR: at 02:32

## 2019-01-01 RX ADMIN — PHENYLEPHRINE HYDROCHLORIDE 100 MCG: 10 INJECTION INTRAVENOUS at 08:21

## 2019-01-01 RX ADMIN — IOVERSOL 65 ML: 509 INJECTION INTRA-ARTERIAL; INTRAVENOUS at 10:34

## 2019-01-01 RX ADMIN — KETOROLAC TROMETHAMINE 1 DROP: 5 SOLUTION OPHTHALMIC at 16:48

## 2019-01-01 RX ADMIN — ACETAMINOPHEN 1000 MG: 500 TABLET ORAL at 01:01

## 2019-01-01 RX ADMIN — MOXIFLOXACIN 1 DROP: 5 SOLUTION/ DROPS OPHTHALMIC at 11:43

## 2019-01-01 RX ADMIN — PHENYLEPHRINE HYDROCHLORIDE 100 MCG: 10 INJECTION INTRAVENOUS at 08:38

## 2019-01-01 RX ADMIN — PREDNISOLONE ACETATE 1 DROP: 10 SUSPENSION/ DROPS OPHTHALMIC at 06:05

## 2019-01-01 RX ADMIN — VASOPRESSIN 0.04 UNITS/MIN: 20 INJECTION INTRAVENOUS at 18:08

## 2019-01-01 RX ADMIN — NOREPINEPHRINE BITARTRATE 30 MCG/MIN: 1 INJECTION, SOLUTION, CONCENTRATE INTRAVENOUS at 18:42

## 2019-01-01 RX ADMIN — PROPOFOL 100 MCG/KG/MIN: 10 INJECTION, EMULSION INTRAVENOUS at 20:02

## 2019-01-01 RX ADMIN — TAMSULOSIN HYDROCHLORIDE 0.4 MG: 0.4 CAPSULE ORAL at 08:30

## 2019-01-01 RX ADMIN — DEXTROSE MONOHYDRATE 25 G: 500 INJECTION PARENTERAL at 03:00

## 2019-01-01 RX ADMIN — PIPERACILLIN SODIUM,TAZOBACTAM SODIUM 2.25 G: 2; .25 INJECTION, POWDER, FOR SOLUTION INTRAVENOUS at 05:50

## 2019-01-01 RX ADMIN — KETOROLAC TROMETHAMINE 1 DROP: 5 SOLUTION OPHTHALMIC at 13:15

## 2019-01-01 RX ADMIN — Medication 1500 ML/HR: at 17:54

## 2019-01-01 RX ADMIN — PIPERACILLIN SODIUM,TAZOBACTAM SODIUM 2.25 G: 2; .25 INJECTION, POWDER, FOR SOLUTION INTRAVENOUS at 17:40

## 2019-01-01 RX ADMIN — FUROSEMIDE 40 MG: 40 TABLET ORAL at 18:11

## 2019-01-01 RX ADMIN — Medication 20 MCG/MIN: at 13:16

## 2019-01-01 RX ADMIN — OXYCODONE HYDROCHLORIDE 10 MG: 5 TABLET ORAL at 19:44

## 2019-01-01 RX ADMIN — GABAPENTIN 100 MG: 100 CAPSULE ORAL at 12:17

## 2019-01-01 RX ADMIN — VANCOMYCIN HYDROCHLORIDE 1000 MG: 1 INJECTION, SOLUTION INTRAVENOUS at 22:03

## 2019-01-01 RX ADMIN — ACETAMINOPHEN 1000 MG: 500 TABLET ORAL at 18:06

## 2019-01-01 RX ADMIN — RIVAROXABAN 15 MG: 15 TABLET, FILM COATED ORAL at 18:00

## 2019-01-01 RX ADMIN — PANTOPRAZOLE SODIUM 40 MG: 40 INJECTION, POWDER, FOR SOLUTION INTRAVENOUS at 08:04

## 2019-01-01 RX ADMIN — POLYETHYLENE GLYCOL 3350 17 G: 17 POWDER, FOR SOLUTION ORAL at 22:16

## 2019-01-01 RX ADMIN — ASPIRIN 81 MG: 81 TABLET ORAL at 14:32

## 2019-01-01 RX ADMIN — TAMSULOSIN HYDROCHLORIDE 0.4 MG: 0.4 CAPSULE ORAL at 08:04

## 2019-01-01 RX ADMIN — HYDROCORTISONE SODIUM SUCCINATE 50 MG: 100 INJECTION, POWDER, FOR SOLUTION INTRAMUSCULAR; INTRAVENOUS at 21:21

## 2019-01-01 RX ADMIN — INSULIN LISPRO 9 UNITS: 100 INJECTION, SOLUTION INTRAVENOUS; SUBCUTANEOUS at 14:53

## 2019-01-01 RX ADMIN — PREDNISOLONE ACETATE 1 DROP: 10 SUSPENSION/ DROPS OPHTHALMIC at 00:39

## 2019-01-01 RX ADMIN — PREDNISOLONE ACETATE 1 DROP: 10 SUSPENSION/ DROPS OPHTHALMIC at 18:06

## 2019-01-01 RX ADMIN — KETOROLAC TROMETHAMINE 1 DROP: 5 SOLUTION/ DROPS OPHTHALMIC at 20:50

## 2019-01-01 RX ADMIN — VASOPRESSIN 0.04 UNITS/MIN: 20 INJECTION INTRAVENOUS at 11:12

## 2019-01-01 RX ADMIN — Medication 10 ML: at 21:24

## 2019-01-01 RX ADMIN — Medication: at 10:32

## 2019-01-01 RX ADMIN — INSULIN LISPRO 3 UNITS: 100 INJECTION, SOLUTION INTRAVENOUS; SUBCUTANEOUS at 21:11

## 2019-01-01 RX ADMIN — GABAPENTIN 100 MG: 100 CAPSULE ORAL at 13:33

## 2019-01-01 RX ADMIN — DEXTROSE MONOHYDRATE 100 MG: 50 INJECTION, SOLUTION INTRAVENOUS at 14:35

## 2019-01-01 RX ADMIN — GABAPENTIN 100 MG: 100 CAPSULE ORAL at 13:23

## 2019-01-01 RX ADMIN — ASPIRIN 81 MG: 81 TABLET ORAL at 08:04

## 2019-01-01 RX ADMIN — GABAPENTIN 100 MG: 100 CAPSULE ORAL at 08:30

## 2019-01-01 RX ADMIN — DIGOXIN 250 MCG: 0.25 INJECTION INTRAMUSCULAR; INTRAVENOUS at 17:16

## 2019-01-01 RX ADMIN — PANTOPRAZOLE SODIUM 40 MG: 40 INJECTION, POWDER, FOR SOLUTION INTRAVENOUS at 10:19

## 2019-01-01 RX ADMIN — IRON SUCROSE 50 MG: 20 INJECTION, SOLUTION INTRAVENOUS at 10:31

## 2019-01-01 RX ADMIN — Medication 1 G: at 21:09

## 2019-01-01 RX ADMIN — FENTANYL CITRATE 25 MCG: 50 INJECTION, SOLUTION INTRAMUSCULAR; INTRAVENOUS at 07:54

## 2019-01-01 RX ADMIN — ACETAMINOPHEN 1000 MG: 500 TABLET ORAL at 22:46

## 2019-01-01 RX ADMIN — Medication 15 ML: at 08:14

## 2019-01-01 RX ADMIN — PREDNISOLONE ACETATE 1 DROP: 10 SUSPENSION/ DROPS OPHTHALMIC at 17:27

## 2019-01-01 RX ADMIN — GABAPENTIN 100 MG: 100 CAPSULE ORAL at 08:04

## 2019-01-01 RX ADMIN — ACETAMINOPHEN 1000 MG: 500 TABLET ORAL at 08:03

## 2019-01-01 RX ADMIN — HYDROCORTISONE SODIUM SUCCINATE 100 MG: 100 INJECTION, POWDER, FOR SOLUTION INTRAMUSCULAR; INTRAVENOUS at 21:09

## 2019-01-01 RX ADMIN — ACETAMINOPHEN 1000 MG: 500 TABLET ORAL at 09:10

## 2019-01-01 RX ADMIN — SODIUM CHLORIDE 8 MG/HR: 9 INJECTION, SOLUTION INTRAVENOUS at 12:16

## 2019-01-01 RX ADMIN — ACETAMINOPHEN 1000 MG: 500 TABLET ORAL at 12:19

## 2019-01-01 RX ADMIN — EPINEPHRINE 10 MCG: 1 INJECTION, SOLUTION INTRAMUSCULAR; SUBCUTANEOUS at 13:16

## 2019-01-01 RX ADMIN — Medication 1 G: at 05:30

## 2019-01-01 RX ADMIN — FENTANYL CITRATE 50 MCG: 50 INJECTION INTRAMUSCULAR; INTRAVENOUS at 08:17

## 2019-01-01 RX ADMIN — HYDROCORTISONE SODIUM SUCCINATE 50 MG: 100 INJECTION, POWDER, FOR SOLUTION INTRAMUSCULAR; INTRAVENOUS at 09:58

## 2019-01-01 RX ADMIN — KETOROLAC TROMETHAMINE 1 DROP: 5 SOLUTION OPHTHALMIC at 08:05

## 2019-01-01 RX ADMIN — Medication 10 ML: at 08:07

## 2019-01-01 RX ADMIN — PREDNISOLONE ACETATE 1 DROP: 10 SUSPENSION/ DROPS OPHTHALMIC at 11:16

## 2019-01-01 RX ADMIN — PREDNISOLONE ACETATE 1 DROP: 10 SUSPENSION/ DROPS OPHTHALMIC at 19:54

## 2019-01-01 RX ADMIN — DEXTROSE MONOHYDRATE 100 MG: 50 INJECTION, SOLUTION INTRAVENOUS at 15:51

## 2019-01-01 RX ADMIN — PROPOFOL 50 MG: 10 INJECTION, EMULSION INTRAVENOUS at 07:42

## 2019-01-01 RX ADMIN — HEPARIN SODIUM 4000 UNITS: 5000 INJECTION INTRAVENOUS; SUBCUTANEOUS at 00:40

## 2019-01-01 RX ADMIN — ROCURONIUM BROMIDE 50 MG: 10 INJECTION INTRAVENOUS at 12:53

## 2019-01-01 RX ADMIN — MAGNESIUM SULFATE IN DEXTROSE 1 G: 10 INJECTION, SOLUTION INTRAVENOUS at 14:32

## 2019-01-01 RX ADMIN — KETOROLAC TROMETHAMINE 1 DROP: 5 SOLUTION OPHTHALMIC at 12:10

## 2019-01-01 RX ADMIN — FENTANYL CITRATE 50 MCG: 50 INJECTION, SOLUTION INTRAMUSCULAR; INTRAVENOUS at 13:35

## 2019-01-01 RX ADMIN — PREDNISOLONE ACETATE 1 DROP: 10 SUSPENSION/ DROPS OPHTHALMIC at 17:04

## 2019-01-01 RX ADMIN — PHENYLEPHRINE HYDROCHLORIDE 50 MCG/MIN: 10 INJECTION INTRAVENOUS at 23:48

## 2019-01-01 RX ADMIN — VANCOMYCIN HYDROCHLORIDE 1 G: 1 INJECTION, SOLUTION INTRAVENOUS at 12:16

## 2019-01-01 RX ADMIN — PHENYLEPHRINE HYDROCHLORIDE 200 MCG: 10 INJECTION INTRAVENOUS at 08:40

## 2019-01-01 RX ADMIN — CLOPIDOGREL 75 MG: 75 TABLET, FILM COATED ORAL at 12:17

## 2019-01-01 RX ADMIN — SODIUM BICARBONATE: 84 INJECTION, SOLUTION INTRAVENOUS at 21:13

## 2019-01-01 RX ADMIN — PREDNISOLONE ACETATE 1 DROP: 10 SUSPENSION/ DROPS OPHTHALMIC at 01:24

## 2019-01-01 RX ADMIN — HEPARIN SODIUM 3420 UNITS: 1000 INJECTION, SOLUTION INTRAVENOUS; SUBCUTANEOUS at 08:31

## 2019-01-01 RX ADMIN — INSULIN LISPRO 9 UNITS: 100 INJECTION, SOLUTION INTRAVENOUS; SUBCUTANEOUS at 17:28

## 2019-01-01 RX ADMIN — PREDNISOLONE ACETATE 1 DROP: 10 SUSPENSION/ DROPS OPHTHALMIC at 12:29

## 2019-01-01 RX ADMIN — CEFEPIME HYDROCHLORIDE 1 G: 1 INJECTION, POWDER, FOR SOLUTION INTRAMUSCULAR; INTRAVENOUS at 12:30

## 2019-01-01 RX ADMIN — HEPARIN SODIUM AND DEXTROSE 18 UNITS/KG/HR: 10000; 5 INJECTION INTRAVENOUS at 12:54

## 2019-01-01 RX ADMIN — VASOPRESSIN 0.01 UNITS/MIN: 20 INJECTION INTRAVENOUS at 01:14

## 2019-01-01 RX ADMIN — MAGNESIUM SULFATE HEPTAHYDRATE 1 G: 1 INJECTION, SOLUTION INTRAVENOUS at 09:47

## 2019-01-01 RX ADMIN — DIAZEPAM 2.5 MG: 5 TABLET ORAL at 10:46

## 2019-01-01 RX ADMIN — TAMSULOSIN HYDROCHLORIDE 0.4 MG: 0.4 CAPSULE ORAL at 16:04

## 2019-01-01 RX ADMIN — LIDOCAINE HYDROCHLORIDE 50 MG: 10 INJECTION, SOLUTION EPIDURAL; INFILTRATION; INTRACAUDAL at 07:42

## 2019-01-01 RX ADMIN — NOREPINEPHRINE BITARTRATE 30 MCG/MIN: 1 INJECTION, SOLUTION, CONCENTRATE INTRAVENOUS at 10:18

## 2019-01-01 RX ADMIN — VASOPRESSIN 0.04 UNITS/MIN: 20 INJECTION INTRAVENOUS at 06:02

## 2019-01-01 RX ADMIN — ASPIRIN 81 MG: 81 TABLET ORAL at 08:52

## 2019-01-01 RX ADMIN — Medication 10 ML: at 08:42

## 2019-01-01 RX ADMIN — MAGNESIUM SULFATE HEPTAHYDRATE 1 G: 1 INJECTION, SOLUTION INTRAVENOUS at 12:42

## 2019-01-01 RX ADMIN — DEXTROSE MONOHYDRATE 100 MG: 50 INJECTION, SOLUTION INTRAVENOUS at 14:58

## 2019-01-01 RX ADMIN — FENTANYL CITRATE 50 MCG: 50 INJECTION, SOLUTION INTRAMUSCULAR; INTRAVENOUS at 16:31

## 2019-01-01 RX ADMIN — FENTANYL CITRATE 50 MCG: 50 INJECTION, SOLUTION INTRAMUSCULAR; INTRAVENOUS at 03:55

## 2019-01-01 RX ADMIN — MORPHINE SULFATE 4 MG: 4 INJECTION INTRAVENOUS at 20:53

## 2019-01-01 RX ADMIN — INSULIN HUMAN 10 UNITS: 100 INJECTION, SOLUTION PARENTERAL at 08:36

## 2019-01-01 RX ADMIN — FLUCONAZOLE 400 MG: 2 INJECTION, SOLUTION INTRAVENOUS at 19:47

## 2019-01-01 RX ADMIN — HEPARIN SODIUM 4000 UNITS: 5000 INJECTION, SOLUTION INTRAVENOUS; SUBCUTANEOUS at 09:25

## 2019-01-01 RX ADMIN — ASPIRIN 81 MG: 81 TABLET ORAL at 08:30

## 2019-01-01 RX ADMIN — INSULIN LISPRO 6 UNITS: 100 INJECTION, SOLUTION INTRAVENOUS; SUBCUTANEOUS at 09:22

## 2019-01-01 RX ADMIN — KETOROLAC TROMETHAMINE 1 DROP: 5 SOLUTION OPHTHALMIC at 08:03

## 2019-01-01 RX ADMIN — MORPHINE SULFATE 4 MG: 4 INJECTION INTRAVENOUS at 03:03

## 2019-01-01 RX ADMIN — PHYTONADIONE 10 MG: 10 INJECTION, EMULSION INTRAMUSCULAR; INTRAVENOUS; SUBCUTANEOUS at 05:38

## 2019-01-01 RX ADMIN — DIAZEPAM 2.5 MG: 5 TABLET ORAL at 15:11

## 2019-01-01 RX ADMIN — MOXIFLOXACIN 1 DROP: 5 SOLUTION/ DROPS OPHTHALMIC at 19:54

## 2019-01-01 RX ADMIN — DOCUSATE SODIUM 100 MG: 100 CAPSULE, LIQUID FILLED ORAL at 08:04

## 2019-01-01 RX ADMIN — PROPOFOL 20 MG: 10 INJECTION, EMULSION INTRAVENOUS at 07:53

## 2019-01-01 RX ADMIN — PREDNISOLONE ACETATE 1 DROP: 10 SUSPENSION/ DROPS OPHTHALMIC at 16:15

## 2019-01-01 RX ADMIN — HYDROCORTISONE SODIUM SUCCINATE 50 MG: 100 INJECTION, POWDER, FOR SOLUTION INTRAMUSCULAR; INTRAVENOUS at 09:11

## 2019-01-01 RX ADMIN — DIAZEPAM 2.5 MG: 5 TABLET ORAL at 13:32

## 2019-01-01 RX ADMIN — CARVEDILOL 12.5 MG: 12.5 TABLET, FILM COATED ORAL at 22:30

## 2019-01-01 RX ADMIN — LIDOCAINE HYDROCHLORIDE 50 MG: 10 INJECTION, SOLUTION EPIDURAL; INFILTRATION; INTRACAUDAL; PERINEURAL at 08:13

## 2019-01-01 RX ADMIN — PREDNISOLONE ACETATE 1 DROP: 10 SUSPENSION/ DROPS OPHTHALMIC at 06:09

## 2019-01-01 RX ADMIN — HEPARIN SODIUM 4000 UNITS: 1000 INJECTION, SOLUTION INTRAVENOUS; SUBCUTANEOUS at 01:14

## 2019-01-01 RX ADMIN — GABAPENTIN 100 MG: 100 CAPSULE ORAL at 20:32

## 2019-01-01 RX ADMIN — INSULIN LISPRO 3 UNITS: 100 INJECTION, SOLUTION INTRAVENOUS; SUBCUTANEOUS at 12:18

## 2019-01-01 RX ADMIN — SODIUM BICARBONATE: 84 INJECTION, SOLUTION INTRAVENOUS at 08:34

## 2019-01-01 RX ADMIN — PANTOPRAZOLE SODIUM 40 MG: 40 INJECTION, POWDER, FOR SOLUTION INTRAVENOUS at 21:19

## 2019-01-01 RX ADMIN — SODIUM CHLORIDE 250 ML: 9 INJECTION, SOLUTION INTRAVENOUS at 15:12

## 2019-01-01 RX ADMIN — ACETAMINOPHEN 1000 MG: 500 TABLET, FILM COATED ORAL at 20:11

## 2019-01-01 RX ADMIN — ALBUMIN (HUMAN) 25 G: 0.25 INJECTION, SOLUTION INTRAVENOUS at 22:44

## 2019-01-01 RX ADMIN — Medication 10 ML: at 08:30

## 2019-01-01 RX ADMIN — DIAZEPAM 2.5 MG: 5 TABLET ORAL at 21:23

## 2019-01-01 RX ADMIN — INSULIN LISPRO 5 UNITS: 100 INJECTION, SOLUTION INTRAVENOUS; SUBCUTANEOUS at 21:52

## 2019-01-01 RX ADMIN — Medication 10 ML: at 08:04

## 2019-01-01 RX ADMIN — NOREPINEPHRINE BITARTRATE 50 MCG/MIN: 1 INJECTION, SOLUTION, CONCENTRATE INTRAVENOUS at 12:25

## 2019-01-01 RX ADMIN — KETOROLAC TROMETHAMINE 1 DROP: 5 SOLUTION/ DROPS OPHTHALMIC at 08:33

## 2019-01-01 RX ADMIN — MORPHINE SULFATE 4 MG: 4 INJECTION INTRAVENOUS at 23:07

## 2019-01-01 RX ADMIN — DOCUSATE SODIUM 100 MG: 100 CAPSULE, LIQUID FILLED ORAL at 18:02

## 2019-01-01 RX ADMIN — SODIUM CHLORIDE 250 ML: 9 INJECTION, SOLUTION INTRAVENOUS at 02:55

## 2019-01-01 RX ADMIN — FUROSEMIDE 40 MG: 40 TABLET ORAL at 10:23

## 2019-01-01 RX ADMIN — FUROSEMIDE 40 MG: 40 TABLET ORAL at 14:31

## 2019-01-01 RX ADMIN — INSULIN LISPRO 2 UNITS: 100 INJECTION, SOLUTION INTRAVENOUS; SUBCUTANEOUS at 20:48

## 2019-01-01 RX ADMIN — MOXIFLOXACIN 1 DROP: 5 SOLUTION/ DROPS OPHTHALMIC at 15:39

## 2019-01-01 RX ADMIN — PREDNISOLONE ACETATE 1 DROP: 10 SUSPENSION/ DROPS OPHTHALMIC at 05:40

## 2019-01-01 RX ADMIN — NOREPINEPHRINE BITARTRATE 50 MCG/MIN: 1 INJECTION, SOLUTION, CONCENTRATE INTRAVENOUS at 02:35

## 2019-01-01 RX ADMIN — PREDNISOLONE ACETATE 1 DROP: 10 SUSPENSION/ DROPS OPHTHALMIC at 06:28

## 2019-01-01 RX ADMIN — HYDROCORTISONE SODIUM SUCCINATE 50 MG: 100 INJECTION, POWDER, FOR SOLUTION INTRAMUSCULAR; INTRAVENOUS at 23:39

## 2019-01-01 RX ADMIN — VASOPRESSIN 0.04 UNITS/MIN: 20 INJECTION INTRAVENOUS at 14:15

## 2019-01-01 RX ADMIN — CEFEPIME HYDROCHLORIDE 1 G: 1 INJECTION, POWDER, FOR SOLUTION INTRAMUSCULAR; INTRAVENOUS at 15:31

## 2019-01-01 RX ADMIN — KETOROLAC TROMETHAMINE 1 DROP: 5 SOLUTION/ DROPS OPHTHALMIC at 19:48

## 2019-01-01 RX ADMIN — KETOROLAC TROMETHAMINE 1 DROP: 5 SOLUTION OPHTHALMIC at 16:19

## 2019-01-01 RX ADMIN — KETOROLAC TROMETHAMINE 1 DROP: 5 SOLUTION OPHTHALMIC at 21:31

## 2019-01-01 RX ADMIN — ASPIRIN 81 MG: 81 TABLET ORAL at 10:22

## 2019-01-01 RX ADMIN — HYDROCORTISONE SODIUM SUCCINATE 50 MG: 100 INJECTION, POWDER, FOR SOLUTION INTRAMUSCULAR; INTRAVENOUS at 16:49

## 2019-01-01 RX ADMIN — PREDNISOLONE ACETATE 1 DROP: 10 SUSPENSION/ DROPS OPHTHALMIC at 05:37

## 2019-01-01 RX ADMIN — PIPERACILLIN SODIUM,TAZOBACTAM SODIUM 2.25 G: 2; .25 INJECTION, POWDER, FOR SOLUTION INTRAVENOUS at 17:57

## 2019-01-01 RX ADMIN — PREDNISOLONE ACETATE 1 DROP: 10 SUSPENSION/ DROPS OPHTHALMIC at 17:58

## 2019-01-01 RX ADMIN — TAMSULOSIN HYDROCHLORIDE 0.4 MG: 0.4 CAPSULE ORAL at 12:53

## 2019-01-01 RX ADMIN — OXYCODONE HYDROCHLORIDE 10 MG: 5 TABLET ORAL at 08:04

## 2019-01-01 RX ADMIN — INSULIN LISPRO 3 UNITS: 100 INJECTION, SOLUTION INTRAVENOUS; SUBCUTANEOUS at 16:11

## 2019-01-01 RX ADMIN — CARVEDILOL 12.5 MG: 12.5 TABLET, FILM COATED ORAL at 20:07

## 2019-01-01 RX ADMIN — GABAPENTIN 100 MG: 100 CAPSULE ORAL at 14:11

## 2019-01-01 RX ADMIN — PREDNISOLONE ACETATE 1 DROP: 10 SUSPENSION/ DROPS OPHTHALMIC at 23:44

## 2019-01-01 RX ADMIN — PREDNISOLONE ACETATE 1 DROP: 10 SUSPENSION/ DROPS OPHTHALMIC at 00:06

## 2019-01-01 RX ADMIN — HYDROCORTISONE SODIUM SUCCINATE 100 MG: 100 INJECTION, POWDER, FOR SOLUTION INTRAMUSCULAR; INTRAVENOUS at 07:58

## 2019-01-01 RX ADMIN — Medication 1500 ML/HR: at 19:08

## 2019-01-01 RX ADMIN — CALCIUM GLUCONATE 2 G: 98 INJECTION, SOLUTION INTRAVENOUS at 07:48

## 2019-01-01 RX ADMIN — DIAZEPAM 2.5 MG: 5 TABLET ORAL at 06:19

## 2019-01-01 RX ADMIN — Medication 20 MCG/MIN: at 19:00

## 2019-01-01 RX ADMIN — Medication 1500 ML/HR: at 05:03

## 2019-01-01 RX ADMIN — VASOPRESSIN 0.03 UNITS/MIN: 20 INJECTION INTRAVENOUS at 08:42

## 2019-01-01 RX ADMIN — INSULIN HUMAN 10 UNITS: 100 INJECTION, SOLUTION PARENTERAL at 03:01

## 2019-01-01 RX ADMIN — ACETAMINOPHEN 1000 MG: 500 TABLET ORAL at 06:18

## 2019-01-01 RX ADMIN — VANCOMYCIN HYDROCHLORIDE 1000 MG: 1 INJECTION, POWDER, LYOPHILIZED, FOR SOLUTION INTRAVENOUS at 06:03

## 2019-01-01 RX ADMIN — TAMSULOSIN HYDROCHLORIDE 0.4 MG: 0.4 CAPSULE ORAL at 09:38

## 2019-01-01 RX ADMIN — Medication 1500 ML/HR: at 05:01

## 2019-01-01 RX ADMIN — FLUCONAZOLE 200 MG: 200 INJECTION, SOLUTION INTRAVENOUS at 20:31

## 2019-01-01 RX ADMIN — PREDNISOLONE ACETATE 1 DROP: 10 SUSPENSION/ DROPS OPHTHALMIC at 11:46

## 2019-01-01 RX ADMIN — KETOROLAC TROMETHAMINE 1 DROP: 5 SOLUTION OPHTHALMIC at 00:39

## 2019-01-01 RX ADMIN — POTASSIUM CHLORIDE 20 MEQ: 29.8 INJECTION, SOLUTION INTRAVENOUS at 07:08

## 2019-01-01 RX ADMIN — SODIUM CHLORIDE: 9 INJECTION, SOLUTION INTRAVENOUS at 09:34

## 2019-01-01 RX ADMIN — Medication 10 ML: at 21:10

## 2019-01-01 RX ADMIN — Medication 1500 ML/HR: at 04:12

## 2019-01-01 RX ADMIN — DIGOXIN 250 MCG: 0.25 INJECTION INTRAMUSCULAR; INTRAVENOUS at 08:02

## 2019-01-01 RX ADMIN — NOREPINEPHRINE BITARTRATE 50 MCG/MIN: 1 INJECTION, SOLUTION, CONCENTRATE INTRAVENOUS at 11:56

## 2019-01-01 RX ADMIN — TAMSULOSIN HYDROCHLORIDE 0.4 MG: 0.4 CAPSULE ORAL at 14:31

## 2019-01-01 RX ADMIN — KETOROLAC TROMETHAMINE 15 MG: 15 INJECTION, SOLUTION INTRAMUSCULAR; INTRAVENOUS at 08:27

## 2019-01-01 RX ADMIN — KETAMINE HYDROCHLORIDE 25 MG: 10 INJECTION INTRAMUSCULAR; INTRAVENOUS at 20:13

## 2019-01-01 RX ADMIN — SODIUM CHLORIDE 8 MG/HR: 9 INJECTION, SOLUTION INTRAVENOUS at 03:07

## 2019-01-01 RX ADMIN — HEPARIN SODIUM 5000 UNITS: 1000 INJECTION, SOLUTION INTRAVENOUS; SUBCUTANEOUS at 08:16

## 2019-01-01 RX ADMIN — PHYTONADIONE 10 MG: 10 INJECTION, EMULSION INTRAMUSCULAR; INTRAVENOUS; SUBCUTANEOUS at 16:00

## 2019-01-01 RX ADMIN — Medication 1500 ML/HR: at 02:31

## 2019-01-01 RX ADMIN — GABAPENTIN 100 MG: 100 CAPSULE ORAL at 14:45

## 2019-01-01 RX ADMIN — Medication 1500 ML/HR: at 05:02

## 2019-01-01 RX ADMIN — DEXMEDETOMIDINE HYDROCHLORIDE 0.4 MCG/KG/HR: 100 INJECTION, SOLUTION INTRAVENOUS at 18:04

## 2019-01-01 RX ADMIN — FAMOTIDINE 20 MG: 20 TABLET, FILM COATED ORAL at 15:50

## 2019-01-01 RX ADMIN — PHYTONADIONE 10 MG: 10 INJECTION, EMULSION INTRAMUSCULAR; INTRAVENOUS; SUBCUTANEOUS at 07:50

## 2019-01-01 RX ADMIN — MOXIFLOXACIN 1 DROP: 5 SOLUTION/ DROPS OPHTHALMIC at 16:02

## 2019-01-01 RX ADMIN — GLIPIZIDE 2.5 MG: 5 TABLET ORAL at 09:10

## 2019-01-01 RX ADMIN — Medication 15 ML: at 09:21

## 2019-01-01 RX ADMIN — HYDROCORTISONE SODIUM SUCCINATE 50 MG: 100 INJECTION, POWDER, FOR SOLUTION INTRAMUSCULAR; INTRAVENOUS at 09:48

## 2019-01-01 RX ADMIN — PREDNISOLONE ACETATE 1 DROP: 10 SUSPENSION/ DROPS OPHTHALMIC at 12:50

## 2019-01-01 RX ADMIN — HYDROCORTISONE SODIUM SUCCINATE 50 MG: 100 INJECTION, POWDER, FOR SOLUTION INTRAMUSCULAR; INTRAVENOUS at 22:39

## 2019-01-01 RX ADMIN — PHYTONADIONE 10 MG: 10 INJECTION, EMULSION INTRAMUSCULAR; INTRAVENOUS; SUBCUTANEOUS at 15:02

## 2019-01-01 RX ADMIN — PREDNISOLONE ACETATE 1 DROP: 10 SUSPENSION/ DROPS OPHTHALMIC at 17:56

## 2019-01-01 RX ADMIN — KETOROLAC TROMETHAMINE 1 DROP: 5 SOLUTION OPHTHALMIC at 20:53

## 2019-01-01 RX ADMIN — SODIUM CHLORIDE 250 ML: 9 INJECTION, SOLUTION INTRAVENOUS at 23:28

## 2019-01-01 RX ADMIN — PREDNISOLONE ACETATE 1 DROP: 10 SUSPENSION/ DROPS OPHTHALMIC at 00:33

## 2019-01-01 RX ADMIN — KETOROLAC TROMETHAMINE 1 DROP: 5 SOLUTION/ DROPS OPHTHALMIC at 20:33

## 2019-01-01 RX ADMIN — ACETAMINOPHEN 1000 MG: 500 TABLET ORAL at 14:44

## 2019-01-01 RX ADMIN — PREDNISOLONE ACETATE 1 DROP: 10 SUSPENSION/ DROPS OPHTHALMIC at 05:07

## 2019-01-01 RX ADMIN — DIAZEPAM 2.5 MG: 5 TABLET ORAL at 22:18

## 2019-01-01 RX ADMIN — HEPARIN SODIUM 4000 UNITS: 5000 INJECTION INTRAVENOUS; SUBCUTANEOUS at 17:26

## 2019-01-01 RX ADMIN — SODIUM BICARBONATE: 84 INJECTION, SOLUTION INTRAVENOUS at 12:19

## 2019-01-01 RX ADMIN — CLOPIDOGREL 75 MG: 75 TABLET, FILM COATED ORAL at 15:32

## 2019-01-01 RX ADMIN — PREDNISOLONE ACETATE 1 DROP: 10 SUSPENSION/ DROPS OPHTHALMIC at 11:03

## 2019-01-01 RX ADMIN — SODIUM CHLORIDE 8 MG/HR: 9 INJECTION, SOLUTION INTRAVENOUS at 07:19

## 2019-01-01 RX ADMIN — CEFEPIME HYDROCHLORIDE 1 G: 1 INJECTION, POWDER, FOR SOLUTION INTRAMUSCULAR; INTRAVENOUS at 03:10

## 2019-01-01 RX ADMIN — Medication: at 03:56

## 2019-01-01 RX ADMIN — HEPARIN SODIUM 23.5 UNITS/KG/HR: 10000 INJECTION, SOLUTION INTRAVENOUS at 16:22

## 2019-01-01 RX ADMIN — CLOPIDOGREL 75 MG: 75 TABLET, FILM COATED ORAL at 08:04

## 2019-01-01 RX ADMIN — KETOROLAC TROMETHAMINE 1 DROP: 5 SOLUTION OPHTHALMIC at 08:22

## 2019-01-01 RX ADMIN — FAMOTIDINE 20 MG: 20 TABLET, FILM COATED ORAL at 19:47

## 2019-01-01 RX ADMIN — PIPERACILLIN SODIUM AND TAZOBACTAM SODIUM 3.38 G: 3; .375 INJECTION, POWDER, LYOPHILIZED, FOR SOLUTION INTRAVENOUS at 18:07

## 2019-01-01 RX ADMIN — KETOROLAC TROMETHAMINE 1 DROP: 5 SOLUTION OPHTHALMIC at 21:00

## 2019-01-01 RX ADMIN — Medication 0.4 MG: at 12:21

## 2019-01-01 RX ADMIN — CEFEPIME HYDROCHLORIDE 1 G: 1 INJECTION, POWDER, FOR SOLUTION INTRAMUSCULAR; INTRAVENOUS at 11:54

## 2019-01-01 RX ADMIN — Medication 10 ML: at 08:23

## 2019-01-01 RX ADMIN — Medication 1500 ML/HR: at 11:15

## 2019-01-01 RX ADMIN — GABAPENTIN 100 MG: 100 CAPSULE ORAL at 20:47

## 2019-01-01 RX ADMIN — WARFARIN SODIUM 5 MG: 5 TABLET ORAL at 01:16

## 2019-01-01 RX ADMIN — Medication 10 ML: at 08:05

## 2019-01-01 RX ADMIN — KETOROLAC TROMETHAMINE 1 DROP: 5 SOLUTION OPHTHALMIC at 09:12

## 2019-01-01 RX ADMIN — TAMSULOSIN HYDROCHLORIDE 0.4 MG: 0.4 CAPSULE ORAL at 12:17

## 2019-01-01 RX ADMIN — DEXTROSE MONOHYDRATE 200 MG: 50 INJECTION, SOLUTION INTRAVENOUS at 15:50

## 2019-01-01 RX ADMIN — Medication 1500 ML/HR: at 19:06

## 2019-01-01 RX ADMIN — KETOROLAC TROMETHAMINE 1 DROP: 5 SOLUTION OPHTHALMIC at 08:57

## 2019-01-01 RX ADMIN — CLOPIDOGREL 75 MG: 75 TABLET, FILM COATED ORAL at 10:23

## 2019-01-01 RX ADMIN — MOXIFLOXACIN 1 DROP: 5 SOLUTION/ DROPS OPHTHALMIC at 08:07

## 2019-01-01 RX ADMIN — Medication 1 G: at 01:51

## 2019-01-01 RX ADMIN — CEFEPIME HYDROCHLORIDE 1 G: 1 INJECTION, POWDER, FOR SOLUTION INTRAMUSCULAR; INTRAVENOUS at 19:07

## 2019-01-01 RX ADMIN — PREDNISOLONE ACETATE 1 DROP: 10 SUSPENSION/ DROPS OPHTHALMIC at 05:55

## 2019-01-01 RX ADMIN — DEXTROSE MONOHYDRATE 25 G: 500 INJECTION PARENTERAL at 07:48

## 2019-01-01 RX ADMIN — Medication 15 ML: at 08:07

## 2019-01-01 RX ADMIN — PROPOFOL 25 MCG/KG/MIN: 10 INJECTION, EMULSION INTRAVENOUS at 07:42

## 2019-01-01 RX ADMIN — HEPARIN SODIUM 6850 UNITS: 1000 INJECTION INTRAVENOUS; SUBCUTANEOUS at 12:49

## 2019-01-01 RX ADMIN — GABAPENTIN 100 MG: 100 CAPSULE ORAL at 18:00

## 2019-01-01 RX ADMIN — PREDNISONE 20 MG: 20 TABLET ORAL at 13:23

## 2019-01-01 RX ADMIN — MIDODRINE HYDROCHLORIDE 5 MG: 5 TABLET ORAL at 05:29

## 2019-01-01 RX ADMIN — KETOROLAC TROMETHAMINE 1 DROP: 5 SOLUTION OPHTHALMIC at 15:39

## 2019-01-01 RX ADMIN — KETOROLAC TROMETHAMINE 1 DROP: 5 SOLUTION OPHTHALMIC at 21:24

## 2019-01-01 RX ADMIN — EPINEPHRINE 2 MCG/MIN: 1 INJECTION INTRAMUSCULAR; INTRAVENOUS; SUBCUTANEOUS at 17:36

## 2019-01-01 RX ADMIN — Medication: at 13:13

## 2019-01-01 RX ADMIN — Medication 10 ML: at 09:11

## 2019-01-01 RX ADMIN — PIPERACILLIN SODIUM,TAZOBACTAM SODIUM 2.25 G: 2; .25 INJECTION, POWDER, FOR SOLUTION INTRAVENOUS at 18:02

## 2019-01-01 RX ADMIN — INSULIN LISPRO 3 UNITS: 100 INJECTION, SOLUTION INTRAVENOUS; SUBCUTANEOUS at 21:12

## 2019-01-01 RX ADMIN — Medication 1500 ML/HR: at 02:30

## 2019-01-01 RX ADMIN — SODIUM CHLORIDE 8 MG/HR: 9 INJECTION, SOLUTION INTRAVENOUS at 12:45

## 2019-01-01 RX ADMIN — HEPARIN SODIUM 2210 UNITS/HR: 10000 INJECTION, SOLUTION INTRAVENOUS at 05:53

## 2019-01-01 RX ADMIN — PROPOFOL 100 MCG/KG/MIN: 10 INJECTION, EMULSION INTRAVENOUS at 17:00

## 2019-01-01 RX ADMIN — HYDROCORTISONE SODIUM SUCCINATE 50 MG: 100 INJECTION, POWDER, FOR SOLUTION INTRAMUSCULAR; INTRAVENOUS at 10:26

## 2019-01-01 RX ADMIN — MORPHINE SULFATE 4 MG: 4 INJECTION INTRAVENOUS at 10:30

## 2019-01-01 RX ADMIN — VASOPRESSIN 0.02 UNITS/MIN: 20 INJECTION INTRAVENOUS at 20:06

## 2019-01-01 RX ADMIN — GABAPENTIN 100 MG: 100 CAPSULE ORAL at 21:23

## 2019-01-01 RX ADMIN — NOREPINEPHRINE BITARTRATE 2 MCG/MIN: 1 INJECTION, SOLUTION, CONCENTRATE INTRAVENOUS at 00:54

## 2019-01-01 RX ADMIN — INSULIN LISPRO 12 UNITS: 100 INJECTION, SOLUTION INTRAVENOUS; SUBCUTANEOUS at 21:50

## 2019-01-01 RX ADMIN — Medication 10 ML: at 19:48

## 2019-01-01 RX ADMIN — Medication 10 ML: at 08:21

## 2019-01-01 RX ADMIN — MORPHINE SULFATE 2 MG: 2 INJECTION, SOLUTION INTRAMUSCULAR; INTRAVENOUS at 12:13

## 2019-01-01 RX ADMIN — PREDNISOLONE ACETATE 1 DROP: 10 SUSPENSION/ DROPS OPHTHALMIC at 12:11

## 2019-01-01 RX ADMIN — KETOROLAC TROMETHAMINE 1 DROP: 5 SOLUTION OPHTHALMIC at 07:56

## 2019-01-01 RX ADMIN — OXYCODONE HYDROCHLORIDE 10 MG: 5 TABLET ORAL at 12:16

## 2019-01-01 RX ADMIN — PIPERACILLIN SODIUM,TAZOBACTAM SODIUM 2.25 G: 2; .25 INJECTION, POWDER, FOR SOLUTION INTRAVENOUS at 10:24

## 2019-01-01 RX ADMIN — EPINEPHRINE 10 MCG/MIN: 1 INJECTION PARENTERAL at 15:15

## 2019-01-01 RX ADMIN — DOXERCALCIFEROL 1 MCG: 2 INJECTION, SOLUTION INTRAVENOUS at 12:12

## 2019-01-01 RX ADMIN — Medication 10 ML: at 12:19

## 2019-01-01 RX ADMIN — DEXAMETHASONE SODIUM PHOSPHATE 10 MG: 10 INJECTION INTRAMUSCULAR; INTRAVENOUS at 08:37

## 2019-01-01 RX ADMIN — KETOROLAC TROMETHAMINE 1 DROP: 5 SOLUTION OPHTHALMIC at 09:54

## 2019-01-01 RX ADMIN — INSULIN LISPRO 6 UNITS: 100 INJECTION, SOLUTION INTRAVENOUS; SUBCUTANEOUS at 18:04

## 2019-01-01 RX ADMIN — KETOROLAC TROMETHAMINE 1 DROP: 5 SOLUTION/ DROPS OPHTHALMIC at 20:36

## 2019-01-01 RX ADMIN — Medication 1500 ML/HR: at 15:47

## 2019-01-01 RX ADMIN — INSULIN LISPRO 9 UNITS: 100 INJECTION, SOLUTION INTRAVENOUS; SUBCUTANEOUS at 08:07

## 2019-01-01 RX ADMIN — CALCIUM GLUCONATE 1 G: 98 INJECTION, SOLUTION INTRAVENOUS at 15:42

## 2019-01-01 RX ADMIN — RIVAROXABAN 15 MG: 15 TABLET, FILM COATED ORAL at 17:42

## 2019-01-01 RX ADMIN — GABAPENTIN 100 MG: 100 CAPSULE ORAL at 09:51

## 2019-01-01 RX ADMIN — KETAMINE HYDROCHLORIDE 25 MG: 10 INJECTION INTRAMUSCULAR; INTRAVENOUS at 20:22

## 2019-01-01 RX ADMIN — BENZONATATE 200 MG: 100 CAPSULE ORAL at 06:19

## 2019-01-01 RX ADMIN — FUROSEMIDE 40 MG: 40 TABLET ORAL at 22:30

## 2019-01-01 RX ADMIN — MOXIFLOXACIN 1 DROP: 5 SOLUTION/ DROPS OPHTHALMIC at 00:40

## 2019-01-01 RX ADMIN — HEPARIN SODIUM AND DEXTROSE 12 UNITS/KG/HR: 10000; 5 INJECTION INTRAVENOUS at 08:48

## 2019-01-01 RX ADMIN — CEFEPIME HYDROCHLORIDE 1 G: 1 INJECTION, POWDER, FOR SOLUTION INTRAMUSCULAR; INTRAVENOUS at 04:19

## 2019-01-01 RX ADMIN — FENTANYL CITRATE 50 MCG: 50 INJECTION, SOLUTION INTRAMUSCULAR; INTRAVENOUS at 12:07

## 2019-01-01 RX ADMIN — VANCOMYCIN HYDROCHLORIDE 1000 MG: 1 INJECTION, POWDER, LYOPHILIZED, FOR SOLUTION INTRAVENOUS at 08:27

## 2019-01-01 RX ADMIN — MORPHINE SULFATE 4 MG: 4 INJECTION INTRAVENOUS at 11:28

## 2019-01-01 RX ADMIN — MINERAL OIL AND PETROLATUM: 150; 830 OINTMENT OPHTHALMIC at 01:39

## 2019-01-01 RX ADMIN — Medication 1.6 ML: at 14:49

## 2019-01-01 RX ADMIN — ACETAMINOPHEN 1000 MG: 500 TABLET, FILM COATED ORAL at 00:39

## 2019-01-01 RX ADMIN — MIDAZOLAM HYDROCHLORIDE 2 MG: 1 INJECTION, SOLUTION INTRAMUSCULAR; INTRAVENOUS at 16:32

## 2019-01-01 RX ADMIN — CLOPIDOGREL BISULFATE 75 MG: 75 TABLET, FILM COATED ORAL at 09:24

## 2019-01-01 RX ADMIN — Medication 200 MCG: at 12:24

## 2019-01-01 RX ADMIN — Medication 10 ML: at 20:07

## 2019-01-01 RX ADMIN — DEXTROSE MONOHYDRATE 12.5 G: 500 INJECTION PARENTERAL at 09:13

## 2019-01-01 RX ADMIN — KETOROLAC TROMETHAMINE 1 DROP: 5 SOLUTION/ DROPS OPHTHALMIC at 09:52

## 2019-01-01 RX ADMIN — Medication 1500 ML/HR: at 10:31

## 2019-01-01 RX ADMIN — ASPIRIN 81 MG: 81 TABLET ORAL at 13:23

## 2019-01-01 RX ADMIN — PROPOFOL 200 MG: 10 INJECTION, EMULSION INTRAVENOUS at 08:13

## 2019-01-01 RX ADMIN — NOREPINEPHRINE BITARTRATE 30 MCG/MIN: 1 INJECTION, SOLUTION, CONCENTRATE INTRAVENOUS at 17:28

## 2019-01-01 RX ADMIN — INSULIN LISPRO 9 UNITS: 100 INJECTION, SOLUTION INTRAVENOUS; SUBCUTANEOUS at 00:33

## 2019-01-01 RX ADMIN — Medication 10 ML: at 21:28

## 2019-01-01 RX ADMIN — MINERAL OIL AND PETROLATUM: 150; 830 OINTMENT OPHTHALMIC at 12:31

## 2019-01-01 RX ADMIN — SODIUM CHLORIDE: 9 INJECTION, SOLUTION INTRAVENOUS at 08:13

## 2019-01-01 RX ADMIN — TAMSULOSIN HYDROCHLORIDE 0.4 MG: 0.4 CAPSULE ORAL at 18:01

## 2019-01-01 RX ADMIN — INSULIN LISPRO 3 UNITS: 100 INJECTION, SOLUTION INTRAVENOUS; SUBCUTANEOUS at 06:17

## 2019-01-01 RX ADMIN — INSULIN LISPRO 3 UNITS: 100 INJECTION, SOLUTION INTRAVENOUS; SUBCUTANEOUS at 16:16

## 2019-01-01 RX ADMIN — Medication 1500 ML/HR: at 22:45

## 2019-01-01 RX ADMIN — ONDANSETRON 4 MG: 2 INJECTION INTRAMUSCULAR; INTRAVENOUS at 18:26

## 2019-01-01 RX ADMIN — PHENYLEPHRINE HYDROCHLORIDE 100 MCG/MIN: 10 INJECTION INTRAVENOUS at 09:44

## 2019-01-01 RX ADMIN — SODIUM CHLORIDE: 9 INJECTION, SOLUTION INTRAVENOUS at 06:06

## 2019-01-01 RX ADMIN — SODIUM CHLORIDE 500 ML: 9 INJECTION, SOLUTION INTRAVENOUS at 01:01

## 2019-01-01 RX ADMIN — KETOROLAC TROMETHAMINE 1 DROP: 5 SOLUTION OPHTHALMIC at 08:08

## 2019-01-01 RX ADMIN — DEXTROSE MONOHYDRATE 25 G: 500 INJECTION PARENTERAL at 01:19

## 2019-01-01 RX ADMIN — INSULIN LISPRO 3 UNITS: 100 INJECTION, SOLUTION INTRAVENOUS; SUBCUTANEOUS at 17:19

## 2019-01-01 RX ADMIN — ACETAMINOPHEN 1000 MG: 500 TABLET ORAL at 22:30

## 2019-01-01 RX ADMIN — TAMSULOSIN HYDROCHLORIDE 0.4 MG: 0.4 CAPSULE ORAL at 13:23

## 2019-01-01 RX ADMIN — DEXTROSE MONOHYDRATE 100 MG: 50 INJECTION, SOLUTION INTRAVENOUS at 14:44

## 2019-01-01 RX ADMIN — GABAPENTIN 100 MG: 100 CAPSULE ORAL at 15:15

## 2019-01-01 RX ADMIN — DEXTROSE MONOHYDRATE 25 G: 500 INJECTION PARENTERAL at 14:10

## 2019-01-01 RX ADMIN — INSULIN LISPRO 12 UNITS: 100 INJECTION, SOLUTION INTRAVENOUS; SUBCUTANEOUS at 17:00

## 2019-01-01 RX ADMIN — MORPHINE SULFATE 4 MG: 4 INJECTION INTRAVENOUS at 18:37

## 2019-01-01 RX ADMIN — ASPIRIN 81 MG: 81 TABLET ORAL at 10:23

## 2019-01-01 RX ADMIN — ETOMIDATE 2 MG: 2 INJECTION INTRAVENOUS at 16:47

## 2019-01-01 RX ADMIN — Medication 200 MCG: at 10:00

## 2019-01-01 RX ADMIN — EPINEPHRINE 1000 MCG: 1 INJECTION, SOLUTION INTRAMUSCULAR; SUBCUTANEOUS at 12:42

## 2019-01-01 RX ADMIN — POTASSIUM BICARBONATE 20 MEQ: 782 TABLET, EFFERVESCENT ORAL at 15:07

## 2019-01-01 RX ADMIN — CALCIUM CHLORIDE 1 G: 100 INJECTION INTRAVENOUS; INTRAVENTRICULAR at 12:58

## 2019-01-01 RX ADMIN — HYDROCORTISONE SODIUM SUCCINATE 50 MG: 100 INJECTION, POWDER, FOR SOLUTION INTRAMUSCULAR; INTRAVENOUS at 15:51

## 2019-01-01 RX ADMIN — HEPARIN SODIUM 1694 UNITS/HR: 10000 INJECTION, SOLUTION INTRAVENOUS at 03:41

## 2019-01-01 RX ADMIN — Medication 10 ML: at 08:16

## 2019-01-01 RX ADMIN — DOXERCALCIFEROL 1 MCG: 2 INJECTION, SOLUTION INTRAVENOUS at 10:30

## 2019-01-01 RX ADMIN — Medication 10 ML: at 10:38

## 2019-01-01 RX ADMIN — DEXTROSE MONOHYDRATE 25 G: 500 INJECTION PARENTERAL at 08:37

## 2019-01-01 RX ADMIN — CARVEDILOL 12.5 MG: 12.5 TABLET, FILM COATED ORAL at 09:24

## 2019-01-01 RX ADMIN — DEXTROSE MONOHYDRATE 12.5 G: 500 INJECTION PARENTERAL at 17:53

## 2019-01-01 RX ADMIN — Medication 200 MCG: at 12:19

## 2019-01-01 RX ADMIN — Medication 1500 ML/HR: at 10:33

## 2019-01-01 RX ADMIN — DOCUSATE SODIUM 100 MG: 100 CAPSULE, LIQUID FILLED ORAL at 09:10

## 2019-01-01 RX ADMIN — FUROSEMIDE 40 MG: 40 TABLET ORAL at 09:24

## 2019-01-01 RX ADMIN — PREDNISOLONE ACETATE 1 DROP: 10 SUSPENSION/ DROPS OPHTHALMIC at 17:51

## 2019-01-01 RX ADMIN — PIPERACILLIN SODIUM,TAZOBACTAM SODIUM 2.25 G: 2; .25 INJECTION, POWDER, FOR SOLUTION INTRAVENOUS at 21:07

## 2019-01-01 RX ADMIN — PIPERACILLIN SODIUM,TAZOBACTAM SODIUM 2.25 G: 2; .25 INJECTION, POWDER, FOR SOLUTION INTRAVENOUS at 15:28

## 2019-01-01 RX ADMIN — KETOROLAC TROMETHAMINE 1 DROP: 5 SOLUTION OPHTHALMIC at 12:28

## 2019-01-01 RX ADMIN — GLIPIZIDE 2.5 MG: 5 TABLET ORAL at 07:06

## 2019-01-01 RX ADMIN — KETOROLAC TROMETHAMINE 1 DROP: 5 SOLUTION/ DROPS OPHTHALMIC at 13:39

## 2019-01-01 RX ADMIN — GABAPENTIN 100 MG: 100 CAPSULE ORAL at 14:58

## 2019-01-01 RX ADMIN — PREDNISOLONE ACETATE 1 DROP: 10 SUSPENSION/ DROPS OPHTHALMIC at 06:13

## 2019-01-01 RX ADMIN — INSULIN LISPRO 3 UNITS: 100 INJECTION, SOLUTION INTRAVENOUS; SUBCUTANEOUS at 12:55

## 2019-01-01 RX ADMIN — HYDROCORTISONE SODIUM SUCCINATE 100 MG: 100 INJECTION, POWDER, FOR SOLUTION INTRAMUSCULAR; INTRAVENOUS at 20:48

## 2019-01-01 RX ADMIN — Medication 10 ML: at 21:52

## 2019-01-01 RX ADMIN — GABAPENTIN 100 MG: 100 CAPSULE ORAL at 08:03

## 2019-01-01 RX ADMIN — KETOROLAC TROMETHAMINE 1 DROP: 5 SOLUTION OPHTHALMIC at 06:38

## 2019-01-01 RX ADMIN — DEXTROSE MONOHYDRATE 100 MG: 50 INJECTION, SOLUTION INTRAVENOUS at 14:47

## 2019-01-01 RX ADMIN — ACETAMINOPHEN 1000 MG: 500 TABLET ORAL at 15:15

## 2019-01-01 RX ADMIN — MORPHINE SULFATE 4 MG: 4 INJECTION INTRAVENOUS at 04:43

## 2019-01-01 RX ADMIN — OXYCODONE HYDROCHLORIDE 10 MG: 5 TABLET ORAL at 18:37

## 2019-01-01 RX ADMIN — SODIUM CHLORIDE: 9 INJECTION, SOLUTION INTRAVENOUS at 00:55

## 2019-01-01 RX ADMIN — Medication 1500 ML/HR: at 18:27

## 2019-01-01 RX ADMIN — FLUCONAZOLE 400 MG: 2 INJECTION, SOLUTION INTRAVENOUS at 22:15

## 2019-01-01 RX ADMIN — INSULIN LISPRO 9 UNITS: 100 INJECTION, SOLUTION INTRAVENOUS; SUBCUTANEOUS at 14:48

## 2019-01-01 RX ADMIN — PHENYLEPHRINE HYDROCHLORIDE 250 MCG/MIN: 10 INJECTION INTRAVENOUS at 11:03

## 2019-01-01 RX ADMIN — PREDNISOLONE ACETATE 1 DROP: 10 SUSPENSION/ DROPS OPHTHALMIC at 00:15

## 2019-01-01 RX ADMIN — MOXIFLOXACIN 1 DROP: 5 SOLUTION/ DROPS OPHTHALMIC at 19:14

## 2019-01-01 RX ADMIN — HYDROCORTISONE SODIUM SUCCINATE 100 MG: 100 INJECTION, POWDER, FOR SOLUTION INTRAMUSCULAR; INTRAVENOUS at 06:18

## 2019-01-01 RX ADMIN — CALCIUM GLUCONATE 1 G: 98 INJECTION, SOLUTION INTRAVENOUS at 09:20

## 2019-01-01 RX ADMIN — INSULIN HUMAN 10 UNITS: 100 INJECTION, SOLUTION PARENTERAL at 14:10

## 2019-01-01 RX ADMIN — Medication 10 ML: at 21:19

## 2019-01-01 RX ADMIN — Medication 1500 ML/HR: at 22:46

## 2019-01-01 RX ADMIN — FENTANYL CITRATE 25 MCG: 50 INJECTION, SOLUTION INTRAMUSCULAR; INTRAVENOUS at 07:52

## 2019-01-01 RX ADMIN — Medication 1500 ML/HR: at 10:34

## 2019-01-01 RX ADMIN — ACETAMINOPHEN 1000 MG: 500 TABLET ORAL at 22:15

## 2019-01-01 RX ADMIN — CALCIUM GLUCONATE 1 G: 98 INJECTION, SOLUTION INTRAVENOUS at 19:05

## 2019-01-01 RX ADMIN — FENTANYL CITRATE 25 MCG: 50 INJECTION, SOLUTION INTRAMUSCULAR; INTRAVENOUS at 08:33

## 2019-01-01 RX ADMIN — FUROSEMIDE 40 MG: 40 TABLET ORAL at 12:54

## 2019-01-01 RX ADMIN — ASPIRIN 81 MG: 81 TABLET ORAL at 16:04

## 2019-01-01 RX ADMIN — HYDROCORTISONE SODIUM SUCCINATE 50 MG: 100 INJECTION, POWDER, FOR SOLUTION INTRAMUSCULAR; INTRAVENOUS at 22:57

## 2019-01-01 RX ADMIN — ALBUMIN (HUMAN) 25 G: 0.25 INJECTION, SOLUTION INTRAVENOUS at 01:30

## 2019-01-01 RX ADMIN — GABAPENTIN 100 MG: 100 CAPSULE ORAL at 16:10

## 2019-01-01 RX ADMIN — OXYCODONE HYDROCHLORIDE 10 MG: 5 TABLET ORAL at 12:59

## 2019-01-01 RX ADMIN — INSULIN LISPRO 3 UNITS: 100 INJECTION, SOLUTION INTRAVENOUS; SUBCUTANEOUS at 17:24

## 2019-01-01 RX ADMIN — PHENYLEPHRINE HYDROCHLORIDE 75 MCG/MIN: 10 INJECTION INTRAVENOUS at 03:54

## 2019-01-01 RX ADMIN — HYDROCORTISONE SODIUM SUCCINATE 50 MG: 100 INJECTION, POWDER, FOR SOLUTION INTRAMUSCULAR; INTRAVENOUS at 05:29

## 2019-01-01 RX ADMIN — DEXTROSE MONOHYDRATE 12.5 G: 500 INJECTION PARENTERAL at 21:40

## 2019-01-01 RX ADMIN — CEFEPIME HYDROCHLORIDE 1 G: 1 INJECTION, POWDER, FOR SOLUTION INTRAMUSCULAR; INTRAVENOUS at 23:16

## 2019-01-01 RX ADMIN — MOXIFLOXACIN 1 DROP: 5 SOLUTION/ DROPS OPHTHALMIC at 07:57

## 2019-01-01 RX ADMIN — KETOROLAC TROMETHAMINE 1 DROP: 5 SOLUTION OPHTHALMIC at 21:52

## 2019-01-01 RX ADMIN — Medication 10 ML: at 21:25

## 2019-01-01 RX ADMIN — KETAMINE HYDROCHLORIDE 25 MG: 10 INJECTION INTRAMUSCULAR; INTRAVENOUS at 21:03

## 2019-01-01 RX ADMIN — HEPARIN SODIUM AND DEXTROSE 12 UNITS/KG/HR: 10000; 5 INJECTION INTRAVENOUS at 01:14

## 2019-01-01 RX ADMIN — OXYCODONE HYDROCHLORIDE 5 MG: 5 TABLET ORAL at 17:41

## 2019-01-01 RX ADMIN — CEFAZOLIN 1 G: 1 INJECTION, POWDER, FOR SOLUTION INTRAMUSCULAR; INTRAVENOUS at 08:25

## 2019-01-01 RX ADMIN — SODIUM BICARBONATE 50 MEQ: 84 INJECTION INTRAVENOUS at 12:52

## 2019-01-01 RX ADMIN — GABAPENTIN 100 MG: 100 CAPSULE ORAL at 20:36

## 2019-01-01 RX ADMIN — Medication 2 G: at 07:44

## 2019-01-01 RX ADMIN — SODIUM BICARBONATE 50 MEQ: 84 INJECTION, SOLUTION INTRAVENOUS at 05:27

## 2019-01-01 RX ADMIN — SODIUM CHLORIDE, PRESERVATIVE FREE 10 ML: 5 INJECTION INTRAVENOUS at 21:11

## 2019-01-01 RX ADMIN — FLUCONAZOLE 200 MG: 200 INJECTION, SOLUTION INTRAVENOUS at 00:32

## 2019-01-01 RX ADMIN — KETOROLAC TROMETHAMINE 1 DROP: 5 SOLUTION OPHTHALMIC at 16:24

## 2019-01-01 RX ADMIN — NOREPINEPHRINE BITARTRATE 30 MCG/MIN: 1 INJECTION, SOLUTION, CONCENTRATE INTRAVENOUS at 06:12

## 2019-01-01 RX ADMIN — SODIUM BICARBONATE: 84 INJECTION, SOLUTION INTRAVENOUS at 09:20

## 2019-01-01 RX ADMIN — GABAPENTIN 300 MG: 300 CAPSULE ORAL at 14:32

## 2019-01-01 RX ADMIN — MIDAZOLAM HYDROCHLORIDE 0.5 MG: 1 INJECTION, SOLUTION INTRAMUSCULAR; INTRAVENOUS at 09:37

## 2019-01-01 RX ADMIN — NOREPINEPHRINE BITARTRATE 40 MCG/MIN: 1 INJECTION, SOLUTION, CONCENTRATE INTRAVENOUS at 23:22

## 2019-01-01 RX ADMIN — SODIUM BICARBONATE 50 MEQ: 84 INJECTION, SOLUTION INTRAVENOUS at 07:54

## 2019-01-01 RX ADMIN — DIAZEPAM 2.5 MG: 5 TABLET ORAL at 22:30

## 2019-01-01 RX ADMIN — CLOPIDOGREL 75 MG: 75 TABLET, FILM COATED ORAL at 14:31

## 2019-01-01 RX ADMIN — DOCUSATE SODIUM 100 MG: 100 CAPSULE, LIQUID FILLED ORAL at 14:33

## 2019-01-01 RX ADMIN — KETOROLAC TROMETHAMINE 1 DROP: 5 SOLUTION OPHTHALMIC at 18:06

## 2019-01-01 RX ADMIN — GABAPENTIN 100 MG: 100 CAPSULE ORAL at 08:57

## 2019-01-01 RX ADMIN — Medication 1500 ML/HR: at 01:26

## 2019-01-01 RX ADMIN — KETOROLAC TROMETHAMINE 1 DROP: 5 SOLUTION/ DROPS OPHTHALMIC at 08:31

## 2019-01-01 RX ADMIN — ONDANSETRON 4 MG: 2 INJECTION INTRAMUSCULAR; INTRAVENOUS at 00:42

## 2019-01-01 RX ADMIN — SODIUM CHLORIDE: 9 INJECTION, SOLUTION INTRAVENOUS at 07:27

## 2019-01-01 RX ADMIN — INSULIN LISPRO 12 UNITS: 100 INJECTION, SOLUTION INTRAVENOUS; SUBCUTANEOUS at 12:19

## 2019-01-01 RX ADMIN — Medication: at 14:00

## 2019-01-01 RX ADMIN — PREDNISOLONE ACETATE 1 DROP: 10 SUSPENSION/ DROPS OPHTHALMIC at 12:00

## 2019-01-01 RX ADMIN — ACETAMINOPHEN 1000 MG: 500 TABLET ORAL at 07:53

## 2019-01-01 RX ADMIN — CEFEPIME HYDROCHLORIDE 1 G: 1 INJECTION, POWDER, FOR SOLUTION INTRAMUSCULAR; INTRAVENOUS at 20:07

## 2019-01-01 RX ADMIN — GABAPENTIN 100 MG: 100 CAPSULE ORAL at 08:37

## 2019-01-01 RX ADMIN — CALCIUM GLUCONATE 1 G: 98 INJECTION, SOLUTION INTRAVENOUS at 12:41

## 2019-01-01 RX ADMIN — Medication 10 ML: at 07:58

## 2019-01-01 RX ADMIN — INSULIN LISPRO 3 UNITS: 100 INJECTION, SOLUTION INTRAVENOUS; SUBCUTANEOUS at 10:31

## 2019-01-01 RX ADMIN — ASPIRIN 81 MG: 81 TABLET ORAL at 12:18

## 2019-01-01 RX ADMIN — Medication 10 ML: at 08:33

## 2019-01-01 RX ADMIN — Medication 10 ML: at 08:29

## 2019-01-01 RX ADMIN — PHENYLEPHRINE HYDROCHLORIDE 200 MCG: 10 INJECTION INTRAVENOUS at 08:34

## 2019-01-01 RX ADMIN — Medication 200 MCG: at 12:57

## 2019-01-01 RX ADMIN — FENTANYL CITRATE 50 MCG: 50 INJECTION, SOLUTION INTRAMUSCULAR; INTRAVENOUS at 16:42

## 2019-01-01 RX ADMIN — Medication 25 MCG/MIN: at 04:27

## 2019-01-01 RX ADMIN — SODIUM CHLORIDE 80 MG: 9 INJECTION, SOLUTION INTRAVENOUS at 03:53

## 2019-01-01 RX ADMIN — KETOROLAC TROMETHAMINE 1 DROP: 5 SOLUTION OPHTHALMIC at 17:04

## 2019-01-01 RX ADMIN — HYDROCORTISONE SODIUM SUCCINATE 50 MG: 100 INJECTION, POWDER, FOR SOLUTION INTRAMUSCULAR; INTRAVENOUS at 05:40

## 2019-01-01 RX ADMIN — NOREPINEPHRINE BITARTRATE 30 MCG/MIN: 1 INJECTION, SOLUTION, CONCENTRATE INTRAVENOUS at 01:07

## 2019-01-01 RX ADMIN — PHENYLEPHRINE HYDROCHLORIDE 250 MCG/MIN: 10 INJECTION INTRAVENOUS at 08:20

## 2019-01-01 RX ADMIN — BENZONATATE 200 MG: 100 CAPSULE ORAL at 15:33

## 2019-01-01 RX ADMIN — GABAPENTIN 100 MG: 100 CAPSULE ORAL at 21:06

## 2019-01-01 RX ADMIN — Medication 10 ML: at 22:18

## 2019-01-01 RX ADMIN — ONDANSETRON 4 MG: 2 INJECTION, SOLUTION INTRAMUSCULAR; INTRAVENOUS at 08:48

## 2019-01-01 RX ADMIN — PIPERACILLIN SODIUM,TAZOBACTAM SODIUM 2.25 G: 2; .25 INJECTION, POWDER, FOR SOLUTION INTRAVENOUS at 04:56

## 2019-01-01 RX ADMIN — OXYCODONE HYDROCHLORIDE AND ACETAMINOPHEN 2 TABLET: 5; 325 TABLET ORAL at 20:31

## 2019-01-01 RX ADMIN — NOREPINEPHRINE BITARTRATE 35 MCG/MIN: 1 INJECTION, SOLUTION, CONCENTRATE INTRAVENOUS at 12:33

## 2019-01-01 RX ADMIN — INSULIN LISPRO 3 UNITS: 100 INJECTION, SOLUTION INTRAVENOUS; SUBCUTANEOUS at 13:03

## 2019-01-01 RX ADMIN — SODIUM CHLORIDE 250 ML: 9 INJECTION, SOLUTION INTRAVENOUS at 12:10

## 2019-01-01 RX ADMIN — VASOPRESSIN 0.04 UNITS/MIN: 20 INJECTION INTRAVENOUS at 12:25

## 2019-01-01 RX ADMIN — HYDROCORTISONE SODIUM SUCCINATE 50 MG: 100 INJECTION, POWDER, FOR SOLUTION INTRAMUSCULAR; INTRAVENOUS at 21:25

## 2019-01-01 RX ADMIN — ACETAMINOPHEN 1000 MG: 500 TABLET ORAL at 14:32

## 2019-01-01 RX ADMIN — FUROSEMIDE 40 MG: 40 TABLET ORAL at 08:04

## 2019-01-01 RX ADMIN — HYDROCORTISONE SODIUM SUCCINATE 50 MG: 100 INJECTION, POWDER, FOR SOLUTION INTRAMUSCULAR; INTRAVENOUS at 10:06

## 2019-01-01 RX ADMIN — GLIPIZIDE 2.5 MG: 5 TABLET ORAL at 05:07

## 2019-01-01 RX ADMIN — PREDNISOLONE ACETATE 1 DROP: 10 SUSPENSION/ DROPS OPHTHALMIC at 18:03

## 2019-01-01 RX ADMIN — PREDNISOLONE ACETATE 1 DROP: 10 SUSPENSION/ DROPS OPHTHALMIC at 06:30

## 2019-01-01 RX ADMIN — KETOROLAC TROMETHAMINE 1 DROP: 5 SOLUTION OPHTHALMIC at 09:43

## 2019-01-01 RX ADMIN — MAGNESIUM SULFATE HEPTAHYDRATE 1 G: 1 INJECTION, SOLUTION INTRAVENOUS at 14:11

## 2019-01-01 RX ADMIN — KETOROLAC TROMETHAMINE 1 DROP: 5 SOLUTION/ DROPS OPHTHALMIC at 15:17

## 2019-01-01 RX ADMIN — Medication 1500 ML/HR: at 09:10

## 2019-01-01 RX ADMIN — VASOPRESSIN 0.04 UNITS/MIN: 20 INJECTION INTRAVENOUS at 02:35

## 2019-01-01 RX ADMIN — HYDROCORTISONE SODIUM SUCCINATE 50 MG: 100 INJECTION, POWDER, FOR SOLUTION INTRAMUSCULAR; INTRAVENOUS at 05:32

## 2019-01-01 RX ADMIN — PANTOPRAZOLE SODIUM 40 MG: 40 INJECTION, POWDER, FOR SOLUTION INTRAVENOUS at 21:25

## 2019-01-01 RX ADMIN — VASOPRESSIN 0.04 UNITS/MIN: 20 INJECTION INTRAVENOUS at 23:22

## 2019-01-01 RX ADMIN — PREDNISOLONE ACETATE 1 DROP: 10 SUSPENSION/ DROPS OPHTHALMIC at 18:00

## 2019-01-01 RX ADMIN — PREDNISOLONE ACETATE 1 DROP: 10 SUSPENSION/ DROPS OPHTHALMIC at 06:19

## 2019-01-01 RX ADMIN — BENZONATATE 200 MG: 100 CAPSULE ORAL at 23:43

## 2019-01-01 RX ADMIN — AMIODARONE HYDROCHLORIDE 300 MG: 50 INJECTION, SOLUTION INTRAVENOUS at 12:41

## 2019-01-01 RX ADMIN — KETOROLAC TROMETHAMINE 1 DROP: 5 SOLUTION OPHTHALMIC at 12:49

## 2019-01-01 RX ADMIN — CALCIUM GLUCONATE 1 G: 98 INJECTION, SOLUTION INTRAVENOUS at 02:37

## 2019-01-01 RX ADMIN — MORPHINE SULFATE 2 MG: 2 INJECTION, SOLUTION INTRAMUSCULAR; INTRAVENOUS at 06:01

## 2019-01-01 RX ADMIN — CLOPIDOGREL 75 MG: 75 TABLET, FILM COATED ORAL at 08:30

## 2019-01-01 RX ADMIN — DEXTROSE MONOHYDRATE 12.5 G: 500 INJECTION PARENTERAL at 07:57

## 2019-01-01 RX ADMIN — TAMSULOSIN HYDROCHLORIDE 0.4 MG: 0.4 CAPSULE ORAL at 15:32

## 2019-01-01 RX ADMIN — HEPARIN SODIUM 3420 UNITS: 1000 INJECTION, SOLUTION INTRAVENOUS; SUBCUTANEOUS at 15:52

## 2019-01-01 RX ADMIN — PIPERACILLIN SODIUM,TAZOBACTAM SODIUM 2.25 G: 2; .25 INJECTION, POWDER, FOR SOLUTION INTRAVENOUS at 01:27

## 2019-01-01 RX ADMIN — HYDROCORTISONE SODIUM SUCCINATE 50 MG: 100 INJECTION, POWDER, FOR SOLUTION INTRAMUSCULAR; INTRAVENOUS at 16:22

## 2019-01-01 RX ADMIN — FUROSEMIDE 40 MG: 40 TABLET ORAL at 21:23

## 2019-01-01 RX ADMIN — CEFAZOLIN 1 G: 1 INJECTION, POWDER, FOR SOLUTION INTRAMUSCULAR; INTRAVENOUS at 14:23

## 2019-01-01 RX ADMIN — MAGNESIUM SULFATE HEPTAHYDRATE 1 G: 1 INJECTION, SOLUTION INTRAVENOUS at 16:51

## 2019-01-01 RX ADMIN — CEFAZOLIN 1 G: 1 INJECTION, POWDER, FOR SOLUTION INTRAMUSCULAR; INTRAVENOUS at 09:14

## 2019-01-01 RX ADMIN — PREDNISOLONE ACETATE 1 DROP: 10 SUSPENSION/ DROPS OPHTHALMIC at 16:02

## 2019-01-01 RX ADMIN — SODIUM CHLORIDE: 9 INJECTION, SOLUTION INTRAVENOUS at 20:02

## 2019-01-01 RX ADMIN — KETOROLAC TROMETHAMINE 1 DROP: 5 SOLUTION OPHTHALMIC at 12:01

## 2019-01-01 RX ADMIN — CEFEPIME HYDROCHLORIDE 1 G: 1 INJECTION, POWDER, FOR SOLUTION INTRAMUSCULAR; INTRAVENOUS at 03:13

## 2019-01-01 RX ADMIN — LOSARTAN POTASSIUM 50 MG: 50 TABLET, FILM COATED ORAL at 10:23

## 2019-01-01 RX ADMIN — HYDROCORTISONE SODIUM SUCCINATE 50 MG: 100 INJECTION, POWDER, FOR SOLUTION INTRAMUSCULAR; INTRAVENOUS at 01:37

## 2019-01-01 RX ADMIN — EPINEPHRINE 200 MCG: 1 INJECTION, SOLUTION INTRAMUSCULAR; SUBCUTANEOUS at 12:26

## 2019-01-01 RX ADMIN — VANCOMYCIN HYDROCHLORIDE 1250 MG: 5 INJECTION, POWDER, LYOPHILIZED, FOR SOLUTION INTRAVENOUS at 18:45

## 2019-01-01 RX ADMIN — SODIUM CHLORIDE: 900 INJECTION, SOLUTION INTRAVENOUS at 12:00

## 2019-01-01 RX ADMIN — FUROSEMIDE 40 MG: 40 TABLET ORAL at 22:14

## 2019-01-01 RX ADMIN — GABAPENTIN 100 MG: 100 CAPSULE ORAL at 22:30

## 2019-01-01 RX ADMIN — Medication 10 ML: at 21:15

## 2019-01-01 RX ADMIN — Medication 1.9 ML: at 14:49

## 2019-01-01 RX ADMIN — PREDNISOLONE ACETATE 1 DROP: 10 SUSPENSION/ DROPS OPHTHALMIC at 13:39

## 2019-01-01 RX ADMIN — PREDNISOLONE ACETATE 1 DROP: 10 SUSPENSION/ DROPS OPHTHALMIC at 00:00

## 2019-01-01 RX ADMIN — Medication 10 ML: at 20:53

## 2019-01-01 RX ADMIN — CALCIUM GLUCONATE 2 G: 98 INJECTION, SOLUTION INTRAVENOUS at 21:24

## 2019-01-01 RX ADMIN — IOVERSOL 85 ML: 509 INJECTION INTRA-ARTERIAL; INTRAVENOUS at 11:33

## 2019-01-01 RX ADMIN — HEPARIN SODIUM 3420 UNITS: 1000 INJECTION, SOLUTION INTRAVENOUS; SUBCUTANEOUS at 04:13

## 2019-01-01 RX ADMIN — KETOROLAC TROMETHAMINE 1 DROP: 5 SOLUTION OPHTHALMIC at 12:21

## 2019-01-01 RX ADMIN — PHENYLEPHRINE HYDROCHLORIDE 300 MCG/MIN: 10 INJECTION INTRAVENOUS at 14:15

## 2019-01-01 RX ADMIN — PHENYLEPHRINE HYDROCHLORIDE 100 MCG: 10 INJECTION INTRAVENOUS at 08:32

## 2019-01-01 RX ADMIN — VASOPRESSIN 0.04 UNITS/MIN: 20 INJECTION INTRAVENOUS at 22:23

## 2019-01-01 RX ADMIN — ACETAMINOPHEN 1000 MG: 500 TABLET ORAL at 05:40

## 2019-01-01 RX ADMIN — INSULIN LISPRO 12 UNITS: 100 INJECTION, SOLUTION INTRAVENOUS; SUBCUTANEOUS at 01:51

## 2019-01-01 RX ADMIN — VASOPRESSIN 0.02 UNITS/MIN: 20 INJECTION INTRAVENOUS at 12:24

## 2019-01-01 RX ADMIN — PREDNISOLONE ACETATE 1 DROP: 10 SUSPENSION/ DROPS OPHTHALMIC at 00:10

## 2019-01-01 RX ADMIN — ASPIRIN 81 MG: 81 TABLET ORAL at 15:32

## 2019-01-01 RX ADMIN — HYDROCORTISONE SODIUM SUCCINATE 50 MG: 100 INJECTION, POWDER, FOR SOLUTION INTRAMUSCULAR; INTRAVENOUS at 16:59

## 2019-01-01 RX ADMIN — ACETAMINOPHEN 1000 MG: 500 TABLET ORAL at 18:00

## 2019-01-01 RX ADMIN — HEPARIN SODIUM AND DEXTROSE 20.21 UNITS/KG/HR: 10000; 5 INJECTION INTRAVENOUS at 08:32

## 2019-01-01 RX ADMIN — VASOPRESSIN 0.02 UNITS/MIN: 20 INJECTION INTRAVENOUS at 13:11

## 2019-01-01 RX ADMIN — OXYCODONE HYDROCHLORIDE 5 MG: 5 TABLET ORAL at 00:24

## 2019-01-01 RX ADMIN — HYDROCORTISONE SODIUM SUCCINATE 50 MG: 100 INJECTION, POWDER, FOR SOLUTION INTRAMUSCULAR; INTRAVENOUS at 14:44

## 2019-01-01 RX ADMIN — ACETAMINOPHEN 1000 MG: 500 TABLET ORAL at 21:05

## 2019-01-01 RX ADMIN — Medication 0.4 MG: at 20:09

## 2019-01-01 RX ADMIN — CARVEDILOL 12.5 MG: 12.5 TABLET, FILM COATED ORAL at 08:04

## 2019-01-01 RX ADMIN — SODIUM BICARBONATE: 84 INJECTION, SOLUTION INTRAVENOUS at 09:02

## 2019-01-01 RX ADMIN — HEPARIN SODIUM AND DEXTROSE 12 UNITS/KG/HR: 10000; 5 INJECTION INTRAVENOUS at 22:06

## 2019-01-01 RX ADMIN — GABAPENTIN 100 MG: 100 CAPSULE ORAL at 10:22

## 2019-01-01 RX ADMIN — Medication 15 ML: at 21:33

## 2019-01-01 RX ADMIN — PREDNISOLONE ACETATE 1 DROP: 10 SUSPENSION/ DROPS OPHTHALMIC at 13:24

## 2019-01-01 RX ADMIN — MINERAL OIL AND PETROLATUM: 150; 830 OINTMENT OPHTHALMIC at 22:57

## 2019-01-01 RX ADMIN — RIVAROXABAN 15 MG: 15 TABLET, FILM COATED ORAL at 17:41

## 2019-01-01 RX ADMIN — KETAMINE HYDROCHLORIDE 10 MG: 10 INJECTION INTRAMUSCULAR; INTRAVENOUS at 16:45

## 2019-01-01 RX ADMIN — Medication 10 ML: at 00:41

## 2019-01-01 RX ADMIN — SODIUM BICARBONATE 50 MEQ: 84 INJECTION INTRAVENOUS at 12:43

## 2019-01-01 RX ADMIN — SODIUM CHLORIDE 8 MG/HR: 9 INJECTION, SOLUTION INTRAVENOUS at 03:53

## 2019-01-01 RX ADMIN — MORPHINE SULFATE 2 MG: 2 INJECTION, SOLUTION INTRAMUSCULAR; INTRAVENOUS at 09:46

## 2019-01-01 RX ADMIN — EPINEPHRINE 800 MCG: 1 INJECTION, SOLUTION INTRAMUSCULAR; SUBCUTANEOUS at 12:31

## 2019-01-01 RX ADMIN — Medication 10 ML: at 08:57

## 2019-01-01 RX ADMIN — SODIUM CHLORIDE, PRESERVATIVE FREE 10 ML: 5 INJECTION INTRAVENOUS at 01:16

## 2019-01-01 RX ADMIN — ACETAMINOPHEN 1000 MG: 500 TABLET ORAL at 01:24

## 2019-01-01 RX ADMIN — INSULIN LISPRO 9 UNITS: 100 INJECTION, SOLUTION INTRAVENOUS; SUBCUTANEOUS at 05:36

## 2019-01-01 RX ADMIN — KETOROLAC TROMETHAMINE 1 DROP: 5 SOLUTION OPHTHALMIC at 12:56

## 2019-01-01 RX ADMIN — Medication 15 ML: at 21:25

## 2019-01-01 RX ADMIN — SODIUM CHLORIDE 8 MG/HR: 9 INJECTION, SOLUTION INTRAVENOUS at 10:45

## 2019-01-01 RX ADMIN — FAMOTIDINE 20 MG: 20 TABLET, FILM COATED ORAL at 20:52

## 2019-01-01 RX ADMIN — HYDROCORTISONE SODIUM SUCCINATE 100 MG: 100 INJECTION, POWDER, FOR SOLUTION INTRAMUSCULAR; INTRAVENOUS at 15:17

## 2019-01-01 RX ADMIN — KETOROLAC TROMETHAMINE 1 DROP: 5 SOLUTION OPHTHALMIC at 09:20

## 2019-01-01 RX ADMIN — CEFEPIME HYDROCHLORIDE 1 G: 1 INJECTION, POWDER, FOR SOLUTION INTRAMUSCULAR; INTRAVENOUS at 05:02

## 2019-01-01 RX ADMIN — FENTANYL CITRATE 25 MCG: 50 INJECTION, SOLUTION INTRAMUSCULAR; INTRAVENOUS at 08:55

## 2019-01-01 RX ADMIN — PROPOFOL 150 MG: 10 INJECTION, EMULSION INTRAVENOUS at 12:07

## 2019-01-01 RX ADMIN — CEFEPIME HYDROCHLORIDE 1 G: 1 INJECTION, POWDER, FOR SOLUTION INTRAMUSCULAR; INTRAVENOUS at 14:44

## 2019-01-01 RX ADMIN — Medication 300 MCG: at 12:21

## 2019-01-01 RX ADMIN — KETOROLAC TROMETHAMINE 1 DROP: 5 SOLUTION OPHTHALMIC at 21:19

## 2019-01-01 RX ADMIN — FUROSEMIDE 40 MG: 40 TABLET ORAL at 17:57

## 2019-01-01 RX ADMIN — FUROSEMIDE 40 MG: 40 TABLET ORAL at 12:17

## 2019-01-01 RX ADMIN — PREDNISOLONE ACETATE 1 DROP: 10 SUSPENSION/ DROPS OPHTHALMIC at 23:15

## 2019-01-01 RX ADMIN — CARVEDILOL 12.5 MG: 12.5 TABLET, FILM COATED ORAL at 10:22

## 2019-01-01 RX ADMIN — KETOROLAC TROMETHAMINE 1 DROP: 5 SOLUTION OPHTHALMIC at 13:24

## 2019-01-01 RX ADMIN — OXYCODONE HYDROCHLORIDE 10 MG: 5 TABLET ORAL at 04:17

## 2019-01-01 RX ADMIN — Medication 15 ML: at 08:03

## 2019-01-01 RX ADMIN — PREDNISONE 20 MG: 20 TABLET ORAL at 15:51

## 2019-01-01 RX ADMIN — Medication 10 ML: at 18:40

## 2019-01-01 RX ADMIN — INSULIN LISPRO 6 UNITS: 100 INJECTION, SOLUTION INTRAVENOUS; SUBCUTANEOUS at 22:27

## 2019-01-01 RX ADMIN — INSULIN LISPRO 6 UNITS: 100 INJECTION, SOLUTION INTRAVENOUS; SUBCUTANEOUS at 08:29

## 2019-01-01 RX ADMIN — CEFEPIME HYDROCHLORIDE 1 G: 1 INJECTION, POWDER, FOR SOLUTION INTRAMUSCULAR; INTRAVENOUS at 11:50

## 2019-01-01 RX ADMIN — INSULIN LISPRO 2 UNITS: 100 INJECTION, SOLUTION INTRAVENOUS; SUBCUTANEOUS at 22:19

## 2019-01-01 RX ADMIN — FAMOTIDINE 20 MG: 20 TABLET, FILM COATED ORAL at 21:59

## 2019-01-01 RX ADMIN — CLOPIDOGREL BISULFATE 75 MG: 75 TABLET, FILM COATED ORAL at 12:52

## 2019-01-01 RX ADMIN — MOXIFLOXACIN 1 DROP: 5 SOLUTION/ DROPS OPHTHALMIC at 12:50

## 2019-01-01 RX ADMIN — PREDNISOLONE ACETATE 1 DROP: 10 SUSPENSION/ DROPS OPHTHALMIC at 19:14

## 2019-01-01 RX ADMIN — RIVAROXABAN 15 MG: 15 TABLET, FILM COATED ORAL at 17:43

## 2019-01-01 RX ADMIN — INSULIN LISPRO 6 UNITS: 100 INJECTION, SOLUTION INTRAVENOUS; SUBCUTANEOUS at 08:31

## 2019-01-01 RX ADMIN — FUROSEMIDE 40 MG: 40 TABLET ORAL at 15:32

## 2019-01-01 RX ADMIN — PREDNISONE 20 MG: 20 TABLET ORAL at 16:04

## 2019-01-01 RX ADMIN — ACETAMINOPHEN 1000 MG: 500 TABLET ORAL at 20:47

## 2019-01-01 RX ADMIN — KETOROLAC TROMETHAMINE 1 DROP: 5 SOLUTION/ DROPS OPHTHALMIC at 21:06

## 2019-01-01 RX ADMIN — CALCIUM GLUCONATE 1 G: 98 INJECTION, SOLUTION INTRAVENOUS at 13:43

## 2019-01-01 RX ADMIN — PREDNISOLONE ACETATE 1 DROP: 10 SUSPENSION/ DROPS OPHTHALMIC at 05:41

## 2019-01-01 RX ADMIN — CLOPIDOGREL 75 MG: 75 TABLET, FILM COATED ORAL at 08:55

## 2019-01-01 RX ADMIN — CEFAZOLIN 1 G: 1 INJECTION, POWDER, FOR SOLUTION INTRAMUSCULAR; INTRAVENOUS at 10:23

## 2019-01-01 RX ADMIN — CEFAZOLIN 1 G: 1 INJECTION, POWDER, FOR SOLUTION INTRAMUSCULAR; INTRAVENOUS at 16:25

## 2019-01-01 RX ADMIN — CALCIUM GLUCONATE 1 G: 98 INJECTION, SOLUTION INTRAVENOUS at 08:12

## 2019-01-01 RX ADMIN — KETOROLAC TROMETHAMINE 1 DROP: 5 SOLUTION OPHTHALMIC at 16:14

## 2019-01-01 RX ADMIN — LIDOCAINE HYDROCHLORIDE 50 MG: 10 INJECTION, SOLUTION EPIDURAL; INFILTRATION; INTRACAUDAL at 12:07

## 2019-01-01 RX ADMIN — SODIUM BICARBONATE 50 MEQ: 84 INJECTION, SOLUTION INTRAVENOUS at 03:00

## 2019-01-01 RX ADMIN — RIVAROXABAN 15 MG: 15 TABLET, FILM COATED ORAL at 18:06

## 2019-01-01 RX ADMIN — DIAZEPAM 2.5 MG: 5 TABLET ORAL at 12:16

## 2019-01-01 RX ADMIN — KETOROLAC TROMETHAMINE 1 DROP: 5 SOLUTION/ DROPS OPHTHALMIC at 16:05

## 2019-01-01 RX ADMIN — WARFARIN SODIUM 5 MG: 5 TABLET ORAL at 17:53

## 2019-01-01 RX ADMIN — RIVAROXABAN 15 MG: 15 TABLET, FILM COATED ORAL at 17:24

## 2019-01-01 RX ADMIN — HEPARIN SODIUM 4000 UNITS: 5000 INJECTION INTRAVENOUS; SUBCUTANEOUS at 08:15

## 2019-01-01 RX ADMIN — TAMSULOSIN HYDROCHLORIDE 0.4 MG: 0.4 CAPSULE ORAL at 10:23

## 2019-01-01 RX ADMIN — Medication 10 ML: at 22:29

## 2019-01-01 RX ADMIN — ASPIRIN 81 MG: 81 TABLET ORAL at 08:55

## 2019-01-01 RX ADMIN — PREDNISOLONE ACETATE 1 DROP: 10 SUSPENSION/ DROPS OPHTHALMIC at 00:36

## 2019-01-01 RX ADMIN — CLOPIDOGREL 75 MG: 75 TABLET, FILM COATED ORAL at 18:01

## 2019-01-01 RX ADMIN — SODIUM CHLORIDE 250 ML: 9 INJECTION, SOLUTION INTRAVENOUS at 19:02

## 2019-01-01 RX ADMIN — PREDNISOLONE ACETATE 1 DROP: 10 SUSPENSION/ DROPS OPHTHALMIC at 00:22

## 2019-01-01 RX ADMIN — PHENYLEPHRINE HYDROCHLORIDE 250 MCG/MIN: 10 INJECTION INTRAVENOUS at 03:22

## 2019-01-01 RX ADMIN — Medication 10 ML: at 09:19

## 2019-01-01 RX ADMIN — GLIPIZIDE 2.5 MG: 5 TABLET ORAL at 10:23

## 2019-01-01 RX ADMIN — ASPIRIN 81 MG: 81 TABLET, COATED ORAL at 12:52

## 2019-01-01 RX ADMIN — EPINEPHRINE 1000 MCG: 1 INJECTION, SOLUTION INTRAMUSCULAR; SUBCUTANEOUS at 12:58

## 2019-01-01 RX ADMIN — PREDNISOLONE ACETATE 1 DROP: 10 SUSPENSION/ DROPS OPHTHALMIC at 07:57

## 2019-01-01 RX ADMIN — SODIUM CHLORIDE: 9 INJECTION, SOLUTION INTRAVENOUS at 21:27

## 2019-01-01 RX ADMIN — SODIUM CHLORIDE, POTASSIUM CHLORIDE, SODIUM LACTATE AND CALCIUM CHLORIDE: 600; 310; 30; 20 INJECTION, SOLUTION INTRAVENOUS at 07:19

## 2019-01-01 RX ADMIN — CEFAZOLIN 1 G: 1 INJECTION, POWDER, FOR SOLUTION INTRAMUSCULAR; INTRAVENOUS at 12:17

## 2019-01-01 RX ADMIN — PREDNISOLONE ACETATE 1 DROP: 10 SUSPENSION/ DROPS OPHTHALMIC at 18:05

## 2019-01-01 RX ADMIN — INSULIN LISPRO 3 UNITS: 100 INJECTION, SOLUTION INTRAVENOUS; SUBCUTANEOUS at 22:23

## 2019-01-01 RX ADMIN — KETOROLAC TROMETHAMINE 1 DROP: 5 SOLUTION OPHTHALMIC at 08:35

## 2019-01-01 RX ADMIN — PIPERACILLIN SODIUM,TAZOBACTAM SODIUM 2.25 G: 2; .25 INJECTION, POWDER, FOR SOLUTION INTRAVENOUS at 18:05

## 2019-01-01 RX ADMIN — Medication 1500 ML/HR: at 22:47

## 2019-01-01 RX ADMIN — OXYCODONE HYDROCHLORIDE AND ACETAMINOPHEN 2 TABLET: 5; 325 TABLET ORAL at 00:12

## 2019-01-01 RX ADMIN — ACETAMINOPHEN 1000 MG: 500 TABLET ORAL at 10:27

## 2019-01-01 RX ADMIN — PREDNISOLONE ACETATE 1 DROP: 10 SUSPENSION/ DROPS OPHTHALMIC at 11:54

## 2019-01-01 RX ADMIN — PHENYLEPHRINE HYDROCHLORIDE 50 MCG/MIN: 10 INJECTION INTRAVENOUS at 16:07

## 2019-01-01 RX ADMIN — KETAMINE HYDROCHLORIDE 50 MG: 10 INJECTION INTRAMUSCULAR; INTRAVENOUS at 20:05

## 2019-01-01 RX ADMIN — ASPIRIN 81 MG: 81 TABLET, COATED ORAL at 09:24

## 2019-01-01 RX ADMIN — MAGNESIUM SULFATE HEPTAHYDRATE 1 G: 1 INJECTION, SOLUTION INTRAVENOUS at 15:44

## 2019-01-01 RX ADMIN — PIPERACILLIN SODIUM,TAZOBACTAM SODIUM 2.25 G: 2; .25 INJECTION, POWDER, FOR SOLUTION INTRAVENOUS at 05:07

## 2019-01-01 RX ADMIN — CLOPIDOGREL 75 MG: 75 TABLET, FILM COATED ORAL at 10:22

## 2019-01-01 RX ADMIN — PREDNISONE 20 MG: 20 TABLET ORAL at 08:57

## 2019-01-01 RX ADMIN — PREDNISOLONE ACETATE 1 DROP: 10 SUSPENSION/ DROPS OPHTHALMIC at 15:14

## 2019-01-01 RX ADMIN — SODIUM CHLORIDE 8 MG/HR: 9 INJECTION, SOLUTION INTRAVENOUS at 16:56

## 2019-01-01 RX ADMIN — TAMSULOSIN HYDROCHLORIDE 0.4 MG: 0.4 CAPSULE ORAL at 08:55

## 2019-01-01 RX ADMIN — KETOROLAC TROMETHAMINE 1 DROP: 5 SOLUTION/ DROPS OPHTHALMIC at 08:06

## 2019-01-01 RX ADMIN — INSULIN LISPRO 6 UNITS: 100 INJECTION, SOLUTION INTRAVENOUS; SUBCUTANEOUS at 07:58

## 2019-01-01 RX ADMIN — CARVEDILOL 12.5 MG: 12.5 TABLET, FILM COATED ORAL at 12:18

## 2019-01-01 RX ADMIN — FAMOTIDINE 20 MG: 10 INJECTION, SOLUTION INTRAVENOUS at 08:57

## 2019-01-01 RX ADMIN — HEPARIN SODIUM 4000 UNITS: 5000 INJECTION INTRAVENOUS; SUBCUTANEOUS at 12:22

## 2019-01-01 RX ADMIN — Medication: at 13:06

## 2019-01-01 RX ADMIN — Medication 15 ML: at 21:53

## 2019-01-01 RX ADMIN — ROCURONIUM BROMIDE 20 MG: 10 INJECTION INTRAVENOUS at 13:35

## 2019-01-01 RX ADMIN — PIPERACILLIN SODIUM,TAZOBACTAM SODIUM 2.25 G: 2; .25 INJECTION, POWDER, FOR SOLUTION INTRAVENOUS at 17:18

## 2019-01-01 RX ADMIN — PREDNISOLONE ACETATE 1 DROP: 10 SUSPENSION/ DROPS OPHTHALMIC at 08:07

## 2019-01-01 RX ADMIN — Medication 1500 ML/HR: at 08:11

## 2019-01-01 RX ADMIN — PIPERACILLIN SODIUM,TAZOBACTAM SODIUM 2.25 G: 2; .25 INJECTION, POWDER, FOR SOLUTION INTRAVENOUS at 02:49

## 2019-01-01 RX ADMIN — PIPERACILLIN SODIUM,TAZOBACTAM SODIUM 2.25 G: 2; .25 INJECTION, POWDER, FOR SOLUTION INTRAVENOUS at 01:49

## 2019-01-01 RX ADMIN — KETOROLAC TROMETHAMINE 15 MG: 15 INJECTION, SOLUTION INTRAMUSCULAR; INTRAVENOUS at 02:15

## 2019-01-01 RX ADMIN — MORPHINE SULFATE 2 MG: 2 INJECTION, SOLUTION INTRAMUSCULAR; INTRAVENOUS at 18:40

## 2019-01-01 RX ADMIN — RIVAROXABAN 15 MG: 15 TABLET, FILM COATED ORAL at 16:15

## 2019-01-01 RX ADMIN — MIDODRINE HYDROCHLORIDE 5 MG: 5 TABLET ORAL at 16:25

## 2019-01-01 RX ADMIN — PREDNISONE 20 MG: 20 TABLET ORAL at 09:10

## 2019-01-01 RX ADMIN — CEFEPIME HYDROCHLORIDE 1 G: 1 INJECTION, POWDER, FOR SOLUTION INTRAMUSCULAR; INTRAVENOUS at 03:07

## 2019-01-01 ASSESSMENT — PULMONARY FUNCTION TESTS
PIF_VALUE: 0
PIF_VALUE: 19
PIF_VALUE: 1
PIF_VALUE: 19
PIF_VALUE: 7
PIF_VALUE: 1
PIF_VALUE: 1
PIF_VALUE: 0
PIF_VALUE: 0
PIF_VALUE: 19
PIF_VALUE: 0
PIF_VALUE: 11
PIF_VALUE: 1
PIF_VALUE: 12
PIF_VALUE: 20
PIF_VALUE: 8
PIF_VALUE: 1
PIF_VALUE: 0
PIF_VALUE: 1
PIF_VALUE: 1
PIF_VALUE: 19
PIF_VALUE: 0
PIF_VALUE: 0
PIF_VALUE: 16
PIF_VALUE: 1
PIF_VALUE: 19
PIF_VALUE: 0
PIF_VALUE: 19
PIF_VALUE: 1
PIF_VALUE: 0
PIF_VALUE: 1
PIF_VALUE: 1
PIF_VALUE: 19
PIF_VALUE: 1
PIF_VALUE: 1
PIF_VALUE: 0
PIF_VALUE: 0
PIF_VALUE: 1
PIF_VALUE: 0
PIF_VALUE: 1
PIF_VALUE: 19
PIF_VALUE: 18
PIF_VALUE: 1
PIF_VALUE: 26
PIF_VALUE: 11
PIF_VALUE: 19
PIF_VALUE: 24
PIF_VALUE: 1
PIF_VALUE: 0
PIF_VALUE: 1
PIF_VALUE: 19
PIF_VALUE: 1
PIF_VALUE: 19
PIF_VALUE: 1
PIF_VALUE: 14
PIF_VALUE: 1
PIF_VALUE: 19
PIF_VALUE: 11
PIF_VALUE: 19
PIF_VALUE: 0
PIF_VALUE: 26
PIF_VALUE: 12
PIF_VALUE: 19
PIF_VALUE: 1
PIF_VALUE: 0
PIF_VALUE: 1
PIF_VALUE: 19
PIF_VALUE: 19
PIF_VALUE: 17
PIF_VALUE: 25
PIF_VALUE: 19
PIF_VALUE: 1
PIF_VALUE: 0
PIF_VALUE: 0
PIF_VALUE: 20
PIF_VALUE: 31
PIF_VALUE: 0
PIF_VALUE: 19
PIF_VALUE: 14
PIF_VALUE: 1
PIF_VALUE: 25
PIF_VALUE: 1
PIF_VALUE: 20
PIF_VALUE: 21
PIF_VALUE: 1
PIF_VALUE: 26
PIF_VALUE: 19
PIF_VALUE: 1
PIF_VALUE: 0
PIF_VALUE: 1
PIF_VALUE: 9
PIF_VALUE: 0
PIF_VALUE: 0
PIF_VALUE: 1
PIF_VALUE: 19
PIF_VALUE: 19
PIF_VALUE: 1
PIF_VALUE: 25
PIF_VALUE: 1
PIF_VALUE: 19
PIF_VALUE: 1
PIF_VALUE: 14
PIF_VALUE: 14
PIF_VALUE: 37
PIF_VALUE: 0
PIF_VALUE: 23
PIF_VALUE: 1
PIF_VALUE: 0
PIF_VALUE: 0
PIF_VALUE: 1
PIF_VALUE: 1
PIF_VALUE: 6
PIF_VALUE: 23
PIF_VALUE: 0
PIF_VALUE: 1
PIF_VALUE: 0
PIF_VALUE: 1
PIF_VALUE: 1
PIF_VALUE: 0
PIF_VALUE: 1
PIF_VALUE: 19
PIF_VALUE: 2
PIF_VALUE: 1
PIF_VALUE: 0
PIF_VALUE: 19
PIF_VALUE: 0
PIF_VALUE: 1
PIF_VALUE: 19
PIF_VALUE: 0
PIF_VALUE: 0
PIF_VALUE: 8
PIF_VALUE: 1
PIF_VALUE: 19
PIF_VALUE: 1
PIF_VALUE: 1
PIF_VALUE: 0
PIF_VALUE: 0
PIF_VALUE: 14
PIF_VALUE: 1
PIF_VALUE: 19
PIF_VALUE: 25
PIF_VALUE: 1
PIF_VALUE: 4
PIF_VALUE: 0
PIF_VALUE: 19
PIF_VALUE: 6
PIF_VALUE: 1
PIF_VALUE: 1
PIF_VALUE: 11
PIF_VALUE: 26
PIF_VALUE: 1
PIF_VALUE: 22
PIF_VALUE: 11
PIF_VALUE: 15
PIF_VALUE: 21
PIF_VALUE: 0
PIF_VALUE: 1
PIF_VALUE: 1
PIF_VALUE: 8
PIF_VALUE: 20
PIF_VALUE: 0
PIF_VALUE: 18
PIF_VALUE: 1
PIF_VALUE: 0
PIF_VALUE: 27
PIF_VALUE: 11
PIF_VALUE: 1
PIF_VALUE: 0
PIF_VALUE: 22
PIF_VALUE: 10
PIF_VALUE: 19
PIF_VALUE: 29
PIF_VALUE: 7
PIF_VALUE: 17
PIF_VALUE: 3
PIF_VALUE: 1
PIF_VALUE: 1
PIF_VALUE: 0
PIF_VALUE: 21
PIF_VALUE: 19
PIF_VALUE: 1
PIF_VALUE: 1
PIF_VALUE: 0
PIF_VALUE: 0
PIF_VALUE: 26
PIF_VALUE: 1
PIF_VALUE: 0
PIF_VALUE: 14
PIF_VALUE: 18
PIF_VALUE: 0
PIF_VALUE: 11
PIF_VALUE: 0
PIF_VALUE: 1
PIF_VALUE: 1
PIF_VALUE: 0
PIF_VALUE: 2
PIF_VALUE: 28
PIF_VALUE: 0
PIF_VALUE: 1
PIF_VALUE: 14
PIF_VALUE: 1
PIF_VALUE: 0
PIF_VALUE: 19
PIF_VALUE: 0
PIF_VALUE: 1
PIF_VALUE: 19
PIF_VALUE: 0
PIF_VALUE: 1
PIF_VALUE: 0
PIF_VALUE: 0
PIF_VALUE: 1
PIF_VALUE: 17
PIF_VALUE: 1
PIF_VALUE: 19
PIF_VALUE: 1
PIF_VALUE: 1
PIF_VALUE: 20
PIF_VALUE: 1
PIF_VALUE: 0
PIF_VALUE: 7
PIF_VALUE: 1
PIF_VALUE: 1
PIF_VALUE: 18
PIF_VALUE: 19
PIF_VALUE: 19
PIF_VALUE: 1
PIF_VALUE: 19
PIF_VALUE: 0
PIF_VALUE: 19
PIF_VALUE: 28
PIF_VALUE: 19
PIF_VALUE: 27
PIF_VALUE: 11
PIF_VALUE: 1
PIF_VALUE: 0
PIF_VALUE: 1
PIF_VALUE: 1
PIF_VALUE: 0
PIF_VALUE: 19
PIF_VALUE: 0
PIF_VALUE: 1
PIF_VALUE: 1
PIF_VALUE: 0
PIF_VALUE: 1
PIF_VALUE: 5
PIF_VALUE: 0
PIF_VALUE: 24
PIF_VALUE: 3
PIF_VALUE: 1
PIF_VALUE: 19
PIF_VALUE: 26
PIF_VALUE: 14
PIF_VALUE: 0
PIF_VALUE: 19
PIF_VALUE: 0
PIF_VALUE: 2
PIF_VALUE: 0
PIF_VALUE: 45
PIF_VALUE: 0
PIF_VALUE: 0
PIF_VALUE: 1
PIF_VALUE: 0
PIF_VALUE: 14
PIF_VALUE: 2
PIF_VALUE: 0
PIF_VALUE: 0
PIF_VALUE: 1
PIF_VALUE: 15
PIF_VALUE: 1
PIF_VALUE: 10
PIF_VALUE: 1
PIF_VALUE: 14
PIF_VALUE: 26
PIF_VALUE: 28
PIF_VALUE: 19
PIF_VALUE: 18
PIF_VALUE: 0
PIF_VALUE: 0
PIF_VALUE: 4
PIF_VALUE: 1
PIF_VALUE: 0
PIF_VALUE: 19
PIF_VALUE: 1
PIF_VALUE: 23
PIF_VALUE: 19
PIF_VALUE: 1
PIF_VALUE: 3
PIF_VALUE: 19
PIF_VALUE: 3
PIF_VALUE: 1
PIF_VALUE: 0
PIF_VALUE: 15
PIF_VALUE: 0
PIF_VALUE: 1
PIF_VALUE: 17
PIF_VALUE: 1
PIF_VALUE: 1
PIF_VALUE: 0
PIF_VALUE: 19
PIF_VALUE: 1
PIF_VALUE: 1
PIF_VALUE: 10
PIF_VALUE: 0
PIF_VALUE: 1
PIF_VALUE: 0
PIF_VALUE: 1
PIF_VALUE: 1
PIF_VALUE: 0
PIF_VALUE: 1
PIF_VALUE: 8
PIF_VALUE: 24
PIF_VALUE: 0
PIF_VALUE: 1
PIF_VALUE: 0
PIF_VALUE: 0
PIF_VALUE: 19
PIF_VALUE: 21
PIF_VALUE: 1
PIF_VALUE: 19
PIF_VALUE: 19
PIF_VALUE: 17
PIF_VALUE: 0
PIF_VALUE: 15
PIF_VALUE: 10
PIF_VALUE: 0
PIF_VALUE: 23
PIF_VALUE: 10
PIF_VALUE: 14
PIF_VALUE: 1
PIF_VALUE: 0
PIF_VALUE: 1
PIF_VALUE: 1
PIF_VALUE: 0
PIF_VALUE: 1
PIF_VALUE: 0
PIF_VALUE: 19
PIF_VALUE: 18
PIF_VALUE: 0
PIF_VALUE: 29
PIF_VALUE: 19
PIF_VALUE: 5
PIF_VALUE: 0
PIF_VALUE: 5
PIF_VALUE: 1
PIF_VALUE: 25
PIF_VALUE: 0
PIF_VALUE: 8
PIF_VALUE: 0
PIF_VALUE: 1
PIF_VALUE: 0
PIF_VALUE: 18
PIF_VALUE: 19
PIF_VALUE: 1
PIF_VALUE: 0
PIF_VALUE: 1
PIF_VALUE: 1
PIF_VALUE: 43
PIF_VALUE: 0
PIF_VALUE: 1
PIF_VALUE: 5
PIF_VALUE: 0
PIF_VALUE: 8
PIF_VALUE: 1
PIF_VALUE: 24
PIF_VALUE: 0
PIF_VALUE: 19
PIF_VALUE: 1
PIF_VALUE: 0
PIF_VALUE: 0
PIF_VALUE: 19
PIF_VALUE: 7
PIF_VALUE: 14
PIF_VALUE: 17
PIF_VALUE: 18
PIF_VALUE: 0
PIF_VALUE: 0
PIF_VALUE: 1
PIF_VALUE: 1
PIF_VALUE: 0
PIF_VALUE: 19
PIF_VALUE: 19
PIF_VALUE: 14
PIF_VALUE: 1
PIF_VALUE: 11
PIF_VALUE: 20
PIF_VALUE: 5
PIF_VALUE: 1
PIF_VALUE: 11
PIF_VALUE: 26
PIF_VALUE: 1
PIF_VALUE: 25
PIF_VALUE: 1
PIF_VALUE: 23
PIF_VALUE: 1

## 2019-01-01 ASSESSMENT — PAIN SCALES - GENERAL
PAINLEVEL_OUTOF10: 5
PAINLEVEL_OUTOF10: 0
PAINLEVEL_OUTOF10: 6
PAINLEVEL_OUTOF10: 0
PAINLEVEL_OUTOF10: 0
PAINLEVEL_OUTOF10: 6
PAINLEVEL_OUTOF10: 6
PAINLEVEL_OUTOF10: 7
PAINLEVEL_OUTOF10: 7
PAINLEVEL_OUTOF10: 0
PAINLEVEL_OUTOF10: 7
PAINLEVEL_OUTOF10: 10
PAINLEVEL_OUTOF10: 0
PAINLEVEL_OUTOF10: 0
PAINLEVEL_OUTOF10: 6
PAINLEVEL_OUTOF10: 0
PAINLEVEL_OUTOF10: 7
PAINLEVEL_OUTOF10: 0
PAINLEVEL_OUTOF10: 0
PAINLEVEL_OUTOF10: 5
PAINLEVEL_OUTOF10: 8
PAINLEVEL_OUTOF10: 0
PAINLEVEL_OUTOF10: 0
PAINLEVEL_OUTOF10: 10
PAINLEVEL_OUTOF10: 0
PAINLEVEL_OUTOF10: 8
PAINLEVEL_OUTOF10: 7
PAINLEVEL_OUTOF10: 0
PAINLEVEL_OUTOF10: 10
PAINLEVEL_OUTOF10: 0
PAINLEVEL_OUTOF10: 5
PAINLEVEL_OUTOF10: 0
PAINLEVEL_OUTOF10: 4
PAINLEVEL_OUTOF10: 0
PAINLEVEL_OUTOF10: 6
PAINLEVEL_OUTOF10: 8
PAINLEVEL_OUTOF10: 0
PAINLEVEL_OUTOF10: 0
PAINLEVEL_OUTOF10: 10
PAINLEVEL_OUTOF10: 0
PAINLEVEL_OUTOF10: 0
PAINLEVEL_OUTOF10: 9
PAINLEVEL_OUTOF10: 10
PAINLEVEL_OUTOF10: 0
PAINLEVEL_OUTOF10: 6
PAINLEVEL_OUTOF10: 0
PAINLEVEL_OUTOF10: 0
PAINLEVEL_OUTOF10: 10
PAINLEVEL_OUTOF10: 6
PAINLEVEL_OUTOF10: 0
PAINLEVEL_OUTOF10: 10
PAINLEVEL_OUTOF10: 0
PAINLEVEL_OUTOF10: 8
PAINLEVEL_OUTOF10: 5
PAINLEVEL_OUTOF10: 6
PAINLEVEL_OUTOF10: 8
PAINLEVEL_OUTOF10: 0
PAINLEVEL_OUTOF10: 0
PAINLEVEL_OUTOF10: 10
PAINLEVEL_OUTOF10: 0
PAINLEVEL_OUTOF10: 7
PAINLEVEL_OUTOF10: 0
PAINLEVEL_OUTOF10: 8
PAINLEVEL_OUTOF10: 0
PAINLEVEL_OUTOF10: 8
PAINLEVEL_OUTOF10: 0
PAINLEVEL_OUTOF10: 8
PAINLEVEL_OUTOF10: 0
PAINLEVEL_OUTOF10: 8
PAINLEVEL_OUTOF10: 0
PAINLEVEL_OUTOF10: 0
PAINLEVEL_OUTOF10: 7
PAINLEVEL_OUTOF10: 0
PAINLEVEL_OUTOF10: 8
PAINLEVEL_OUTOF10: 5
PAINLEVEL_OUTOF10: 0
PAINLEVEL_OUTOF10: 9
PAINLEVEL_OUTOF10: 0
PAINLEVEL_OUTOF10: 0
PAINLEVEL_OUTOF10: 5
PAINLEVEL_OUTOF10: 0
PAINLEVEL_OUTOF10: 9

## 2019-01-01 ASSESSMENT — ENCOUNTER SYMPTOMS
RHINORRHEA: 0
BACK PAIN: 0
ABDOMINAL PAIN: 0
NAUSEA: 0
VOMITING: 0
CHOKING: 0
NAUSEA: 0
VOMITING: 0
COUGH: 0
CHEST TIGHTNESS: 0
NAUSEA: 0
NAUSEA: 0
COUGH: 0
CHOKING: 0
ABDOMINAL PAIN: 0
ABDOMINAL PAIN: 0
RHINORRHEA: 0
SHORTNESS OF BREATH: 0
VOMITING: 0
SHORTNESS OF BREATH: 0
SHORTNESS OF BREATH: 1
CONSTIPATION: 0
WHEEZING: 0
CHOKING: 0
SINUS PRESSURE: 0
DIARRHEA: 0
CHEST TIGHTNESS: 0
BACK PAIN: 0
CHEST TIGHTNESS: 0
EYE REDNESS: 0
COUGH: 0
ABDOMINAL PAIN: 0
SHORTNESS OF BREATH: 0
ABDOMINAL PAIN: 0
WHEEZING: 0
EYE PAIN: 0
CONSTIPATION: 0
VOMITING: 0
SHORTNESS OF BREATH: 0
RHINORRHEA: 0
CHEST TIGHTNESS: 0
WHEEZING: 0
ABDOMINAL DISTENTION: 0
CONSTIPATION: 0
NAUSEA: 0
RHINORRHEA: 0
COUGH: 0
SHORTNESS OF BREATH: 0
DIARRHEA: 0
CHEST TIGHTNESS: 0
VOICE CHANGE: 0
WHEEZING: 0
ABDOMINAL PAIN: 0
SINUS PRESSURE: 0
CHEST TIGHTNESS: 0
BACK PAIN: 0
ABDOMINAL PAIN: 0
SHORTNESS OF BREATH: 0
SHORTNESS OF BREATH: 0
ABDOMINAL PAIN: 0
COLOR CHANGE: 0
BACK PAIN: 0
ABDOMINAL DISTENTION: 0
ALLERGIC/IMMUNOLOGIC NEGATIVE: 1
WHEEZING: 0
BACK PAIN: 0
COUGH: 0
RHINORRHEA: 0
GASTROINTESTINAL NEGATIVE: 1
CHOKING: 0
EYE PAIN: 0
COUGH: 0
ALLERGIC/IMMUNOLOGIC NEGATIVE: 1
CHOKING: 0
NAUSEA: 0
VOICE CHANGE: 0
VOICE CHANGE: 0
SHORTNESS OF BREATH: 0
SINUS PRESSURE: 0
DIARRHEA: 0
WHEEZING: 0
SINUS PRESSURE: 0
NAUSEA: 0
BACK PAIN: 0
COLOR CHANGE: 0
VOMITING: 0
RHINORRHEA: 0
CHEST TIGHTNESS: 0
BACK PAIN: 0
SHORTNESS OF BREATH: 0
SHORTNESS OF BREATH: 0
SORE THROAT: 0
DIARRHEA: 0
CHOKING: 0
SINUS PRESSURE: 0
COUGH: 0
SHORTNESS OF BREATH: 0
VOMITING: 0
VOMITING: 0
COUGH: 0
NAUSEA: 0
CHOKING: 0
SINUS PRESSURE: 0
VOICE CHANGE: 0
CHEST TIGHTNESS: 0
CHOKING: 0
CHEST TIGHTNESS: 0
COLOR CHANGE: 1
EYE PAIN: 0
WHEEZING: 0
CONSTIPATION: 0
DIARRHEA: 0
SHORTNESS OF BREATH: 1
RHINORRHEA: 0
CONSTIPATION: 0
SHORTNESS OF BREATH: 1
NAUSEA: 0
COLOR CHANGE: 1
SHORTNESS OF BREATH: 1
NAUSEA: 0
WHEEZING: 0
BACK PAIN: 0
VOICE CHANGE: 0
CONSTIPATION: 0
ABDOMINAL PAIN: 0
COLOR CHANGE: 1
DIARRHEA: 0
VOMITING: 0
VOMITING: 0
COUGH: 0
SHORTNESS OF BREATH: 0
SINUS PRESSURE: 0
COLOR CHANGE: 1
CHEST TIGHTNESS: 0
DIARRHEA: 0
CONSTIPATION: 0
ABDOMINAL PAIN: 0
SINUS PRESSURE: 0
CHEST TIGHTNESS: 0
SHORTNESS OF BREATH: 1
VOICE CHANGE: 0
CHOKING: 0
DIARRHEA: 0
COUGH: 0
RHINORRHEA: 0
VOMITING: 0
VOICE CHANGE: 0
CONSTIPATION: 0
SHORTNESS OF BREATH: 0
DIARRHEA: 0
CHOKING: 0
VOICE CHANGE: 0

## 2019-01-01 ASSESSMENT — PAIN DESCRIPTION - FREQUENCY: FREQUENCY: INTERMITTENT

## 2019-01-01 ASSESSMENT — PAIN DESCRIPTION - LOCATION
LOCATION: KNEE
LOCATION: GENERALIZED

## 2019-01-01 ASSESSMENT — PAIN - FUNCTIONAL ASSESSMENT
PAIN_FUNCTIONAL_ASSESSMENT: 0-10
PAIN_FUNCTIONAL_ASSESSMENT: 0-10

## 2019-01-01 ASSESSMENT — PAIN DESCRIPTION - PAIN TYPE: TYPE: ACUTE PAIN

## 2019-04-24 NOTE — BRIEF OP NOTE
Brief Postoperative Note    Michelle Burkett  YOB: 1965  [id number]    Pre-operative Diagnosis: malfunctioning fistula    Post-operative Diagnosis: Same    Procedure: Fistulagram with Balloon Angioplasty    Anesthesia: Local and Moderate Sedation    Surgeons/Assistants: Dr. Kate Pfeiffer    Estimated Blood Loss: minimal    Complications: None    Specimens: Was Not Obtained    Findings: Balloon angioplasty performed at stenoses. Patient tolerated well. Vital signs monitored and reviewed. Pressure held to site and dressing applied.     Electronically signed by POLI Mathias on 4/24/2019 at 9:57 AM

## 2019-04-24 NOTE — H&P
History and Physical    Pt Name: Meliza Quijano  MRN: [id number]  YOB: 1965  Date of evaluation: 4/24/2019    SUBJECTIVE:   History of Chief Complaint:    Patient complains of CRF, on dialysis since November 2018. He has a RUE AVF that he has been using for access. Patient says that he has had \"clotting\" at dialysis regarding the fistula. Patient has been scheduled for a fistulagram today. Past Medical History    has a past medical history of A-fib ([facility] Anemia, CAD (coronary artery disease), Carotid artery stenosis, Cerebral artery occlusion with cerebral infarction [facility]), CHF (congestive heart failure) ([facility] CKD (chronic kidney disease) stage 4, GFR 15-29 ml/min ([facility] Diabetes mellitus ([facility] Hemodialysis patient ([facility] Hyperlipidemia, Hypertension, Nephropathy, Neuropathy, No natural teeth, Poor historian, Renal insufficiency, mild, Uses roller walker, Wears glasses, and Wheelchair dependence. Past Surgical History   has a past surgical history that includes Foot Debridement (Left, 7/2015); Finger fracture surgery (Right); Tunneled venous catheter placement (08/2018); Cardiac catheterization (Bilateral, 8/5/13); Dialysis fistula creation (Right, 1/16/2019); Toe Surgery (Left, 8/19/2015); Cataract removal (Right); and Elbow surgery (Right, 1975). Medications  Prior to Admission medications    Medication Sig Start Date End Date Taking? Authorizing Provider   glipiZIDE (GLUCOTROL XL) 2.5 MG extended release tablet Take 1 tablet by mouth daily 2/25/19   Gloria Godoy MD   Blood Glucose Monitoring Suppl (ONE TOUCH ULTRA 2) w/Device KIT  1/17/19   Historical Provider, MD   tamsulosin (FLOMAX) 0.4 MG capsule TAKE 1 CAPSULE BY MOUTH ONCE DAILY 2/21/19   Gloria Godoy MD   aspirin EC 81 MG EC tablet Take 1 tablet by mouth daily 1/16/19 2/22/19  Urban Mcgraw, DPM   warfarin (COUMADIN) 5 MG tablet Take 5 mg by mouth daily Pt.  Stopped 1-12-19 for OR    Historical Provider, MD   losartan (COZAAR) 50 MG tablet TAKE ONE TABLET BY MOUTH ONCE DAILY 12/7/18   Eleni Curtis MD   furosemide (LASIX) 40 MG tablet Take 40 mg by mouth 2 times daily    Historical Provider, MD   carvedilol (COREG) 12.5 MG tablet Take 1 tablet by mouth 2 times daily 11/7/18   Junie Henley APRN - CNP   clopidogrel (PLAVIX) 75 MG tablet Take 1 tablet by mouth daily  Patient taking differently: Take 75 mg by mouth daily Pt. Stopped 1-12-19 for OR 9/7/18   Rochelle Denson MD     Allergies  is allergic to lipitor [atorvastatin] and metformin and related. Family History  family history includes Diabetes in his brother, father, and mother; Heart Disease in his father and mother; High Blood Pressure in his mother. Social History   reports that he has never smoked. He has never used smokeless tobacco.   reports that he does not drink alcohol. reports that he does not use drugs. Marital Status single  Occupation none/disabled    OBJECTIVE:   VITALS:  height is 6' (1.829 m) and weight is 182 lb 15.7 oz (83 kg). His infrared temperature is 98 °F (36.7 °C). His blood pressure is 134/65 and his pulse is 94. His respiration is 20 and oxygen saturation is 97%. CONSTITUTIONAL:alert & oriented x 3, no acute distress. Very pleasant but is a poor historian. SKIN:  Warm and dry, no rashes. HEAD:  Normocephalic, atraumatic. EYES: PERRL. EOMs intact. EARS:  Hearing loss. NOSE:  Nares patent. No rhinorrhea. MOUTH/THROAT:  benign  NECK:supple, no lymphadenopathy  LUNGS: Clear to auscultation bilaterally, no wheezes. CARDIOVASCULAR: somewhat tachycardic. No murmurs. ABDOMEN: soft, non tender, non distended. EXTREMITIES: no edema bilateral lower extremities. RUE AVF with positive thrill and bruit.     IMPRESSIONS:   1. CRF  2.  has a past medical history of A-fib ([facility] Anemia, CAD (coronary artery disease), Carotid artery stenosis, Cerebral artery occlusion with cerebral infarction ([facility] [facility] (11/2017), CHF (congestive heart failure) ([facility] (2013), CKD (chronic kidney disease) stage 4, GFR 15-29 ml/min ([facility] Diabetes mellitus ([facility] [address]od[facility] patient ([facility] (08/2018), Hyperlipidemia, Hypertension, Nephropathy, Neuropathy, No natural teeth, Poor historian, Renal insufficiency, mild (2013), Uses roller walker, Wears glasses, and Wheelchair dependence.    PLANS:   1. fistulagram    Tiara Curtis PA-C  Electronically signed 4/24/2019 at 8:01 AM

## 2019-04-24 NOTE — PROGRESS NOTES
Patient returned from procedure room on stretcher. Right forearm has 2 band aids intact without redness, swelling or drainage. Positive bruit and thrill noted. Denies numbness, tingling and pain to the right wrist and fingers. Palpable radial pulse noted.

## 2019-04-24 NOTE — POST SEDATION
Sedation Post Procedure Note    Patient Name: Halie Curtis   YOB: 1965  Room/Bed: Room/bed info not found  Medical Record Number: [id number]  Date: 4/24/2019   Time: 11:44 AM         Physicians/Assistants: Reena Haywood MD    Procedure Performed:  fistulagram    Post-Sedation Vital Signs:  Vitals:    04/24/19 1009   BP: 130/75   Pulse: 97   Resp: 20   Temp: 97.5 °F (36.4 °C)   SpO2: 95%      Vital signs were reviewed and were stable after the procedure (see flow sheet for vitals)            Post-Sedation Exam: stable           Complications: none    Electronically signed by Reena Haywood MD on 4/24/2019 at 11:44 AM

## 2019-04-24 NOTE — PRE SEDATION
Sedation Pre-Procedure Note    Patient Name: Deepali Muse   YOB: 1965  Room/Bed: Room/bed info not found  Medical Record Number: [id number]  Date: 4/24/2019   Time: 11:44 AM       Indication:  esrd    Consent: I have discussed with the patient and/or the patient representative the indication, alternatives, and the possible risks and/or complications of the planned procedure and the anesthesia methods. The patient and/or patient representative appear to understand and agree to proceed. Vital Signs:   Vitals:    04/24/19 1009   BP: 130/75   Pulse: 97   Resp: 20   Temp: 97.5 °F (36.4 °C)   SpO2: 95%       Past Medical History:   has a past medical history of A-fib ([facility] Anemia, CAD (coronary artery disease), Carotid artery stenosis, Cerebral artery occlusion with cerebral infarction ([facility] [address] CHF (congestive heart failure) ([facility] [address] CKD (chronic kidney disease) stage 4, GFR 15-29 ml/min ([facility] [address] Diabetes mellitus ([facility] [address] Hemodialysis patient ([facility] [address] Hyperlipidemia, Hypertension, Nephropathy, Neuropathy, No natural teeth, Poor historian, Renal insufficiency, mild, Uses roller walker, Wears glasses, and Wheelchair dependence. Past Surgical History:   has a past surgical history that includes Foot Debridement (Left, 7/2015); Finger fracture surgery (Right); Tunneled venous catheter placement (08/2018); Cardiac catheterization (Bilateral, 8/5/13); Dialysis fistula creation (Right, 1/16/2019); Toe Surgery (Left, 8/19/2015); Cataract removal (Right); and Elbow surgery (Right, 1975). Medications:   Scheduled Meds:   Continuous Infusions:    sodium chloride       PRN Meds:   Home Meds:   Prior to Admission medications    Medication Sig Start Date End Date Taking?  Authorizing Provider   glipiZIDE (GLUCOTROL XL) 2.5 MG extended release tablet Take 1 tablet by mouth daily 2/25/19  Yes Sharifa Fuller MD   Blood Glucose Monitoring Suppl (ONE TOUCH ULTRA 2) w/Device KIT  1/17/19   Historical Provider, MD tamsulosin (FLOMAX) 0.4 MG capsule TAKE 1 CAPSULE BY MOUTH ONCE DAILY 2/21/19   Veronique Quach MD   aspirin EC 81 MG EC tablet Take 1 tablet by mouth daily 1/16/19 2/22/19  Elmira Goldberg, DPM   warfarin (COUMADIN) 5 MG tablet Take 5 mg by mouth daily Pt. Stopped 1-12-19 for OR    Historical Provider, MD   losartan (COZAAR) 50 MG tablet TAKE ONE TABLET BY MOUTH ONCE DAILY 12/7/18   Veronique Quach MD   furosemide (LASIX) 40 MG tablet Take 40 mg by mouth 2 times daily    Historical Provider, MD   carvedilol (COREG) 12.5 MG tablet Take 1 tablet by mouth 2 times daily 11/7/18   EMILY White - CNP   clopidogrel (PLAVIX) 75 MG tablet Take 1 tablet by mouth daily  Patient taking differently: Take 75 mg by mouth daily Pt. Stopped 1-12-19 for OR 9/7/18   Mell Knight MD     Coumadin Use Last 7 Days:  no  Antiplatelet drug therapy use last 7 days: no  Other anticoagulant use last 7 days: no  Additional Medication Information:  na      Pre-Sedation Documentation and Exam:   Vital signs have been reviewed (see flow sheet for vitals).     Mallampati Airway Assessment:  dentition not prohibitive    Prior History of Anesthesia Complications:   none    ASA Classification:  Class 2 - A normal healthy patient with mild systemic disease    Sedation/ Anesthesia Plan:   intravenous sedation    Medications Planned:   midazolam (Versed) intravenously and fentanyl intravenously    Patient is an appropriate candidate for plan of sedation: yes    Electronically signed by Angie Bernal MD on 4/24/2019 at 11:44 AM

## 2019-05-31 NOTE — PROGRESS NOTES
Subjective:      Patient ID: Rojelio Carbone is a 47 y.o. male. Visit Information    Have you changed or started any medications since your last visit including any over-the-counter medicines, vitamins, or herbal medicines? no   Are you having any side effects from any of your medications? -  no  Have you stopped taking any of your medications? Is so, why? -  no    Have you seen any other physician or provider since your last visit? No  Have you had any other diagnostic tests since your last visit? No  Have you been seen in the emergency room and/or had an admission to a hospital since we last saw you? No  Have you had your routine dental cleaning in the past 6 months? no    Have you activated your Flowline account? If not, what are your barriers? Yes     Patient Care Team:  Marty Dumont MD as PCP - General (Internal Medicine)  Marty Dumont MD as PCP - [facility]     Chief Complaint   Patient presents with    Cough     Dry cough, nasal congestion x 1 week     Other     cologuard ordered at last visit    Toe Pain     redness to all toes on right foot x 1 week. pt states he can't feel them.          Medical History Review  Past Medical, Family, and Social History reviewed and does contribute to the patient presenting condition    Health Maintenance   Topic Date Due    Pneumococcal 0-64 years Vaccine (1 of 3 - PCV13) 10/06/1972    HIV screen  10/06/1981    Hepatitis B Vaccine (1 of 3 - Risk Recombivax 3-dose series) 10/06/1985    Diabetic foot exam  07/29/2016    Colon cancer screen colonoscopy  10/06/2016    DTaP/Tdap/Td vaccine (1 - Tdap) 12/13/2019 (Originally 10/6/1985)    Shingles Vaccine (1 of 2) 05/15/2020 (Originally 10/6/2016)    Lipid screen  08/07/2019    Creatinine monitoring  01/16/2020    A1C test (Diabetic or Prediabetic)  02/22/2020    Diabetic retinal exam  03/04/2020    Potassium monitoring  04/24/2020    Flu vaccine  Completed    HPV vaccine  Aged Abner HPI  Chief Complaint   Patient presents with    Cough     Dry cough, nasal congestion x 1 week     Other     cologuard ordered at last visit    Toe Pain     redness to all toes on right foot x 1 week. pt states he can't feel them. Pt with hx of ESRD   A fib on coumadin   With recent eye sx stopped coumadin     Today with right foot redness and cannot feel the right foot     Review of Systems   Past Medical History:   Diagnosis Date    A-fib ([place] [address]     Anemia     CAD (coronary artery disease)     Dr. Glenny Lorenz Carotid artery stenosis     BILATERAL    Cerebral artery occlusion with cerebral infarction ([facility] 11/2017    RIGHT SIDED WEAKNESS    CHF (congestive heart failure) ([facility] 2013    CKD (chronic kidney disease) stage 4, GFR 15-29 ml/min ([facility]     Diabetes mellitus ([facility]     diet controlled    Hemodialysis patient ([facility] 08/2018    tues/thurs/sat/ [facility] [place]  [place]/ Pt. doesn't know  Nephrologist name/ access Left chest       Hyperlipidemia     PCP Dr. Sonja Corcoran Hypertension     Nephropathy     Neuropathy     No natural teeth     Poor historian     Renal insufficiency, mild 2013    Uses roller walker     Wears glasses     Wheelchair dependence      Social History     Tobacco Use    Smoking status: Never Smoker    Smokeless tobacco: Never Used   Substance Use Topics    Alcohol use: No     Alcohol/week: 0.0 oz       ROS  [facility] no chest pain no sob no orthopnea  Resp + cough   General no weight loss no fatigue no fever      Blood pressure 118/76, resp. rate 16, height 6' 0.01\" (1.829 m).   General Appearance NAD conversant  Neck FROM supple no JVD  Lungs normal effort Clear to auscultation  Cardio regular rhythm no murmur  Abdomen Soft nontender no HSM  Ext no edema no cyanosis no clubbing   Skin right foot 1+ pulse has redness of foot no pain (has neuropathy)   Neuro no focal deficits small ulcer right 4 toe  Musculoskeletal no spinal tenderness no kyphosis       Objective: Physical Exam    Assessment:       Diagnosis Orders   1. PAD (peripheral artery disease) ([address]       I am concerned about foot viability   Think he has severe PAD with decreased pulses   Plan admit for antibiotics and plan arteral doppler   Pt to go to ER       Plan:           Diagnosis Orders   1. PAD (peripheral artery disease) ([address] new worsening     2. ESRD on hemodialysis ([address] stable     3. Diabetic ulcer of other part of right foot new  associated with type 2 diabetes mellitus, limited to breakdown of skin ([address]     4. Congestive heart failure, unspecified HF chronicity, unspecified heart failure type ([address] stable     5. Chronic systolic congestive heart failure ([address]     6. DM type 2 with diabetic peripheral neuropathy (HCC) unchanged     7. Diabetes mellitus due to underlying condition with complication, with long-term current use of insulin ([address]     8.  ESRD (end stage renal disease) on dialysis ([address]             Sheryl Barajas MD

## 2019-06-08 PROBLEM — I73.9 PERIPHERAL VASCULAR OCCLUSIVE DISEASE (HCC): Status: ACTIVE | Noted: 2019-01-01

## 2019-06-08 NOTE — ED PROVIDER NOTES
[facility]), CKD (chronic kidney disease) stage 4, GFR 15-29 ml/min ([facility] [address] [facility] Diabetes mellitus ([facility] [address] Hemodialysis patient ([facility] [address] [facility] Hyperlipidemia, Hypertension, Nephropathy, Neuropathy, No natural teeth, Poor historian, Renal insufficiency, mild, Uses roller walker, Wears glasses, and Wheelchair dependence. has a past surgical history that includes Foot Debridement (Left, 7/2015); Finger fracture surgery (Right); Tunneled venous catheter placement (08/2018); Cardiac catheterization (Bilateral, 8/5/13); Dialysis fistula creation (Right, 1/16/2019); Toe Surgery (Left, 8/19/2015); Cataract removal (Right); and Elbow surgery (Right, 1975).     Social History     Socioeconomic History    Marital status: Single     Spouse name: Not on file    Number of children: Not on file    Years of education: Not on file    Highest education level: Not on file   Occupational History    Not on file   Social Needs    Financial resource strain: Not on file    Food insecurity:     Worry: Not on file     Inability: Not on file    Transportation needs:     Medical: Not on file     Non-medical: Not on file   Tobacco Use    Smoking status: Never Smoker    Smokeless tobacco: Never Used   Substance and Sexual Activity    Alcohol use: No     Alcohol/week: 0.0 oz    Drug use: No    Sexual activity: Yes     Partners: Female   Lifestyle    Physical activity:     Days per week: Not on file     Minutes per session: Not on file    Stress: Not on file   Relationships    Social connections:     Talks on phone: Not on file     Gets together: Not on file     Attends [employer] service: Not on file     Active member of club or organization: Not on file     Attends meetings of clubs or organizations: Not on file     Relationship status: Not on file    Intimate partner violence:     Fear of current or ex partner: Not on file     Emotionally abused: Not on file     Physically abused: Not on file     Forced sexual activity: Not on file   Other Topics There is no tenderness, no bony tenderness, no crepitus and no deformity. Feet:    Neurological: He is alert and oriented to person, place, and time. Skin: Skin is warm and dry. No rash noted. Psychiatric: He has a normal mood and affect.  His behavior is normal.       DIFFERENTIAL  DIAGNOSIS     PLAN (LABS / IMAGING / EKG):  Orders Placed This Encounter   Procedures    Culture Blood #1    Culture Blood #1    Urine Culture    XR CHEST PORTABLE    XR FOOT RIGHT (MIN 3 VIEWS)    CBC Auto Differential    Comprehensive Metabolic Panel    Magnesium    Protime-INR    APTT    Urinalysis with Microscopic    Troponin    Lactic Acid    Lactic Acid    C-Reactive Protein    Sedimentation Rate    Pharmacy to Dose: Vancomycin    Inpatient consult to Internal Medicine    EKG 12 Lead    PATIENT STATUS (FROM ED OR OR/PROCEDURAL) Inpatient       MEDICATIONS ORDERED:  Orders Placed This Encounter   Medications    piperacillin-tazobactam (ZOSYN) 3.375 g in dextrose 5 % 50 mL IVPB (mini-bag)    vancomycin (VANCOCIN) intermittent dosing (placeholder)    vancomycin (VANCOCIN) 1,250 mg in dextrose 5 % 250 mL IVPB       DDX: Osteoarthritis, strain, sprain, contusion, hematoma, fracture, dislocation, compartment syndrome, septic joint, ligamentous injury, tendon injury, meniscal injury, DVT, cellulitis, abscess, vascular occlusive disease, arterial insufficiency, diabetic ulcer, osteomyelitis    DIAGNOSTIC RESULTS / EMERGENCY DEPARTMENT COURSE / MDM     LABS:  Results for orders placed or performed during the hospital encounter of 06/08/19   CBC Auto Differential   Result Value Ref Range    WBC 13.8 (H) 3.5 - 11.0 k/uL    RBC 4.10 (L) 4.5 - 5.9 m/uL    Hemoglobin 11.0 (L) 13.5 - 17.5 g/dL    Hematocrit 33.6 (L) 41 - 53 %    MCV 81.9 80 - 100 fL    MCH 26.8 26 - 34 pg    MCHC 32.7 31 - 37 g/dL    RDW 18.0 (H) 11.5 - 14.9 %    Platelets 004 005 - 022 k/uL    MPV 7.8 6.0 - 12.0 fL    NRBC Automated NOT REPORTED TO DOSE VANCOMYCIN  IP CONSULT TO INTERNAL MEDICINE  IP CONSULT TO VASCULAR SURGERY    CRITICAL CARE:  None    FINAL IMPRESSION      1. Gangrene due to peripheral vascular disease ([facility]    2.  Peripheral vascular occlusive disease ([facility]    3. Elevated troponin          DISPOSITION / PLAN     DISPOSITION    Admitted    PATIENT REFERRED TO:  Yun Benavidez MD  [address] [place] [address]  [phone]            DISCHARGE MEDICATIONS:  New Prescriptions    No medications on file       Melia Alejandre MD  Emergency Medicine Resident    (Please note that portions of thisnote were completed with a voice recognition program.  Efforts were made to edit the dictations but occasionally words are mis-transcribed.)       Melia Alejandre MD  Resident  06/08/19 [id number]

## 2019-06-09 NOTE — PLAN OF CARE
Problem: Falls - Risk of:  Goal: Will remain free from falls  Description  Will remain free from falls  Outcome: Ongoing     Problem: Falls - Risk of:  Goal: Absence of physical injury  Description  Absence of physical injury  Outcome: Ongoing   No falls/injuries noted this shift. Call light within reach. Pt encouraged to call out for assistance.     Problem: Skin Integrity:  Goal: Will show no infection signs and symptoms  Description  Will show no infection signs and symptoms  Outcome: Ongoing     Problem: Skin Integrity:  Goal: Absence of new skin breakdown  Description  Absence of new skin breakdown  Outcome: Ongoing

## 2019-06-09 NOTE — CARE COORDINATION
CASE MANAGEMENT NOTE:    Admission Date:  6/8/2019 Eveline Miranda is a 47 y.o.  male    Admitted for : Peripheral vascular occlusive disease ([facility] [I73.9]  Peripheral vascular occlusive disease ([facility] [I73.9]    Met with:  Patient    PCP:  Dr. Buchanan Linear:  Priyank Garnett:  independently at home , GF            Current Services PTA:  Yes, [address], HD w/ [address], T/TH/S at 10 am.    Is patient agreeable to VNS: No, denies needs, states \"very good family support\", Will follow    Freedom of choice provided: No    List of [address] provided: No    VNS chosen:  No    DME:  walker and wheelchair    Home Oxygen: No    Nebulizer: No    CPAP/BIPAP: No    Supplier: N/A    Potential Assistance Needed: Will follow    SNF needed: Will follow    Pharmacy:  [facility] acute [facility] [address]       Is the Patient an [employer] with Readmission Risk Score greater than 14%? Yes  If yes, pt needs a follow up appointment made within 7 days. Does Patient want to use MEDS to BEDS? No    Family Members/Caregivers that pt would like involved in their care:    Yes    If yes, list name here:  Daughter Pee Keane    Transportation Provider:  Family             Is patient in Isolation/One on One/Altered Mental Status? No  If yes, skip next question. If no, would they like an I-Pad to  use? No  If yes, call [phone]. Discharge Plan:  6/9/19 [facility] Pt. Lives in 2 story home w/ GF, has liveable 1st floor. DME Miky, W/C. Current at [facility] Cou[facility] & HD w/ [address] T/TH/S at 10 am. [facility] header 21%. Denies VNS at this time, states, Very good Family/friend support. Vascular following, Needs RLE Angiogram on Tuesday at [facility] V'S.   Will follow closely for any needs//KB             Electronically signed by: Marion Hoyos RN on 6/9/2019 at 11:54 AM

## 2019-06-09 NOTE — ED NOTES
Report given to [place], RN from [employer]. Report method in person   The following was reviewed with receiving RN:   Current vital signs:  /60   Pulse 95   Temp 101.3 °F (38.5 °C)   Resp 17   Ht 6' (1.829 m)   Wt 185 lb 3 oz (84 kg)   SpO2 95%   BMI 25.12 kg/m²                MEWS Score: 3     Any medication or safety alerts were reviewed. Any pending diagnostics and notifications were also reviewed, as well as any safety concerns or issues, abnormal labs, abnormal imaging, and abnormal assessment findings. Questions were answered.           Raulito Kyle RN  06/08/19 2683

## 2019-06-09 NOTE — FLOWSHEET NOTE
Patient resting. Family gave update to writer. No SC needs. 06/09/19 1159   Encounter Summary   Services provided to: Patient and family together   Referral/Consult From: [address] [place] [address] Significant other;Children;Family members   Continue Visiting   (6-9-19)   Complexity of Encounter Moderate   Length of Encounter 15 minutes   Routine   Type Initial   Spiritual/[facility]   Type Spiritual support   Assessment Calm   Intervention Active listening;Sustaining presence/ Ministry of presence; Discussed illness/injury and it's impact   Outcome Expressed gratitude;Expressed feelings/needs/concerns

## 2019-06-09 NOTE — CONSULTS
Division of Vascular Surgery        New Consult      Physician Requesting Consult:  Dr. Praveen Aguila    Reason for Consult:   Right foot ischemia    Chief Complaint:      My toes are purple    History of Present Illness:      Mega Nelson is a 47 y.o. gentleman who presents with ischemic changes to right foot. He denies any pain, unsure if he had any recent trauma to the area given the severe neuropathy he has in his feet. His girlfriend noted the color change and brought him to the ER for evaluation. He denies symptoms suggestive of claudication or ischemic rest pain. He has a radial cephalic fistula in his right arm and undergoes hemodialysis on TThS. For the most part they do not have any issues with access, except for the other day, he suspects it was because the nurse was going too deep.   He denies any pain, weakness in his hand to suggest ischemic steal.    Medical History:     Past Medical History:   Diagnosis Date    [facility]fib ([facility]     Anemia     CAD (coronary artery disease)     Dr. Chito Baez Carotid artery stenosis     BILATERAL    [facility]erebral [facility] ([facility] 11/2017    RIGHT SIDED WEAKNESS    CHF (congestive heart failure) ([facility] 2013    CKD (chronic kidney disease) stage 4, GFR 15-29 ml/min ([facility]     Diabetes mellitus ([facility]     diet controlled    [facility] ([facility] Pt. doesn't know  Nephrologist name/ access Left chest       Hyperlipidemia     PCP Dr. George Locke Hypertension     Nephropathy     Neuropathy     No natural teeth     Poor historian     Renal insufficiency, mild 2013    Uses roller walker     Wears glasses     Wheelchair dependence        Surgical History:     Past Surgical History:   Procedure Laterality Date    CARDIAC CATHETERIZATION Bilateral 8/5/13    CATARACT REMOVAL Right     DIALYSIS FISTULA CREATION Right 1/16/2019    RIGHT ARM AV FISTULA CREATION performed by Radha Ha MD at [address] [place] [address] Right     5TH DIGIT    FOOT DEBRIDEMENT Left 7/2015    [facility]     TOE SURGERY Left 8/19/2015    Arthrodesis IP Joint Hallux    TUNNELED VENOUS CATHETER PLACEMENT  08/2018    RIGHT CHEST PERM CATH       Family History:     Family History   Problem Relation Age of Onset    Diabetes Mother     Heart Disease Mother     High Blood Pressure Mother     Diabetes Brother     Heart Disease Father     Diabetes Father        Allergies:       Lipitor [atorvastatin] and Metformin and related    Medications:      Current Facility-Administered Medications   Medication Dose Route Frequency Provider Last Rate Last Dose    warfarin (COUMADIN) tablet 5 mg  5 mg Oral Once per day on Sun Tue Wed Thu Sat Jany Santiago MD        [START ON 6/10/2019] warfarin (COUMADIN) tablet 2.5 mg  2.5 mg Oral Once per day on Mon Fri Jany Santiago MD        warfarin (COUMADIN) daily dosing (placeholder)   Other RX Placeholder Jany Santiago MD        vancomycin (VANCOCIN) intermittent dosing (placeholder)   Other RX Placeholder Sandhya Stacy MD        sodium chloride flush 0.9 % injection 10 mL  10 mL Intravenous 2 times per day Sandhya Stacy MD   10 mL at 06/09/19 0758    sodium chloride flush 0.9 % injection 10 mL  10 mL Intravenous PRN Sandhya Stacy MD        acetaminophen (TYLENOL) tablet 1,000 mg  1,000 mg Oral Q6H PRN Sandhya Stacy MD   1,000 mg at 06/09/19 0039    aspirin EC tablet 81 mg  81 mg Oral Daily Jany Santiago MD        carvedilol (COREG) tablet 12.5 mg  12.5 mg Oral BID Jany Santiago MD        clopidogrel (PLAVIX) tablet 75 mg  75 mg Oral Daily Jany Santiago MD        furosemide (LASIX) tablet 40 mg  40 mg Oral BID Jany Santiago MD        glipiZIDE (GLUCOTROL) tablet 2.5 mg  2.5 mg Oral QAM AC Jany Santiago MD   Stopped at 06/09/19 0550    ketorolac (ACULAR) 0.5 % ophthalmic solution 1 drop  1 drop Left Eye Q2H Nicholas Hernandez MD   1 drop at 06/09/19 0756    moxifloxacin (VIGAMOX) 0.5 % ophthalmic solution 1 drop  1 drop Left Eye Q4H Nicholas Hernandez MD   1 drop at 06/09/19 0757    prednisoLONE acetate (PRED FORTE) 1 % ophthalmic suspension 1 drop  1 drop Left Eye Q4H Nicholas Hernandez MD   1 drop at 06/09/19 0757    tamsulosin (FLOMAX) capsule 0.4 mg  0.4 mg Oral Daily Nicholas Hernandez MD        glucose (GLUTOSE) 40 % oral gel 15 g  15 g Oral PRN Nicholas Hernandez MD        dextrose 50 % IV solution  12.5 g Intravenous PRN Nicholas Hernandez MD        glucagon (rDNA) injection 1 mg  1 mg Intramuscular PRN Nicholas Hernandez MD        dextrose 5 % solution  100 mL/hr Intravenous PRN Nicholas Hernandez MD        insulin lispro (HUMALOG) injection vial 0-6 Units  0-6 Units Subcutaneous TID WC Nicholas Hernandez MD        insulin lispro (HUMALOG) injection vial 0-3 Units  0-3 Units Subcutaneous Nightly Nicholas Hernandez MD           Social History:     Tobacco:    reports that he has never smoked. He has never used smokeless tobacco.  Alcohol:      reports that he does not drink alcohol. Drug Use:  reports that he does not use drugs. Review of Systems:     Review of Systems   Constitutional: Negative for chills and fever. HENT: Negative. Eyes: Negative for visual disturbance. Respiratory: Negative for chest tightness and shortness of breath. Cardiovascular: Negative for chest pain and leg swelling. Gastrointestinal: Negative for abdominal pain. Endocrine: Negative. Genitourinary: Negative. Musculoskeletal: Negative. Skin: Positive for color change and wound. Allergic/Immunologic: Negative. Neurological: Positive for numbness. Negative for weakness. Hematological: Negative. Psychiatric/Behavioral: Negative.         Physical Exam:     Vitals:  BP (!) 103/49   Pulse 96   Temp 98.8 °F (37.1

## 2019-06-09 NOTE — H&P
stenosis     BILATERAL    Cerebral artery occlusion with cerebral infarction ([facility] 11/2017    RIGHT SIDED WEAKNESS    CHF (congestive heart failure) ([facility] 2013    CKD (chronic kidney disease) stage 4, GFR 15-29 ml/min ([facility])     Diabetes mellitus ([facility]     diet controlled    Hemodialysis patient ([facility] 08/2018    tues/thurs/sat/ Yolanda Beath  [place]/ Pt. doesn't know  Nephrologist name/ access Left chest       Hyperlipidemia     PCP Dr. Jose Cruz Rhodes Hypertension     Nephropathy     Neuropathy     No natural teeth     Poor historian     Renal insufficiency, mild 2013    Uses roller walker     Wears glasses     Wheelchair dependence      Allergies   Allergen Reactions    Lipitor [Atorvastatin] Diarrhea    Metformin And Related Nausea And Vomiting       Review of systems    Constitutional: Negative for fever and fatigue. Respiratory: Negative for cough and shortness of breath. Cardiovascular: Negative for chest pain, palpitations and leg swelling. Gastrointestinal: Negative for abdominal pain, diarrhea and blood in stool. Endocrine: Negative for cold intolerance. Genitourinary: Negative for dysuria and frequency. Musculoskeletal: Negative for back pain and arthralgias. Skin: right toe gangrene   Neurological: Negative for dizziness and headaches. Psychiatric/Behavioral: Negative for confusion. ROS  Physical exam     BP (!) 107/55   Pulse 86   Temp 97 °F (36.1 °C) (Oral)   Resp 16   Ht 6' (1.829 m)   Wt 191 lb 5.8 oz (86.8 kg)   SpO2 97%   BMI 25.95 kg/m²      General appearance: well nourished in no distress  Eyes:  JASPREET   Head: AT/NC  ENT NAD   Neck: Trachea midline; supple  Lungs: normal effort, clear to auscultation. CVS sinus with no murmurs. Vasc No JVP, no carotid bruit  Abdomen: Soft, non-tender; no masses or HSM,   Extremities: no edema; no digital cyanosis or clubbing.   Musculoskeletal NO joint effusion or synovitis  Skin: right 3 toe gangrene Neurologic: Cranial nerves II-XII grossly intact; uses whee chair hx of CVA   Psych: Appropriate affect, alert and oriented to person, place and time  NO lymphadenopathy            Relevant Labs/Imaging     CBC:   Recent Labs     06/08/19  1749   WBC 13.8*   HGB 11.0*        BMP:    Recent Labs     06/08/19  1749      K 3.9   CL 91*   CO2 27   BUN 21*   CREATININE 3.26*   GLUCOSE 126*     S. Calcium:  Recent Labs     06/08/19  1749   CALCIUM 8.6     Glycosylated hemoglobin A1C: No results for input(s): LABA1C in the last 72 hours. BNP:No results for input(s): BNP in the last 72 hours. Assessment / Plan      Patient Active Problem List   Diagnosis    DM type 2 with diabetic peripheral neuropathy    Diabetic foot ulcer ([address]    CHF (congestive heart failure)    HTN (hypertension)    CRF (chronic renal failure) (HCC)    [facility]omyel[facility]    Diabetes mellitus due to underlying condition with complication, with long-term current use of insulin (HCC)    Cellulitis    Shortness of breath    Chronic systolic congestive heart failure (HCC)    Atypical chest pain    Essential hypertension    Chest pain    Nephropathy    Unstable angina pectoris ([address]    ESRD on hemodialysis ([address]    Sepsis ([address]    Fever    MSSA (methicillin susceptible Staphylococcus aureus) septicemia ([address]    Bandemia    C. difficile colitis    AVF (arteriovenous fistula) ([address]    Peripheral vascular occlusive disease (HCC)       A/P  Pt with hx of HTN DM ESRD on HD   With swelling redness right foot with no pulses right foot   On coumadin INR 1.7  vanco zosyn   Plan vascular consult     Work up for PAD   MD ELVIS Novoa [address], [address] [place] [address].    Phone [phone]   Fax: [phone]  Answering Service: [phone]

## 2019-06-09 NOTE — PROGRESS NOTES
Pt's sánchez Claros (emergency contact list) called for an update on patient. Asking about pt's temp and pain level. Jabari Claros notified that pt's temp is 97.0 at this time. Pt states no pain at this time. Jabari Claros satisfied with update. Will visit pt in the morning.

## 2019-06-09 NOTE — ED NOTES
Pt arrived to facility with complaints of having discoloration to his 2nd 3rd and fourth digit on his right foot. Per pt he was seen by his Dr last Friday and was told to come to ED. Pt waited until today and states that his toes were more red and are now black/purple. Writer unable to feel dorsal pulse or posterior tibial pulse felt or heard on doppler on right leg/foot. Pt has popliteal pulses and femoral pulses on right side. Pt states he has chills at times. Pt denies fever,pain,chest pain or SOB.      Chandler Veronica RN  06/08/19 2037

## 2019-06-10 NOTE — PROGRESS NOTES
Called Dr. Dionne Suarez to inform him of new nephrology consult. Received order via telephone with readback for BMP daily X3days.

## 2019-06-10 NOTE — PROGRESS NOTES
Reviewed in EMR    Assessment/Plan:     PAD, ischemic gangrene to right foot  · Plan for angiogram tomorrow after noon at [facility] cath lab  · NPO after breakfast  · Dialysis in the morning  · Will need transportation setup for tomorrow afternoon  · Continue heparin drip, ok to continue dual antiplatelet therapy    Electronically signed by Shireen Murillo MD on 6/10/19 at 1:39 PM      [address] [place] [address],[address]: [phone]  C: [phone]  Email: [email or web address]

## 2019-06-10 NOTE — PROGRESS NOTES
CAD (coronary artery disease)     Dr. Kirsten Gonzales Carotid artery stenosis     BILATERAL    Cerebral artery occlusion with cerebral infarction ([facility] 11/2017    RIGHT SIDED WEAKNESS    CHF (congestive heart failure) ([place] [address] 2013    CKD (chronic kidney disease) stage 4, GFR 15-29 ml/min ([facility])     Diabetes mellitus ([place] [address]     diet controlled    Hemodialysis patient ([place] [address] 08/2018    tues/thurs/sat/ Heydi Adamson  [place]/ Pt. doesn't know  Nephrologist name/ access Left chest       Hyperlipidemia     PCP Dr. Fany Ngo Hypertension     Nephropathy     Neuropathy     No natural teeth     Poor historian     Renal insufficiency, mild 2013    Uses roller walker     Wears glasses     Wheelchair dependence      Allergies   Allergen Reactions    Lipitor [Atorvastatin] Diarrhea    Metformin And Related Nausea And Vomiting       Review of systems    Constitutional: Negative for fever and fatigue. Respiratory: Negative for cough and shortness of breath. Cardiovascular: Negative for chest pain, palpitations and leg swelling. Gastrointestinal: Negative for abdominal pain, diarrhea and blood in stool. ROS  Physical exam     /62   Pulse 98   Temp 98.7 °F (37.1 °C) (Oral)   Resp 16   Ht 6' (1.829 m)   Wt 191 lb 5.8 oz (86.8 kg)   SpO2 99%   BMI 25.95 kg/m²      General appearance: well nourished in no distress  Eyes:  JASPREET   Head: AT/NC  ENT NAD   Neck: Trachea midline; supple  Lungs: normal effort, clear to auscultation. CVS sinus with no murmurs. Vasc No JVP, no carotid bruit  Abdomen: Soft, non-tender; no masses or HSM,   Extremities: no edema; no digital cyanosis or clubbing.   Musculoskeletal NO joint effusion or synovitis  Skin: right 3 toe gangrene   Neurologic: Cranial nerves II-XII grossly intact; uses whee chair hx of CVA   Psych: Appropriate affect, alert and oriented to person, place and time  NO lymphadenopathy            Relevant Labs/Imaging     CBC:   Recent Labs 06/09/19  0830   WBC 12.8*   HGB 10.0*        BMP:    Recent Labs     06/08/19  1749      K 3.9   CL 91*   CO2 27   BUN 21*   CREATININE 3.26*   GLUCOSE 126*     S. Calcium:  Recent Labs     06/08/19  1749   CALCIUM 8.6     Glycosylated hemoglobin A1C: No results for input(s): LABA1C in the last 72 hours. BNP:No results for input(s): BNP in the last 72 hours. Assessment / Plan      Patient Active Problem List   Diagnosis    DM type 2 with diabetic peripheral neuropathy    Di[facility] ulcer ([facility]    CHF (congestive heart failure)    HTN (hypertension)    CRF (chronic renal failure) (HCC)    Osteomyelitis ([facility]    Diabetes mellitus due to underlying condition with complication, with long-term current use of insulin (HCC)    Cellulitis    Shortness of breath    Chronic systolic congestive heart failure (HCC)    Atypical chest pain    Essential hypertension    Chest pain    Nephropathy    Unstable angina pectoris ([facility]    ESRD on hemodialysis ([facility]    Sepsis ([facility]    Fever    MSSA (methicillin susceptible Staphylococcus aureus) septicemia ([facility]    Bandemia    C. difficile colitis    AVF (arteriovenous fistula) ([facility]    Peripheral vascular occlusive disease (HCC)       A/P  Pt with hx of HTN DM ESRD on HD   With swelling redness right foot with no pulses right foot   On coumadin INR 1.7  vanco zosyn   Plan vascular consult     Work up for PAD   Plan for angio Tuesday   nephro for HD     MD ELVIS [address]    Phone [phone]   Fax: [phone]  Answering Service: [phone]

## 2019-06-10 NOTE — PROGRESS NOTES
Patient is refusing eye drops tonight. Patient states that he does not take eye drops overnight. Writer explained eyedrops are scheduled for 2000, 2200, 2330, 00, 02, 0330, 0400, 0600. Patient reiterates that he does not take eyedrops all night and he does not want them tonight. Writer explains that she will document patient's refusal for eyedrops tonight. Will continue to monitor patient.

## 2019-06-10 NOTE — PROGRESS NOTES
Dr. Jose Montero in to examine patient.  Notified of creatinine level of 6.02. Dr. Jose Montero wants to stick with plan of patient having dialysis tomorrow morning prior to going to [facility]. V's for angiogram.

## 2019-06-10 NOTE — CONSULTS
Nephrology ESRD Consult Note    Reason for Consult:  End stage renal disease  Requesting Physician:      History of Present Illness: This is a 47 y.o. male with past medical history of Hypertension, peripheral vascular disease,type 2 diabetes, ESRD secondary to diabetic nephropathy,on dialysis Tuesday Thursday Saturday at [address] [place] [address] dialysis unit by way of a Right radiocephalic AV fistula. He presented with ischemic changes to the right foot. He denied any initial pain but noticed redness and color change to his skin. His girlfriend therefore brought him to the ER. He denies any fevers or chills weakness. He did receive his last dialysis treatment on Saturday.   He has been evaluated by vascular surgery with ischemic gangrene to the right foot and will undergo angiogram of the right lower extremity to evaluate circulation with postop possible intervention at  [facility]. Gavin's tomorrow    Past Medical History:        Diagnosis Date    A[facility]fib ([facility]     Anemia     CAD (coronary artery disease)     Dr. Mortensen Province Carotid artery stenosis     BILATERAL    Cerebral artery occlusion with cerebral infarction ([facility] 11/2017    RIGHT SIDED WEAKNESS    CHF (congestive heart failure) ([facility] 2013    CKD (chronic kidney disease) stage 4, GFR 15-29 ml/min ([facility]     Diabetes mellitus ([facility]     diet controlled    Hemodialysis patient ([facility] 08/2018 tues/thurs/sat/ [place]/ Pt. doesn't know  Nephrologist name/ access Left chest       Hyperlipidemia     PCP Dr. Steven Elizabeth Hypertension     Nephropathy     Neuropathy     No natural teeth     Poor historian     Renal insufficiency, mild 2013    Uses roller walker     Wears glasses     Wheelchair dependence            Past Surgical History:        Procedure Laterality Date    CARDIAC CATHETERIZATION Bilateral 8/5/13    CATARACT REMOVAL Right     DIALYSIS FISTULA CREATION Right 1/16/2019    RIGHT ARM AV FISTULA CREATION performed by Holly Sow per 24 hour   Intake 840.67 ml   Output --   Net 840.67 ml           Physical Exam:  GENERAL APPEARANCE: Alert and cooperative, and appears to be in no acute distress. HEAD: normocephalic  EYES: PERRL, EOMI. Not pale, anicteric   NOSE:  No nasal discharge. THROAT:  Oral cavity and pharynx normal. Moist  NECK: Neck supple, non-tender without lymphadenopathy, masses or thyromegaly. JVD-neg  CARDIAC: Normal S1 and S2. No S3, S4 or murmurs. Rhythm is regular. LUNGS: Clear to auscultation and percussion without rales, rhonchi, wheezing or diminished breath sounds. ABD-Soft non distended, BS+ Non tender no organomegally  BACK: Examination of the spine reveals  no spinal deformity, without tenderness,   MUSKULOSKELETAL: Adequately aligned spine. No joint erythema or tenderness. EXTREMITIES: No edema. Peripheral pulses intact. Lower extremity discoloration pulses absent  NEURO:Alert oriented x 3 ,power 5/5 in all extremities      Labs:  PTH:  No results found for: PTH  abs:   CBC: Recent Labs     06/08/19 1749 06/09/19  0830   WBC 13.8* 12.8*   RBC 4.10* 3.81*   HGB 11.0* 10.0*   HCT 33.6* 30.9*   MCV 81.9 81.2   MCH 26.8 26.3   MCHC 32.7 32.4   RDW 18.0* 17.8*    243   MPV 7.8 8.3      BMP: Recent Labs     06/08/19  1749 06/10/19  0653    137   K 3.9 4.1   CL 91* 93*   CO2 27 25   BUN 21* 41*   CREATININE 3.26* 6.02*   GLUCOSE 126* 155*   CALCIUM 8.6 8.8        Phosphorus:  No results for input(s): PHOS in the last 72 hours. Magnesium:   Recent Labs     06/08/19  1749   MG 2.0     Albumin:   Recent Labs     06/08/19  1749   LABALBU 4.0     Assessment:  1. End-stage renal disease secondary to diabetic nephropathy dialysis dependent on dialysis Tuesday Thursday Saturday  [facility]  Renal diet 2 gm Na, 2 gm potassium, 1 gm po4 and 1.2 gm/kg/bw protein per day and 1500 mls fluid restriction    2.  Type 2 diabetes-Complicated by diabetic neuropathy and nephropathy-Check a hemoglobin A1c    3. Essential hypertension-Controlled    4. Anemia of chronic kidney disease    5. Right Foot ischemic gangrene-Patient on heparin drip and continue empiric antibiotic treatment withIV vancomycin     6. Secondary hyperparathyroidism-Aggressive control of phosphorus to prevent vascular calcification-Check a phosphorus panel and PTH      Plan  Will perform dialysis tomorrow morning and subsequently patient will be transferred to [facility] in the afternoon for right lower extremity angiogram per vascular surgery. Barrtet Fernando M.D, [facility]  Nephrologist    Thank you for the consultation.

## 2019-06-11 PROBLEM — Z98.890 S/P ANGIOGRAM OF EXTREMITY: Status: ACTIVE | Noted: 2019-01-01

## 2019-06-11 NOTE — PROGRESS NOTES
Patient family was seen by MD. Patient went for his angiogram and did not come back to his room. The doctor took him straight to surgery, the doctor stated that he need more than an angiogram. Family aware and they accompanied patient to surgery across the street.

## 2019-06-11 NOTE — DISCHARGE INSTR - COC
Diabetic foot ulcer ([facility] E11.621, L97.509    CHF (congestive heart failure) I50.9    HTN (hypertension) I10    CRF (chronic renal failure) ([facility]) N18.9    Osteomyelitis ([facility]) M86.9    Diabetes mellitus due to underlying condition with complication, with long-term current use of insulin ([facility]) E08.8, Z79.4    Cellulitis L03.90    Shortness of breath R06.02    Chronic systolic congestive heart failure ([facility]) I50.22    Atypical chest pain R07.89    Essential hypertension I10    Chest pain R07.9    Nephropathy N28.9    Unstable angina pectoris ([facility]) I20.0    ESRD on hemodialysis ([facility]) N18.6, Z99.2    Sepsis ([facility]) A41.9    Fever R50.9    MSSA (methicillin susceptible Staphylococcus aureus) septicemia ([facility]) A41.01    Bandemia D72.825    C. difficile colitis A04.72    AVF (arteriovenous fistula) ([facility]) I77.0    Peripheral vascular occlusive disease ([facility]) I73.9       Isolation/Infection:   Isolation          No Isolation            Nurse Assessment:  Last Vital Signs: /60   Pulse 92   Temp 98.4 °F (36.9 °C)   Resp 17   Ht 6' (1.829 m)   Wt 194 lb 10.7 oz (88.3 kg)   SpO2 99%   BMI 26.40 kg/m²     Last documented pain score (0-10 scale): Pain Level: 0  Last Weight:   Wt Readings from Last 1 Encounters:   06/10/19 194 lb 10.7 oz (88.3 kg)     Mental Status:  oriented and alert    IV Access:  - None    Nursing Mobility/ADLs:  Walking   Assisted  Transfer  Independent  Bathing  Independent  Dressing  Independent  Toileting  Independent  Feeding  Independent  Med Admin  Independent  Med Delivery   whole    Wound Care Documentation and Therapy:  Wound 08/28/18 Other (Comment) Scrotum (Active)   Number of days: 286       Wound 08/28/18 Other (Comment) Toe (Comment  which one) (Active)   Number of days: 286       Wound 08/28/18 Coccyx (Active)   Number of days: 286       Wound 06/09/19 Foot Anterior;Right gangrene noted to several toes, flaking, redness (Active)   Wound Venous 6/9/2019  7:45 AM Dressing Status Clean;Dry; Intact 6/10/2019  7:56 PM   Dressing Changed Changed/New 6/10/2019  8:00 AM   Dressing/Treatment Dry Dressing 6/10/2019  7:56 PM   Dressing Change Due 06/11/19 6/10/2019  8:00 AM   Wound Assessment Purple;Black 6/10/2019  7:56 PM   Drainage Amount None 6/10/2019  7:56 PM   Odor Mild 6/10/2019  8:00 AM   Margins ОЛЬГА 6/9/2019  7:45 PM   Rebecca-wound Assessment Red 6/10/2019  7:56 PM   Culture Taken No 6/9/2019 12:15 PM   Number of days: 1       Wound 06/09/19 Foot Anterior; Left flaky with redness noted to top of left toes/foot (Active)   Wound Venous 6/9/2019 12:15 PM   Dressing Status Clean;Dry; Intact 6/10/2019  7:56 PM   Dressing Changed Changed/New 6/10/2019  8:00 AM   Dressing/Treatment Dry Dressing 6/10/2019  7:56 PM   Dressing Change Due 06/10/19 6/9/2019  7:45 PM   Wound Assessment ОЛЬГА 6/10/2019  7:56 PM   Drainage Amount None 6/10/2019  7:56 PM   Odor None 6/9/2019  7:45 PM   Margins ОЛЬГА 6/9/2019  7:45 PM   Rebecca-wound Assessment Pink 6/10/2019  7:56 PM   Culture Taken No 6/9/2019 12:15 PM   Number of days: 1        Elimination:  Continence:   · Bowel: Yes  · Bladder: Yes  Urinary Catheter: None   Colostomy/Ileostomy/Ileal Conduit: No     *  Date of Last BM: 6/11/19    Intake/Output Summary (Last 24 hours) at 6/11/2019 0636  Last data filed at 6/11/2019 0030  Gross per 24 hour   Intake 1087.58 ml   Output 200 ml   Net 887.58 ml     I/O last 3 completed shifts: In: 1527.6 [P.O.:900; I.V.:627.6]  Out: -     Safety Concerns: At Risk for Falls    Impairments/Disabilities:      no sensation in bilateral feet    Nutrition Therapy:  Current Nutrition Therapy:   - Oral Diet:  Cardiac    Routes of Feeding: Oral  Liquids: No Restrictions  Daily Fluid Restriction: no  Last Modified Barium Swallow with Video (Video Swallowing Test): not done    Treatments at the Time of Hospital Discharge:   Respiratory Treatments: ***  Oxygen Therapy:  is not on home oxygen therapy.   Ventilator:    - No ventilator support    Rehab Therapies: Physical Therapy  Weight Bearing Status/Restrictions: Non-weight bearing on right leg  Other Medical Equipment (for information only, NOT a DME order):  wheelchair  Other Treatments: Skilled Nursing Assessment; Medication Education and [facility]'s  to evaluate patient two weeks prior to discharge from home health to determine post-discharge services. Patient's personal belongings (please select all that are sent with patient):  None    RN SIGNATURE:  Electronically signed by Rhina Dawkins RN on 6/11/19 at 6:42 AM    CASE MANAGEMENT/SOCIAL WORK SECTION    Inpatient Status Date: 6/8/2019    Readmission Risk Assessment Score:  Readmission Risk              Risk of Unplanned Readmission:        21           Discharging to Facility/ . [address] [address]  [address] [address]  Phone [phone]  Fax  [phone]        Dialysis Facility (if applicable)   [address] [place] [address]  Tuesday- Thursday- Saturday 10am    / signature: Electronically signed by Catrachito Scott RN on 6/11/19 at 12:28 PM    PHYSICIAN SECTION    Prognosis: {Prognosis:9652922083}    Condition at Discharge: [address] Patient Condition:803040491}    Rehab Potential (if transferring to Rehab): {Prognosis:0431111417}    Recommended Labs or Other Treatments After Discharge: ***    Physician Certification: I certify the above information and transfer of Quinton Heritage  is necessary for the continuing treatment of the diagnosis listed and that he requires {Admit to Appropriate Level of Care:09670} for {GREATER/LESS:820122501} 30 days.      Update Admission H&P: {CHP DME Changes in ODQRV:588556722}    PHYSICIAN SIGNATURE:  {Esignature:431341005}

## 2019-06-11 NOTE — PROGRESS NOTES
(coronary artery disease)     Dr. Iman Quinn Carotid artery stenosis     BILATERAL    Cerebral artery occlusion with cerebral infarction ([facility] 11/2017    RIGHT SIDED WEAKNESS    CHF (congestive heart failure) ([facility] 2013    CKD (chronic kidney disease) stage 4, GFR 15-29 ml/min ([facility])     Diabetes mellitus ([place] [address]     diet controlled    Hemodialysis patient ([place] [address] 08/2018    tues/thurs/sat/ [place]/ Pt. doesn't know  Nephrologist name/ access Left chest       Hyperlipidemia     PCP Dr. Jaxon Loera Hypertension     Nephropathy     Neuropathy     No natural teeth     Poor historian     Renal insufficiency, mild 2013    Uses roller walker     Wears glasses     Wheelchair dependence      Allergies   Allergen Reactions    Lipitor [Atorvastatin] Diarrhea    Metformin And Related Nausea And Vomiting       Review of systems    Constitutional: Negative for fever and fatigue. Respiratory: Negative for cough and shortness of breath. Cardiovascular: Negative for chest pain, palpitations and leg swelling. Gastrointestinal: Negative for abdominal pain, diarrhea and blood in stool. ROS  Physical exam     /60   Pulse 92   Temp 98.4 °F (36.9 °C)   Resp 17   Ht 6' (1.829 m)   Wt 194 lb 10.7 oz (88.3 kg)   SpO2 99%   BMI 26.40 kg/m²      General appearance: well nourished in no distress  Eyes:  JASPREET   Head: AT/NC  ENT NAD   Neck: Trachea midline; supple  Lungs: normal effort, clear to auscultation. CVS sinus with no murmurs. Vasc No JVP, no carotid bruit  Abdomen: Soft, non-tender; no masses or HSM,   Extremities: no edema; no digital cyanosis or clubbing.   Musculoskeletal NO joint effusion or synovitis  Skin: right 3 toe gangrene black discolored   Neurologic: Cranial nerves II-XII grossly intact; uses whee chair hx of CVA   Psych: Appropriate affect, alert and oriented to person, place and time  NO lymphadenopathy            Relevant Labs/Imaging     CBC:   Recent Labs 06/09/19  0830   WBC 12.8*   HGB 10.0*        BMP:    Recent Labs     06/08/19  1749 06/10/19  0653    137   K 3.9 4.1   CL 91* 93*   CO2 27 25   BUN 21* 41*   CREATININE 3.26* 6.02*   GLUCOSE 126* 155*     S. Calcium:  Recent Labs     06/10/19  0653   CALCIUM 8.8     Glycosylated hemoglobin A1C: No results for input(s): LABA1C in the last 72 hours. BNP:No results for input(s): BNP in the last 72 hours. Assessment / Plan      Patient Active Problem List   Diagnosis    DM type 2 with diabetic peripheral neuropathy    [facility]    CHF (congestive heart failure)    HTN (hypertension)    CRF (chronic renal failure) (H[facility])    Osteomyelitis ([facility]    Diabetes mellitus due to underlying condition with complication, with long-term current use of insulin ([facility])    Cellulitis    Shortness of breath    Chronic systolic congestive heart failure ([facility])    Atypical chest pain    Essential hypertension    Chest pain    Nephropathy    Unstable angina pectoris ([facility]    ESRD on hemodialysis ([facility]    Sepsis ([facility]    Fever    MSSA (methicillin susceptible Staphylococcus aureus) septicemia ([facility]    Bandemia    C. difficile colitis    AVF (arteriovenous fistula) ([facility]    Peripheral vascular occlusive disease (HCC)       A/P  Pt with hx of HTN DM ESRD on HD   With swelling redness right foot with no pulses right foot   On coumadin INR 2.1 stopped on heaprin now   vanco zosyn   Plan vascular consult     Work up for PAD Plan for angio Tuesday   nephro for HD am     MD ELVIS Longoria [address], [address] [place] [address].    Phone [phone]   Fax: [phone]  Answering Service: [phone]

## 2019-06-11 NOTE — PROGRESS NOTES
Nephrology Progress Note    Subjective/   47y.o. year old male who we are seeing in consultation for ESRD on hemodialysis. This is a 54 y. o. male with past medical history of Hypertension, peripheral vascular disease,type 2 diabetes, ESRD secondary to diabetic nephropathy,on dialysis Tuesday Thursday Saturday at [address] [facility] dialysis unit by way of a Right radiocephalic AV fistula. He presented with ischemic changes to the right foot. He denied any initial pain but noticed redness and color change to his skin. His girlfriend therefore brought him to the ER. He denies any fevers or chills weakness. He did receive his last dialysis treatment on Saturday.   He has been evaluated by vascular surgery with ischemic gangrene to the right foot and will undergo angiogram of the right lower extremity to evaluate circulation with postop possible intervention at  [facility]. Gavin's tomorrow        Objective/     Vitals:    06/11/19 1103 06/11/19 1128 06/11/19 1129 06/11/19 1208   BP: 129/62 136/68 (!) 113/59 (!) 119/50   Pulse: 85 86 88 91   Resp:   16 20   Temp: 96.8 °F (36 °C)  98.2 °F (36.8 °C) 98.3 °F (36.8 °C)   TempSrc:    Oral   SpO2:    98%   Weight:   187 lb 9.8 oz (85.1 kg)    Height:         24HR INTAKE/OUTPUT:      Intake/Output Summary (Last 24 hours) at 6/11/2019 1627  Last data filed at 6/11/2019 0715  Gross per 24 hour   Intake 1087.58 ml   Output 300 ml   Net 787.58 ml     Patient Vitals for the past 96 hrs (Last 3 readings):   Weight   06/11/19 1129 187 lb 9.8 oz (85.1 kg)   06/11/19 0750 194 lb 3.6 oz (88.1 kg)   06/10/19 0530 194 lb 10.7 oz (88.3 kg)       Constitutional:  Alert, awake, no apparent distress  Cardiovascular:  S1, S2 without m/r/g  Respiratory:  CTA B without w/r/r  Abdomen: +bs, soft, nt  Ext: LE edema    Data/  Recent Labs     06/08/19  1749 06/09/19  0830   WBC 13.8* 12.8*   HGB 11.0* 10.0*   HCT 33.6* 30.9*   MCV 81.9 81.2    243     Recent Labs     06/08/19  1749 06/10/19  6523 06/11/19  0631    137 134*   K 3.9 4.1 3.9   CL 91* 93* 91*   CO2 27 25 24   GLUCOSE 126* 155* 185*   PHOS  --   --  6.4*   MG 2.0  --   --    BUN 21* 41* 49*   CREATININE 3.26* 6.02* 6.66*   LABGLOM 20* 10* 9*   GFRAA 24* 12* 11*         Assessment/   Assessment:  1.  End-stage renal disease secondary to diabetic nephropathy dialysis dependent on dialysis Tuesday Thursday Saturday  at [facility] dialysis  Renal diet 2 gm Na, 2 gm potassium, 1 gm po4 and 1.2 gm/kg/bw protein per day and 1500 mls fluid restriction     2. Type 2 diabetes-Complicated by diabetic neuropathy and nephropathy-Check a hemoglobin A1c     3. Essential hypertension-Controlled     4. Anemia of chronic kidney disease     5. Right Foot ischemic gangrene-Patient on heparin drip and continue empiric antibiotic treatment withIV vancomycin     6. Secondary hyperparathyroidism-Aggressive control of phosphorus to prevent vascular calcification-Serum phosphorus is 6.4 and will restart phosphorus binders-Renvela 2 tablets with meals        Plan  Continue dialysis today and patient will be transferred to [facility] for right lower extremity angiogram per vascular surgery.  Shamika Rao M.D, MARK  Nephrologist    Alethea Caceres M.D, MARK  Nephrologist

## 2019-06-11 NOTE — PROGRESS NOTES
Patient admitted, consent signed and questions answered. Patient ready for procedure. Call light to reach with side rails up 2 of 2. Groins clipped. Family at bedside with patient.   History and physical updated

## 2019-06-12 NOTE — PROGRESS NOTES
Tuesday, Thursday and Saturday with dialysis. A pre-dialysis level will be drawn next week if therapy is continued. Pharmacy will continue to monitor. Thank you for the consult. Will continue to follow. [id number] TARUN RAMSEY, [facility]  6/12/2019  3:24 PM

## 2019-06-12 NOTE — PROGRESS NOTES
Division of Vascular Surgery             Progress Note      Name: Jazmin Holguin  MRN: [id number]         Subjective:     Patient seen and examined. No acute events overnight. Patient reports he feels well overall this morning. Denies lower extremity pain. Physical Exam:     Vitals:  /62   Pulse 87   Temp 97.1 °F (36.2 °C) (Oral)   Resp 25   Ht 6' (1.829 m)   Wt 182 lb 12.2 oz (82.9 kg)   SpO2 94%   BMI 24.79 kg/m²       General appearance - alert, well appearing and in no acute distress  Mental status - oriented to person, place and time with normal affect  Head - normocephalic and atraumatic  Neck - supple, no carotid bruits, thyroid not palpable, no JVD  Chest - clear to auscultation, normal effort  Heart - normal rate, regular rhythm, no murmurs  Abdomen - soft, non-tender, non-distended  Neurological - normal speech, no focal findings or movement disorder noted, cranial nerves II through XII grossly intact  Vascular Exam - bilateral groins soft, easily compressible, palpable fem, R foot with necrotic digits, incision to dorsal foot C/D/I, no doppler signal to PT, L foot dopplers PT/DP      Assessment:     1. RLE ischemia s/p angio with subsequent mechanical thrombectomy      Plan:     1. Continue local wound care - dry gauze between toes with adaptic to dorsal foot, kerlix wrap  2. Continue anticoagulation, patient restarted on coumadin last night will continue transition  3.  Plan remains to let foot demarcate and determine level of amputation at follow up appointment      David Parrish MD  General Surgery PGY1  Pager Number: [phone]        [address] [place] [address],[address]: [phone]

## 2019-06-12 NOTE — H&P
PCP Dr. Vladimir Dominguez Hypertension     Nephropathy     Neuropathy     No natural teeth     Poor historian     Renal insufficiency, mild 2013    Uses roller walker     Wears glasses     Wheelchair dependence         Past Surgical History:     Past Surgical History:   Procedure Laterality Date    CARDIAC CATHETERIZATION Bilateral 8/5/13    CATARACT REMOVAL Right     DIALYSIS FISTULA CREATION Right 1/16/2019    RIGHT ARM AV FISTULA CREATION performed by Nadia Montgomery MD at [facility] Right 6/11/2019    LOWER LEG EMBOLECTOMY THROMBECTOMY performed by Nadia Montgomery MD at [place] [address] Right     5TH DIGIT    FOOT DEBRIDEMENT Left 7/2015    [facility]     TOE SURGERY Left 8/19/2015    Arthrodesis IP Joint Hallux    TUNNELED VENOUS CATHETER PLACEMENT  08/2018    RIGHT CHEST PERM CATH        Medications Prior to Admission:     Prior to Admission medications    Medication Sig Start Date End Date Taking? Authorizing Provider   warfarin (COUMADIN) 5 MG tablet Take 2.5 mg by mouth Twice a Week Indications:  On Monday, Friday; 5 mg all other days On Monday, Friday; 5 mg all other days    Historical Provider, MD   ketorolac (ACULAR) 0.5 % ophthalmic solution Place 1 drop into the left eye every 2 hours    Historical Provider, MD   prednisoLONE acetate (PRED FORTE) 1 % ophthalmic suspension Place 1 drop into the left eye every 4 hours    Historical Provider, MD   moxifloxacin (VIGAMOX) 0.5 % ophthalmic solution Place 1 drop into the left eye every 4 hours    Historical Provider, MD   glipiZIDE (GLUCOTROL XL) 2.5 MG extended release tablet Take 1 tablet by mouth daily 2/25/19   Minus MD Anaya   Blood Glucose Monitoring Suppl (ONE TOUCH ULTRA 2) w/Device KIT  1/17/19   Historical Provider, MD   tamsulosin (FLOMAX) 0.4 MG capsule TAKE 1 CAPSULE BY MOUTH ONCE DAILY 2/21/19   Minus MD Anaya   aspirin EC 81 MG EC tablet Take 1 tablet by mouth daily 1/16/19 6/8/19  Vertie Primus, DPM   warfarin (COUMADIN) 5 MG tablet Take 5 mg by mouth Five times weekly Indications: 2.5 mg Monday, Friday; 5 mg all other days 2.5 mg Monday, Friday; 5 mg all other days    Historical Provider, MD   losartan (COZAAR) 50 MG tablet TAKE ONE TABLET BY MOUTH ONCE DAILY 12/7/18   Yesenia Holland MD   furosemide (LASIX) 40 MG tablet Take 40 mg by mouth 2 times daily    Historical Provider, MD   clopidogrel (PLAVIX) 75 MG tablet Take 1 tablet by mouth daily  Patient taking differently: Take 75 mg by mouth daily Pt. Stopped 1-12-19 for OR 9/7/18   Jovon Beltran MD        Allergies:     Lipitor [atorvastatin] and Metformin and related    Social History:     Tobacco:    reports that he has never smoked. He has never used smokeless tobacco.  Alcohol:      reports that he does not drink alcohol. Drug Use:  reports that he does not use drugs. Family History:     Family History   Problem Relation Age of Onset    Diabetes Mother     Heart Disease Mother     High Blood Pressure Mother     Diabetes Brother     Heart Disease Father     Diabetes Father        Review of Systems:     Positive and Negative as described in HPI. CONSTITUTIONAL:  negative for fevers, chills, sweats, fatigue, weight loss  HEENT:  negative for vision, hearing changes, runny nose, throat pain  RESPIRATORY:  negative for shortness of breath, cough, congestion, wheezing.   CARDIOVASCULAR:  negative for chest pain, palpitations  GASTROINTESTINAL:  negative for nausea, vomiting, diarrhea, constipation, change in bowel habits, abdominal pain   GENITOURINARY:  negative for difficulty of urination, burning with urination, frequency   INTEGUMENT:  negative for rash, skin lesions, easy bruising   HEMATOLOGIC/LYMPHATIC:  negative for swelling/edema   ALLERGIC/IMMUNOLOGIC:  negative for urticaria , itching  ENDOCRINE:  negative increase in drinking, increase in urination, hot or cold intolerance  MUSCULOSKELETAL: Forefoot pain and discoloration   NEUROLOGICAL:  negative for headaches, dizziness, lightheadedness, numbness, pain, tingling extremities  BEHAVIOR/PSYCH:  negative for depression, anxiety    Physical Exam:   BP (!) 108/56   Pulse 88   Temp 99.5 °F (37.5 °C) (Oral)   Resp 20   Ht 6' (1.829 m)   Wt 182 lb 12.2 oz (82.9 kg)   SpO2 97%   BMI 24.79 kg/m²   Temp (24hrs), Av °F (36.7 °C), Min:97.1 °F (36.2 °C), Max:99.5 °F (37.5 °C)    Recent Labs     19  1206 19  2345 19  0655 19  1259   POCGLU 106 97 138* 129*       Intake/Output Summary (Last 24 hours) at 2019 1424  Last data filed at 2019 1024  Gross per 24 hour   Intake 1306.79 ml   Output 25 ml   Net 1281.79 ml       General Appearance:  alert, well appearing, and in no acute distress  Mental status: oriented to person, place, and time with normal affect  Head:  normocephalic, atraumatic. Eye: no icterus, redness, pupils equal and reactive, extraocular eye movements intact, conjunctiva clear  Ear: normal external ear, no discharge, hearing intact  Nose:  no drainage noted  Mouth: mucous membranes moist  Neck: supple, no carotid bruits, thyroid not palpable  Lungs: Bilateral equal air entry, clear to ausculation, no wheezing, rales or rhonchi, normal effort  Cardiovascular: normal rate, regular rhythm, no murmur, gallop, rub.   Abdomen: Soft, nontender, nondistended, normal bowel sounds, no hepatomegaly or splenomegaly  Neurologic: There are no new focal motor or sensory deficits, normal muscle tone and bulk, no abnormal sensation, normal speech, cranial nerves II through XII grossly intact  Skin: Dressing applied to the right lower extremity  Extremities: diminished pulses, right lower extremity dressed  Psych: normal affect    Investigations:      Laboratory Testing:  Recent Results (from the past 24 hour(s))   CBC    Collection Time: 19 11:30 PM   Result Value Ref Range    WBC 11.0 3.5 - 11.3 k/uL    RBC 3.42 (L) 4.21 - 5.77 m/uL    Hemoglobin 9.0 (L) 13.0 - 17.0 g/dL    Hematocrit 30.3 (L) 40.7 - 50.3 %    MCV 88.6 82.6 - 102.9 fL    MCH 26.3 25.2 - 33.5 pg    MCHC 29.7 28.4 - 34.8 g/dL    RDW 16.5 (H) 11.8 - 14.4 %    Platelets 471 547 - 354 k/uL    MPV 11.1 8.1 - 13.5 fL    NRBC Automated 0.0 0.0 per 100 WBC   APTT    Collection Time: 06/11/19 11:30 PM   Result Value Ref Range    PTT 35.0 (H) 20.5 - 30.5 sec   Hemoglobin A1c    Collection Time: 06/11/19 11:30 PM   Result Value Ref Range    Hemoglobin A1C 6.2 (H) 4.0 - 6.0 %    Estimated Avg Glucose 131 mg/dL   POC Glucose Fingerstick    Collection Time: 06/11/19 11:45 PM   Result Value Ref Range    POC Glucose 97 75 - 110 mg/dL   Basic Metabolic Panel w/ Reflex to MG    Collection Time: 06/12/19  4:51 AM   Result Value Ref Range    Glucose 115 (H) 70 - 99 mg/dL    BUN 26 (H) 6 - 20 mg/dL    CREATININE 4.10 (H) 0.70 - 1.20 mg/dL    Bun/Cre Ratio NOT REPORTED 9 - 20    Calcium 8.2 (L) 8.6 - 10.4 mg/dL    Sodium 137 135 - 144 mmol/L    Potassium 3.5 (L) 3.7 - 5.3 mmol/L    Chloride 96 (L) 98 - 107 mmol/L    CO2 25 20 - 31 mmol/L    Anion Gap 16 9 - 17 mmol/L    GFR Non-African American 15 (L) >60 mL/min    GFR  19 (L) >60 mL/min    GFR Comment          GFR Staging NOT REPORTED    CBC    Collection Time: 06/12/19  4:51 AM   Result Value Ref Range    WBC 10.4 3.5 - 11.3 k/uL    RBC 3.29 (L) 4.21 - 5.77 m/uL    Hemoglobin 8.5 (L) 13.0 - 17.0 g/dL    Hematocrit 28.7 (L) 40.7 - 50.3 %    MCV 87.2 82.6 - 102.9 fL    MCH 25.8 25.2 - 33.5 pg    MCHC 29.6 28.4 - 34.8 g/dL    RDW 16.3 (H) 11.8 - 14.4 %    Platelets 301 269 - 002 k/uL    MPV 11.3 8.1 - 13.5 fL    NRBC Automated 0.0 0.0 per 100 WBC   Magnesium    Collection Time: 06/12/19  4:51 AM   Result Value Ref Range    Magnesium 2.0 1.6 - 2.6 mg/dL   POC Glucose Fingerstick    Collection Time: 06/12/19  6:55 AM   Result Value Ref Range    POC Glucose 138 (H) 75 - 110 mg/dL APTT    Collection Time: 06/12/19  7:37 AM   Result Value Ref Range    PTT 64.4 (H) 20.5 - 30.5 sec   Protime-INR    Collection Time: 06/12/19  7:37 AM   Result Value Ref Range    Protime 20.6 (H) 9.0 - 12.0 sec    INR 2.1    POC Glucose Fingerstick    Collection Time: 06/12/19 12:59 PM   Result Value Ref Range    POC Glucose 129 (H) 75 - 110 mg/dL       Imaging/Diagnostics:    Xr Foot Right (min 3 Views)    Result Date: 6/8/2019  No acute osseous abnormality of the right foot. Xr Chest Portable    Result Date: 6/8/2019  No acute cardiopulmonary abnormality. Assessment :      Primary Problem  Ischemia of right lower extremity    Active Hospital Problems    Diagnosis Date Noted    S/P angiogram of extremity [Z98.890] 06/11/2019    Ischemia of right lower extremity [I99.8]     Peripheral vascular occlusive disease ([facility] [I73.9] 06/08/2019    ESRD on hemodialysis ([facility] [N18.6, Z99.2]     HTN (hypertension) [I10] 08/05/2013    DM type 2 with diabetic peripheral neuropathy [E11.42] 01/17/2012       Plan:     Patient status Admit as inpatient in the  Progressive Unit/Step down    1. INR is therapeutic, heparin drip stopped  2. He was on vancomycin and Zosyn from [address] [place] [address], continue that, de-escalate soon  3. Resume home medications including losartan  4. Discussed with patient in detail about the importance of compliance with Coumadin  5.  Vascular plans on amputation after demarcation  6. nephrology evaluation because of end-stage renal disease on hemodialysis    Consultations:   IP CONSULT TO SOCIAL WORK  IP CONSULT TO NEPHROLOGY  PHARMACY TO DOSE WARFARIN  IP CONSULT TO PHARMACY  IP CONSULT TO HOME CARE NEEDS  PHARMACY TO DOSE VANCOMYCIN     Patient is admitted as inpatient status because of co-morbidities listed above, severity of signs and symptoms as outlined, requirement for current medical therapies and most importantly because of direct risk to patient if care not provided in a hospital setting.     Naman Romeo MD  6/12/2019  2:24 PM    Copy sent to Dr. Mcleod Cancer, MD

## 2019-06-12 NOTE — PROGRESS NOTES
Occupational Therapy   Occupational Therapy Initial Assessment  Date: 2019   Patient Name: Ila Cornell  MRN: [id number]     : 1965    Date of Service: 2019    Discharge Recommendations: Further therapy recommended at discharge. Copied from H&P: This is 47years old gentleman who was transferred to us from [facility].  She presented there with pain and discoloration of his right foot. He has a history of peripheral vascular disease. He is also on hemodialysis. He says he has a history of atrial fibrillation and uses Coumadin at home. Recently he was off Coumadin for a procedure. Was admitted. He was empirically started on vancomycin and Zosyn. Coumadin was continued. At [facility] he was evaluated by vascular who recommended heparin drip and then transferred to [place] for angiogram.  The patient underwent angiogram yesterday. Postprocedure he was admitted to our service. Patient received mechanical thrombectomy. And currently denies chest pain, denies shortness of breath. Denies any fevers or chills. Denies smoking or alcohol use. Assessment   Performance deficits / Impairments: Decreased functional mobility ; Decreased ADL status; Decreased endurance;Decreased balance  Assessment: Pt demo'd deficits in areas noted above limiting independence in ADLs/functional mobility. Pt would benefit from continued acute and post acute OT services to promote safety and independence with self-care tasks.   Treatment Diagnosis: Ischemia of right lower extremity  Prognosis: Good  Decision Making: Medium Complexity  Patient Education: educated pt on OTPOC, safety with transfers/mobility, proper hand placement with RW, good return  Barriers to Learning: none  REQUIRES OT FOLLOW UP: Yes  Activity Tolerance  Activity Tolerance: Patient Tolerated treatment well  Safety Devices  Safety Devices in place: Yes  Type of devices: Gait belt;Left in bed;Call light within reach;Nurse notified  Restraints  Initially in place: No         Patient Diagnosis(es): The encounter diagnosis was S/P angiogram of extremity. has a past medical history of A-fib ([facility] Anemia, CAD (coronary artery disease), Carotid artery stenosis, Cerebral artery occlusion with cerebral infarction [facility]), CHF (congestive heart failure) ([facility] CKD (chronic kidney disease) stage 4, GFR 15-29 ml/min ([facility] Diabetes mellitus ([email or web address] Hemodialysis patient ([facility] Hyperlipidemia, Hypertension, Nephropathy, Neuropathy, No natural teeth, Poor historian, Renal insufficiency, mild, Uses roller walker, Wears glasses, and Wheelchair dependence. has a past surgical history that includes Foot Debridement (Left, 7/2015); Finger fracture surgery (Right); Tunneled venous catheter placement (08/2018); Cardiac catheterization (Bilateral, 8/5/13); Dialysis fistula creation (Right, 1/16/2019); Toe Surgery (Left, 8/19/2015); Cataract removal (Right); Elbow surgery (Right, 1975); and embolectomy (Right, 6/11/2019). Treatment Diagnosis: Ischemia of right lower extremity      Restrictions  Restrictions/Precautions  Restrictions/Precautions: Weight Bearing  Required Braces or Orthoses?: No  Lower Extremity Weight Bearing Restrictions  Right Lower Extremity Weight Bearing: (heel WBing only per RN Jasvir Machado)  Position Activity Restriction  Other position/activity restrictions:  Up with A, Heel WB only on R per RN    Subjective   General  Chart Reviewed: No  Patient assessed for rehabilitation services?: Yes  Family / Caregiver Present: No  Pain Assessment  Pain Assessment: 0-10  Pain Level: 0    Social/Functional History  Social/Functional History  Lives With: Significant other, Son, Other (comment)(8month old baby)  Type of Home: House  Home Layout: Two level, Bed/Bath upstairs(bath upstairs, bed available on first level)  Home Access: Ramped entrance  Entrance Stairs - Number of Steps: 3  Bathroom Shower/Tub: Tub/Shower unit  Bathroom Toilet: Standard  Bathroom Equipment: Shower chair(pt reports does not currently use shower chair)  Bathroom Accessibility: Accessible  Home Equipment: Rolling walker, [address] Help From: Family  ADL Assistance: Independent  Homemaking Assistance: Independent  Homemaking Responsibilities: Yes(assists occasionally however reports significant other completes most)  Ambulation Assistance: Independent  Transfer Assistance: Independent  Active : No  Patient's  Info: Daughter, son, GF  Mode of Transportation: Car  Occupation: Unemployed  [address]: pt reports enjoying cars  Additional Comments: Family/girlfriend able to assist pt prn. Objective   Vision: Within Functional Limits  Hearing: Within functional limits    Orientation  Overall Orientation Status: Within Functional Limits  Observation/Palpation  Posture: Good  Balance  Sitting Balance: Independent  Standing Balance: Contact guard assistance  Standing Balance  Time: ~5 minutes  Activity: static standing EOB  Comment: no LOB/SOB noted  Functional Mobility  Functional Mobility Comments: Pt refusing to complete this date d/t saying is NWB on RLE even though pt was educated on being able to WB through heel. Pt reports \"I'll walk tomorrow but I want to stay off of it today cause the docs told me to. \" Pt's LLE also began to cramp and pt needed to return to seated EOB. ADL  Feeding: Independent;Setup  Grooming: Independent;Setup(seated ADLs)  UE Bathing: Contact guard assistance;Setup  LE Bathing: Minimal assistance;Setup  UE Dressing: Setup;Contact guard assistance  LE Dressing: Minimal assistance;Setup  Toileting: Contact guard assistance  Additional Comments: Pt supine in bed upon arrival. Pt completed bed mob to sitting EOB with good balance. Pt demo'd func sit > stand transfers adhering to [facility] precautions. Pt unwilling to take steps this date as pt reports is NWB although pt was educated on being able to WB through heel.  Pt goals : To go home.        Therapy Time   Individual Concurrent Group Co-treatment   Time In 2262         Time Out 1401         Minutes 13         Timed Code Treatment Minutes: 550 Ena Stewart, S/OT

## 2019-06-12 NOTE — PROGRESS NOTES
Pharmacy Note    Luzma Cummins is a 47 y.o. male ordered Zosyn, consult received from Dr. Michaela Novak to manage therapy. Patient Active Problem List   Diagnosis    DM type 2 with diabetic peripheral neuropathy    Diabetic foot ulcer (HCC)    CHF (congestive heart failure)    HTN (hypertension)    CRF (chronic renal failure) (HCC)    Osteomyelitis (HCC)    Diabetes mellitus due to underlying condition with complication, with long-term current use of insulin (HCC)    Cellulitis    Shortness of breath    Chronic systolic congestive heart failure (HCC)    Atypical chest pain    Essential hypertension    Chest pain    Nephropathy    Unstable angina pectoris ([facility])    ESRD on hemodialysis ([facility])    Sepsis ([facility]    Fever    MSSA (methicillin susceptible Staphylococcus aureus) septicemia ([facility])    Bandemia    C. difficile colitis    AVF (arteriovenous fistula) ([facility]    Peripheral vascular occlusive disease ([facility])    S/P angiogram of extremity    Ischemia of right lower extremity       Allergies:  Lipitor [atorvastatin] and Metformin and related     Temp max: 36.7 C    Recent Labs     06/11/19  0631 06/12/19  0451   CREATININE 6.66* 4.10*       Recent Labs     06/11/19  2330 06/12/19  0451   WBC 11.0 10.4         Intake/Output Summary (Last 24 hours) at 6/12/2019 0851  Last data filed at 6/12/2019 0600  Gross per 24 hour   Intake 977.19 ml   Output 25 ml   Net 952.19 ml       Culture Date      Source                 Results  N/a    Height:   Ht Readings from Last 1 Encounters:   06/11/19 6' (1.829 m)     Weight:  Wt Readings from Last 1 Encounters:   06/11/19 182 lb 12.2 oz (82.9 kg)     Estimated Creatinine Clearance: 23 mL/min (A) (based on SCr of 4.1 mg/dL (H)). Assessment/Plan:  Patient on dialysis three times weekly, Tuesday/Thursday/Saturday schedule. Will start Zosyn 2.25 gm IV every 12 hours. Thank you for the consult. Will continue to follow. Kaela Aguilar, Pharm. D.  6/12/2019  8:56 AM

## 2019-06-12 NOTE — CARE COORDINATION
notified by Deepthi Belle that patient will need VNS, [facility] was following at [facility], discussed with patient, he would like referral to remedios.  Referral faxed and called to [facility]

## 2019-06-12 NOTE — OP NOTE
[address] [facility] [address]                                OPERATIVE REPORT    PATIENT NAME: Rene Green                     :        1965  MED REC NO:   [id number]                             ROOM:       1007  ACCOUNT NO:   [de-identified]                           ADMIT DATE: 2019  PROVIDER:     Hilton Fried MD    DATE OF PROCEDURE:  2019    PREOPERATIVE DIAGNOSIS:  Right leg ischemia. POSTOPERATIVE DIAGNOSIS:  Right leg ischemia. PROCEDURES:  1. Ultrasound-guided vascular access and percutaneous closure of left  common femoral artery. 2.  Aortogram.  3.  Right lower extremity angiogram.  4.  Selective third-order catheterization of peroneal artery. 5.  Mechanical thrombectomy of the superficial femoral artery, popliteal  and peroneal arteries with AngioJet. 6.  Intravascular ultrasound of common femoral, SFA, popliteal, and  peroneal arteries. 7.  Injection of 10 mg TPA directly into peroneal artery    ANESTHESIA: Local with IV sedation. SURGEON:  Hilton Fried MD    ESTIMATED BLOOD LOSS:  100 mL. COMPLICATIONS:  None. FINDINGS:  Subacute thrombus of the superficial femoral and popliteal  Arteries, with distal embolization. The peroneal artery  reconstitutes a distal AT just at the level of the ankle. 10 mg of TPA  was injected directly into the peroneal artery to see if any more outflow  opens up. PLAN: Will attempt open thrombectomy to improve  the circulation to his foot. Unfortunately with the limited option and  with the subacute thrombus and chronic occlusion of his outflow into the foot, he has  high risk of limb loss. This was explained to the patient as well as  the family. INDICATION FOR PROCEDURE:  The patient is a 52-year-old gentleman who  has undergone previous fistula creation by me.   He was admitted to the  hospital over the weekend with concerns of ischemic changes to his right  foot. This has been going on for several weeks as noted by his  girlfriend. He has severe neuropathy and is not complaining of any pain  or discomfort. He has been on and off his anticoagulation due to recent  fistulogram performed at the access center as well as recent eye  surgery. Given the concern of ischemia, he was recommended to undergo  an angiogram to evaluate circulation in his foot. There was concern  with significant amount of tissue loss already that he was at high risk  of limb loss. This has been explained to him. Risks and benefits of  the procedure were also explained and consented. DESCRIPTION OF PROCEDURE:  The patient was brought to the  catheterization suite. After appropriate time-out was performed, the  bilateral groin sites were prepped and draped. The left common femoral  artery was evaluated under ultrasound. It was compressible. Local  anesthetic was given. Using a micropuncture needle, the left common  femoral artery was accessed. Wire passed easily. Permanent ultrasound  images were saved. Fluoroscopic image was also performed to confirm  access over the femoral head. I then placed a 4-Djiboutian micropuncture  sheath and exchanged over a wire for a 5-Djiboutian sheath. RBI catheter  was brought into the abdominal aorta over a wire and aortogram was  performed. I then went up and over the aortic bifurcation and parked  the catheter in the right common femoral artery. I performed a right  lower extremity angiogram with runoff to the foot. RADIOGRAPHICAL FINDINGS:  He has widely patent abdominal aorta and  single renal arteries bilaterally. Bilateral common iliac, external and  internal iliac are widely patent. The right common femoral and profunda  are patent. The SFA has sluggish to and fro flow. There  is a flow-limiting stenosis or occlusion in the mid and distal SFA. The  popliteal artery also has sluggish flow.   The AT and posterior tibial  artery are occluded. The peroneal artery is single runoff to the ankle  with reconstitution of distal anterior tibial artery just above the  ankle. There is no flow into the foot, with concern of outflow occlusion  in the small vessels of the foot. At this point, given the findings and what appears to be subacute  thrombus, I decided to attempt mechanical thrombectomy so I then  exchanged for a 6-Surinamese, 45-cm long Holger sheath and parked it in the  right common femoral artery. I performed an angiogram and selectively  catheterized the superficial femoral artery. Wire and catheter went  down quite easily. There was some difficulty around the level of  Jovnai's canal and the popliteal artery. I was eventually able to get  the catheter into the peroneal artery and performed a hand-injection  angiogram at the level of the ankle which confirmed flow into the distal  anterior tibial artery. However, there was no flow past the ankle. There were some collateral branches that I could see around the heel;  however, no significant flow into the forefoot. At this point, I then  removed the [address] and then performed mechanical thrombectomy with  the 6-Surinamese AngioJet. Several passes were performed through the SFA,  popliteal, and proximal peroneal artery. I then performed a repeat  right lower extremity angiogram.  This showed some improvement; however,  there was still sluggish flow down the SFA. I then performed intravascular  ultrasound of the SFA, popliteal, and peroneal arteries. This revealed  widely patent common femoral artery,  the SFA had intraluminal echoes   consistent with subacute thrombus. There  was near occlusion near the mid SFA and Jovani's canal.  There was also  a near occlusion in the P1 segment of the popliteal artery. The  peroneal artery was widely patent without any evidence of thrombus or  stenosis.     At this point, given the findings of occluded outflow with single vessel  runoff and thrombus that was not easily removed with the percutaneous  mechanical thrombectomy, I decided to give 10 mg of TPA into the right  foot with the catheter in the peroneal artery to help dissolve any  distal clot and hopefully open up his outflow. Plan will be to take him  to the operating room for open embolectomy to remove all the subacute  thrombus. At this point, after giving the TPA, the catheter was  removed. The sheath was brought back into the abdominal aorta and  exchanged for a StarClosure dilator and sheath and the left common  femoral artery was successfully percutaneously closed. Manual pressure  was held for 10 minutes just to make sure the groin was hemostatic  before moving the patient. It was. There was no hematoma or swelling. Band-Aid was applied. The patient was then transferred to his bed and  to the recovery room in hemodynamically stable condition.         Adalgisa Govea MD    D: 06/11/2019 23:40:35       T: 06/12/2019 11:50:40     [email or web address]  Job#: [id number]     Doc#: [id number]    CC:

## 2019-06-12 NOTE — PROGRESS NOTES
Pt transferred from [facility] for angiogram w/ intervention as indicated. Pt taken to OR postprocedure for open thrombectomy. Discussed case w/ IM resident service as Dr. Ingrid Arriaza was admitting provider at [facility]. Resident service capped for new admissions. Discussed w/ Hansa Ennis NP. Pt accepted to be admitted under Dr. Isa Myrick. Continue w/ hep gtt tonight. Will decrease hep infusion rate if INR >2. Ok to restart coumadin tonight.       Electronically signed by Elle Schaffer DO on 6/11/2019 at 9:44 PM

## 2019-06-12 NOTE — PROGRESS NOTES
Nutrition Assessment    Type and Reason for Visit: Initial, Positive Nutrition Screen(wt loss)    Nutrition Recommendations: Continue diabetic diet. Recommend magic cup supplements BID. Nutrition Assessment:  Pt admitted w/ Rt foot infection w/ DM ulcer and multiple wounds. Pt stated that he had a good appetite. Pt denied any wt loss and states his wt usually flux from 185-190 lbs. However, per EMR pt has had 13.3% wt loss x 10 mo. Pt denied ensure enlive supplements. Will try magic cup supplements d/t increased nutrient needs and will monitor. Malnutrition Assessment:  · Malnutrition Status: At risk for malnutrition  · Context: (acute on chronic )  · Findings of the 6 clinical characteristics of malnutrition (Minimum of 2 out of 6 clinical characteristics is required to make the diagnosis of moderate or severe Protein Calorie Malnutrition based on AND/ASPEN Guidelines):  1. Energy Intake-Unable to assess,      2. Weight Loss-10% loss or greater, (x 10 mo)  3. Fat Loss-No significant subcutaneous fat loss,    4. Muscle Loss-No significant muscle mass loss,    5. Fluid Accumulation-Mild fluid accumulation, Extremities  6.  Strength-Not measured    Nutrition Risk Level:  Moderate    Nutrient Needs:  · Estimated Daily Total Kcal: 25-28 kcal/kg ~>2347-5958 kcals/d   · Estimated Daily Protein (g): 1.2-1.4 gm/kg ~>100-116 gms/d     Nutrition Diagnosis:   · Problem: Predicted suboptimal energy intake  · Etiology: related to Insufficient energy/nutrient consumption     Signs and symptoms:  as evidenced by (13.3% wt loss x 10 mo per EMR)    Objective Information:  · Wound Type: Open Wounds, Multiple, Diabetic Ulcer  · Current Nutrition Therapies:  · Oral Diet Orders: Carb Control 4 Carbs/Meal   · Oral Diet intake: %  · Oral Nutrition Supplement (ONS) Orders: None  · Anthropometric Measures:  · Ht: 6' (182.9 cm)   · Current Body Wt: 182 lb (82.6 kg)  · Admission Body Wt: 182 lb (82.6 kg)  · Usual Body Wt:

## 2019-06-12 NOTE — PROGRESS NOTES
Smoking Cessation - topics covered   []  Health Risks  []  Benefits of Quitting   []  Smoking Cessation  [x]  Patient has no history of tobacco use  []  Patient is former smoker. [x]  No need for tobacco cessation education. []  Booklet given  []  Patient verbalizes understanding. []  Patient denies need for tobacco cessation education. []  Unable to meet with patient today. Will follow up as able.   Reina Higginbotham  9:45 AM

## 2019-06-12 NOTE — PROGRESS NOTES
[address] [facility].    Date:   6/11/2019  Patient name:  Nakia Pickering  Date of admission:  6/8/2019  5:03 PM  MRN:   [id number]  YOB: 1965      Cc right foot discoloration     HPI   Pt admitted with sympts of right foot infection           HPI   1) Location/Symptom right foot infection   2) Timing/Onset: more than 1 week   3) Context/Setting: pt did not to ER for right foot redness   4) Quality: pulses not palpable   5) Duration: continuous   6) Modifying Factors: DM HTN known PAD ESRD on HD CHF EF 25 %   7) Severity: moderate     Pt to have angiogram today   Comfortable on heparin drip       Past Medical History:   Diagnosis Date    [facility]fib ([facility]     Anemia     CAD (coronary artery disease)     Dr. Aurther Leyden Carotid artery stenosis     BILATERAL    [facility] 11/2017    RIGHT SIDED WEAKNESS    CHF (congestive heart failure) ([facility] 2013    CKD (chronic kidney disease) stage 4, GFR 15-29 ml/min ([facility]     Diabetes mellitus ([facility]     diet controlled    Hemodialysis patient ([facility] 08/2018    tues/thurs/sat/ [place]/ Pt. doesn't know  Nephrologist name/ access Left chest       Hyperlipidemia     PCP Dr. Andrew Bang Hypertension     Nephropathy     Neuropathy     No natural teeth     Poor historian     Renal insufficiency, mild 2013    Uses roller walker     Wears glasses     Wheelchair dependence      Family History   Problem Relation Age of Onset    Diabetes Mother     Heart Disease Mother     High Blood Pressure Mother     Diabetes Brother     Heart Disease Father     Diabetes Father       reports that he has never smoked. He has never used smokeless tobacco. He reports that he does not drink alcohol or use drugs.   Past Medical History:   Diagnosis Date    A-fib ([facility]     Anemia     CAD (coronary artery disease)     Dr. Aurther Leyden Carotid      BMP:    Recent Labs     06/10/19  0653 06/11/19  0631    134*   K 4.1 3.9   CL 93* 91*   CO2 25 24   BUN 41* 49*   CREATININE 6.02* 6.66*   GLUCOSE 155* 185*     S. Calcium:  Recent Labs     06/11/19  0631   CALCIUM 8.4*     Glycosylated hemoglobin A1C:   Recent Labs     06/11/19  0631   LABA1C 6.1*     BNP:No results for input(s): BNP in the last 72 hours. Assessment / Plan      Patient Active Problem List   Diagnosis    DM type 2 with diabetic peripheral neuropathy    Diabetic foot ulcer ([facility]    CHF (congestive heart failure)    HTN (hypertension)    CRF (chronic renal failure) (HCC)    Osteomyelitis ([facility]    Diabetes mellitus due to underlying condition with complication, with long-term current use of insulin (HCC)    Cellulitis    Shortness of breath    Chronic systolic congestive heart failure (HCC)    Atypical chest pain    Essential hypertension    Chest pain    Nephropathy    Unstable angina pectoris ([facility]    ESRD on hemodialysis ([facility]    Sepsis ([facility]    Fever    MSSA (methicillin susceptible Staphylococcus aureus) septicemia ([address]    Bandemia    C. difficile colitis    AVF (arteriovenous fistula) ([facility]    Peripheral vascular occlusive disease ([facility]    S/P angiogram of extremity       A/P  Pt with hx of HTN DM ESRD on HD   With swelling redness right foot with no pulses right foot   On coumadin INR 2.1 stopped on heaprin now   vanco zosyn   Plan vascular consult     Work up for PAD Plan for angio today       MD ELVIS Moses [address], [address] [place] [address].    Phone [phone]   Fax: [phone]  Answering Service: [phone]

## 2019-06-12 NOTE — DISCHARGE INSTR - COC
6:00 AM   Dressing Status Changed 6/12/2019  6:00 AM   Dressing Changed Changed/New 6/12/2019  6:00 AM   Dressing/Treatment Dry Dressing;Tegaderm 6/12/2019  6:00 AM   Dressing Change Due 06/11/19 6/10/2019  8:00 AM   Wound Assessment Black 6/12/2019  4:00 AM   Drainage Amount None 6/12/2019  6:00 AM   Odor Mild 6/12/2019  6:00 AM   Margins ОЛЬГА 6/9/2019  7:45 PM   Rebecca-wound Assessment Clean;Dry; Intact 6/12/2019  4:00 AM   Culture Taken No 6/9/2019 12:15 PM   Number of days: 3       Wound 06/09/19 Foot Anterior; Left flaky with redness noted to top of left toes/foot (Active)   Wound Other 6/11/2019  7:15 AM   Dressing Status Clean;Dry; Intact 6/11/2019  7:15 AM   Dressing Changed Changed/New 6/10/2019  8:00 AM   Dressing/Treatment Dry Dressing 6/12/2019  4:00 AM   Dressing Change Due 06/10/19 6/9/2019  7:45 PM   Wound Assessment ОЛЬГА 6/12/2019  4:00 AM   Drainage Amount None 6/12/2019  4:00 AM   Odor None 6/12/2019  4:00 AM   Margins ОЛЬГА 6/9/2019  7:45 PM   Rebecca-wound Assessment ОЛЬГА 6/12/2019  4:00 AM   Culture Taken No 6/9/2019 12:15 PM   Number of days: 3        Elimination:  Continence:   · Bowel: Yes  · Bladder: Anuric  Urinary Catheter: {Urinary Catheter:[id number]}   Colostomy/Ileostomy/Ileal Conduit: No       Date of Last BM: ***    Intake/Output Summary (Last 24 hours) at 6/12/2019 1059  Last data filed at 6/12/2019 0600  Gross per 24 hour   Intake 977.19 ml   Output 25 ml   Net 952.19 ml     I/O last 3 completed shifts: In: 977.2 [P.O.:480; I.V.:497.2]  Out: 25 [Blood:25]    Safety Concerns:      At Risk for Falls    Impairments/Disabilities:      None    Nutrition Therapy:  Current Nutrition Therapy:   - Oral Diet:  Carb Control 4 carbs/meal (1800kcals/day)    Routes of Feeding: Oral  Liquids: No Restrictions  Daily Fluid Restriction: no  Last Modified Barium Swallow with Video (Video Swallowing Test): not done    Treatments at the Time of Hospital Discharge:   Respiratory Treatments: ***  Oxygen Therapy:  is not on home oxygen therapy. Ventilator:    - No ventilator support    Rehab Therapies: Physical Therapy and Occupational Therapy  Weight Bearing Status/Restrictions: Non-weight bearing on right leg  Other Medical Equipment (for information only, NOT a DME order):  wheelchair, cane and crutches  Other Treatments: skilled RN    Patient's personal belongings (please select all that are sent with patient):  None    RN SIGNATURE:  Electronically signed by Shani Lara RN on 6/13/19 at 9:58 AM    CASE MANAGEMENT/SOCIAL WORK SECTION    Inpatient Status Date: ***    Readmission Risk Assessment Score:  Readmission Risk              Risk of Unplanned Readmission:        29           Discharging to Facility/ Agency   · Name: [facility]  Address:  [place] [address] [place] [address]        Phone: [phone]       Fax: [phone]        ·   · Phone:  · Fax:    Dialysis Facility (if applicable)   · Name:  · Address:  · Dialysis Schedule:  · Phone:  · Fax:    / signature: Electronically signed by Josh Beach RN on 6/12/19 at 10:59 AM    PHYSICIAN SECTION    Prognosis: Fair    Condition at Discharge: Stable    Rehab Potential (if transferring to Rehab): Fair    Recommended Labs or Other Treatments After Discharge: follow Hemodialysis TTS schedule   Follow up with vascular surgery     Physician Certification: I certify the above information and transfer of Chrissy Hou  is necessary for the continuing treatment of the diagnosis listed and that he requires [address] for greater 30 days.      Update Admission H&P: No change in H&P    PHYSICIAN SIGNATURE:  Electronically signed by Berenice Cardenas MD on 6/13/19 at 12:14 PM

## 2019-06-12 NOTE — ANESTHESIA PRE PROCEDURE
furosemide (LASIX) tablet 40 mg  40 mg Oral BID Quintin Valadez MD   40 mg at 06/10/19 1757    glipiZIDE (GLUCOTROL) tablet 2.5 mg  2.5 mg Oral QAM AC Quintin Valadez MD   2.5 mg at 06/10/19 0700    moxifloxacin (VIGAMOX) 0.5 % ophthalmic solution 1 drop  1 drop Left Eye Q4H Quintin Valadez MD   1 drop at 06/10/19 1954    prednisoLONE acetate (PRED FORTE) 1 % ophthalmic suspension 1 drop  1 drop Left Eye Q4H Quintin Valadez MD   1 drop at 06/10/19 1954    tamsulosin (FLOMAX) capsule 0.4 mg  0.4 mg Oral Daily Quintin Valadez MD   0.4 mg at 06/10/19 1253    glucose (GLUTOSE) 40 % oral gel 15 g  15 g Oral PRN Quintin Valadez MD        dextrose 50 % IV solution  12.5 g Intravenous PRN Quintin Valadez MD        glucagon (rDNA) injection 1 mg  1 mg Intramuscular PRN Abbi Garcia MD        dextrose 5 % solution  100 mL/hr Intravenous PRN Quintin Valadez MD        insulin lispro (HUMALOG) injection vial 0-6 Units  0-6 Units Subcutaneous TID WC Quintin Valadez MD        insulin lispro (HUMALOG) injection vial 0-3 Units  0-3 Units Subcutaneous Nightly Quintin Valadez MD           Allergies:     Allergies   Allergen Reactions    Lipitor [Atorvastatin] Diarrhea    Metformin And Related Nausea And Vomiting       Problem List:    Patient Active Problem List   Diagnosis Code    DM type 2 with diabetic peripheral neuropathy E11.42    Diabetic foot ulcer ([facility] E11.621, L97.509    CHF (congestive heart failure) I50.9    HTN (hypertension) I10    CRF (chronic renal failure) ([facility] N18.9    Osteomyelitis ([facility]) M86.9    Diabetes mellitus due to underlying condition with complication, with long-term current use of insulin ([facility]) E08.8, Z79.4    Cellulitis L03.90    Shortness of breath R06.02    Chronic systolic congestive heart failure (HCC) I50.22    Atypical chest pain R07.89    Essential hypertension I10    Chest pain R07.9    Nephropathy N28.9    Unstable angina Counseling given: Not Answered      Vital Signs (Current):   Vitals:    06/11/19 1645 06/11/19 1700 06/11/19 1715 06/11/19 1730   BP:       Pulse: 92 93 93 94   Resp: 19 17 21 22   Temp:       TempSrc:       SpO2: 98% 98% 98% 98%                                              BP Readings from Last 3 Encounters:   06/11/19 (!) 119/50   06/11/19 130/65   05/31/19 118/76       NPO Status:                                                                                 BMI:   Wt Readings from Last 3 Encounters:   06/11/19 187 lb 9.8 oz (85.1 kg)   05/15/19 196 lb (88.9 kg)   04/24/19 182 lb 15.7 oz (83 kg)     There is no height or weight on file to calculate BMI.    CBC:   Lab Results   Component Value Date    WBC 12.8 06/09/2019    RBC 3.81 06/09/2019    RBC 3.98 02/27/2012    HGB 10.0 06/09/2019    HCT 30.9 06/09/2019    MCV 81.2 06/09/2019    RDW 17.8 06/09/2019     06/09/2019     02/27/2012       CMP:   Lab Results   Component Value Date     06/11/2019    K 3.9 06/11/2019    CL 91 06/11/2019    CO2 24 06/11/2019    BUN 49 06/11/2019    CREATININE 6.66 06/11/2019    GFRAA 11 06/11/2019    LABGLOM 9 06/11/2019    GLUCOSE 185 06/11/2019    GLUCOSE 229 02/21/2012    PROT 8.1 06/08/2019    CALCIUM 8.4 06/11/2019    BILITOT 1.41 06/08/2019    ALKPHOS 68 06/08/2019    AST 10 06/08/2019    ALT 10 06/08/2019       POC Tests:   Recent Labs     06/11/19  1206   POCGLU 106       Coags:   Lab Results   Component Value Date    PROTIME 25.6 06/11/2019    PROTIME 18.9 01/16/2019    INR 2.3 06/11/2019    APTT 54.1 06/09/2019       HCG (If Applicable): No results found for: PREGTESTUR, PREGSERUM, HCG, HCGQUANT     ABGs: No results found for: PHART, PO2ART, IGC5AVW, KCI4RPH, BEART, N8GWGOIV     Type & Screen (If Applicable):  No results found for: LABABO, [address]    Anesthesia Evaluation  Patient summary reviewed and Nursing notes reviewed no history of anesthetic complications:   Airway: Mallampati: II  TM distance:

## 2019-06-12 NOTE — PROGRESS NOTES
Pharmacy Note  Warfarin Consult    Michelle Burkett is a 47 y.o. male for whom pharmacy has been consulted to manage warfarin therapy. Consulting Physician: Almaz Hdz MD  Reason for Admission: foot infection    Warfarin dose prior to admission: 2.5mg on Mon, Fri only and 5mg all other days of the week. Warfarin indication:  DVT  Target INR range: 2-3     Past Medical History:   Diagnosis Date    A-fib ([place] [address]     Anemia     CAD (coronary artery disease)     Dr. Renard Pinzon    Carotid artery stenosis     BILATERAL    Cerebral artery occlusion with cerebral infarction [facility]) 11/2017    RIGHT SIDED WEAKNESS    CHF (congestive heart failure) ([facility] 2013    CKD (chronic kidney disease) stage 4, GFR 15-29 ml/min ([facility])     Diabetes mellitus ([place] [address]     diet controlled    Hemodialysis patient ([place] [address] 08/2018    tues/thurs/sat/ [place]/ Pt. doesn't know  Nephrologist name/ access Left chest       Hyperlipidemia     PCP Dr. Eric Bernardo    Hypertension     Nephropathy     Neuropathy     No natural teeth     Poor historian     Renal insufficiency, mild 2013    Uses roller walker     Wears glasses     Wheelchair dependence        Recent Labs     06/12/19  0737   INR 2.1     Recent Labs     06/11/19  2330 06/12/19  0451   HGB 9.0* 8.5*   HCT 30.3* 28.7*    261     Current warfarin drug-drug interactions: acetaminophen, aspirin, heparin    Date INR Dose   6/11/19 2.1 5mg   6/12/19 2.1 5mg     Notes:  Give warfarin 5mg today on 6/12/19. Daily PT/INR while inpatient. Thank you for the consult. Will continue to follow.      Pritesh Aviles, [facility], CACP  Clinical Pharmacist Medication Management  6/12/2019  10:07 AM

## 2019-06-12 NOTE — PROGRESS NOTES
Physical Therapy    Facility/Department: [facility] CAR 1  Initial Assessment    NAME: Ibeth Sr  : 1965  MRN: [id number]    Date of Service: 2019    Discharge Recommendations: Further therapy recommended at discharge. PT Equipment Recommendations  Equipment Needed: No  Other: Pt reports having manual w/c and RW  This is 47years old gentleman who was transferred to us from [facility].  He presented there with pain and discoloration of his right foot. He has a history of peripheral vascular disease. He is also on hemodialysis. He says he has a history of atrial fibrillation and uses Coumadin at home. Recently he was off Coumadin for a procedure. Was admitted. He was empirically started on vancomycin and Zosyn. Coumadin was continued. At [facility] he was evaluated by vascular who recommended heparin drip and then transferred to [facility] for angiogram.  The patient underwent angiogram yesterday. Open thrombectomy: 19  1) Right SFA and popliteal thromboembolectomy  2) Dorsalis pedis exposure, attempted tibial thromboembolectomy    Assessment   Body structures, Functions, Activity limitations: Decreased functional mobility ; Decreased ROM; Decreased strength;Decreased safe awareness;Decreased balance;Decreased endurance; Increased Pain;Decreased sensation  Assessment: Pt required supervision for supine to sit, sit to supine, and STS with RW. Unable to attempt ambulation due to L LE cramping. Pt will require additional therapy upon discharge. Pending R LE amputation. Prognosis: Fair  Decision Making: Medium Complexity  Patient Education: purpose of eval, PT POC, importance of mobility, WB precautions  REQUIRES PT FOLLOW UP: Yes  Activity Tolerance  Activity Tolerance: Patient limited by pain       Patient Diagnosis(es): The encounter diagnosis was S/P angiogram of extremity.      has a past medical history of A-fib ([facility] [address] Anemia, CAD (coronary artery disease), Carotid artery stenosis, Cerebral artery occlusion with cerebral infarction [facility]), CHF (congestive heart failure) ([facility] [address] CKD (chronic kidney disease) stage 4, GFR 15-29 ml/min ([facility] [address] Diabetes mellitus ([facility] [address] Hemodialysis patient ([facility] [address] Hyperlipidemia, Hypertension, Nephropathy, Neuropathy, No natural teeth, Poor historian, Renal insufficiency, mild, Uses roller walker, Wears glasses, and Wheelchair dependence. has a past surgical history that includes Foot Debridement (Left, 7/2015); Finger fracture surgery (Right); Tunneled venous catheter placement (08/2018); Cardiac catheterization (Bilateral, 8/5/13); Dialysis fistula creation (Right, 1/16/2019); Toe Surgery (Left, 8/19/2015); Cataract removal (Right); Elbow surgery (Right, 1975); and embolectomy (Right, 6/11/2019). Restrictions  Restrictions/Precautions  Restrictions/Precautions: Weight Bearing  Required Braces or Orthoses?: No  Lower Extremity Weight Bearing Restrictions  Right Lower Extremity Weight Bearing: (Heel WB only on R per RN)  Position Activity Restriction  Other position/activity restrictions: Up with A, Heel WB only on R per RN  Vision/Hearing  Vision: Within Functional Limits  Hearing: Within functional limits     Subjective  General  Patient assessed for rehabilitation services?: Yes  Response To Previous Treatment: Not applicable  Family / Caregiver Present: No  Follows Commands: Within Functional Limits  Subjective  Subjective: RN and pt agreeable to PT. Pt pleasant and cooperative.  Pt supine in bed upon arrival.  Pain Screening  Patient Currently in Pain: No  Vital Signs  Patient Currently in Pain: No       Orientation  Orientation  Overall Orientation Status: Within Functional Limits  Social/Functional History  Social/Functional History  Lives With: Significant other, Son, Other (comment)(10 mo old baby)  Type of Home: House  Home Layout: Two level, Bed/Bath upstairs(Bed available on first level)  Home Access: Ramped entrance  Entrance Stairs - Number of Steps: 3  Bathroom Shower/Tub: Tub/Shower unit  Bathroom Toilet: Standard  Bathroom Equipment: Shower chair(Does not use shower chair currently)  Bathroom Accessibility: Accessible  Home Equipment: Rolling walker, [address] Help From: Family  ADL Assistance: Independent  Homemaking Assistance: Independent  Homemaking Responsibilities: Yes  Ambulation Assistance: Independent  Transfer Assistance: Independent  Active : No  Patient's  Info: Daughter, son, GF  Mode of Transportation: Car  Occupation: Unemployed  Leisure & Hobbies: cars  Cognition   Cognition  Overall Cognitive Status: WFL    Objective     Observation/Palpation  Posture: Good    Joint Mobility  ROM RLE: WFL, ankle not assessed due to dressing, Pt reports inability to complete AROM of ankle  ROM LLE: WFL, Ankle DF PROM limited to neutral, Pt reports inability to complete AROM of ankle  ROM RUE: WFL  ROM LUE: WFL  Strength RLE  Comment: grossly 4-/5 for hip/knee, ankle not assessed  Strength LLE  Comment: grossly 4-/5 for hip/knee, ankle not assessed  Strength RUE  Strength RUE: WFL  Strength LUE  Strength LUE: WFL  Tone RLE  RLE Tone: Normotonic  Tone LLE  LLE Tone: Normotonic  Motor Control  Gross Motor?: WFL  Sensation  Overall Sensation Status: Impaired(Per pt no feeling in bilateral feet.)  Bed mobility  Supine to Sit: Supervision  Sit to Supine: Supervision  Comment: HOB elevated ~30 dgs, used of bed rails, increased time to complete  Transfers  Sit to Stand: Stand by assistance  Stand to sit: Stand by assistance  Comment: Use of RW, verbal cues for hand placement, pt reports cramping in L LE upon standing  Ambulation  Ambulation?: No(Cramping in L LE upon standing)  Stairs/Curb  Stairs?: No     Balance  Posture: Good  Sitting - Static: Good  Sitting - Dynamic: Good  Standing - Static: Fair;+  Comments: Standing assessed with RW, pt maintained NWB R LE in standing.         Plan   Plan  Times per week: 5-6x week  Times per day: Daily  Current Treatment Recommendations: Strengthening, ROM, Balance Training, Functional Mobility Training, Transfer Training, Wheelchair Mobility Training, Endurance Training, Gait Training, Stair training, Pain Management, Home Exercise Program, Safety Education & Training, Patient/Caregiver Education & Training  Safety Devices  Type of devices: Gait belt, Left in bed, Nurse notified  Restraints  Initially in place: No             AM-PAC Score  AM-PAC Inpatient Mobility Raw Score : 16 (06/12/19 1501)  AM-PAC Inpatient T-Scale Score : 40.78 (06/12/19 1501)  Mobility Inpatient CMS 0-100% Score: 54.16 (06/12/19 1501)  Mobility Inpatient CMS G-Code Modifier : CK (06/12/19 1501)          Goals  Short term goals  Time Frame for Short term goals: 14  Short term goal 1: Patient will perform STS independently  Short term goal 2: Patient will ambulate 100 ft with appropriate AD, heel WB R LE, with supervision  Short term goal 3: Patient will be able to perform SLS for 5 sec on L LE, without UE support  Short term goal 4: Patient will be able to participate in 30 min of PT to demonstrate improved activity tolerance  Short term goal 5: Patient will be able to propel w/c using bilateral  ft independently.        Therapy Time   Individual Concurrent Group Co-treatment   Time In 8205         Time Out 1359         Minutes 21         Timed Code Treatment Minutes: 8 Minutes       Eduardo Maciel PT

## 2019-06-12 NOTE — CONSULTS
Renal Consult Note    Patient :  Yue Danielle; 47 y.o. MRN# [id number]  Location:  1007/1007-01  Attending:  Arturo Quinn MD  Admit Date:  6/11/2019   Hospital Day: 1    Reason for Consult:     Asked by Dr Arturo Quinn MD to see for SUKHDEV/Elevated Creatinine. History Obtained From:     patient, electronic medical record, patient's nurse    HD Access:     previous  Right AV fistula    HD Unit:     [facility]     Nephrologist:     Dr. Marco Antonio Rosas Weight:     86 kgs    Admission Weight:     82.9 kgs    History of Present Illness:     Yue Danielle; 47 y.o. male with past medical history as mentioned below presented to the hospital as a transfer from [facility] for further evaluation/intervention of his right lower extremity. Patient has history of hypertension, type 2 diabetes, ESRD on dialysis, peripheral vascular disease and has been having ischemic changes to his right foot. Patient was evaluated by vascular surgery and had ischemic gangrene to right foot and was to undergo angiogram of the right lower extremity to evaluate circulation and hence he is transferred to [facility] for further management. Patient is status post angiogram 6/11/2019: Positive subacute thrombus of superficial femoral and popliteal arteries, status post open thrombectomy. Nephrology is consulted due to history of ESRD on dialysis. I called the dialysis unit and got his basic dialysis information. Past History/Allergies? Social History:     Past Medical History:   Diagnosis Date    A-fib ([address]     Anemia     CAD (coronary artery disease)     Dr. Dora Nogueira Carotid artery stenosis     BILATERAL    Cerebral artery occlusion with cerebral infarction ([facility] [address] 11/2017    RIGHT SIDED WEAKNESS    CHF (congestive heart failure) ([address] 2013    CKD (chronic kidney disease) stage 4, GFR 15-29 ml/min ([address]     Diabetes mellitus ([address]     diet controlled    Hemodialysis patient ([address] 08/2018 tues/thurs/sat/ Daniel Book martin/ricci/ Pt. doesn't know  Nephrologist name/ access Left chest       Hyperlipidemia     PCP Dr. Dario Vang Hypertension     Nephropathy     Neuropathy     No natural teeth     Poor historian     Renal insufficiency, mild 2013    Uses roller walker     Wears glasses     Wheelchair dependence        Allergies   Allergen Reactions    Lipitor [Atorvastatin] Diarrhea    Metformin And Related Nausea And Vomiting       Social History     Socioeconomic History    Marital status: Single     Spouse name: Not on file    Number of children: Not on file    Years of education: Not on file    Highest education level: Not on file   Occupational History    Not on file   Social Needs    Financial resource strain: Not on file    Food insecurity:     Worry: Not on file     Inability: Not on file    Transportation needs:     Medical: Not on file     Non-medical: Not on file   Tobacco Use    Smoking status: Never Smoker    Smokeless tobacco: Never Used   Substance and Sexual Activity    Alcohol use: No     Alcohol/week: 0.0 oz    Drug use: No    Sexual activity: Yes     Partners: Female   Lifestyle    Physical activity:     Days per week: Not on file     Minutes per session: Not on file    Stress: Not on file   Relationships    Social connections:     Talks on phone: Not on file     Gets together: Not on file     Attends Hindu service: Not on file     Active member of club or organization: Not on file     Attends meetings of clubs or organizations: Not on file     Relationship status: Not on file    Intimate partner violence:     Fear of current or ex partner: Not on file     Emotionally abused: Not on file     Physically abused: Not on file     Forced sexual activity: Not on file   Other Topics Concern    Not on file   Social History Narrative    Not on file       Family History:        Family History   Problem Relation Age of Onset    Diabetes Mother     Heart Disease Mother    Toñito Sos High Blood Pressure Mother     Diabetes Brother     Heart Disease Father     Diabetes Father        Outpatient Medications:     Medications Prior to Admission: warfarin (COUMADIN) 5 MG tablet, Take 2.5 mg by mouth Twice a Week Indications: On Monday, Friday; 5 mg all other days On Monday, Friday; 5 mg all other days  ketorolac (ACULAR) 0.5 % ophthalmic solution, Place 1 drop into the left eye every 2 hours  prednisoLONE acetate (PRED FORTE) 1 % ophthalmic suspension, Place 1 drop into the left eye every 4 hours  moxifloxacin (VIGAMOX) 0.5 % ophthalmic solution, Place 1 drop into the left eye every 4 hours  glipiZIDE (GLUCOTROL XL) 2.5 MG extended release tablet, Take 1 tablet by mouth daily  Blood Glucose Monitoring Suppl (ONE TOUCH ULTRA 2) w/Device KIT,   tamsulosin (FLOMAX) 0.4 MG capsule, TAKE 1 CAPSULE BY MOUTH ONCE DAILY  aspirin EC 81 MG EC tablet, Take 1 tablet by mouth daily  warfarin (COUMADIN) 5 MG tablet, Take 5 mg by mouth Five times weekly Indications: 2.5 mg Monday, Friday; 5 mg all other days 2.5 mg Monday, Friday; 5 mg all other days  losartan (COZAAR) 50 MG tablet, TAKE ONE TABLET BY MOUTH ONCE DAILY  furosemide (LASIX) 40 MG tablet, Take 40 mg by mouth 2 times daily  [DISCONTINUED] carvedilol (COREG) 12.5 MG tablet, Take 1 tablet by mouth 2 times daily  clopidogrel (PLAVIX) 75 MG tablet, Take 1 tablet by mouth daily (Patient taking differently: Take 75 mg by mouth daily Pt.  Stopped 1-12-19 for OR)    Current Medications:     Scheduled Meds:    piperacillin-tazobactam  2.25 g Intravenous Q12H    aspirin EC  81 mg Oral Daily    clopidogrel  75 mg Oral Daily    furosemide  40 mg Oral BID    glipiZIDE  2.5 mg Oral QAM AC    losartan  50 mg Oral Daily    tamsulosin  0.4 mg Oral Daily    warfarin (COUMADIN) daily dosing (placeholder)   Other RX Placeholder    warfarin  5 mg Oral Daily    sodium chloride flush  10 mL Intravenous 2 times per day    insulin lispro  0-12 Units Subcutaneous TID WC    insulin lispro  0-6 Units Subcutaneous Nightly     Continuous Infusions:    dextrose       PRN Meds:  sodium chloride flush, potassium chloride **OR** potassium alternative oral replacement **OR** potassium chloride, magnesium sulfate, magnesium hydroxide, ondansetron, nicotine, acetaminophen, glucose, dextrose, glucagon (rDNA), dextrose    Review of Systems:     Constitutional: No fever, no chills, no lethargy, no weakness. HEENT:  No headache, otalgia, itchy eyes, nasal discharge or sore throat. Cardiac:  No chest pain, dyspnea, orthopnea or PND. Chest:   No cough, phlegm or wheezing. Abdomen:  No abdominal pain, nausea or vomiting. Neuro:  No focal weakness, abnormal movements orseizure like activity. Skin:   No rashes, no itching. :   No hematuria, no pyuria, no dysuria, no flank pain. Extremities:  Positive right foot discoloration and numbness. ROS was otherwise negative except as mentioned in the [address] Input/Output:     I/O last 3 completed shifts: In: 977.2 [P.O.:480; I.V.:497.2]  Out: 25 [Blood:25]      Vital Signs:   Temperature:  Temp: 99.5 °F (37.5 °C)  TMax:   Temp (24hrs), Av °F (36.7 °C), Min:97.1 °F (36.2 °C), Max:99.5 °F (37.5 °C)    Respirations:  Resp: 18  Pulse:   Pulse: 83  BP:    BP: 106/62  BP Range: Systolic (87UUE), XGE:836 , Min:105 , HGC:925       Diastolic (14KFS), KVP:36, Min:46, Max:74      Physical Examination:     General:  AAO x 3, speaking in full sentences, no accessory muscle use. HEENT: Atraumatic, normocephalic, no throat congestion, moist mucosa. Eyes:   Pupils equal, round and reactive to light, EOMI. Neck:   Supple  Chest:   Bilateral vesicular breath sounds, no rales or wheezes. Cardiac:  S1 S2 RR, no murmurs, gallops or rubs. Abdomen: Soft, non-tender, no masses or organomegaly, BS audible. :   No suprapubic or flank tenderness. Neuro:  AAO x 3, No FND. SKIN:  No rashes, good skin turgor.   Extremities:  Positive right foot ischemic changes    Labs:       Recent Labs     06/11/19  2330 06/12/19  0451   WBC 11.0 10.4   RBC 3.42* 3.29*   HGB 9.0* 8.5*   HCT 30.3* 28.7*   MCV 88.6 87.2   MCH 26.3 25.8   MCHC 29.7 29.6   RDW 16.5* 16.3*    261   MPV 11.1 11.3      BMP:   Recent Labs     06/10/19  0653 06/11/19  0631 06/12/19  0451    134* 137   K 4.1 3.9 3.5*   CL 93* 91* 96*   CO2 25 24 25   BUN 41* 49* 26*   CREATININE 6.02* 6.66* 4.10*   GLUCOSE 155* 185* 115*   CALCIUM 8.8 8.4* 8.2*      Phosphorus:     Recent Labs     06/11/19  0631   PHOS 6.4*     Magnesium:    Recent Labs     06/12/19  0451   MG 2.0     BNP:      Lab Results   Component Value Date     09/20/2013     PTH:     Lab Results   Component Value Date    IPTH 141.3 06/11/2019     Urinalysis/Chemistries:      Lab Results   Component Value Date    NITRU NEGATIVE 06/11/2019    COLORU DARK YELLOW 06/11/2019    PHUR 7.5 06/11/2019    WBCUA 10 TO 20 06/11/2019    RBCUA 2 TO 5 06/11/2019    MUCUS 1+ 06/11/2019    TRICHOMONAS NOT REPORTED 06/11/2019    YEAST NOT REPORTED 06/11/2019    BACTERIA MANY 06/11/2019    SPECGRAV 1.017 06/11/2019    LEUKOCYTESUR NEGATIVE 06/11/2019    UROBILINOGEN Normal 06/11/2019    BILIRUBINUR  06/11/2019     Presumptive positive. Unable to confirm due to unavailability of reagent. BILIRUBINUR NEGATIVE 12/24/2011    GLUCOSEU NEGATIVE 06/11/2019    GLUCOSEU 2+ 12/24/2011    KETUA NEGATIVE 06/11/2019    AMORPHOUS 2+ 06/11/2019       Radiology:     Reviewed. Assessment:     1. ESRD on Hemodialysis. His regular HD days are TTS at [facility] hemodialysis facility using R AVF under Dr. Babita Rhodes. His dry weight is 86 kgs. Admitted with 82.9 kgs. 2. PVD and Ischemic gangrene right foot - status post angiogram 6/11/2019: Positive subacute thrombus of superficial femoral and popliteal arteries, status post open thrombectomy. 3. DM2  4. Anemia of chronic disease  5. Secondary hyperparathyroidism  6. Hypertension    Plan:   1.   Will get regular dialysis in a.m. as per TTS schedule. No acute need for dialysis today. 2. Strict Input and Output, Daily weigh and document in the chart. 3. Low Potassium, Low phosphorus and low salt diet. Fluids to be restricted to 1500ml/day. 4. IV Aranesp/Epogen for anemia of chronic disease with HD weekly. 5. IV Zemplar per protocol for secondary hyperparathyroidism with HD thrice a week. 6.  BMP in a.m.  7.  PVD treatment per vascular surgery. 8.  Will follow. Nutrition   Please ensure that patient is on a renal diet/TF. Thank you for the consultation. Please do not hesitate to contact us for any further questions/concerns. We will continue to follow along with you. Josh Leon MD  [facility]     This note is created with the assistance of a speech-recognition program. While intending to generate a document that actually reflects the content of the visit, no guarantees can be provided that every mistake has been identified and corrected by editing.

## 2019-06-12 NOTE — OP NOTE
what I recall on the angiogram, the distal  anterior tibial artery did reconstitute from collaterals  from the peroneal artery. Given this, I decided to do a dorsalis pedis  cut down at the level of the foot in order to try to salvage flow back  into the foot. Local anesthetic was given. A longitudinal incision was  created in the forefoot. Electrocautery was used and then sharp dissection was ensued. The  dorsalis pedis artery and dual venous systems were quite small, probably  less than 1 mm in diameter. They were isolated free. Small arteriotomy  was created. There was very little backbleeding or any bleeding noted  from this. A 1-mm coronary dilator was attempted to be brought into the  dorsalis pedis; however, could only go about 5 or 6 cm. The artery did  appear to be ischemic and completely thrombosed so thromboembolectomy  could not be fully performed. At this point, I decided to complete the procedure. The groin incision  was irrigated and dried, it was hemostatic. It was then closed with  multiple layers interrupted Vicryl closing the femoral sheath, the  Jose E's and deep dermals followed by a running Monocryl. The dorsalis  pedis exposure site was re-approximated with vertical mattress nylon  sutures. Sterile dressing was applied. The patient tolerated the  procedure well. The foot was dressed with Adaptic nonadhesive dressing  over the forefoot due to the blistering and skin sloughing and gauze was  placed in between the toes followed by Kerlix wrap. The patient was  then brought to the recovery room in stable condition.         Claire Son MD    D: 06/11/2019 23:47:59       T: 06/12/2019 12:16:48     [email or web address]  Job#: [id number]     Doc#: [id number]    CC:

## 2019-06-13 NOTE — PROGRESS NOTES
SUBJECTIVE    Patient seen and examined. Dialysis was in progress. Patient was seen and evaluated by vascular. There is no plan for intervention at this point. He will be seeing vascular surgery in a week. Patient was receiving dialysis through his right arm AV fistula. Cannulation was uneventful. Target for today is 2 L,  so far he is tolerating fairly well except some drop in blood pressure. OBJECTIVE      CURRENT TEMPERATURE:  Temp: 98.2 °F (36.8 °C)  MAXIMUM TEMPERATURE OVER 24HRS:  Temp (24hrs), Av.9 °F (37.2 °C), Min:97.9 °F (36.6 °C), Max:100.1 °F (37.8 °C)    CURRENT RESPIRATORY RATE:  Resp: 25  CURRENT PULSE:  Pulse: 80  CURRENT BLOOD PRESSURE:  BP: (!) 118/54  24HR BLOOD PRESSURE RANGE:  Systolic (88LKL), ZNL:737 , Min:81 , VVE:474   ; Diastolic (17WJM), YEB:60, Min:32, Max:62    24HR INTAKE/OUTPUT:      Intake/Output Summary (Last 24 hours) at 2019 0953  Last data filed at 2019 0400  Gross per 24 hour   Intake 1279.6 ml   Output 0 ml   Net 1279.6 ml     WEIGHT :  Patient Vitals for the past 96 hrs (Last 3 readings):   Weight   19 2354 182 lb 12.2 oz (82.9 kg)     PHYSICAL EXAM      GENERAL APPEARANCE: Awake and alert x3 s  SKIN: Warm to touch with no edema  EYES: Magali Higgins was being  NECK: No JVD or carotid bruit. PULMONARY: Bilateral air entry and clear to auscultation s. CADRDIOVASCULAR: S2 audible no S3 r. ABDOMEN: soft nontender, bowel sounds present, no organomegaly,  no ascites   EXTREMITIES: Foot was wrapped with a bandage.     CURRENT MEDICATIONS        warfarin (COUMADIN) tablet 5 mg Once   piperacillin-tazobactam (ZOSYN) 2.25 g in dextrose 5 % 50 mL IVPB (mini-bag) Q12H   aspirin EC tablet 81 mg Daily   clopidogrel (PLAVIX) tablet 75 mg Daily   furosemide (LASIX) tablet 40 mg BID   glipiZIDE (GLUCOTROL) tablet 2.5 mg QAM AC   losartan (COZAAR) tablet 50 mg Daily   tamsulosin (FLOMAX) capsule 0.4 mg Daily   warfarin (COUMADIN) daily dosing (placeholder) RX Placeholder   vancomycin (VANCOCIN) intermittent dosing (placeholder) RX Placeholder   vancomycin (VANCOCIN) 1000 mg in dextrose 5% 200 mL IVPB Once per day on Tue Thu Sat   benzonatate (TESSALON) capsule 200 mg TID PRN   sodium chloride flush 0.9 % injection 10 mL 2 times per day   sodium chloride flush 0.9 % injection 10 mL PRN   potassium chloride (KLOR-CON M) extended release tablet 40 mEq PRN   Or    potassium bicarb-citric acid (EFFER-K) effervescent tablet 40 mEq PRN   Or    potassium chloride 10 mEq/100 mL IVPB (Peripheral Line) PRN   magnesium sulfate 1 g in dextrose 5% 100 mL IVPB PRN   magnesium hydroxide (MILK OF MAGNESIA) 400 MG/5ML suspension 30 mL Daily PRN   ondansetron (ZOFRAN) injection 4 mg Q6H PRN   nicotine (NICODERM CQ) 21 MG/24HR 1 patch Daily PRN   acetaminophen (TYLENOL) tablet 650 mg Q4H PRN   glucose (GLUTOSE) 40 % oral gel 15 g PRN   dextrose 50 % IV solution PRN   glucagon (rDNA) injection 1 mg PRN   dextrose 5 % solution PRN   insulin lispro (HUMALOG) injection vial 0-12 Units TID WC   insulin lispro (HUMALOG) injection vial 0-6 Units Nightly         LABS      CBC: Recent Labs     06/11/19  2330 06/12/19  0451 06/13/19  0444   WBC 11.0 10.4 11.8*   RBC 3.42* 3.29* 3.05*   HGB 9.0* 8.5* 7.8*   HCT 30.3* 28.7* 26.6*   MCV 88.6 87.2 87.2   MCH 26.3 25.8 25.6   MCHC 29.7 29.6 29.3   RDW 16.5* 16.3* 16.6*    261 275   MPV 11.1 11.3 11.0      BMP: Recent Labs     06/11/19  0631 06/12/19  0451 06/13/19  0730   * 137 132*   K 3.9 3.5* 3.4*   CL 91* 96* 93*   CO2 24 25 25   BUN 49* 26* 38*   CREATININE 6.66* 4.10* 5.70*   GLUCOSE 185* 115* 82   CALCIUM 8.4* 8.2* 8.1*       RADIOLOGY      Reviewed as available. ASSESSMENT    1. End-stage renal disease and on maintenance hemodialysis. His regular dialysis days are Tuesday Thursday Saturday at [facility] dialysis facility. Patient was seen and examined. Patient was tolerating hemodialysis fairly well.    2.  Peripheral vascular disease with ischemic right foot. Patient is under the care of vascular  3. Output hemoglobin resume is clear at this point. We will repeat hemoglobin 4 hours after dialysis  4. Anemia of chronic disease  5. Hypertension which is under good control    PLAN      1. Dialysis as ordered  2. Repeat hemoglobin 4 hours after dialysis  3. Discharge planning if hemoglobin remains stable. Please do not hesitate to call with questions.     Electronically signed by Messi Granger MD on 6/13/2019 at 9:53 AM

## 2019-06-13 NOTE — PROGRESS NOTES
[facility]      Daily Progress Note     Admit Date: 6/11/2019  Bed/Room No.  1007/1007-01  Admitting Physician : Nayely Costa MD  Code Status :[address] Day:  LOS: 2 days   Chief Complaint:     Pain, discoloration right foot    Principal Problem:    Ischemia of right lower extremity  Active Problems:    DM type 2 with diabetic peripheral neuropathy    HTN (hypertension)    ESRD on hemodialysis ([address]    Peripheral vascular occlusive disease ([facility] [address]    S/P angiogram of extremity  Resolved Problems:    * No resolved hospital problems. *    Subjective : Interval History/Significant events :  06/13/19    Patient denies any pain right lower extremity, fever, chills. He still has right lower extremity, foot discoloration, redness. He is eating and drinking okay. Patient getting hemodialysis in the unit. Vitals - Stable afebrile  Labs - stable    Nursing notes , Consults notes reviewed. Overnight events and updates discussed with Nursing staff . Background History:         Laura Montano is 47 y.o. male  Who was admitted to the hospital on 6/11/2019 for treatment of Ischemia of right lower extremity. Patient was transferred from [facility] emergency room where he presented with pain and discoloration of right lower extremity. Patient has known history of peripheral vascular disease, atrial fibrillation, CAD, CVA, ESRD on HD, type 2 diabetes mellitus. Patient was seen by PCP on 5/31/2019 for right foot discoloration and was concerned about decreased pulses and gangrene. patient was advised to go to emergency room for further evaluation. Patient did not seek any care until 6/8/2019. initial evaluation at emergency room was consistent with lower extremity ischemia. Patient was started on heparin infusion and transferred to [facility] for further management. Patient underwent angiogram on 6/11/2019 and had mechanical thrombectomy.      PMH:  Past Medical History: Diagnosis Date    A-fib [facility])     Anemia     CAD (coronary artery disease)     Dr. Jahaira Escalante Carotid artery stenosis     BILATERAL    Cerebral artery occlusion with cerebral infarction ([facility] [address] 11/2017    RIGHT SIDED WEAKNESS    CHF (congestive heart failure) ([address] 2013    CKD (chronic kidney disease) stage 4, GFR 15-29 ml/min ([facility])     Diabetes mellitus ([facility] [address]     diet controlled    Hemodialysis patient ([address] 08/2018 tues/thurs/sat/ [facility]  [place]/ Pt. doesn't know  Nephrologist name/ access Left chest       Hyperlipidemia     PCP Dr. Hilaria Lieberman Hypertension     Nephropathy     Neuropathy     No natural teeth     Poor historian     Renal insufficiency, mild 2013    Uses roller walker     Wears glasses     Wheelchair dependence       Allergies: Allergies   Allergen Reactions    Lipitor [Atorvastatin] Diarrhea    Metformin And Related Nausea And Vomiting      Medications :    warfarin 5 mg Oral Once   piperacillin-tazobactam 2.25 g Intravenous Q12H   aspirin EC 81 mg Oral Daily   clopidogrel 75 mg Oral Daily   furosemide 40 mg Oral BID   glipiZIDE 2.5 mg Oral QAM AC   losartan 50 mg Oral Daily   tamsulosin 0.4 mg Oral Daily   warfarin (COUMADIN) daily dosing (placeholder)  Other RX Placeholder   vancomycin (VANCOCIN) intermittent dosing (placeholder)  Other RX Placeholder   vancomycin 1,000 mg Intravenous Once per day on Tue Thu Sat   sodium chloride flush 10 mL Intravenous 2 times per day   insulin lispro 0-12 Units Subcutaneous TID WC   insulin lispro 0-6 Units Subcutaneous Nightly       Review of Systems   Review of Systems   Constitutional: Negative for activity change, appetite change, fatigue, fever and unexpected weight change. HENT: Negative for congestion, nosebleeds, rhinorrhea, sinus pressure, sneezing and voice change. Eyes: Negative for visual disturbance. Respiratory: Negative for cough, choking, chest tightness, shortness of breath and wheezing. Cardiovascular: Negative for chest pain, palpitations and leg swelling. Gastrointestinal: Negative for abdominal pain, constipation, diarrhea, nausea and vomiting. Genitourinary: Negative for difficulty urinating, discharge, dysuria, frequency and testicular pain. Musculoskeletal: Negative for back pain. Skin: Positive for color change. Negative for rash. Neurological: Negative for dizziness, weakness, light-headedness, numbness and headaches. Hematological: Does not bruise/bleed easily. Psychiatric/Behavioral: Negative for agitation, behavioral problems, confusion, self-injury, sleep disturbance and suicidal ideas.      Objective :      Current Vitals : Temp: 98.2 °F (36.8 °C),  Pulse: 80, Resp: 25, BP: (!) 118/54, SpO2: 96 %  Last 24 Hrs Vitals   Patient Vitals for the past 24 hrs:   BP Temp Temp src Pulse Resp SpO2   06/13/19 0743 -- -- -- -- -- 96 %   06/13/19 0700 (!) 118/54 -- -- 80 25 100 %   06/13/19 0645 (!) 104/48 98.2 °F (36.8 °C) Oral 86 18 100 %   06/13/19 0600 (!) 91/41 -- -- 86 23 (!) 79 %   06/13/19 0500 114/61 -- -- 82 21 95 %   06/13/19 0400 (!) 88/38 99 °F (37.2 °C) Oral 82 28 94 %   06/13/19 0300 (!) 93/42 -- -- 79 22 90 %   06/13/19 0200 (!) 97/43 -- -- 78 22 100 %   06/13/19 0100 (!) 101/54 -- -- 80 18 100 %   06/13/19 0000 (!) 112/49 98.7 °F (37.1 °C) Oral -- -- --   06/12/19 2300 (!) 99/47 -- -- 82 16 98 %   06/12/19 2200 (!) 96/51 -- -- 76 20 95 %   06/12/19 2100 -- -- -- 76 24 93 %   06/12/19 2000 (!) 81/32 100.1 °F (37.8 °C) Oral 82 (!) 0 95 %   06/12/19 1900 (!) 90/43 -- -- 84 23 93 %   06/12/19 1800 (!) 87/41 -- -- 82 23 92 %   06/12/19 1700 (!) 101/52 -- -- 82 25 96 %   06/12/19 1600 (!) 101/44 97.9 °F (36.6 °C) Oral 84 19 98 %   06/12/19 1500 (!) 103/55 -- -- 87 23 95 %   06/12/19 1400 -- -- -- 90 17 --   06/12/19 1300 (!) 108/56 -- -- 88 20 97 %   06/12/19 1200 106/62 -- -- 83 18 100 %   06/12/19 1148 -- 99.5 °F (37.5 °C) Oral 81 22 98 %   06/12/19 1100 (!) 113/56 -- -- 85 17 99 %   06/12/19 1000 (!) 117/57 -- -- 87 22 97 %     Intake / output   06/12 0701 - 06/13 0700  In: 1279.6 [P.O.:1140; I.V.:39.6]  Out: 0   Physical Exam:  Physical Exam   Constitutional: He is oriented to person, place, and time. He appears well-developed and well-nourished. HENT:   Head: Normocephalic and atraumatic. Nose: Nose normal.   Mouth/Throat: Oropharynx is clear and moist.   Eyes: Pupils are equal, round, and reactive to light. Neck: Normal range of motion. Neck supple. No JVD present. No tracheal deviation present. No thyromegaly present. Cardiovascular: Normal rate, regular rhythm, normal heart sounds and intact distal pulses. Exam reveals no friction rub. No murmur heard. Pulmonary/Chest: Effort normal. No respiratory distress. He has no wheezes. He has no rales. He exhibits no tenderness. Abdominal: Soft. Bowel sounds are normal. He exhibits no distension. There is no tenderness. There is no rebound and no guarding. Musculoskeletal: Normal range of motion. He exhibits no edema. Left upper ext AV Fistula    Lymphadenopathy:     He has no cervical adenopathy. Neurological: He is alert and oriented to person, place, and time. He displays normal reflexes. No cranial nerve deficit. Skin: Skin is warm and dry. He is not diaphoretic. Psychiatric: He has a normal mood and affect.  His behavior is normal.     Lower Extremities : No ankle Edema , No calf Tenderness     Laboratory findings:    Recent Labs     06/11/19  0631 06/11/19  2330 06/12/19  0451 06/12/19  0737 06/13/19  0444   WBC  --  11.0 10.4  --  11.8*   HGB  --  9.0* 8.5*  --  7.8*   HCT  --  30.3* 28.7*  --  26.6*   PLT  --  272 261  --  275   INR 2.3  --   --  2.1 1.8     Recent Labs     06/11/19  0631 06/12/19  0451 06/13/19  0730   * 137 132*   K 3.9 3.5* 3.4*   CL 91* 96* 93*   CO2 24 25 25   GLUCOSE 185* 115* 82   BUN 49* 26* 38*   CREATININE 6.66* 4.10* 5.70*   MG  --  2.0  --    CALCIUM 8.4* 8.2* 8.1*   PHOS with a voice recognition program. Efforts were made to edit the dictations but occasionally words are mis-transcribed.)      Deepthi Small MD  6/13/2019

## 2019-06-13 NOTE — FLOWSHEET NOTE
[employer] sent to Vascular resident to see if they would like the patient discharged with a off loading shoe per PT recommendations.

## 2019-06-13 NOTE — FLOWSHEET NOTE
DATE: 2019    NAME: Trevin Fritz  MRN: [id number]   : 1965    Patient not seen this date for Physical Therapy due to:  [] Blood transfusion in progress  [x] Hemodialysis  []  Patient Declined  [] Spine Precautions   [] Strict Bedrest  [] Surgery/ Procedure  [] Testing      [] Other        [] PT being discontinued at this time. Patient independent. No further needs. [] PT being discontinued at this time as the patient has been transferred to palliative care. No further needs.     Kelyl Shea, PTA

## 2019-06-13 NOTE — PROGRESS NOTES
Division of Vascular Surgery             Progress Note      Name: Rojelio Carbone  MRN: [id number]         Subjective:     Pt seen and examined. No acute events overnight. Patient denies complaints this AM. Will have dialysis today. Physical Exam:     Vitals:  BP (!) 104/48   Pulse 86   Temp 98.2 °F (36.8 °C) (Oral)   Resp 18   Ht 6' (1.829 m)   Wt 182 lb 12.2 oz (82.9 kg)   SpO2 100%   BMI 24.79 kg/m²       General appearance - alert, well appearing and in no acute distress  Mental status - oriented to person, place and time with normal affect  Head - normocephalic and atraumatic  Chest - normal effort  Heart - normal rate, regular rhythm, no murmurs  Abdomen - soft, non-tender, non-distended, bowel sounds present all four quadrants, no masses  Neurological - normal speech, no focal findings or movement disorder noted, cranial nerves II through XII grossly intact  Skin - no gross lesions, rashes, or induration noted  Vascular Exam - bilateral groins soft, easily compressible, palpable fem, R foot with necrotic digits, incision to dorsal foot C/D/I, no doppler signal to PT, L foot dopplers PT/DP        Assessment:     1. RLE ischemia s/p angio with subsequent mechanical thrombectomy      Plan:     1. OK for discharge home from Vascular standpoint  2. Daily wound care instructions provided in Home Care order and in discharge instructions  3. Recommend 2 weeks Augmentin  4. Follow up outpatient with Dr Siva Gonzalez in 2 weeks for evaluation and preoperative planning  5. Vascular Surgery to sign off    Patient and plan discussed with Dr Evan Gonzalez, who is in agreement.     Alejandrina Boland MD  General Surgery PGY1  Pager Number: [phone]      [facility],[address]: [phone]

## 2019-06-13 NOTE — FLOWSHEET NOTE
Dr. Shefali Christian notified per [employer] that the patient's sepsis risk score is 5. The patient is currently receiving antibiotics. Vital signs stable, currently undergoing dialysis and has anticipated discharge this afternoon.

## 2019-06-13 NOTE — PROGRESS NOTES
Pharmacy Note  Warfarin Consult follow-up    Recent Labs     06/13/19  0444   INR 1.8     Recent Labs     06/11/19  2330 06/12/19  0451 06/13/19  0444   HGB 9.0* 8.5* 7.8*   HCT 30.3* 28.7* 26.6*    261 275     Current warfarin drug-drug interactions: acetaminophen, aspirin, heparin    Date INR Dose   6/11/19 2.1 5mg   6/12/19 2.1 5mg   6/13/19 1.8 5mg     Notes:  INR with drop secondary to no warfarin for the past several days, but have not yet seen full impact of two 5mg doses so will give warfarin 5mg today on 6/13/19. Daily PT/INR while inpatient.       Treva Cristina, RP, CACP  Clinical Pharmacist Medication Management  6/13/2019  9:21 AM

## 2019-06-14 NOTE — TELEPHONE ENCOUNTER
[facility] Trans[facility] Initial Follow Up Call    Outreach made within 2 business days of discharge: Yes    Patient: Chrissy Hou Patient : 1965   MRN: [id number]  Reason for Admission: There are no discharge diagnoses documented for the most recent discharge. Discharge Date: 19       Spoke with: [facility]     Discharge department/facility: [facility] Interactive Patient Contact:  Was patient able to fill all prescriptions:   Was patient instructed to bring all medications to the follow-up visit:     Is patient taking all medications as directed in the discharge summary? Does patient understand their discharge instructions:   Does patient have questions or concerns that need addressed prior to 7-14 day follow up office visit:     Scheduled appointment with PCP within 7-14 days    Follow Up  Future Appointments   Date Time Provider Rosemary Palumbo   2019 10:30 AM [facility]  1st attempt     Trevin Jauregui, MA

## 2019-06-17 NOTE — TELEPHONE ENCOUNTER
Patients daughter called stating that patient is in pain and OTC tylenol is not working she states that his pain level is high to where he is in tears is wondering if patient can be prescribed something. I did inform her that if he is in that much pain that he should go to the ER but she states that he has chose not to go. Patient had a Lower Leg Embolectomy Thrombectomy on 6/11/19.

## 2019-06-18 NOTE — OP NOTE
creation by me. He was admitted to the  hospital over the weekend with concerns of ischemic changes to his right  foot. This has been going on for several weeks as noted by his  girlfriend. He has severe neuropathy and is not complaining of any pain  or discomfort. He has been on and off his anticoagulation due to recent  fistulogram performed at the access center as well as recent eye  surgery. Given the concern of ischemia, he was recommended to undergo  an angiogram to evaluate circulation in his foot. There was concern  with significant amount of tissue loss already that he was at high risk  of limb loss. This has been explained to him. Risks and benefits of  the procedure were also explained and consented. DESCRIPTION OF PROCEDURE:  The patient was brought to the  catheterization suite. After appropriate time-out was performed, the  bilateral groin sites were prepped and draped. The left common femoral  artery was evaluated under ultrasound. It was compressible. Local  anesthetic was given. Using a micropuncture needle, the left common  femoral artery was accessed. Wire passed easily. Permanent ultrasound  images were saved. Fluoroscopic image was also performed to confirm  access over the femoral head. I then placed a 4-British micropuncture  sheath and exchanged over a wire for a 5-British sheath. RBI catheter was  brought into the abdominal aorta over a wire and aortogram was  performed. I then went up and over the aortic bifurcation and parked  the catheter in the right common femoral artery. I performed a right  lower extremity angiogram with runoff to the foot. RADIOGRAPHICAL FINDINGS:  He has widely patent abdominal aorta and  single renal arteries bilaterally. Bilateral common iliac, external and  internal iliac are widely patent. The right common femoral and profunda  are patent. The SFA has sluggish to and fro flow.  There is a  flow-limiting stenosis or occlusion in the mid and stenosis. At this point, given the findings of occluded outflow with single vessel  runoff and thrombus that was not easily removed with the percutaneous  mechanical thrombectomy, I decided to give 10 mg of TPA into the right  foot with the catheter in the peroneal artery to help dissolve any  distal clot and hopefully open up his outflow. Plan will be to take him  to the operating room for open embolectomy to remove all the subacute  thrombus. At this point, after giving the TPA, the catheter was  removed. The sheath was brought back into the abdominal aorta and  exchanged for a StarClosure dilator and sheath and the left common  femoral artery was successfully percutaneously closed. Manual pressure  was held for 10 minutes just to make sure the groin was hemostatic  before moving the patient. It was. There was no hematoma or swelling. Band-Aid was applied. The patient was then transferred to his bed and to  the recovery room in hemodynamically stable condition.         Lessie Oppenheim, MD    D: 06/11/2019 23:40:35       T: 06/12/2019 11:50:40     [email or web address]  Job#: [id number]     Doc#: [id number]    CC:

## 2019-06-18 NOTE — DISCHARGE SUMMARY
[address] [facility]      Discharge Summary     Patient ID: Trinidad Lou  :  1965   MRN: [id number]     ACCOUNT:  [de-identified]   Patient Location : [address] [place] [address]  Patient's PCP: Brenda Coronado MD  Admit Date: 2019   Discharge Date: 2019     Length of Stay: 2  Code Status:  Prior  Admitting Physician: Alex Barraza MD  Discharge Physician: Gena Mullen MD     Active Discharge Diagnosis :     Primary Problem  Ischemia of right lower extremity      [place] Problems    Diagnosis Date Noted    S/P angiogram of extremity [Z98.890] 2019    Ischemia of right lower extremity [I99.8]     Peripheral vascular occlusive disease ([facility] [I73.9] 2019    ESRD on hemodialysis ([facility] [N18.6, Z99.2]     HTN (hypertension) [I10] 2013    DM type 2 with diabetic peripheral neuropathy [E11.42] 2012       Admission Condition:  fair     Discharged Condition: stable    Hospital Stay:     Hospital Course:  Trinidad Lou is a 47 y.o. male who was admitted for the management of   Ischemia of right lower extremity. Patient was transferred from [facility] emergency room where he presented with pain and discoloration of right lower extremity. Patient has known history of peripheral vascular disease, atrial fibrillation, CAD, CVA, ESRD on HD, type 2 diabetes mellitus. Patient was seen by PCP on 2019 for right foot discoloration and was concerned about decreased pulses and gangrene. patient was advised to go to emergency room for further evaluation. Patient did not seek any care until 2019. initial evaluation at emergency room was consistent with lower extremity ischemia. Patient was started on heparin infusion and transferred to [address] [place] [address] for further management. Patient underwent angiogram on 2019 and had mechanical thrombectomy. Significant therapeutic interventions:   1.  Right lower extremity ischemia s/p angiogram and mechanical thrombectomy -treated with heparin infusion. Resume Coumadin on discharge. Augmentin twice daily for 7 days. 2. Peripheral vascular disease -smoking cessation, continue aspirin, Lipitor. 3. ESRD on HD for last 7 months-received hemodialysis on TTS schedule. 4. Essential hypertension -well controlled. 5. Type 2 diabetes mellitus with diabetic peripheral neuropathy -controlled on glipizide. 6. Paroxysmal atrial fibrillation  -in atrial fibrillation. Rate controlled. Continue Coumadin. Continue Coreg . Significant Diagnostic Studies:   Labs / Micro:/Radiology  Recent Labs     06/13/19  1330 06/13/19  0444 06/12/19  0451 06/11/19  2330   WBC  --  11.8* 10.4 11.0   HGB 8.0* 7.8* 8.5* 9.0*   HCT 26.0* 26.6* 28.7* 30.3*   MCV  --  87.2 87.2 88.6   PLT  --  275 261 272     Labs Renal Latest Ref Rng & Units 6/13/2019 6/12/2019 6/11/2019 6/10/2019 6/8/2019   BUN 6 - 20 mg/dL 38(H) 26(H) 49(H) 41(H) 21(H)   Cr 0.70 - 1.20 mg/dL 5.70(HH) 4.10(H) 6.66(HH) 6.02(HH) 3.26(H)   K 3.7 - 5.3 mmol/L 3.4(L) 3.5(L) 3.9 4.1 3.9   Na 135 - 144 mmol/L 132(L) 137 134(L) 137 135     Lab Results   Component Value Date    ALT 10 06/08/2019    AST 10 06/08/2019    ALKPHOS 68 06/08/2019    BILITOT 1.41 (H) 06/08/2019     Lab Results   Component Value Date    TSH 3.72 10/14/2015     Lab Results   Component Value Date    HEPBIGM NONREACTIVE 11/09/2018    HEPBCAB NONREACTIVE 08/29/2018    HEPCAB NONREACTIVE 08/08/2018     Lab Results   Component Value Date    COLORU DARK YELLOW 06/11/2019    NITRU NEGATIVE 06/11/2019    GLUCOSEU NEGATIVE 06/11/2019    GLUCOSEU 2+ 12/24/2011    KETUA NEGATIVE 06/11/2019    UROBILINOGEN Normal 06/11/2019    BILIRUBINUR  06/11/2019     Presumptive positive. Unable to confirm due to unavailability of reagent.     BILIRUBINUR NEGATIVE 12/24/2011     Lab Results   Component Value Date    LABA1C 6.2 (H) 06/11/2019     Lab Results   Component Value Date     06/11/2019     Lab Results Component Value Date    INR 1.8 06/13/2019    INR 2.1 06/12/2019    INR 2.3 06/11/2019    PROTIME 18.1 (H) 06/13/2019    PROTIME 20.6 (H) 06/12/2019    PROTIME 25.6 (H) 06/11/2019       No results found. Imaging / Clinical Data :-   Xr Foot Right (min 3 Views)     Result Date: 6/8/2019  No acute osseous abnormality of the right foot.      Xr Chest Portable     Result Date: 6/8/2019  No acute cardiopulmonary abnormality. Echocardiogram 11/10/2018  LVEF 25%, focal aortic valve calcification. Cannot rule out small concomitant vegetation. No significant pericardial effusion. Consultations:    Consults:     Final Specialist Recommendations/Findings:   IP CONSULT TO SOCIAL WORK  IP CONSULT TO NEPHROLOGY  PHARMACY TO DOSE WARFARIN  IP CONSULT TO PHARMACY  PHARMACY TO DOSE VANCOMYCIN  IP CONSULT TO HOME CARE NEEDS  IP CONSULT TO HOME CARE NEEDS      The patient was seen and examined on day of discharge and this discharge summary is in conjunction with any daily progress note from day of discharge. Discharge plan:     Disposition: Home with [address] [place] [address]    Physician Follow Up:     Hermelinda Washington MD  [address] [place] [address]  [phone]          Beatrice Elmore, [facility]  [facility] [address] [phone]    In 2 weeks  For wound re-check       Requiring Further Evaluation/Follow Up POST HOSPITALIZATION/Incidental Findings: follow up with Vascular surgery     Diet: renal diet    Activity: As tolerated    Instructions to Patient: medication as advised.      Discharge Medications:      Medication List      START taking these medications    amoxicillin-clavulanate 875-125 MG per tablet  Commonly known as:  AUGMENTIN  Take 1 tablet by mouth 2 times daily for 7 days        CHANGE how you take these medications    clopidogrel 75 MG tablet  Commonly known as:  PLAVIX  Take 1 tablet by mouth daily  What changed:  additional instructions        CONTINUE taking these medications    aspirin EC 81 MG EC tablet  Take 1 tablet by mouth daily     furosemide 40 MG tablet  Commonly known as:  LASIX  Take 1 tablet by mouth 2 times daily     glipiZIDE 2.5 MG extended release tablet  Commonly known as:  GLUCOTROL XL  Take 1 tablet by mouth daily     ketorolac 0.5 % ophthalmic solution  Commonly known as:  ACULAR     losartan 50 MG tablet  Commonly known as:  COZAAR  TAKE ONE TABLET BY MOUTH ONCE DAILY     moxifloxacin 0.5 % ophthalmic solution  Commonly known as:  VIGAMOX     ONE TOUCH ULTRA 2 w/Device Kit     prednisoLONE acetate 1 % ophthalmic suspension  Commonly known as:  PRED FORTE     tamsulosin 0.4 MG capsule  Commonly known as:  FLOMAX  TAKE 1 CAPSULE BY MOUTH ONCE DAILY     * warfarin 5 MG tablet  Commonly known as:  COUMADIN     * warfarin 5 MG tablet  Commonly known as:  COUMADIN         * This list has 2 medication(s) that are the same as other medications prescribed for you. Read the directions carefully, and ask your doctor or other care provider to review them with you. STOP taking these medications    carvedilol 12.5 MG tablet  Commonly known as:  COREG           Where to Get Your Medications      These medications were sent to [address] [place] [address], [address] () [address]    Phone:  [phone]   · amoxicillin-clavulanate 875-125 MG per tablet     Information about where to get these medications is not yet available    Ask your nurse or doctor about these medications  · furosemide 40 MG tablet         Time Spent on discharge is  35 mins in patient examination, evaluation, counseling as well as medication reconciliation, prescriptions for required medications, discharge plan and follow up. Electronically signed by   Stanton Ordaz MD  6/18/2019        Thank you Dr. Marci Martinez MD for the opportunity to be involved in this patient's care.

## 2019-06-28 NOTE — PROGRESS NOTES
Division of Vascular Surgery        Follow Up      Chief Complaint:      Right foot gangrene    History of Present Illness:      Reena Delgado is a 47 y.o. gentleman who presents for follow up after undergoing percutaneous and open attempt at revascularization of his right foot. Unfortunately he had suffered likely embolic event to his foot and I was not able to restore his circulation. He had significant ischemia in his toes and plan was to evaluate to see how proximal the amputation would have to be. His wounds have gotten worse and he blistered the top and bottom part of his foot. He has severe neuropathy, but has felt some soreness lately. I suspect he developed an embolic event after his anticoagulation was held for prior procedures (cataract and fistulagram). Since discharge he was taking aspirin/plavix and coumadin, however for some reason he says that he was told to stop coumadin. Unclear who told him this, but he will need anticoagulation due to likely cardioembolic source.     Medical History:     Past Medical History:   Diagnosis Date    A[facility]fib ([facility]     Anemia     CAD (coronary artery disease)     Dr. Rhoda Alves Carotid artery stenosis     BILATERAL    Cerebral artery occlusion with cerebral infarction ([facility] 11/2017    RIGHT SIDED WEAKNESS    CHF (congestive heart failure) ([facility] 2013    CKD (chronic kidney disease) stage 4, GFR 15-29 ml/min ([facility])     Diabetes mellitus ([facility]     diet controlled    Hemodialysis patient ([facility] 08/2018    tues/thurs/sat/ [place]/ Pt. doesn't know  Nephrologist name/ access Left chest       Hyperlipidemia     PCP Dr. Sunitha Khanna Hypertension     Nephropathy     Neuropathy     No natural teeth     Poor historian     Renal insufficiency, mild 2013    Uses roller walker     Wears glasses     Wheelchair dependence        Surgical History:     Past Surgical History:   Procedure Laterality Date    CARDIAC CATHETERIZATION Bilateral 8/5/13    CATARACT REMOVAL Right     DIALYSIS FISTULA CREATION Right 1/16/2019    RIGHT ARM AV FISTULA CREATION performed by Jim Fox MD at [facility] Right 6/11/2019    LOWER LEG EMBOLECTOMY THROMBECTOMY performed by Jim Fox MD at [place] [address] Right     5TH DIGIT    FOOT DEBRIDEMENT Left 7/2015    [facility] TOE SURGERY Left 8/19/2015    Arthrodesis IP Joint Hallux    TUNNELED VENOUS CATHETER PLACEMENT  08/2018    RIGHT CHEST PERM CATH       Family History:     Family History   Problem Relation Age of Onset    Diabetes Mother     Heart Disease Mother     High Blood Pressure Mother     Diabetes Brother     Heart Disease Father     Diabetes Father        Allergies:       Lipitor [atorvastatin] and Metformin and related    Medications:      Current Outpatient Medications   Medication Sig Dispense Refill    tamsulosin (FLOMAX) 0.4 MG capsule TAKE 1 CAPSULE BY MOUTH ONCE DAILY 90 capsule 1    carvedilol (COREG) 12.5 MG tablet TAKE 1 TABLET BY MOUTH TWICE DAILY (Patient taking differently: TAKE 1 TABLET BY MOUTH DAILY) 180 tablet 1    furosemide (LASIX) 40 MG tablet Take 1 tablet by mouth 2 times daily (Patient taking differently: Take 40 mg by mouth daily ) 60 tablet 0    ketorolac (ACULAR) 0.5 % ophthalmic solution Place 1 drop into the left eye every 2 hours      moxifloxacin (VIGAMOX) 0.5 % ophthalmic solution Place 1 drop into the left eye every 4 hours      glipiZIDE (GLUCOTROL XL) 2.5 MG extended release tablet Take 1 tablet by mouth daily 30 tablet 3    aspirin EC 81 MG EC tablet Take 1 tablet by mouth daily 30 tablet 2    losartan (COZAAR) 50 MG tablet TAKE ONE TABLET BY MOUTH ONCE DAILY 30 tablet 11    clopidogrel (PLAVIX) 75 MG tablet Take 1 tablet by mouth daily 30 tablet 3    warfarin (COUMADIN) 5 MG tablet Take 2.5 mg by mouth Twice a Week Indications:  On Monday, Friday; 5 mg all other days On Monday, Friday; 5 mg all other days      prednisoLONE acetate (PRED FORTE) 1 % ophthalmic suspension Place 1 drop into the left eye every 4 hours      Blood Glucose Monitoring Suppl (ONE TOUCH ULTRA 2) w/Device KIT       warfarin (COUMADIN) 5 MG tablet Take 5 mg by mouth Five times weekly Indications: 2.5 mg Monday, Friday; 5 mg all other days 2.5 mg Monday, Friday; 5 mg all other days       No current facility-administered medications for this visit. Social History:     Tobacco:    reports that he has never smoked. He has never used smokeless tobacco.  Alcohol:      reports that he does not drink alcohol. Drug Use:  reports that he does not use drugs. Review of Systems:     Review of Systems   Constitutional: Positive for fatigue. Negative for chills and fever. HENT: Negative. Eyes: Negative for visual disturbance. Respiratory: Negative for chest tightness and shortness of breath. Cardiovascular: Positive for leg swelling (right foot). Negative for chest pain. Gastrointestinal: Negative. Endocrine: Negative. Genitourinary: Negative. Musculoskeletal: Negative. Skin: Positive for color change and wound. Allergic/Immunologic: Negative. Neurological: Positive for numbness. Negative for weakness. Hematological: Negative. Psychiatric/Behavioral: Negative. Physical Exam:     Vitals:  /66 (Site: Left Upper Arm, Position: Sitting, Cuff Size: Medium Adult)   Pulse 99   Temp 97.3 °F (36.3 °C) (Oral)   Resp 17   Ht 6' (1.829 m) Comment: per pt  Wt 188 lb 11.4 oz (85.6 kg)   SpO2 99%   BMI 25.59 kg/m²     Physical Exam   Constitutional: He is oriented to person, place, and time. He appears well-developed and well-nourished. Eyes: Conjunctivae and EOM are normal.   Cardiovascular: Normal rate and regular rhythm. Pulses:       Dorsalis pedis pulses are 0 on the right side. Posterior tibial pulses are 0 on the right side. Pulmonary/Chest: Effort normal. No respiratory distress. Musculoskeletal:        Right foot: There is swelling and decreased capillary refill. Feet:   Right Foot:   Skin Integrity: Positive for blister, skin breakdown, erythema and dry skin. Neurological: He is alert and oriented to person, place, and time. GCS eye subscore is 4. GCS verbal subscore is 5. GCS motor subscore is 6. 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 · Depending on erythema around amputation site may need IV antibiotics and a few days before revising  · Will plan to have surgery done tomorrow morning (6/29/19). · He will be admitted afterwards and we can work on placement into rehab and get him started back on anticoagulation. Electronically signed by Christie Trevizo MD on 6/28/19 at 2:04 PM      [address] [place] [address],[address]: [phone]  C: [phone]  Email: [email or web address]

## 2019-06-29 NOTE — CARE COORDINATION
Case Management Initial Discharge Plan  Pankaj Holguin,             Met with:patient to discuss discharge plans. Information verified: address, contacts, phone number, , insurance Yes  PCP: Shanthi Macedo MD  Date of last visit: needs appt. Insurance Provider: [facility]     Discharge Planning    Living Arrangements:  Spouse/Significant Other   Support Systems:  Spouse/Significant Other    Home has two  stories  3  stairs to climb to get into front door, one flight of stairs to climb to reach second floor  Location of bedroom/bathroom in home second     Patient able to perform ADL's:Independent    Current Services (outpatient & in home) [address] [place] [address]   DME equipment: walker, w/c   DME provider:     Pharmacy: [facility] Medications:  No  Does patient want to participate in local refill/ meds to beds program?  No    Potential Assistance Needed:       Patient agreeable to home care: Yes  Freedom of choice provided:  n/a    Prior SNF/Rehab Placement and Facility: na  Agreeable to SNF/Rehab: No  [place] of choice provided: n/a   Evaluation: no    Expected Discharge date:  19  Patient expects to be discharged to:  home   Follow Up Appointment: Best Day/ Time:      Transportation provider: family   Transportation arrangements needed for discharge: Yes    Readmission Risk              Risk of Unplanned Readmission:        29             Does patient have a readmission risk score greater than 14?: Yes  If yes, follow-up appointment must be made within 7 days of discharge. Discharge Plan: Pt had BKA today, may need further amputation. He wants to return home with [address] [place] [address]. Will watch for pt/ot evals   He goes to dialysis T-Th-Sat at [address] [facility], brother drives him.         Electronically signed by Juanpablo Haq RN on 19 at 5:05 PM

## 2019-06-29 NOTE — H&P
Place 1 drop into the left eye every 2 hours        moxifloxacin (VIGAMOX) 0.5 % ophthalmic solution Place 1 drop into the left eye every 4 hours        glipiZIDE (GLUCOTROL XL) 2.5 MG extended release tablet Take 1 tablet by mouth daily 30 tablet 3    aspirin EC 81 MG EC tablet Take 1 tablet by mouth daily 30 tablet 2    losartan (COZAAR) 50 MG tablet TAKE ONE TABLET BY MOUTH ONCE DAILY 30 tablet 11    clopidogrel (PLAVIX) 75 MG tablet Take 1 tablet by mouth daily 30 tablet 3    warfarin (COUMADIN) 5 MG tablet Take 2.5 mg by mouth Twice a Week Indications: On Monday, Friday; 5 mg all other days On Monday, Friday; 5 mg all other days        prednisoLONE acetate (PRED FORTE) 1 % ophthalmic suspension Place 1 drop into the left eye every 4 hours        Blood Glucose Monitoring Suppl (ONE TOUCH ULTRA 2) w/Device KIT          warfarin (COUMADIN) 5 MG tablet Take 5 mg by mouth Five times weekly Indications: 2.5 mg Monday, Friday; 5 mg all other days 2.5 mg Monday, Friday; 5 mg all other days          No current facility-administered medications for this visit.             Social History:      Tobacco:    reports that he has never smoked. He has never used smokeless tobacco.  Alcohol:      reports that he does not drink alcohol. Drug Use:  reports that he does not use drugs.     Review of Systems:      Review of Systems   Constitutional: Positive for fatigue. Negative for chills and fever. HENT: Negative. Eyes: Negative for visual disturbance. Respiratory: Negative for chest tightness and shortness of breath. Cardiovascular: Positive for leg swelling (right foot). Negative for chest pain. Gastrointestinal: Negative. Endocrine: Negative. Genitourinary: Negative. Musculoskeletal: Negative. Skin: Positive for color change and wound. Allergic/Immunologic: Negative. Neurological: Positive for numbness. Negative for weakness. Hematological: Negative. Psychiatric/Behavioral: Negative.

## 2019-06-29 NOTE — CONSULTS
TAKE 1 TABLET BY MOUTH DAILY)  furosemide (LASIX) 40 MG tablet, Take 1 tablet by mouth 2 times daily (Patient taking differently: Take 40 mg by mouth daily )  glipiZIDE (GLUCOTROL XL) 2.5 MG extended release tablet, Take 1 tablet by mouth daily  Blood Glucose Monitoring Suppl (ONE TOUCH ULTRA 2) w/Device KIT,   aspirin EC 81 MG EC tablet, Take 1 tablet by mouth daily  losartan (COZAAR) 50 MG tablet, TAKE ONE TABLET BY MOUTH ONCE DAILY (Patient not taking: Reported on 6/29/2019)  clopidogrel (PLAVIX) 75 MG tablet, Take 1 tablet by mouth daily    Current Medications:      aspirin EC tablet 81 mg Daily   carvedilol (COREG) tablet 12.5 mg BID   clopidogrel (PLAVIX) tablet 75 mg Daily   furosemide (LASIX) tablet 40 mg BID   tamsulosin (FLOMAX) capsule 0.4 mg Daily   acetaminophen (TYLENOL) tablet 650 mg Q4H PRN   ondansetron (ZOFRAN) injection 4 mg Q6H PRN   sodium chloride flush 0.9 % injection 10 mL 2 times per day   sodium chloride flush 0.9 % injection 10 mL PRN   0.9 % sodium chloride infusion Continuous   oxyCODONE (ROXICODONE) immediate release tablet 5 mg Q4H PRN   Or    oxyCODONE (ROXICODONE) immediate release tablet 10 mg Q4H PRN   morphine (PF) injection 2 mg Q2H PRN   Or    morphine injection 4 mg Q2H PRN   polyethylene glycol (GLYCOLAX) packet 17 g BID   acetaminophen (TYLENOL) tablet 1,000 mg Q8H   gabapentin (NEURONTIN) capsule 100 mg TID   diazepam (VALIUM) tablet 2.5 mg Q8H   heparin (porcine) injection 6,850 Units Once   heparin (porcine) injection 6,850 Units PRN   heparin (porcine) injection 3,420 Units PRN   heparin 25,000 units in dextrose 5% 250 mL infusion Continuous       Allergies:  Lipitor [atorvastatin] and Metformin and related    Social History:    Social History     Socioeconomic History    Marital status: Single     Spouse name: Not on file    Number of children: Not on file    Years of education: Not on file    Highest education level: Not on file   Occupational History    Not on

## 2019-06-29 NOTE — ANESTHESIA POSTPROCEDURE EVALUATION
Department of Anesthesiology  Postprocedure Note    Patient: Rojeilo Carbone  MRN: [id number]  YOB: 1965  Date of evaluation: 6/29/2019  Time:  9:05 AM     Procedure Summary     Date:  06/29/19 Room / Location:  [address] OR    Anesthesia Start:  0813 Anesthesia Stop:  9185    Procedure:  BELOW KNEE GUILLOTINE AMPUTATION (Right ) Diagnosis:  (ISCHEMIC GANGRENE RIGHT FOOT)    Surgeon:  Juan Roldan MD Responsible Provider:  Aubrie Matute MD    Anesthesia Type:  general ASA Status:  4          Anesthesia Type: general    Benjamin Phase I:      Benjamin Phase II:      Last vitals: Reviewed and per EMR flowsheets.        Anesthesia Post Evaluation    Patient location during evaluation: PACU  Patient participation: complete - patient participated  Level of consciousness: awake and alert  Pain score: 3  Airway patency: patent  Nausea & Vomiting: no vomiting and no nausea  Complications: no  Cardiovascular status: hemodynamically stable  Respiratory status: acceptable  Hydration status: stable

## 2019-06-29 NOTE — ANESTHESIA PRE PROCEDURE
Department of Anesthesiology  Preprocedure Note       Name:  Chrissy Hou   Age:  47 y.o.  :  1965                                          MRN:  [id number]         Date:  2019      Surgeon: Yesi Novak): Jessica Murillo MD    Procedure: BELOW KNEE GUILLOTINE AMPUTATION (Right )    Medications prior to admission:   Prior to Admission medications    Medication Sig Start Date End Date Taking? Authorizing Provider   ketorolac (ACULAR) 0.5 % ophthalmic solution Place 1 drop into the left eye every 2 hours   Yes Historical Provider, MD   moxifloxacin (VIGAMOX) 0.5 % ophthalmic solution Place 1 drop into the left eye every 4 hours   Yes Historical Provider, MD   tamsulosin (FLOMAX) 0.4 MG capsule TAKE 1 CAPSULE BY MOUTH ONCE DAILY 19   Hubert Rivera MD   carvedilol (COREG) 12.5 MG tablet TAKE 1 TABLET BY MOUTH TWICE DAILY  Patient taking differently: TAKE 1 TABLET BY MOUTH DAILY 19   Susie Basurto MD   furosemide (LASIX) 40 MG tablet Take 1 tablet by mouth 2 times daily  Patient taking differently: Take 40 mg by mouth daily  19   Berenice Cardenas MD   warfarin (COUMADIN) 5 MG tablet Take 2.5 mg by mouth Twice a Week Indications:  On Monday, Friday; 5 mg all other days On Monday, Friday; 5 mg all other days    Historical Provider, MD   glipiZIDE (GLUCOTROL XL) 2.5 MG extended release tablet Take 1 tablet by mouth daily 19   Eleni Curtis MD   Blood Glucose Monitoring Suppl (ONE TOUCH ULTRA 2) w/Device KIT  19   Historical Provider, MD   aspirin EC 81 MG EC tablet Take 1 tablet by mouth daily 19  Nick Ramos DPM   warfarin (COUMADIN) 5 MG tablet Take 5 mg by mouth Five times weekly Indications: 2.5 mg Monday, Friday; 5 mg all other days 2.5 mg Monday, Friday; 5 mg all other days    Historical Provider, MD   losartan (COZAAR) 50 MG tablet TAKE ONE TABLET BY MOUTH ONCE DAILY 18   Eleni Curtis MD   clopidogrel (PLAVIX) 75 MG tablet Take 1 tablet by mouth daily 9/7/18   Mell Knight MD       Current medications:    Current Facility-Administered Medications   Medication Dose Route Frequency Provider Last Rate Last Dose    lactated ringers infusion   Intravenous Continuous Napoleon Aly MD           Allergies:     Allergies   Allergen Reactions    Lipitor [Atorvastatin] Diarrhea    Metformin And Related Nausea And Vomiting       Problem List:    Patient Active Problem List   Diagnosis Code    DM type 2 with diabetic peripheral neuropathy E11.42    Diabetic foot ulcer ([facility] E11.621, L97.509    CHF (congestive heart failure) I50.9    HTN (hypertension) I10    CRF (chronic renal failure) ([facility]) N18.9    Osteomyelitis ([facility]) M86.9    Diabetes mellitus due to underlying condition with complication, with long-term current use of insulin ([facility]) E08.8, Z79.4    Cellulitis L03.90    Shortness of breath R06.02    Chronic systolic congestive heart failure ([facility]) I50.22    Atypical chest pain R07.89    Essential hypertension I10    Chest pain R07.9    Nephropathy N28.9    Unstable angina pectoris ([facility]) I20.0    ESRD on hemodialysis ([facility]) N18.6, Z99.2    Sepsis ([facility]) A41.9    Fever R50.9    MSSA (methicillin susceptible Staphylococcus aureus) septicemia ([facility]) A41.01    Bandemia D72.825    C. difficile colitis A04.72    AVF (arteriovenous fistula) ([facility]) I77.0    Peripheral vascular occlusive disease ([facility]) I73.9    S/P angiogram of extremity Z98.890    Ischemia of right lower extremity I99.8       Past Medical History:        Diagnosis Date    [facility]     Anemia     CAD (coronary artery disease)     Dr. Glenny Lorenz Carotid artery stenosis     BILATERAL    Cerebral artery occlusion with cerebral infarction ([facility] 11/2017    RIGHT SIDED WEAKNESS    CHF (congestive heart failure) ([facility] 2013    CKD (chronic kidney disease) stage 4, GFR 15-29 ml/min ([facility]     Diabetes mellitus ([facility]     diet controlled    Hemodialysis patient ([facility] 08/2018 Component Value Date    WBC 11.8 06/13/2019    RBC 3.05 06/13/2019    RBC 3.98 02/27/2012    HGB 8.0 06/13/2019    HCT 26.0 06/13/2019    MCV 87.2 06/13/2019    RDW 16.6 06/13/2019     06/13/2019     02/27/2012       CMP:   Lab Results   Component Value Date     06/13/2019    K 3.4 06/13/2019    CL 93 06/13/2019    CO2 25 06/13/2019    BUN 38 06/13/2019    CREATININE 5.70 06/13/2019    GFRAA 13 06/13/2019    LABGLOM 11 06/13/2019    GLUCOSE 82 06/13/2019    GLUCOSE 229 02/21/2012    PROT 8.1 06/08/2019    CALCIUM 8.1 06/13/2019    BILITOT 1.41 06/08/2019    ALKPHOS 68 06/08/2019    AST 10 06/08/2019    ALT 10 06/08/2019       POC Tests: No results for input(s): POCGLU, POCNA, POCK, POCCL, POCBUN, POCHEMO, POCHCT in the last 72 hours.     Coags:   Lab Results   Component Value Date    PROTIME 18.1 06/13/2019    PROTIME 18.9 01/16/2019    INR 1.8 06/13/2019    APTT 64.4 06/12/2019       HCG (If Applicable): No results found for: PREGTESTUR, PREGSERUM, HCG, HCGQUANT     ABGs: No results found for: PHART, PO2ART, YIU7VFQ, ISK9TOZ, BEART, D3ZWHNOR     Type & Screen (If Applicable):  No results found for: LABABO, [address]    Anesthesia Evaluation  Patient summary reviewed no history of anesthetic complications:   Airway: Mallampati: II  TM distance: >3 FB   Neck ROM: full  Mouth opening: > = 3 FB Dental:    (+) edentulous      Pulmonary:normal exam    (+) shortness of breath: no interval change,                             Cardiovascular:    (+) hypertension: moderate, angina: no interval change, CAD: no interval change, CHF: no interval change,         Rhythm: regular  Rate: normal                    Neuro/Psych:   (+) CVA:, neuromuscular disease:,             GI/Hepatic/Renal:   (+) renal disease: ESRD and dialysis,           Endo/Other:    (+) DiabetesType II DM, no interval change, , .                 Abdominal:           Vascular:                                    Anesthesia Plan      general ASA 4       Induction: intravenous. Anesthetic plan and risks discussed with patient. Use of blood products discussed with patient whom consented to blood products. Plan discussed with CRNA.                   Haley Casillas MD   6/29/2019

## 2019-06-30 PROBLEM — I48.20 CHRONIC ATRIAL FIBRILLATION (HCC): Status: ACTIVE | Noted: 2019-01-01

## 2019-06-30 NOTE — CONSULTS
40 MG tablet Take 1 tablet by mouth 2 times daily  Patient taking differently: Take 40 mg by mouth daily  6/13/19   Heena Farias MD   glipiZIDE (GLUCOTROL XL) 2.5 MG extended release tablet Take 1 tablet by mouth daily 2/25/19   Martina Mukherjee MD   Blood Glucose Monitoring Suppl (ONE TOUCH ULTRA 2) w/Device KIT  1/17/19   Historical Provider, MD   aspirin EC 81 MG EC tablet Take 1 tablet by mouth daily 1/16/19 6/28/19  Arden Barrios DPM   losartan (COZAAR) 50 MG tablet TAKE ONE TABLET BY MOUTH ONCE DAILY  Patient not taking: Reported on 6/29/2019 12/7/18   Martina Mukherjee MD   clopidogrel (PLAVIX) 75 MG tablet Take 1 tablet by mouth daily 9/7/18   Betty Price MD        Allergies:     Lipitor [atorvastatin] and Metformin and related    Social History:     Tobacco:    reports that he has never smoked. He has never used smokeless tobacco.  Alcohol:      reports that he does not drink alcohol. Drug Use:  reports that he does not use drugs. Family History:     Family History   Problem Relation Age of Onset    Diabetes Mother     Heart Disease Mother     High Blood Pressure Mother     Diabetes Brother     Heart Disease Father     Diabetes Father        Review of Systems:     Positive and Negative as described in HPI. CONSTITUTIONAL:  negative for fevers, chills, sweats, fatigue, weight loss  HEENT:  negative for vision, hearing changes, runny nose, throat pain  RESPIRATORY:  negative for shortness of breath, cough, congestion, wheezing. CARDIOVASCULAR:  negative for chest pain, palpitations.   GASTROINTESTINAL:  negative for nausea, vomiting, diarrhea, constipation, change in bowel habits, abdominal pain   GENITOURINARY:  negative for difficulty of urination, burning with urination, frequency   INTEGUMENT:  negative for rash, skin lesions, easy bruising   HEMATOLOGIC/LYMPHATIC:  negative for swelling/edema   ALLERGIC/IMMUNOLOGIC:  negative for urticaria , itching  ENDOCRINE:  negative sliding scale, will resume Glucotrol after surgery  3. Check hemoglobin A1c  4. Continue other cardiac medications as before  5.  Thanks for the consult will follow along with    Consultations:   Rosalie Prater MD  6/30/2019  4:33 PM    Copy sent to Dr. Kylie Burt MD

## 2019-06-30 NOTE — PROGRESS NOTES
Division of Vascular Surgery             Progress Note      Name: Renea Dodd  MRN: [id number]         Overnight Events:     No acute events overnight      Subjective:     Pain controlled. Started on hep gtt, no breakthrough. Tolerating diet. Physical Exam:     Vitals:  /65   Pulse 87   Temp 96.8 °F (36 °C)   Resp 19   Ht 6' (1.829 m)   Wt 190 lb 14.7 oz (86.6 kg)   SpO2 95%   BMI 25.89 kg/m²       General appearance - alert, well appearing and in no acute distress  Mental status - oriented to person, place and time with normal affect  Head - normocephalic and atraumatic  Chest - equal and symmetric chest rise/fall,non-labored  Heart - s1+s2  Abdomen - soft, nd, nttp  Extremities - R BKA dressing c/d/i no shadow. Skin - no gross lesions, rashes, or induration noted      Imaging:         Assessment:     1. 48 yo M with ischemic foot RLE, POD#1 s/p right guillotine BKA      Plan:     1. Continue medical and supportive care  2. Dressing take down 7/1, will determine operative planning pending edema and erythema. 3. Abx: ancef  4. Diet: gen  5. Analgesia: tylenol, neurotin, valium otc dhiraj, morphine prn  6. Toshia Melendez for home meds  7. AC: hep gtt, will plan to transition to Coumadin once final BKA.  If pt to be d/c'd to snf prior to revision will d/c on lovenox      ----------------------------------------

## 2019-06-30 NOTE — OP NOTE
I began by  using an Ace wrap to drain the venous supply and then turned up the  tourniquet to 200 mmHg. A circumferential incision just above the  malleolus was performed with a scalpel, going through the skin,  subcutaneous tissue, muscle, and tendons around the tibia and fibula. Periosteal edges were slightly elevated using the lap pad. Using a  power saw, the tibia and the fibula were transected. The foot was then  passed off as specimen. The neurovascular bundles were each identified  individually and sutured ligated. The anterior tibial, posterior  tibial, peroneal vessels, and smaller venous branches were also  identified and ligated. The tourniquet was turned off and was up  for a total of 8 minutes. There were some spot areas that required  cautery for hemostasis, but otherwise it was pretty dry. Wound was  irrigated. A piece of Surgicel was applied over the cut edges of the  bone, followed by a lightly moistened fluff gauze, followed by more  gauze, ABD, Kerlix, Ace wrap, and Coban dressings. The patient  tolerated the procedure well.         Vinicius Hernandez MD    D: 06/29/2019 11:23:13       T: 06/29/2019 23:04:42     [email or web address]  Job#: [id number]     Doc#: [id number]    CC:

## 2019-06-30 NOTE — PLAN OF CARE
Problem: Risk for Impaired Skin Integrity  Goal: Tissue integrity - skin and mucous membranes  Description  Structural intactness and normal physiological function of skin and  mucous membranes.   6/29/2019 2357 by Emelia Siemens, RN  Outcome: Ongoing  6/29/2019 1807 by Nolan Castellon RN  Outcome: Ongoing     Problem: Falls - Risk of:  Goal: Will remain free from falls  Description  Will remain free from falls  Outcome: Ongoing  Goal: Absence of physical injury  Description  Absence of physical injury  Outcome: Ongoing

## 2019-07-01 NOTE — PROGRESS NOTES
Pt to room from OR, report at bedside w/OR staff and anesthesia. Pt connected to monitors and vitals stable.  Will continue to monitor

## 2019-07-01 NOTE — ANESTHESIA PROCEDURE NOTES
Arterial Line:    An arterial line was placed using surface landmarks, in the OR for the following indication(s): continuous blood pressure monitoring and blood sampling needed. A 20 gauge (size), 1 and 3/4 inch (length), Arrow (type) catheter was placed, Seldinger technique used, into the left radial artery, secured by Tegaderm and tape. Anesthesia type: Local  Local infiltration: Injection    Events:  patient tolerated procedure well with no complications.   7/1/2019 4:40 PM7/1/2019 4:45 PM  Anesthesiologist: Jeferson Myles MD  Performed: Anesthesiologist   Preanesthetic Checklist  Completed: patient identified, site marked, surgical consent, pre-op evaluation, timeout performed, IV checked, risks and benefits discussed, monitors and equipment checked, anesthesia consent given, oxygen available and patient being monitored

## 2019-07-01 NOTE — CONSULTS
Inpatient consult to PM&R - Physiatry  Consult performed by: Lizzette Sims MD  Consult ordered by: Aydin Resendiz DO        Physical Medicine & Rehabilitation  Consult Note      Admitting Physician:   David Adamson MD    Primary Care Provider:   Rachel Renee MD     Reason for Consult:  Acute Inpatient Rehabilitation    Chief Complaint: Ischemic RLE - BKA    History of Present Illness:  Referring Provider is requesting an evaluation for appropriate placement upon discharge from acute care. History from chart review and patient. Apolonia Rivera is a 47 y.o. RHD male admitted to [place] [address] on 6/29/2019. Patient with ischemic RLE admitted for R BKA for history PVD and ischemic gangrene R foot. Dr. Yessica Collier performed R below knee guillotine amputation on 6/29/19. Had revision BKA 7/1/19 by Dr. Yessica Collier. On renal dosed xarelto 7/2/19 for DVT prophylaxis. Nephrology: following for ESRD on HD TTS at [facility]. Follows with Dr. Tennille Leo. Patient reports fatigue. Residual limb pain is controlled with medication. He attempted transfer with therapy and nurse in [facility] and was unable to transfer. He reports some intermittent phantom limb sensation. Review of Systems:  Constitutional: negative for anorexia, chills, fatigue, fevers, sweats and weight loss  Eyes: negative for redness and visual disturbance  Ears, nose, mouth, throat, and face: negative for earaches, sore throat and tinnitus  Respiratory: negative for cough and shortness of breath  Cardiovascular: negative for chest pain, dyspnea and palpitations  Gastrointestinal: negative for abdominal pain,  constipation, nausea and vomiting. Having loose stools. Had episode of bowel incontinence today.   Genitourinary:negative for dysuria, frequency, hesitancy or urinary incontinence  Integument/breast: negative for pruritus and rash  Musculoskeletal:negative for stiff joints  Neurological: negative for dizziness, headaches

## 2019-07-01 NOTE — BRIEF OP NOTE
Brief Postoperative Note  ______________________________________________________________    Patient: Jolene Todd  YOB: 1965  MRN: [id number]  Date of Procedure: 7/1/2019    Pre-Op Diagnosis: OSTEOMYELITIS, right guillotine amputation    Post-Op Diagnosis: Same       Procedure(s):  RIGHT LEG BELOW KNEE AMPUTATION REVISION    Anesthesia: General, TIVA    Surgeon(s): Mckenna Dick MD    Estimated Blood Loss (mL): 25 ML    Complications: None    Specimens:   ID Type Source Tests Collected by Time Destination   1 : TYPE AND SCREEN Blood CSF/Blood TYPE AND SCREEN Niki Hodge MD 7/1/2019 1633    A : RIGHT LEG AMPUTATION REVISION Tissue Leg SURGICAL PATHOLOGY Mckenna Dick MD 7/1/2019 1701        Findings: NO PURULENT MATERIAL, MUSCLE SLIGHTLY PALE IN APPEARANCE DUE TO CHRONIC ISCHEMIA.     Mckenna Dick MD  Date: 7/1/2019  Time: 6:16 PM

## 2019-07-01 NOTE — PROGRESS NOTES
Neurological: Negative for dizziness, weakness, light-headedness and headaches. Hematological: Does not bruise/bleed easily. Psychiatric/Behavioral: Negative for agitation, behavioral problems, confusion, self-injury, sleep disturbance and suicidal ideas. Objective :      Current Vitals : Temp: 97.5 °F (36.4 °C),  Pulse: 76, Resp: 14, BP: (!) 102/59, SpO2: 98 %  Last 24 Hrs Vitals   Patient Vitals for the past 24 hrs:   BP Temp Temp src Pulse Resp SpO2   07/01/19 0835 (!) 102/59 97.5 °F (36.4 °C) Oral 76 14 98 %   07/01/19 0801 -- -- -- -- -- 99 %   06/30/19 2113 (!) 90/50 97.3 °F (36.3 °C) -- 71 24 100 %     Intake / output   06/30 0701 - 07/01 0700  In: 360 [P.O.:360]  Out: -   Physical Exam:  Physical Exam   Constitutional: He is oriented to person, place, and time. No distress. HENT:   Head: Normocephalic and atraumatic. Mouth/Throat: Oropharynx is clear and moist. No oropharyngeal exudate. Eyes: Pupils are equal, round, and reactive to light. Conjunctivae and EOM are normal. No scleral icterus. Neck: No JVD present. No thyromegaly present. Cardiovascular: Normal rate, regular rhythm and normal heart sounds. No murmur heard. Pulmonary/Chest: Effort normal and breath sounds normal. He has no wheezes. He has no rales. Abdominal: Soft. He exhibits no mass. There is no tenderness. Musculoskeletal:   Right BKA   Lymphadenopathy:     He has no cervical adenopathy. Neurological: He is alert and oriented to person, place, and time. Skin: He is not diaphoretic. Nursing note and vitals reviewed.     Lower Extremities : No ankle Edema , No calf Tenderness     Laboratory findings:    Recent Labs     06/29/19  0700 06/29/19  1205 07/01/19  0446   WBC  --  4.8  --    HGB  --  7.4*  --    HCT  --  25.7*  --    PLT  --  272 289   INR 1.6  --   --      Recent Labs     06/29/19  1205 06/30/19  0629   * 130*   K 5.7* 4.5   CL 94* 92*   CO2 23 26   GLUCOSE 213* 374*   BUN 34* 28*   CREATININE

## 2019-07-02 NOTE — PROGRESS NOTES
Division of Vascular Surgery             Progress Note      Name: Pamella Caceres  MRN: [id number]         Overnight Events:      None      Subjective:     Pt seen and examined at bedside this morning. No acute events overnight. Afebrile. Pain well controlled. POD 1 s/p revision BKA. Tolerating diet, no nausea or vomiting. Physical Exam:     Vitals:  BP (!) 95/52   Pulse 82   Temp 97.4 °F (36.3 °C) (Oral)   Resp 20   Ht 6' (1.829 m)   Wt 190 lb 14.7 oz (86.6 kg)   SpO2 98%   BMI 25.89 kg/m²     General appearance - alert, well appearing and in no acute distress  Mental status - oriented to person, place and time with normal affect  Head - normocephalic and atraumatic  Neck - supple, no carotid bruits, thyroid not palpable, no JVD  Chest - clear to auscultation, normal effort  Heart - normal rate, regular rhythm, no murmurs  Abdomen - soft, non-tender, non-distended  Neurological - normal speech, no focal findings or movement disorder noted, cranial nerves II through XII grossly intact  Extremities - peripheral pulses palpable, no pedal edema or calf pain with palpation, right lower extremity BKA Site with no strike through bleeding, dressing in place  Skin - no gross lesions, rashes, or induration noted      Assessment:     1. POD 1 s/p revision BKA to RLE      Plan:     1. Continue medical management per primary team.   2. Pain control  3. Will plan to take down dressing tomorrow 7/3.   4. Continue PT daily. 5. PM&R consult for recs and placement   6. Will start oral Xarelto today, renal dosing.        [address] [facility] [place]    Electronically signed by Ashely Dodd DO on 7/2/2019 at 6:48 AM

## 2019-07-02 NOTE — PROGRESS NOTES
Dialysis Post Treatment Note  Patient tolerated treatment well. Denies complaints at time of discharge. Vitals:    07/02/19 1244   BP: 116/62   Pulse: 111   Resp: 16   Temp: 98.1 °F (36.7 °C)   SpO2:      Pre-Weight = 90.2kg  Post-weight = Weight: 189 lb 2.5 oz (85.8 kg)  Total Liters Processed = Total Liters Processed (l/min): 74.1 l/min  Rinseback Volume (mL) = Rinseback Volume (ml): 370 ml  Net Removal (mL) = 3.490  Length of treatment=210 mins  Pt stated leg pain all through tx. Pt has had three to four loose stools. CHAZ [employer] and [employer]. CHAZ Stevens Smoker notified floor nurse.

## 2019-07-02 NOTE — PROGRESS NOTES
Cerebral artery occlusion with cerebral infarction ([facility] 11/2017    RIGHT SIDED WEAKNESS    CHF (congestive heart failure) ([facility] 2013    CKD (chronic kidney disease) stage 4, GFR 15-29 ml/min ([facility])     Diabetes mellitus ([facility]     diet controlled    Hemodialysis patient ([facility] 08/2018    tues/thurs/sat/ [facility] [place]  [place]/ Pt. doesn't know  Nephrologist name/ access Left chest       Hyperlipidemia     PCP Dr. [place] Hypertension     Nephropathy     Neuropathy     No natural teeth     Poor historian     Renal insufficiency, mild 2013    Uses roller walker     Wears glasses     Wheelchair dependence       Allergies: Allergies   Allergen Reactions    Lipitor [Atorvastatin] Diarrhea    Metformin And Related Nausea And Vomiting      Medications :    docusate sodium 100 mg Oral Daily   rivaroxaban 15 mg Oral Daily   gabapentin 300 mg Oral TID   ketorolac 15 mg Intravenous Q6H   insulin lispro 0-18 Units Subcutaneous TID WC   insulin lispro 0-9 Units Subcutaneous Nightly   aspirin EC 81 mg Oral Daily   carvedilol 12.5 mg Oral BID   clopidogrel 75 mg Oral Daily   furosemide 40 mg Oral BID   tamsulosin 0.4 mg Oral Daily   sodium chloride flush 10 mL Intravenous 2 times per day   acetaminophen 1,000 mg Oral Q8H   diazepam 2.5 mg Oral Q8H   ceFAZolin 1 g Intravenous Daily       Review of Systems   Review of Systems   Constitutional: Negative for activity change, appetite change, fatigue, fever and unexpected weight change. HENT: Negative for congestion, nosebleeds, rhinorrhea, sinus pressure, sneezing and voice change. Respiratory: Negative for cough, choking, chest tightness, shortness of breath and wheezing. Cardiovascular: Negative for chest pain, palpitations and leg swelling. Gastrointestinal: Negative for abdominal pain, constipation, diarrhea, nausea and vomiting. Genitourinary: Negative for difficulty urinating, discharge, dysuria, frequency and testicular pain.

## 2019-07-03 NOTE — PROGRESS NOTES
Division of Vascular Surgery             Progress Note      Name: Pankaj Holguin  MRN: [id number]         Overnight Events:     Hypotensive last night, with BP down to 80s/40s, likely due to a combination of fluid removal in dialysis, antihypertensives, diuretics, and narcotics. Midodrine PRN added to medication list.  BP 100s/50s this AM.    Subjective:     VSS. Took down dressing this AM.  Afebrile. Pain minimal with current regimen. POD#2 s/p BKA revision. No n/v. Tolerating diet. Physical Exam:     Vitals:  BP (!) 91/46   Pulse 87   Temp 98.3 °F (36.8 °C) (Oral)   Resp 16   Ht 6' (1.829 m)   Wt 189 lb 2.5 oz (85.8 kg)   SpO2 94%   BMI 25.65 kg/m²     General appearance - alert, well appearing and in no acute distress  Mental status - oriented to person, place and time with normal affect  Head - normocephalic and atraumatic  Chest - normal effort  Heart - normal rate, regular rhythm. Abdomen - soft, non-tender, non-distended  Neurological - somewhat slurred speech, no focal findings or movement disorder noted, cranial nerves II through XII grossly intact  Extremities - right BKA dressing c/d/i. Mild serosanguinous drainage. No erythema. Mild tenderness to palpation. Skin - no gross lesions, rashes, or induration noted      Assessment:     1. 47 y.o. Male POD#2 s/p right BKA revision    Plan:     1. Continue medical management per primary team  2. Pain control - oxycodone and toradol. Wean morphine as tolerated. 3.   Mepilex dressing changes w/ washing every other day  4. Continue PT daily  5. F/u PM&R c/s for recs and placement  6. Continue Xarelto w/ renal dosing.   7. Okay to discharge from vascular standpoint.   ----------------------------------------      [address] [facility],[address] [place]: [phone]    Electronically signed by Arlene Tabor DO on 7/3/2019 at 6:53 AM

## 2019-07-03 NOTE — PROGRESS NOTES
RIGHT SIDED WEAKNESS    CHF (congestive heart failure) ([facility] 2013    CKD (chronic kidney disease) stage 4, GFR 15-29 ml/min ([facility])     Diabetes mellitus ([facility]     diet controlled    Hemodialysis patient ([facility] 08/2018    tues/thurs/sat/ [place]/ Pt. doesn't know  Nephrologist name/ access Left chest       Hyperlipidemia     PCP Dr. Fouzia Cordon Hypertension     Nephropathy     Neuropathy     No natural teeth     Poor historian     Renal insufficiency, mild 2013    Uses roller walker     Wears glasses     Wheelchair dependence       Allergies: Allergies   Allergen Reactions    Lipitor [Atorvastatin] Diarrhea    Metformin And Related Nausea And Vomiting      Medications :    docusate sodium 100 mg Oral Daily   rivaroxaban 15 mg Oral Daily   gabapentin 300 mg Oral TID   ketorolac 15 mg Intravenous Q6H   insulin lispro 0-18 Units Subcutaneous TID WC   insulin lispro 0-9 Units Subcutaneous Nightly   aspirin EC 81 mg Oral Daily   carvedilol 12.5 mg Oral BID   clopidogrel 75 mg Oral Daily   furosemide 40 mg Oral BID   tamsulosin 0.4 mg Oral Daily   sodium chloride flush 10 mL Intravenous 2 times per day   acetaminophen 1,000 mg Oral Q8H   diazepam 2.5 mg Oral Q8H   ceFAZolin 1 g Intravenous Daily       Review of Systems   Review of Systems   Constitutional: Negative for activity change, appetite change, fatigue, fever and unexpected weight change. HENT: Negative for congestion, nosebleeds, rhinorrhea, sinus pressure, sneezing and voice change. Respiratory: Negative for cough, choking, chest tightness, shortness of breath and wheezing. Cardiovascular: Negative for chest pain, palpitations and leg swelling. Gastrointestinal: Negative for abdominal pain, constipation, diarrhea, nausea and vomiting. Genitourinary: Negative for difficulty urinating, discharge, dysuria, frequency and testicular pain. Musculoskeletal: Negative for back pain. Skin: Positive for wound.

## 2019-07-03 NOTE — PLAN OF CARE
tissue perfusion will improve  Outcome: Ongoing     Problem: Pain:  Goal: Pain level will decrease  Description  Pain level will decrease  Outcome: Ongoing  Goal: Control of acute pain  Description  Control of acute pain  Outcome: Ongoing  Goal: Control of chronic pain  Description  Control of chronic pain  Outcome: Ongoing

## 2019-07-03 NOTE — PROGRESS NOTES
Pt BP in the 80's sys HR WNL. Pt AOL times 4. Due to BP writer did not give BP or pain meds over night pain meds. Per pt dialysis makes him tired but not \"this tired\" pt talking and afebrile. Per NP hold all narcotics for BP under 100 sys. Writer contacted medicine and nephrology orders received for fluid bolus 250ml NS. BP improved to 90's sys. [facility],. See Mar. Vascular surgery at bedside and informed of BP management in am. day nurse updated.

## 2019-07-03 NOTE — PROGRESS NOTES
Patient hypotensive in the 80S patient states of not feeling well, answers appropriately but drowsy. Promatine given earlier .  Dr Mejia Mom

## 2019-07-03 NOTE — PROGRESS NOTES
CALCIUM  --  7.1*      BNP:  Lab Results   Component Value Date     09/20/2013     PHOSPHORUS:  No results for input(s): PHOS in the last 72 hours. MAGNESIUM: No results for input(s): MG in the last 72 hours. ALBUMIN: No results for input(s): LABALBU in the last 72 hours. IRON:    Lab Results   Component Value Date    IRON 15 08/30/2018     IRON SATURATION:  Lab Results   Component Value Date    LABIRON 16 08/30/2018     TIBC:    Lab Results   Component Value Date    TIBC 95 08/30/2018     FERRITIN:    Lab Results   Component Value Date    FERRITIN 1,519 08/30/2018     RUDOLPH:   Lab Results   Component Value Date    RUDOLPH POSITIVE (A) 08/08/2018       SPEP: Lab Results   Component Value Date    PROT 8.1 06/08/2019    ALBCAL 2.9 08/08/2018    ALBPCT 54 08/08/2018    LABALPH 0.2 08/08/2018    LABALPH 0.9 08/08/2018    A1PCT 3 08/08/2018    A2PCT 17 08/08/2018    LABBETA 0.7 08/08/2018    BETAPCT 13 08/08/2018    GAMGLOB 0.7 08/08/2018    GGPCT 12 08/08/2018    PATH NOT REPORTED 08/08/2018     UPEP:   Lab Results   Component Value Date     08/08/2018      HEPBSAG:  Lab Results   Component Value Date    HEPBSAG NONREACTIVE 11/09/2018     HEPCAB:  Lab Results   Component Value Date    HEPCAB NONREACTIVE 08/08/2018     C3:   Lab Results   Component Value Date    C3 119 08/08/2018     C4:   Lab Results   Component Value Date    C4 30 08/08/2018     MPO ANCA:   Lab Results   Component Value Date    MPO 25 08/08/2018    .   PR3 ANCA:    Lab Results   Component Value Date    PR3 20 08/08/2018     URINE SODIUM:    Lab Results   Component Value Date    FELI 31 08/28/2018      URINE CREATININE:  Lab Results   Component Value Date    LABCREA 93.8 08/29/2018     URINE EOSINOPHILS: Lab Results   Component Value Date    UREO NONE SEEN 08/29/2018     URINE PROTEIN:    Lab Results   Component Value Date     08/08/2018     URINALYSIS:  U/A: Lab Results   Component Value Date    NITRU NEGATIVE 06/11/2019    COLORU DARK YELLOW 06/11/2019    PHUR 7.5 06/11/2019    WBCUA 10 TO 20 06/11/2019    RBCUA 2 TO 5 06/11/2019    MUCUS 1+ 06/11/2019    TRICHOMONAS NOT REPORTED 06/11/2019    YEAST NOT REPORTED 06/11/2019    BACTERIA MANY 06/11/2019    SPECGRAV 1.017 06/11/2019    LEUKOCYTESUR NEGATIVE 06/11/2019    UROBILINOGEN Normal 06/11/2019    BILIRUBINUR  06/11/2019     Presumptive positive. Unable to confirm due to unavailability of reagent. BILIRUBINUR NEGATIVE 12/24/2011    GLUCOSEU NEGATIVE 06/11/2019    GLUCOSEU 2+ 12/24/2011    KETUA NEGATIVE 06/11/2019    AMORPHOUS 2+ 06/11/2019     ANTIGBM:  Lab Results   Component Value Date    GBMABIGG 17 08/08/2018         RADIOLOGY      Reviewed as available. ASSESSMENT    1 ESRD:  He is on maintenance hemodialysis on Tuesday and Thursday and Saturday    2. Essential hypertension with good control  3. Status post right below the knee amputation with revision with revision performed on 7/1/19  4. Anemia of chronic disease and blood loss   5. Hypotension today, partly secondary to the amount of fluid removed with dialysis yesterday and partly secondary to sedation    PLAN      1. Discontinue oral Lasix with lower BP  2. Decrease Coreg to 3.125 mg oral twice daily because of lower blood pressure. 3. Continued close hemodynamic monitoring  4. Continue on a Tuesday, Thursday, Saturday dialysis schedule, and the next dialysis is tomorrow, 7/4/19  5. Follow up labs ordered. Please do not hesitate to call with questions. Electronically signed by Dee Dee Gutierrez MD on 7/3/2019 at 8:59 AM     Attending Physician Statement  I have discussed the care of Austin Fernandes, including pertinent history and exam findings with the resident/fellow. I have reviewed the key elements of all parts of the encounter with the resident/fellow and have edited the documentation as appropriate. I have seen and examined the patient with the resident/fellow.   I agree with the assessment and plan and status of the problem list as documented.       Roxanna Orantes MD  Nephrology Attending Physician  [facility]

## 2019-07-04 NOTE — PROGRESS NOTES
 CAD (coronary artery disease)     Dr. Flash Hammond Carotid artery stenosis     BILATERAL    Cerebral artery occlusion with cerebral infarction ([facility] 11/2017    RIGHT SIDED WEAKNESS    CHF (congestive heart failure) ([facility] 2013    CKD (chronic kidney disease) stage 4, GFR 15-29 ml/min ([facility])     Diabetes mellitus ([facility]     diet controlled    Hemodialysis patient ([facility] 08/2018    tues/thurs/sat/ [place]/ Pt. doesn't know  Nephrologist name/ access Left chest       Hyperlipidemia     PCP Dr. Melani Singh Hypertension     Nephropathy     Neuropathy     No natural teeth     Poor historian     Renal insufficiency, mild 2013    Uses roller walker     Wears glasses     Wheelchair dependence       Allergies: Allergies   Allergen Reactions    Lipitor [Atorvastatin] Diarrhea    Metformin And Related Nausea And Vomiting      Medications :    gabapentin 100 mg Oral TID   piperacillin-tazobactam 2.25 g Intravenous Q12H   hydrocortisone sodium succinate  mg Intravenous Q8H   prednisoLONE acetate 1 drop Both Eyes 4 times per day   ketorolac 1 drop Both Eyes 4x Daily   docusate sodium 100 mg Oral Daily   rivaroxaban 15 mg Oral Daily   insulin lispro 0-18 Units Subcutaneous TID WC   insulin lispro 0-9 Units Subcutaneous Nightly   aspirin EC 81 mg Oral Daily   clopidogrel 75 mg Oral Daily   tamsulosin 0.4 mg Oral Daily   sodium chloride flush 10 mL Intravenous 2 times per day   acetaminophen 1,000 mg Oral Q8H       Review of Systems   Review of Systems   Constitutional: Positive for fatigue. Negative for activity change, appetite change, fever and unexpected weight change. HENT: Negative for congestion, nosebleeds, rhinorrhea, sinus pressure, sneezing and voice change. Respiratory: Negative for cough, choking, chest tightness, shortness of breath and wheezing. Cardiovascular: Negative for chest pain, palpitations and leg swelling.    Gastrointestinal: Negative for abdominal pain, constipation, diarrhea, nausea and vomiting. Genitourinary: Negative for difficulty urinating, discharge, dysuria, frequency and testicular pain. Musculoskeletal: Negative for back pain. Skin: Positive for wound. Negative for rash. Neurological: Negative for dizziness, weakness, light-headedness and headaches. Hematological: Does not bruise/bleed easily. Psychiatric/Behavioral: Positive for behavioral problems. Negative for agitation, confusion, self-injury, sleep disturbance and suicidal ideas.      Objective :      Current Vitals : Temp: 97.9 °F (36.6 °C),  Pulse: 86, Resp: (!) 6, BP: (!) 108/59, SpO2: 95 %  Last 24 Hrs Vitals   Patient Vitals for the past 24 hrs:   BP Temp Temp src Pulse Resp SpO2 Height Weight   07/04/19 0915 (!) 108/59 -- -- 86 (!) 6 95 % -- --   07/04/19 0900 117/60 -- -- 87 15 100 % -- --   07/04/19 0845 119/63 -- -- 91 17 99 % -- --   07/04/19 0830 120/63 97.9 °F (36.6 °C) Oral 86 12 98 % -- --   07/04/19 0821 -- -- -- -- 17 100 % -- --   07/04/19 0815 118/64 -- -- 89 18 100 % -- --   07/04/19 0800 113/68 -- -- 89 16 100 % -- --   07/04/19 0745 (!) 115/56 -- -- 88 9 96 % -- --   07/04/19 0730 (!) 104/56 -- -- 88 13 97 % -- --   07/04/19 0715 (!) 101/56 -- -- 87 11 98 % -- --   07/04/19 0700 (!) 96/54 -- -- 91 10 97 % -- --   07/04/19 0645 (!) 113/53 -- -- 90 10 98 % -- --   07/04/19 0630 (!) 107/52 -- -- 89 8 97 % -- --   07/04/19 0615 111/61 -- -- 88 11 99 % -- --   07/04/19 0600 (!) 101/57 -- -- 92 10 97 % -- --   07/04/19 0545 (!) 118/57 -- -- 94 9 98 % -- --   07/04/19 0530 (!) 124/59 -- -- 95 10 99 % -- --   07/04/19 0515 (!) 124/59 -- -- 95 10 99 % -- --   07/04/19 0500 119/61 -- -- 96 10 99 % -- --   07/04/19 0445 (!) 118/57 -- -- 97 10 98 % -- --   07/04/19 0430 (!) 96/47 100.2 °F (37.9 °C) Axillary 95 10 97 % -- --   07/04/19 0415 (!) 100/48 -- -- 95 10 97 % -- --   07/04/19 0400 (!) 105/47 -- -- 97 14 98 % -- --   07/04/19 0345 (!) 101/50 -- -- 97 12 98 % -- --

## 2019-07-04 NOTE — PLAN OF CARE
achieving an optimal weight will improve  Description  Progress toward achieving an optimal weight will improve  7/4/2019 1437 by Mariam Grullon RN  Outcome: Ongoing     Problem: Tissue Perfusion:  Goal: Adequacy of tissue perfusion will improve  Description  Adequacy of tissue perfusion will improve  7/4/2019 1437 by Mariam Grullon RN  Outcome: Met This Shift     Problem: Pain:  Goal: Pain level will decrease  Description  Pain level will decrease  7/4/2019 1437 by Mariam Grullon RN  Outcome: Ongoing     Problem: Pain:  Goal: Control of acute pain  Description  Control of acute pain  7/4/2019 1437 by Mariam Grullon RN  Outcome: Ongoing     Problem: Pain:  Goal: Control of chronic pain  Description  Control of chronic pain  7/4/2019 1437 by Mariam Grullon RN  Outcome: Ongoing

## 2019-07-04 NOTE — H&P
 Wears glasses     Wheelchair dependence        Past Surgical History:        Procedure Laterality Date    CARDIAC CATHETERIZATION Bilateral 8/5/13    CATARACT REMOVAL Right     DIALYSIS FISTULA CREATION Right 1/16/2019    RIGHT ARM AV FISTULA CREATION performed by Emily [facility]arez [facility] at [facility] Right 6/11/2019    LOWER LEG EMBOLECTOMY THROMBECTOMY performed by Emily Suarez MD at [place] [address] Right     5TH DIGIT    [facility]BR[facility]     LEG AMPUTATION BELOW KNEE Right 06/29/2019    LEG AMPUTATION BELOW KNEE Right 6/29/2019    BELOW KNEE GUILLOTINE AMPUTATION performed by Emily Suarez MD at [address] [place] [address] Right 7/1/2019    RIGHT LEG AMPUTATION REVISION performed by Emily [facility]arez MD at [facility] Left 8/19/2015    Arthrodesis IP Joint Hallux    TUNNELED VENOUS CATHETER PLACEMENT  08/2018    RIGHT CHEST PERM CATH    VASCULAR SURGERY Right     RBKA       Allergies: Allergies   Allergen Reactions    Lipitor [Atorvastatin] Diarrhea    Metformin And Related Nausea And Vomiting         Home Medications:   Prior to Admission medications    Medication Sig Start Date End Date Taking? Authorizing Provider   warfarin (COUMADIN) 5 MG tablet Take 2.5 mg by mouth Twice a Week Indications:  On Monday, Friday; 5 mg all other days, pt. states he isnt taken it On Monday, Friday; 5 mg all other days    Yes Historical Provider, MD   ketorolac (ACULAR) 0.5 % ophthalmic solution Place 1 drop into the left eye every 2 hours   Yes Historical Provider, MD   moxifloxacin (VIGAMOX) 0.5 % ophthalmic solution Place 1 drop into the left eye every 4 hours   Yes Historical Provider, MD   warfarin (COUMADIN) 5 MG tablet Take 5 mg by mouth Five times weekly Indications: 2.5 mg Monday, Friday; 5 mg all other days 2.5 mg Monday, Friday; 5 mg all other days

## 2019-07-04 NOTE — PROGRESS NOTES
Patients SBP low 79/44 and improved when patient more alert denies pain answers questions appropriately. Dr. Hernando reynolds served and came to bedside to evaluate. Awaiting results from hemoglobin hematocrit.

## 2019-07-04 NOTE — PROGRESS NOTES
NP On call for Dr. Bhavani Wagner notified of blood pressure. Critical care resident Sunil Peoples MD notified of critical care  consult. Orders placed to start patient on fluid.

## 2019-07-04 NOTE — PROGRESS NOTES
Dr Mary Davis, rounded at bedside. Give albumin as ordered and hold off on starting levophed at this time per   Mary Davis, vascular surgery resident Dr. Ashley Noe notified via perfect serve.

## 2019-07-04 NOTE — PROGRESS NOTES
Discussed case with Dr Ralph Mcdaniel. If patient is able to tolerate Dialysis, will plan to transfer back to the floor. Dr Ralph Mcdaniel will be taking care of patient when he leaves the ICU.     Priyanka Mcclain MD      Department of Internal Medicine  [facility]         7/4/2019, 11:21 AM

## 2019-07-05 NOTE — PROGRESS NOTES
Occupational Therapy   Occupational Therapy Initial Assessment  Date: 2019   Patient Name: Precious Covarrubias  MRN: [id number]     : 1965    Copied from Vascular Progress Note 2019:  1. 47 y.o. Male POD#2 s/p right BKA revision    Date of Service: 2019    Discharge Recommendations:    Further therapy recommended at discharge. The patient should be able to tolerate at least three hours of therapy per day over 5 days or 15 hours over 7 days. OT Equipment Recommendations  Other: CTA pending progress     Assessment   Performance deficits / Impairments: Decreased functional mobility ; Decreased ADL status; Decreased endurance  Assessment: Pt would benefit from continued acute care and post acute care OT to address moderate/ maximal deificits in ADL/ functional activities, endurance and functional transfers/ mobility following admission. Pt reuqired use of [place] to complete transfers on this date and cues to assist with balance. Pt is currently unsafe to return to previous living arrnagement at this time. Prognosis: Good  Decision Making: Medium Complexity  Patient Education: OT POC, safety, use of [place]   REQUIRES OT FOLLOW UP: Yes  Activity Tolerance  Activity Tolerance: Patient Tolerated treatment well  Safety Devices  Safety Devices in place: Yes  Type of devices: Call light within reach;Nurse notified; Left in chair;Gait belt;Patient at risk for falls  Restraints  Initially in place: No         Patient Diagnosis(es): There were no encounter diagnoses.      has a past medical history of A-fib ([facility] Anemia, CAD (coronary artery disease), Carotid artery stenosis, Cerebral artery occlusion with cerebral infarction [facility]), CHF (congestive heart failure) ([facility] CKD (chronic kidney disease) stage 4, GFR 15-29 ml/min ([facility] Diabetes mellitus ([facility] Hemodialysis patient ([facility] Hyperlipidemia, Hypertension, Nephropathy, Neuropathy, No natural teeth, Poor historian, Renal insufficiency, mild, Uses roller walker, Wears glasses, and Wheelchair dependence. has a past surgical history that includes Foot Debridement (Left, 7/2015); Finger fracture surgery (Right); Tunneled venous catheter placement (08/2018); Cardiac catheterization (Bilateral, 8/5/13); Dialysis fistula creation (Right, 1/16/2019); Toe Surgery (Left, 8/19/2015); Cataract removal (Right); Elbow surgery (Right, 1975); embolectomy (Right, 6/11/2019); Leg amputation below knee (Right, 06/29/2019); vascular surgery (Right); Leg amputation below knee (Right, 6/29/2019); and pr re-amputation lower leg (Right, 7/1/2019). Restrictions  Restrictions/Precautions  Restrictions/Precautions: Fall Risk  Required Braces or Orthoses?: Yes  Required Braces or Orthoses  Right Lower Extremity Brace: Knee Immobilizer  Position Activity Restriction  Other position/activity restrictions:  Up with assistance     Subjective   General  Chart Reviewed: History and Physical, Progress Notes, Orders  Patient assessed for rehabilitation services?: Yes  Family / Caregiver Present: Yes(daughter)  Patient Currently in Pain: Denies  Pain Assessment  Pain Level: 0    Social/Functional History  Social/Functional History  Lives With: Significant other(and son)  Type of Home: House  Home Layout: Two level, Bed/Bath upstairs  Home Access: Ramped entrance  Bathroom Shower/Tub: Tub/Shower unit  Bathroom Toilet: Standard(BS downstairs )  Bathroom Equipment: Commode(commode used down stairs)  Home Equipment: Rolling walker, [employer], [place] Help From: Family  ADL Assistance: Needs assistance  Bath: Stand by assistance  Dressing: Independent  Homemaking Responsibilities: No  Ambulation Assistance: Independent(functional mobility for short distances, WC for longer distances )  Transfer Assistance: Independent  Active : No  Patient's  Info: Daughter, son, GF  Occupation: Retired     Objective   Vision: Within Functional Limits  Hearing: Within functional limits : WFL  RUE AROM (degrees)  RUE AROM : WFL  LUE Strength  Gross LUE Strength: WFL  RUE Strength  Gross RUE Strength: WFL     Plan   Plan  Times per week: 4-5x/wk   Current Treatment Recommendations: Balance Training, Functional Mobility Training, Patient/Caregiver Education & Training, Safety Education & Training, Equipment Evaluation, Education, & procurement, Self-Care / ADL, Endurance Training     AM-PAC Score  AM-PAC Inpatient Daily Activity Raw Score: 16 (07/05/19 1554)  AM-PAC Inpatient ADL T-Scale Score : 35.96 (07/05/19 1554)  ADL Inpatient CMS 0-100% Score: 53.32 (07/05/19 1554)  ADL Inpatient CMS G-Code Modifier : CK (07/05/19 1554)    Goals  Short term goals  Time Frame for Short term goals: by discharge, pt will  Short term goal 1: demo I in UE ADL activities   Short term goal 2: demo MI in LE ADL activities with AD as needed  Short term goal 3: demo CGA for functional transfers/ mobility with LRD and good safety awareness to assist with ADL/ functional activities   Short term goal 4: demo increased activity tolerance of 40+ min to assist with ADL/ functional activities   Short term goal 5: demo understanding and I use of EC/WS, fall prevention tech during functional activities   Short term goal 6: demo understanding and I use of 2 desensitization tech to decrease phantom pain and increase participation in ADL/ functional activites   Patient Goals   Patient goals : to go home ASAP       Therapy Time   Individual Concurrent Group Co-treatment   Time In 1355         Time Out 1414         Minutes 19          See above for LOF. RN reports patient is medically stable for therapy treatment this date. Chart reviewed prior to treatment and patient is agreeable for therapy. All lines intact and patient positioned comfortably at end of treatment. All patient needs addressed prior to ending therapy session.       Co-evaluation with PT treatment   Mayra Quick OTR/L

## 2019-07-05 NOTE — PROGRESS NOTES
sneezing and voice change. Respiratory: Negative for cough, choking, chest tightness, shortness of breath and wheezing. Cardiovascular: Negative for chest pain, palpitations and leg swelling. Gastrointestinal: Negative for abdominal pain, constipation, diarrhea, nausea and vomiting. Genitourinary: Negative for difficulty urinating, discharge, dysuria, frequency and testicular pain. Musculoskeletal: Negative for back pain. Skin: Positive for wound. Negative for rash. Neurological: Negative for dizziness, weakness, light-headedness and headaches. Hematological: Does not bruise/bleed easily. Psychiatric/Behavioral: Negative for agitation, behavioral problems, confusion, self-injury, sleep disturbance and suicidal ideas.      Objective :      Current Vitals : Temp: 97.9 °F (36.6 °C),  Pulse: 93, Resp: 18, BP: 127/70, SpO2: 100 %  Last 24 Hrs Vitals   Patient Vitals for the past 24 hrs:   BP Temp Temp src Pulse Resp SpO2 Weight   07/05/19 0744 127/70 97.9 °F (36.6 °C) Oral 93 18 100 % --   07/05/19 0530 -- -- -- -- -- -- 184 lb 3.2 oz (83.6 kg)   07/05/19 0337 (!) 105/55 97.7 °F (36.5 °C) Oral 85 18 100 % --   07/05/19 0007 (!) 114/49 97.9 °F (36.6 °C) Oral 86 16 98 % --   07/04/19 2009 (!) 109/49 98.1 °F (36.7 °C) -- 90 18 100 % --   07/04/19 1603 -- -- -- 92 -- -- --   07/04/19 1555 121/70 97.5 °F (36.4 °C) Axillary 92 18 100 % --   07/04/19 1526 123/75 96 °F (35.6 °C) -- 84 16 -- 189 lb 9.5 oz (86 kg)   07/04/19 1523 120/72 97.4 °F (36.3 °C) -- 80 -- -- --   07/04/19 1440 123/73 97.4 °F (36.3 °C) -- 82 -- -- --   07/04/19 1410 119/69 97.4 °F (36.3 °C) -- 89 -- -- --   07/04/19 1340 121/69 97.4 °F (36.3 °C) -- 81 -- -- --   07/04/19 1310 120/69 97.4 °F (36.3 °C) -- 84 -- -- --   07/04/19 1240 119/70 97.4 °F (36.3 °C) -- 86 -- -- --   07/04/19 1210 119/69 97.4 °F (36.3 °C) -- 89 -- -- --   07/04/19 1135 118/68 97.4 °F (36.3 °C) -- 88 -- -- --   07/04/19 1100 (!) 107/59 -- -- 88 8 96 % --   07/04/19 1030 114/63 -- -- 87 10 98 % --   07/04/19 1015 110/66 -- -- 88 12 98 % --   07/04/19 1000 113/60 -- -- 88 12 97 % --   07/04/19 0945 (!) 108/56 -- -- 85 8 97 % --   07/04/19 0930 103/62 -- -- 89 16 100 % --   07/04/19 0915 (!) 108/59 -- -- 86 (!) 6 95 % --   07/04/19 0900 117/60 -- -- 87 15 100 % --   07/04/19 0845 119/63 -- -- 91 17 99 % --   07/04/19 0830 120/63 97.9 °F (36.6 °C) Oral 86 12 98 % --   07/04/19 0821 -- -- -- -- 17 100 % --     Intake / output   07/04 0701 - 07/05 0700  In: 1743.8 [P.O.:676; I.V.:697.8]  Out: 3070   Physical Exam:  Physical Exam   Constitutional: He is oriented to person, place, and time. No distress. HENT:   Head: Normocephalic and atraumatic. Mouth/Throat: Oropharynx is clear and moist. No oropharyngeal exudate. Eyes: Pupils are equal, round, and reactive to light. Conjunctivae and EOM are normal. No scleral icterus. Neck: No JVD present. No thyromegaly present. Cardiovascular: Normal rate, regular rhythm and normal heart sounds. No murmur heard. Pulmonary/Chest: Effort normal and breath sounds normal. He has no wheezes. He has no rales. Abdominal: Soft. He exhibits no mass. There is no tenderness. Musculoskeletal:   Right BKA. RUE fistula    Lymphadenopathy:     He has no cervical adenopathy. Neurological: He is alert and oriented to person, place, and time. Skin: He is not diaphoretic. Nursing note and vitals reviewed.     Lower Extremities : No ankle Edema , No calf Tenderness     Laboratory findings:    Recent Labs     07/03/19  0456 07/03/19 2000 07/04/19  0226 07/05/19  0543   WBC  --   --  10.4  --    HGB  --  7.6* 7.3*  --    HCT  --  26.1* 25.2*  --      --  240 265     Recent Labs     07/02/19  0914 07/04/19  0226    134*   K 5.2 4.5    97*   CO2 22 19*   GLUCOSE 161* 144*   BUN 58* 55*   CREATININE 5.82* 5.50*   CALCIUM 7.1* 7.7*     No results for input(s): PROT, LABALBU, LABA1C, S6NXMBK, H8JRFWP, FT4, TSH, AST, ALT, LDH,

## 2019-07-05 NOTE — PROGRESS NOTES
Writer spoke with Boubacar GREGORIO regarding patient status. She will follow up with PT/OT for notes to start precert as applicable. No therapy notes on chart at this time to initiate precertification.

## 2019-07-05 NOTE — PROGRESS NOTES
Component Value Date    LABCREA 93.8 08/29/2018     URINE EOSINOPHILS:   Lab Results   Component Value Date    UREO NONE SEEN 08/29/2018     URINE PROTEIN:    Lab Results   Component Value Date     08/08/2018     URINALYSIS:  U/A:   Lab Results   Component Value Date    NITRU NEGATIVE 07/03/2019    COLORU DARK YELLOW 07/03/2019    PHUR 7.0 07/03/2019    WBCUA 0 TO 2 07/03/2019    RBCUA 2 TO 5 07/03/2019    MUCUS NOT REPORTED 07/03/2019    TRICHOMONAS NOT REPORTED 07/03/2019    YEAST NOT REPORTED 07/03/2019    BACTERIA NOT REPORTED 07/03/2019    SPECGRAV 1.018 07/03/2019    LEUKOCYTESUR TRACE 07/03/2019    UROBILINOGEN Normal 07/03/2019    BILIRUBINUR NEGATIVE 07/03/2019    BILIRUBINUR NEGATIVE 12/24/2011    GLUCOSEU 1+ 07/03/2019    GLUCOSEU 2+ 12/24/2011    KETUA NEGATIVE 07/03/2019    AMORPHOUS NOT REPORTED 07/03/2019     ANTIGBM:  Lab Results   Component Value Date    GBMABIGG 17 08/08/2018         RADIOLOGY      Reviewed as available. ASSESSMENT    1 ESRD:  He is on maintenance hemodialysis on Tuesday and Thursday and Saturday, at the [facility] unit  under Dr. Aimee Martinez via right AV fistula  2. Essential hypertension with good control  3. Status post right below the knee amputation with revision with revision performed on 7/1/19  4. Anemia of chronic disease and blood loss   5. Hypotension coronary ICU transfer and vasopressors with no vasopressors pretty much off and blood pressure stabilized  6. Metabolic acidosis    PLAN      1. Hemodialysis tomorrow, minimal ultrafiltration  2. Continue to hold Coreg due to hypotension  3. Continued close hemodynamic monitoring  4. Stable for discharge from nephrology standpoint  5. Follow up labs ordered  Fluid restriction 1.5 L a day    Please do not hesitate to call with questions.     Electronically signed by Jean Gregg MD on 7/5/2019 at 12:58 PM

## 2019-07-06 NOTE — PROGRESS NOTES
rales.   Abdominal: Soft. He exhibits no mass. There is no tenderness. Musculoskeletal:   Right BKA. RUE fistula    Lymphadenopathy:     He has no cervical adenopathy. Neurological: He is alert and oriented to person, place, and time. Skin: He is not diaphoretic. Nursing note and vitals reviewed. Lower Extremities : No ankle Edema , No calf Tenderness     Laboratory findings:    Recent Labs     07/03/19 2000 07/04/19 0226 07/05/19  0543   WBC  --  10.4  --    HGB 7.6* 7.3*  --    HCT 26.1* 25.2*  --    PLT  --  240 265     Recent Labs     07/04/19 0226   *   K 4.5   CL 97*   CO2 19*   GLUCOSE 144*   BUN 55*   CREATININE 5.50*   CALCIUM 7.7*     No results for input(s): PROT, LABALBU, LABA1C, M8KOYBH, N9LVXXX, FT4, TSH, AST, ALT, LDH, GGT, ALKPHOS, BILITOT, BILIDIR, AMMONIA, AMYLASE, LIPASE, LACTATE, CHOL, HDL, LDLCHOLESTEROL, CHOLHDLRATIO, TRIG, VLDL, BNP, TROPONINI, CKTOTAL, CKMB, CKMBINDEX, RF, RUDOLPH in the last 72 hours. Specific Gravity, UA   Date Value Ref Range Status   07/03/2019 1.018 1.005 - 1.030 Final     Protein, UA   Date Value Ref Range Status   07/03/2019 3+ (A) NEGATIVE Final     RBC, UA   Date Value Ref Range Status   07/03/2019 2 TO 5 0 - 4 /HPF Final     Comment:     Reference range defined for non-centrifuged specimen. Bacteria, UA   Date Value Ref Range Status   07/03/2019 NOT REPORTED None Final     Nitrite, Urine   Date Value Ref Range Status   07/03/2019 NEGATIVE NEGATIVE Final     WBC, UA   Date Value Ref Range Status   07/03/2019 0 TO 2 0 - 5 /HPF Final     Leukocyte Esterase, Urine   Date Value Ref Range Status   07/03/2019 TRACE (A) NEGATIVE Final       Imaging / Clinical Data :-   No results found. Stress test 8/8/2018  1. Infarct of the anterior and apical wall junction. 2. No reversible perfusion defect to suggest reversible ischemia.    3. Enlarged left ventricular cavity with global hypokinesis.  Left   ventricular ejection fraction of 22%.  Findings can be seen with a dilated   cardiomyopathy. 4. EKG portion of the exam was uninterpretable according to the cardiologist.   Please see report for EKG portion of the examination which will be performed   separately by physician from cardiology. Risk stratification:  High risk           Clinical Course : stable  Assessment and Plan  :        1. Severe PVD with gangrene s/p angiogram 6/11/2019 with mechanical thrombectomy, discharged on Coumadin. Worsening Gangrene s/p  staged BKA. [facility] for  longterm anticoagulation. Pain control Stephanie / Morphine . PT OT   2. Shock  likely from medications vs adrenal insufficieny- antihypertensive discontinued . Off levophed. continue Midodrine . Zosyn started empirically , no signs of sepsis . Follow blood culture . If negative will stop antibiotics. Empirically on solucortef   3. ESRD on HD - Dialysis TTS schedule . 4. Controlled type 2 diabetes mellitus - Humalog as needed. Resume glipizide. 5. Paroxysmal  atrial fibrillation - in NSR. Xareltro    6. Essential hypertension - Having hypotension. Medications on hold . < midodrine. 7. Chronic systolic CHF - compensated. 8. Anemia of Chronic kidney disease - Transfuse if HGB < 7 . Discharge planning . pending arrangements . PT / OT . Wound care and dressing change per Vascular . PM&R following for possible IP   Rehab when stable . Continue to monitor vitals , Intake / output ,  Cell count , HGB , Kidney function, oxygenation  as indicated . Plan and updates discussed with patient ,  answers  explained to satisfaction.    Plan discussed with Staff  RN     (Please note that portions of this note were completed with a voice recognition program. Efforts were made to edit the dictations but occasionally words are mis-transcribed.)      Lisa Aguilera MD  7/6/2019

## 2019-07-06 NOTE — PLAN OF CARE
Problem: Falls - Risk of:  Goal: Will remain free from falls  Description  Will remain free from falls  7/6/2019 0044 by Brittaney Romero RN  Outcome: Ongoing  7/5/2019 1307 by Guillermo Arce RN  Outcome: Ongoing

## 2019-07-07 NOTE — PROGRESS NOTES
Division of Vascular Surgery             Progress Note      Name: Jeet Simmons  MRN: [id number]         Overnight Events:     None      Subjective:     Patient seen and examined at bedside this morning. No acute events overnight. Patient underwent dialysis yesterday, unfortunately the patient's dialysis catheter clotted per the dialysis nurse. Nephrology contacted vascular surgery for possible declotting of the fistula. Patient is refusing to have a Edinson catheter placed while his AV fistula is clotted. Patient is tolerating his diet. Patient's labs are within normal limits, slightly elevated potassium today. Patient has slight thrill over right AV fistula. Physical Exam:     Vitals:  /65   Pulse 101   Temp 97.9 °F (36.6 °C) (Oral)   Resp 16   Ht 6' (1.829 m)   Wt 200 lb 3.2 oz (90.8 kg)   SpO2 95%   BMI 27.15 kg/m²     General appearance - alert, well appearing and in no acute distress  Mental status - oriented to person, place and time with normal affect  Head - normocephalic and atraumatic  Neck - supple, no carotid bruits, thyroid not palpable, no JVD  Chest - clear to auscultation, normal effort  Heart - normal rate, regular rhythm, no murmurs  Abdomen - soft, non-tender, non-distended, bowel sounds present all four quadrants, no masses  Neurological - normal speech, no focal findings or movement disorder noted, cranial nerves II through XII grossly intact  Extremities - peripheral pulses palpable, no pedal edema or calf pain with palpation, slight thrill over right arm fistula site , right BKA stump flap with large area of desquamation of tissue with serous fluid draining. Skin - no gross lesions, rashes, or induration noted      Assessment:     3 52-year-old male postop day 9 status post right BKA revision      Plan:     1. Continue medical management per primary team  2. Pain control  3.   We will continue daily dressing changes to BKA stump site, new finding of desquamation

## 2019-07-07 NOTE — PLAN OF CARE
Problem: Risk for Impaired Skin Integrity  Goal: Tissue integrity - skin and mucous membranes  Description  Structural intactness and normal physiological function of skin and  mucous membranes. Outcome: Ongoing     Problem: Falls - Risk of:  Goal: Will remain free from falls  Description  Will remain free from falls  Outcome: Ongoing  Goal: Absence of physical injury  Description  Absence of physical injury  Outcome: Ongoing     Problem:  Activity:  Goal: Risk for activity intolerance will decrease  Description  Risk for activity intolerance will decrease  Outcome: Ongoing     Problem: Coping:  Goal: Ability to adjust to condition or change in health will improve  Description  Ability to adjust to condition or change in health will improve  Outcome: Ongoing     Problem: Health Behavior:  Goal: Ability to identify and utilize available resources and services will improve  Description  Ability to identify and utilize available resources and services will improve  Outcome: Ongoing  Goal: Ability to manage health-related needs will improve  Description  Ability to manage health-related needs will improve  Outcome: Ongoing     Problem: Metabolic:  Goal: Ability to maintain appropriate glucose levels will improve  Description  Ability to maintain appropriate glucose levels will improve  Outcome: Ongoing     Problem: Nutritional:  Goal: Maintenance of adequate nutrition will improve  Description  Maintenance of adequate nutrition will improve  Outcome: Ongoing  Goal: Progress toward achieving an optimal weight will improve  Description  Progress toward achieving an optimal weight will improve  Outcome: Ongoing     Problem: Tissue Perfusion:  Goal: Adequacy of tissue perfusion will improve  Description  Adequacy of tissue perfusion will improve  Outcome: Ongoing     Problem: Pain:  Goal: Pain level will decrease  Description  Pain level will decrease  Outcome: Ongoing  Goal: Control of acute pain  Description  Control of

## 2019-07-07 NOTE — PROGRESS NOTES
per day   ketorolac (ACULAR) 0.5 % ophthalmic solution 1 drop 4x Daily   docusate sodium (COLACE) capsule 100 mg Daily   rivaroxaban (XARELTO) tablet 15 mg Daily   doxercalciferol (HECTOROL) injection 1 mcg PRN   insulin lispro (HUMALOG) injection vial 0-18 Units TID WC   insulin lispro (HUMALOG) injection vial 0-9 Units Nightly   glucose (GLUTOSE) 40 % oral gel 15 g PRN   dextrose 50 % IV solution PRN   glucagon (rDNA) injection 1 mg PRN   dextrose 5 % solution PRN   aspirin EC tablet 81 mg Daily   clopidogrel (PLAVIX) tablet 75 mg Daily   tamsulosin (FLOMAX) capsule 0.4 mg Daily   acetaminophen (TYLENOL) tablet 650 mg Q4H PRN   ondansetron (ZOFRAN) injection 4 mg Q6H PRN   sodium chloride flush 0.9 % injection 10 mL 2 times per day   sodium chloride flush 0.9 % injection 10 mL PRN   acetaminophen (TYLENOL) tablet 1,000 mg Q8H         LABS      CBC:   Recent Labs     07/05/19  0543 07/06/19  0925 07/07/19  0549   WBC  --  17.4*  --    RBC  --  3.25*  --    HGB  --  8.2*  --    HCT  --  27.5*  --    MCV  --  84.6  --    MCH  --  25.2  --    MCHC  --  29.8  --    RDW  --  17.9*  --     309 270   MPV  --  11.9  --       BMP:   Recent Labs     07/06/19  0925 07/06/19  1850   * 132*   K 5.0 4.9   CL 92* 95*   CO2 18* 17*   BUN 72* 78*   CREATININE 5.61* 5.86*   GLUCOSE 327* 194*   CALCIUM 8.4* 8.4*        ASSESSMENT    1 ESRD: End-stage renal disease. Patient was not been able to receive his regular dialysis yesterday because of fistula malfunction. Patient refused Edinson catheter placement  2. Essential hypertension pressure is under good control excellent 3. Status post right below the knee amputation with revision with revision performed on 7/1/19  4. Anemia of chronic disease and blood loss   5. Hypotension: Blood pressure is under good control   6.  Metabolic acidosis: Again slight drop in bicarbonate probably due to missed dialysis and renal failure  PLAN      1.  IR consult for fistulogram in a.m.  2.  BMP today and in a.m.  3.  Dialysis in a.m. after fistulogram  Please do not hesitate to call with questions.     Electronically signed by Gelacio Rodriguez MD on 7/7/2019 at 12:33 PM

## 2019-07-07 NOTE — PROGRESS NOTES
(Right, 6/29/2019); and pr re-amputation lower leg (Right, 7/1/2019). Restrictions  Restrictions/Precautions  Restrictions/Precautions: Fall Risk  Required Braces or Orthoses?: Yes  Required Braces or Orthoses  Right Lower Extremity Brace: Knee Immobilizer(4 hours on/ 1 hour off.)  Position Activity Restriction  Other position/activity restrictions: Up w/assist  Subjective   General  Chart Reviewed: Yes  Response To Previous Treatment: Patient with no complaints from previous session. Family / Caregiver Present: Yes(brother, niece)  Subjective  Subjective: Pt resting in bed; agreeable to PT. Denies pain. Pt states he feels very weak  General Comment  Comments: Pt returned to bed after attempting to  [address] [place] [address] stedy; call light in reach, bed alarm activated. Pain Screening  Patient Currently in Pain: Denies  Vital Signs  Patient Currently in Pain: Denies       Orientation  Orientation  Overall Orientation Status: Within Normal Limits  Cognition      Objective   Bed mobility  Rolling to Left: Minimal assistance  Rolling to Right: Minimal assistance  Supine to Sit: Minimal assistance(required assist with BLEs)  Sit to Supine: Minimal assistance  Scooting: Minimal assistance  Comment: completed supine to sit with HOB elevated, required assist with upper body- increased time to complete due to weakness  Transfers  Sit to Stand: 2 Person Assistance;Maximum Assistance(completed twice with linnette stedy, elevated bed height)  Stand to sit: Moderate Assistance;2 Person Assistance  Ambulation  Ambulation?: No     Balance  Posture: Fair  Sitting - Static: Good  Sitting - Dynamic: Fair;+  Standing - Static: Poor(pt unable to stand >5'' with max Ax2 in linnette stedy, difficulty shifting weight anteriorly and unable to fully extend LLE)       Exercises: Total Hip Arthroplasty Exercise Program: Quad sets, glut sets, ankle pumps LLE only, heel slides, short arc quads, hip abduction slides.  Reps: 10 x each, AROM    Upper extremity exercises: Bicep curl, shoulder flexion/extension, punches, tricep curl, horizontal shoulder abduction/adduction. Reps: 10x each with green tband       Goals  Short term goals  Time Frame for Short term goals: 14 visits  Short term goal 1: Independent bed mobility  Short term goal 2: Lateral transfer to [facility] Min A  Short term goal 3: Sit<> stand with appropriate device Mod A. Short term goal 4: Pt to propel  ft SBA  Patient Goals   Patient goals : Go home. get prosthesis    Plan    Plan  Timesper week: 6-7x/week   Specific instructions for Next Treatment: slide board to w/c or attempt standing w/green lift walker  Safety Devices  Type of devices:  All fall risk precautions in place, Gait belt, Patient at risk for falls, Call light within reach, Left in bed, Bed alarm in place, Nurse notified  Restraints  Initially in place: No     Therapy Time   Individual Concurrent Group Co-treatment   Time In 0235         Time Out 0329         Minutes [address] [facility]

## 2019-07-07 NOTE — PROGRESS NOTES
Calin Grew CNP notified at [phone] of patient 1 of 2 positive blood culture, anti-fungal started, ID consult placed.  Dr. Hollace Litten notified at 2057 of positive culture, and anti-fungal.

## 2019-07-08 NOTE — PROGRESS NOTES
[facility]    Progress Note    7/8/2019    3:53 PM    Name:   Akiko Ugarte  MRN:     [id number]     Acct:      [de-identified]   Room:   [address] [place] [address] Day:  9  Admit Date:  6/29/2019  6:17 AM    PCP:   Brenda Raza MD  Code Status:  Full Code    Subjective:     C/C: Right leg gangrene    Interval History Status:  Fistulogram and declotting of fistula was done  Currently getting dialysis and is less fatigued  He is being evaluated for placement to acute rehab  Hemoglobin in the morning was 6.7 and repeat hemoglobin came 6.2, he is getting blood transfusion during dialysis today  Brief History:     Akiko Ugarte is 47 y.o. male  Who was admitted to the hospital on 6/29/2019 for treatment of Below knee amputation status, right ([address] Patient was admitted for surgical treatment of PVD and planned BKA right lower extremity. Patient has history of A. fib, CAD, CVA, ESRD on hemodialysis, hypertension, type 2 diabetes mellitus. Patient was recently admitted about 3 weeks ago for right foot gangrene. He underwent angiogram with mechanical thrombectomy and was discharged on Coumadin and Augmentin. Patient was seen in vascular surgery clinic for follow-up and was found to have worsening gangrene and recommended admission for amputation. Patient underwent Right leg below knee Guillotine amputation on 6/29/19 and had revision BKA on 7/1/19. Post Op course was complicated by acute hypotension on 7/3/19 and was transferred to ICU needing Pressor support and antihypertensive  medications were stopped. He was empirically started on IV antibiotics and Solu-cortef. Blood cultures were negative initially  , vanco was discontinued. He had final growth of Candida gibrata in blood culture . Patient's AV fistula clotted on 7/6/19, he was recommended dialysis catheter but refused. Also noted to have desquamation of stump incision .  He was started on

## 2019-07-08 NOTE — PROGRESS NOTES
Division of Vascular Surgery             Progress Note      Name: Jeet Simmons  MRN: [id number]         Overnight Events:     None     Subjective:     Pt seen and examined at bedside this morning. No acute events overnight. Afebrile. Pt with clotted right forearm AV fistula. Unable to undergo dialysis. Pt does not want sahara catheter to be placed. Pt to undergo declot this morning with IR. R BKA dressing changed, serous drainage from wound. Physical Exam:     Vitals:  /61   Pulse 94   Temp 97.8 °F (36.6 °C) (Oral)   Resp 12   Ht 6' (1.829 m)   Wt 201 lb 1.6 oz (91.2 kg)   SpO2 99%   BMI 27.27 kg/m²     General appearance - alert, well appearing and in no acute distress  Mental status - oriented to person, place and time with normal affect  Head - normocephalic and atraumatic  Neck - supple, no carotid bruits, thyroid not palpable, no JVD  Chest - clear to auscultation, normal effort  Heart - normal rate, regular rhythm, no murmurs  Abdomen - soft, non-tender, non-distended, bowel sounds present all four quadrants, no masses  Neurological - normal speech, no focal findings or movement disorder noted, cranial nerves II through XII grossly intact  Extremities - R BKA site with open wound with serous fluid drainage from the flap site, no bleeding noted, mild tenderness to palpation, staples intact, right forearm AV fistula with no palpable thrill. Skin - no gross lesions, rashes, or induration noted      Assessment:     3 52-year-old male postop day 10 status post right BKA revision, ESRD, clotted right forearm AV fistula       Plan:     1. Continue medical management per primary team.   2. Pain control  3. Daily dressing changes to right BKA stump site. 4. Pt to undergo declot with IR this morning. Recommend dialysis after declot. 5. PT/OT  6. Continue to do ROM of R BKA.          [address] [place] [address],[address] [place]: [phone]    Electronically signed by Atul Odonnell DO on 7/8/2019 at 6:51 AM

## 2019-07-08 NOTE — CARE COORDINATION
Transition planning:  Spoke with Es from [facility], she has started precert,still need OT

## 2019-07-08 NOTE — CONSULTS
Other Topics Concern    Not on file   Social History Narrative    Not on file       Family History:     Family History   Problem Relation Age of Onset    Diabetes Mother     Heart Disease Mother     High Blood Pressure Mother     Diabetes Brother     Heart Disease Father     Diabetes Father         Allergies:   Lipitor [atorvastatin] and Metformin and related     Review of Systems:     Review of Systems   Constitutional: Positive for activity change. Negative for appetite change. HENT: Negative for congestion. Eyes: Negative for pain. Respiratory: Negative for choking. Cardiovascular: Negative for chest pain. Gastrointestinal: Negative for abdominal distention. Endocrine: Negative for cold intolerance. Genitourinary: Negative for discharge. Musculoskeletal: Negative for arthralgias. Skin: Positive for wound (right leg stump). Allergic/Immunologic: Negative for food allergies. Neurological: Negative for dizziness. Hematological: Negative for adenopathy. Psychiatric/Behavioral: Negative for agitation.        Physical Examination :     Patient Vitals for the past 8 hrs:   BP Temp Temp src Pulse Resp SpO2 Weight   07/08/19 1201 114/61 -- -- 84 -- -- --   07/08/19 1131 133/70 -- -- 86 -- -- --   07/08/19 1101 128/70 -- -- 85 -- -- --   07/08/19 1042 131/74 98.2 °F (36.8 °C) -- 93 16 -- 196 lb 6.9 oz (89.1 kg)   07/08/19 1017 113/66 -- -- 92 17 98 % --   07/08/19 1013 111/68 -- -- 92 16 100 % --   07/08/19 1008 110/66 -- -- 92 14 100 % --   07/08/19 1004 112/67 -- -- 92 13 97 % --   07/08/19 0959 107/66 -- -- 93 25 99 % --   07/08/19 0956 109/65 -- -- 93 23 95 % --   07/08/19 0952 110/67 -- -- 93 22 -- --   07/08/19 0947 114/67 -- -- 93 24 95 % --   07/08/19 0942 115/66 -- -- 92 22 96 % --   07/08/19 0937 113/65 -- -- 93 19 99 % --   07/08/19 0932 111/66 -- -- 94 11 98 % --   07/08/19 0928 113/65 -- -- 93 16 99 % --   07/08/19 0923 111/64 -- -- 91 19 98 % --   07/08/19 0920 113/63 --

## 2019-07-08 NOTE — PROGRESS NOTES
Rcbrandon'd message from [address] [facility], [address] CM, after hours on Friday with request to initiate precert. Writer called [facility] to request updated OT notes and writer will then start precert for ARU. Pt is down for procedure and then will go to dialysis so OT is unable to see pt at this time. Precert initiated with pending auth #[id number]. Will send clinicals when OT notes are updated. [facility] notified.

## 2019-07-08 NOTE — PROGRESS NOTES
Pharmacy Note     Renal Dose Adjustment    Oseas Bates is a 47 y.o. male. Pharmacist assessment of renally cleared medications. Recent Labs     07/07/19  0549 07/08/19  0658   BUN 92* 118*       Recent Labs     07/07/19  0549 07/08/19  0658   CREATININE 6.37* 7.49*       Estimated Creatinine Clearance: 14 mL/min (A) (based on SCr of 7.49 mg/dL [facility])). Estimated CrCl using Ideal Body Weight: 12.6 mL/min (based on IBW 77.6 kg)    Height:   Ht Readings from Last 1 Encounters:   07/04/19 6' (1.829 m)     Weight:  Wt Readings from Last 1 Encounters:   07/08/19 210 lb 12.2 oz (95.6 kg)       The following medication dose has been adjusted based upon renal function per P&T Guidelines:             Pepcid 20mg bid decreased to 20 mg daily.     Parviz Schwab, Pharm D.  7/8/2019  2:55 PM

## 2019-07-09 NOTE — PROGRESS NOTES
08/08/2018       Urinalysis/Chemistries:      Lab Results   Component Value Date    NITRU NEGATIVE 07/03/2019    COLORU DARK YELLOW 07/03/2019    PHUR 7.0 07/03/2019    WBCUA 0 TO 2 07/03/2019    RBCUA 2 TO 5 07/03/2019    MUCUS NOT REPORTED 07/03/2019    TRICHOMONAS NOT REPORTED 07/03/2019    YEAST NOT REPORTED 07/03/2019    BACTERIA NOT REPORTED 07/03/2019    SPECGRAV 1.018 07/03/2019    LEUKOCYTESUR TRACE 07/03/2019    UROBILINOGEN Normal 07/03/2019    BILIRUBINUR NEGATIVE 07/03/2019    BILIRUBINUR NEGATIVE 12/24/2011    GLUCOSEU 1+ 07/03/2019    GLUCOSEU 2+ 12/24/2011    KETUA NEGATIVE 07/03/2019    AMORPHOUS NOT REPORTED 07/03/2019     Urine Sodium:     Lab Results   Component Value Date    FELI 31 08/28/2018     Urine Potassium:    Lab Results   Component Value Date    KUR 31.6 08/28/2018     Urine Chloride:    Lab Results   Component Value Date    CLUR <20 08/28/2018     Urine Osmolarity:   Lab Results   Component Value Date    OSMOU 652 12/24/2011      Urine Creatinine:     Lab Results   Component Value Date    LABCREA 93.8 08/29/2018       Radiology:     Reviewed. Assessment:     1 ESRD on HD as per TTS schedule at 1 3 hemodialysis unit using the right aVF under Dr. Harsh Dewitt. His dry weight is 86 kgs. Diabetes will need to be adjusted now after amputation. 2.HTN:  3 DM 2:  4. PVD and ischemic gangrene right foot- He does have right leg amputation BKA done on 6/29/2019 and now will get AKA in am; 7/10/2019.  5. AOCD  6. Secondary hypothyroidism  7. Candidemia    Plan:   1. Patient was seen on HD at bedside. Orders were confirmed with the HD nurse. 2. Continue Aranesp per protocol. 3. Antifungals per ID.  4. OR in a.m. for AK per vascular surgery. 5. Will follow. Nutrition   Please ensure that patient is on a renal diet/TF. Avoid nephrotoxic drugs/contrast exposure. We will continue to follow along with you. Josh Abarca MD  [facility]     This note is created with

## 2019-07-09 NOTE — PROGRESS NOTES
[facility]    Progress Note    7/9/2019    9:09 AM    Name:   Renea Dodd  MRN:     [id number]     Acct:      [de-identified]   Room:   [facility] Day:  10  Admit Date:  6/29/2019  6:17 AM    PCP:   Jose Miguel Altamirano MD  Code Status:  Full Code    Subjective:     C/C: Right leg gangrene    Interval History Status:  Fistulogram and declotting of fistula was done yesterday  Currently getting second dialysis today and is less fatigued  He is being evaluated for placement to acute rehab  Hemoglobin in the morning was 7.2, yesterday hemoglobin was 6.7 and repeat hemoglobin came 6.2, he got 1 unit blood transfusion yesterday  Brief History:     Renea Dodd is 47 y.o. male  Who was admitted to the hospital on 6/29/2019 for treatment of Below knee amputation status, right ([facility] [address] Patient was admitted for surgical treatment of PVD and planned BKA right lower extremity. Patient has history of A. fib, CAD, CVA, ESRD on hemodialysis, hypertension, type 2 diabetes mellitus. Patient was recently admitted about 3 weeks ago for right foot gangrene. He underwent angiogram with mechanical thrombectomy and was discharged on Coumadin and Augmentin. Patient was seen in vascular surgery clinic for follow-up and was found to have worsening gangrene and recommended admission for amputation. Patient underwent Right leg below knee Guillotine amputation on 6/29/19 and had revision BKA on 7/1/19. Post Op course was complicated by acute hypotension on 7/3/19 and was transferred to ICU needing Pressor support and antihypertensive  medications were stopped. He was empirically started on IV antibiotics and Solu-cortef. Blood cultures were negative initially  , vanco was discontinued. He had final growth of Candida gibrata in blood culture . Patient's AV fistula clotted on 7/6/19, he was recommended dialysis catheter but refused.  Also noted to have desquamation Neuropathy, No natural teeth, Poor historian, Renal insufficiency, mild, Wears glasses, and Wheelchair dependence. Social History:   reports that he has never smoked. He has never used smokeless tobacco. He reports that he does not drink alcohol or use drugs. Family History:   Family History   Problem Relation Age of Onset    Diabetes Mother     Heart Disease Mother     High Blood Pressure Mother     Diabetes Brother     Heart Disease Father     Diabetes Father        Vitals:  /67   Pulse 99   Temp 97.7 °F (36.5 °C) (Oral)   Resp 15   Ht 6' (1.829 m)   Wt 210 lb 12.2 oz (95.6 kg)   SpO2 100%   BMI 28.58 kg/m²   Temp (24hrs), Av.7 °F (36.5 °C), Min:97.2 °F (36.2 °C), Max:98.2 °F (36.8 °C)    Recent Labs     19  0813 19  1540 197 19  0646   POCGLU 234* 180* 195* 200*       I/O (24Hr): Intake/Output Summary (Last 24 hours) at 2019 0909  Last data filed at 2019 1438  Gross per 24 hour   Intake 500 ml   Output --   Net 500 ml       Labs:    Hematology:  Recent Labs     19  0925 19  0549 19  0658 19  0746 19  1118 19  0621   WBC 17.4*  --  18.8*  --   --  15.5*   HGB 8.2*  --  6.7*  --  6.2* 7.2*   HCT 27.5*  --  22.8*  --  20.8* 24.3*    270 238  --   --  277   INR  --   --   --  1.2  --   --      Chemistry:  Recent Labs     19  0549 19  0658 19  0621   * 134* 135   K 5.0 5.9* 5.1   CL 97* 98 99   CO2 16* 15* 18*   GLUCOSE 223* 258* 213*   BUN 92* 118* 62*   CREATININE 6.37* 7.49* 4.80*   MG  --  1.8  --    ANIONGAP 20* 21* 18*   LABGLOM 9* 8* 13*   GFRAA 11* 9* 16*   CALCIUM 7.8* 7.5* 8.1*     No results for input(s): PROT, LABALBU, LABA1C, H4BGGRY, V5RLLLR, FT4, TSH, AST, ALT, LDH, GGT, ALKPHOS, BILITOT, BILIDIR, AMMONIA, AMYLASE, LIPASE, LACTATE, CHOL, HDL, LDLCHOLESTEROL, CHOLHDLRATIO, TRIG, VLDL, PHENYTOIN, PHENYF in the last 72 hours.       Lab Results   Component Value Date/Time

## 2019-07-09 NOTE — PROGRESS NOTES
Physical Therapy  DATE: 2019    NAME: Jake Rosales  MRN: [id number]   : 1965    Patient not seen this date for Physical Therapy due to:  [] Blood transfusion in progress  [] Hemodialysis  [x]  Patient Declined  -  Pt states he just found out that he has to get a AKA. Pt's not in the mood for therapy. Will check on pt tomorrow. RN informed. [] Spine Precautions   [] Strict Bedrest  [] Surgery/ Procedure  [] Testing      [] Other        [] PT being discontinued at this time. Patient independent. No further needs. [] PT being discontinued at this time as the patient has been transferred to palliative care. No further needs.     Ziyad Avalos, PTA

## 2019-07-09 NOTE — PROGRESS NOTES
Occupational Therapy    Occupational Therapy Not Seen Note    DATE: 2019  Name: Ruth Chi  : 1965  MRN: [id number]    Patient not available for Occupational Therapy due to:    Patient Declined: Not feeling up to therapy today. \"I have to get my leg taken off again\". @1500pm    Next Scheduled Treatment: 7/10/19    Electronically signed by JONNA Cabrera on 2019 at 3:05 PM

## 2019-07-09 NOTE — PROGRESS NOTES
Called [facility] to determine status of precert. Spoke with Peng Mcallister @ [facility] who states precert is currently pending. Notified KAYLA Luther CM, that OT note is still needed for precert.

## 2019-07-10 NOTE — ANESTHESIA PRE PROCEDURE
100 mg  100 mg Oral Daily Jill Cantrell MD   100 mg at 07/06/19 1802    rivaroxaban (XARELTO) tablet 15 mg  15 mg Oral Daily Andres Ha, DO   Stopped at 07/09/19 1716    doxercalciferol (HECTOROL) injection 1 mcg  1 mcg Intravenous PRN Jose Randle MD   1 mcg at 07/09/19 1030    insulin lispro (HUMALOG) injection vial 0-18 Units  0-18 Units Subcutaneous TID WC Mirna Doloresco, DO   3 Units at 07/09/19 1719    insulin lispro (HUMALOG) injection vial 0-9 Units  0-9 Units Subcutaneous Nightly Mirna Doloresco, DO   5 Units at 07/09/19 2152    glucose (GLUTOSE) 40 % oral gel 15 g  15 g Oral PRN Mirna Doloresco, DO        dextrose 50 % IV solution  12.5 g Intravenous PRN Mirna Doloresco, DO        glucagon (rDNA) injection 1 mg  1 mg Intramuscular PRN Mirna Doloresco, DO        dextrose 5 % solution  100 mL/hr Intravenous PRN Mirna Doloresco, DO        aspirin EC tablet 81 mg  81 mg Oral Daily Mirna Doloresco, DO   81 mg at 07/09/19 1323    clopidogrel (PLAVIX) tablet 75 mg  75 mg Oral Daily Hang Mack, DO   Stopped at 07/08/19 0900    tamsulosin (FLOMAX) capsule 0.4 mg  0.4 mg Oral Daily Mirna Doloresco, DO   0.4 mg at 07/09/19 1323    acetaminophen (TYLENOL) tablet 650 mg  650 mg Oral Q4H PRN Mirna Doloresco, DO        ondansetron (ZOFRAN) injection 4 mg  4 mg Intravenous Q6H PRN Mirna Doloresco, DO   4 mg at 07/07/19 0042    sodium chloride flush 0.9 % injection 10 mL  10 mL Intravenous 2 times per day Sandie Holden, DO   10 mL at 07/07/19 1948    sodium chloride flush 0.9 % injection 10 mL  10 mL Intravenous PRN Mirna Doloresco, DO   10 mL at 07/02/19 1840    acetaminophen (TYLENOL) tablet 1,000 mg  1,000 mg Oral Q8H Mirna Doloresco, DO   1,000 mg at 07/09/19 0124       Allergies:     Allergies   Allergen Reactions    Lipitor [Atorvastatin] Diarrhea    Metformin And Related Nausea And Vomiting       Problem List:    Patient Active Problem List   Diagnosis Code    DM type 2 with diabetic peripheral Renal insufficiency, mild 2013    Wears glasses     Wheelchair dependence        Past Surgical History:        Procedure Laterality Date    CARDIAC CATHETERIZATION Bilateral 8/5/13    CATARACT REMOVAL Right     DIALYSIS FISTULA CREATION Right 1/16/2019    RIGHT ARM AV FISTULA CREATION performed by Alvaro Alves MD at [facility] Right 6/11/2019    LOWER LEG EMBOLECTOMY THROMBECTOMY performed by Alvaro Alves MD at [place] [address] Right     5TH DIGIT    [facility]BR[facility]     LEG AMPUTATION BELOW KNEE Right 06/29/2019    LEG AMPUTATION BELOW KNEE Right 6/29/2019    BELOW KNEE GUILLOTINE AMPUTATION performed by Alvaro Alves MD at [address] [place] [address] Right 7/1/2019    RIGHT LEG AMPUTATION REVISION performed by Alvaro Alves MD at [facility] Left 8/19/2015    Arthrodesis IP Joint Hallux    TUNNELED VENOUS CATHETER PLACEMENT  08/2018    RIGHT CHEST PERM CATH    VASCULAR SURGERY Right     [facility]       Social History:    Social History     Tobacco Use    Smoking status: Never Smoker    Smokeless tobacco: Never Used   Substance Use Topics    Alcohol use: No     Alcohol/week: 0.0 oz                                Counseling given: Not Answered      Vital Signs (Current):   Vitals:    07/09/19 1140 07/09/19 1145 07/09/19 1946 07/09/19 2355   BP: 119/67 118/64 (!) 100/51 (!) 117/56   Pulse: 94 103 95 94   Resp:  16 14 18   Temp:  97.3 °F (36.3 °C) 97.6 °F (36.4 °C) 97.7 °F (36.5 °C)   TempSrc:   Oral Oral   SpO2:   100% 98%   Weight:  187 lb 6.3 oz (85 kg)     Height:                                                  BP Readings from Last 3 Encounters:   07/09/19 (!) 117/56   07/01/19 107/62   06/29/19 94/61       NPO Status: Time of last liquid consumption: 2130                        Time of last solid consumption: 2130

## 2019-07-10 NOTE — FLOWSHEET NOTE
[facility] resident paged at [phone], no response. Dr Beena reynolds served at [phone], received call back from Dr Beena Lazo, he stated to call Dr Jasvir Griffin. Called cath lab, updated Dr Jasvir Griffin that unable to obtain doppler pulses in left foot, elevated troponin, blood order clarified, received ok for transfer to ICU as ordered by [address] residents.    1520-Dr Landa at bedside to assess pt, orders received

## 2019-07-10 NOTE — PROGRESS NOTES
Physical Therapy  DATE: 7/10/2019    NAME: Precious Covarrubias  MRN: [id number]   : 1965    Patient not seen this date for Physical Therapy due to:  [] Blood transfusion in progress  [] Hemodialysis  []  Patient Declined  [] Spine Precautions   [] Strict Bedrest  [x] Surgery  -  R AKA. Will check on pt tomorrow. [] Testing      [] Other        [] PT being discontinued at this time. Patient independent. No further needs. [] PT being discontinued at this time as the patient has been transferred to palliative care. No further needs.     Cristiana Wynn, PTA

## 2019-07-10 NOTE — PROGRESS NOTES
Neuropathy, No natural teeth, Poor historian, Renal insufficiency, mild, Wears glasses, and Wheelchair dependence. Social History:   reports that he has never smoked. He has never used smokeless tobacco. He reports that he does not drink alcohol or use drugs. Family History:   Family History   Problem Relation Age of Onset    Diabetes Mother     Heart Disease Mother     High Blood Pressure Mother     Diabetes Brother     Heart Disease Father     Diabetes Father        Vitals:  BP (!) 117/56   Pulse 94   Temp 97.7 °F (36.5 °C) (Oral)   Resp 18   Ht 6' (1.829 m)   Wt 187 lb 6.3 oz (85 kg)   SpO2 98%   BMI 25.41 kg/m²   Temp (24hrs), Av.5 °F (36.4 °C), Min:97.3 °F (36.3 °C), Max:97.7 °F (36.5 °C)    Recent Labs     19  1240 19  1646 07/09/19  2027 07/10/19  0814   POCGLU 158* 161* 262* 233*       I/O (24Hr):     Intake/Output Summary (Last 24 hours) at 7/10/2019 09  Last data filed at 2019 2144  Gross per 24 hour   Intake --   Output 2500 ml   Net -2500 ml       Labs:    Hematology:  Recent Labs     19  0658 19  0746 19  1118 19  0621   WBC 18.8*  --   --  15.5*   HGB 6.7*  --  6.2* 7.2*   HCT 22.8*  --  20.8* 24.3*     --   --  277   INR  --  1.2  --   --      Chemistry:  Recent Labs     19  0658 19  0621   * 135   K 5.9* 5.1   CL 98 99   CO2 15* 18*   GLUCOSE 258* 213*   * 62*   CREATININE 7.49* 4.80*   MG 1.8  --    ANIONGAP 21* 18*   LABGLOM 8* 13*   GFRAA 9* 16*   CALCIUM 7.5* 8.1*     Recent Labs     07/10/19  0614   PROT 6.2*   LABALBU 2.4*   AST 10   ALT <5*   ALKPHOS 79   BILITOT 0.34   BILIDIR 0.18         Lab Results   Component Value Date/Time    SPECIAL L AC 4ML 2019 06:58 AM    SPECIAL L FA 3ML 2019 06:58 AM     Lab Results   Component Value Date/Time    CULTURE NO GROWTH 2 DAYS 2019 06:58 AM    CULTURE NO GROWTH 2 DAYS 2019 06:58 AM       No results found for: POCPH, PHART, PH,

## 2019-07-10 NOTE — CONSULTS
7/10/19      Tolerating the diflucan  Repeat blood culture 7/8/19 are still negative  WBC 17-18-15 -12  Better    Post AVF balloon angioplasty 7/8/19 and was used 7/8/19 wo issues - not red or inflamed. I have personally reviewed the past medical history, past surgical history, medications, social history, and family history, and I haveupdated the database accordingly.   Past Medical History:     Past Medical History:   Diagnosis Date    A-Fib     Eliquis    Anemia     CAD     Dr. Driver [place] Carotid artery stenosis     BILATERAL    Cerebral artery occlusion with cerebral infarction ([facility] [address] 11/2017    RIGHT SIDED WEAKNESS    CHF 2013    Diabetes Mellitus Type 2     diet controlled    ESRD     RUE Fistula     Hemodialysis patient 08/2018    tues/thurs/sat/ [facility]  [place]/ Pt. doesn't know  Nephrologist name/ access Left chest       Hyperlipidemia     PCP Dr. Noni Fry Hypertension     Nephropathy     Neuropathy     No natural teeth     Poor historian     Renal insufficiency, mild 2013    Wears glasses     Wheelchair dependence        Past Surgical  History:     Past Surgical History:   Procedure Laterality Date    CARDIAC CATHETERIZATION Bilateral 8/5/13    CATARACT REMOVAL Right     DIALYSIS FISTULA CREATION Right 1/16/2019    RIGHT ARM AV FISTULA CREATION performed by Norm [facility]ikh MD at [facility] Right 6/11/2019    LOWER LEG EMBOLECTOMY THROMBECTOMY performed by Norm Parikh MD at [place] [address] Right     5TH DIGIT   [facility] Left 7/2015    [facility]     LEG AMPUTATION BELOW KNEE Right 06/29/2019    LEG AMPUTATION BELOW KNEE Right 6/29/2019    BELOW KNEE GUILLOTINE AMPUTATION performed by Norm Parikh MD at [address] [place] [address] Right 7/1/2019    RIGHT LEG AMPUTATION REVISION performed by Norm Parikh MD at [address] [place] [address] Genitourinary:   Genitourinary Comments: No mendez   Musculoskeletal: He exhibits deformity (right BKA). He exhibits no edema. Neurological: He is alert. On the vent   Skin: Skin is warm and dry. No rash noted. There is erythema. Left leg AKA covered   Psychiatric:   Eyes open on the vent         Medical Decision Making:   I have independently reviewed/ordered the following labs:    CBC with Differential:   Recent Labs     07/09/19  0621 07/10/19  0928 07/10/19  1253   WBC 15.5* 12.9*  --    HGB 7.2* 7.4* 7.1   HCT 24.3* 25.7* 22.2    279  --      BMP:  Recent Labs     07/08/19  0658 07/09/19  0621 07/10/19  0614 07/10/19  1253 07/10/19  1355   * 135 131* 135*  --    K 5.9* 5.1 4.6 4.2  --    CL 98 99 94*  --   --    CO2 15* 18* 18*  --   --    * 62* 49*  --   --    CREATININE 7.49* 4.80* 3.69*  --  4.07*   MG 1.8  --   --   --   --      Hepatic Function Panel:   Recent Labs     07/10/19  0614   PROT 6.2*   LABALBU 2.4*   BILIDIR 0.18   IBILI 0.16   BILITOT 0.34   ALKPHOS 79   ALT <5*   AST 10     No results for input(s): RPR in the last 72 hours. No results for input(s): HIV in the last 72 hours. No results for input(s): BC in the last 72 hours. Lab Results   Component Value Date    CREATININE 4.07 07/10/2019    CREATININE 3.69 07/10/2019    GLUCOSE 251 07/10/2019    GLUCOSE 229 02/21/2012       Detailed results: Thank you for allowing us to participate in the care of this patient. Please call with questions. This note is created with the assistance of a speech recognition program.  While intending to generate adocument that actually reflects the content of the visit, the document can still have some errors including those of syntax and sound a like substitutions which may escape proof reading. It such instances, actual meaningcan be extrapolated by contextual diversion.     Nam De La Rosa MD  Office: [phone]  Perfect serve / office [phone]

## 2019-07-10 NOTE — H&P
Critical Care - History and Physical Examination    Patient's name:  Paola Buchanan  Medical Record Number: [id number]  Patient's account/billing number: [de-identified]  Patient's YOB: 1965  Age: 47 y.o. Date of Admission: 6/29/2019  6:17 AM  Date of History and Physical Examination: 7/10/2019      Primary Care Physician: Celena Melendez MD  Attending Physician: Dr. Afshan Murphy    Code Status: Full Code    Chief complaint: cardiac arrest    HISTORY OF PRESENT ILLNESS:   History was obtained from chart review. Paola Buchanan is a 47 y.o. male with significant past medical history of atrial fibrillation, coronary artery disease, congestive heart failure, diabetes, peripheral artery disease, end-stage renal disease on hemodialysis, hypertension, hyperlipidemia, and recent RLE amputation. Patient was recently admitted to the [facility] medical ICU on 7/3/2019 for acute toxic metabolic encephalopathy secondary to shock. There was concern at the time for septic versus cardiogenic shock. Patient's hypotension improved with Solu-Cortef, levophed and Zosyn and patient was transferred back to the floor. On 7/10/2019, patient was taken back to the OR for revision of right below-knee amputation for conversion to right above-knee amputation. Shortly after intubation in the OR, patient was noted to be in PEA rhythm per anesthesia. CPR was initiated, patient required cardioversion x2 due to V. fib rhythm, and pulses noted after approximately 15 minutes of CPR. Postoperatively, patient was transported to cardiac ICU. Shortly thereafter, patient was transferred to medical ICU for further medical management. Upon arrival to ICU, patient was severely hypotensive and on levophed through peripheral line. Fluid bolus was administered and patient's hypotension improved. A left central venous catheter was placed and labs were drawn. Patient's hemoglobin was found to be 6.3 and 2 units of PRBC were ordered. Onset    Diabetes Mother     Heart Disease Mother     High Blood Pressure Mother     Diabetes Brother     Heart Disease Father     Diabetes Father            REVIEW OF SYSTEMS (ROS):  Review of Systems -unable to obtain ROS secondary to acuity of condition    Physical Exam:    Vitals: BP (!) 74/53   Pulse 87   Temp 97.7 °F (36.5 °C) (Axillary)   Resp 16   Ht 6' (1.829 m)   Wt 187 lb 6.3 oz (85 kg)   SpO2 100%   BMI 25.41 kg/m²     Last Body weight:   Wt Readings from Last 3 Encounters:   07/09/19 187 lb 6.3 oz (85 kg)   06/28/19 188 lb 11.4 oz (85.6 kg)   06/13/19 189 lb 6 oz (85.9 kg)       Body Mass Index : Body mass index is 25.41 kg/m². PHYSICAL EXAMINATION :  Constitutional: Appears acutely ill, intubated, unresponsive, not following commands  EENT: Pupils equal, minimally responsive to light. Nonpurposeful eye blinking  Neck: Supple, symmetrical, trachea midline  Respiratory: Intubated, clear to auscultation bilaterally, mild diminished breath sounds  Cardiovascular: regular rate and rhythm, normal S1, S2, no murmur appreciated.   Hypotension  Abdomen: soft, nondistended, no masses or organomegaly  Extremities:  peripheral pulses palpable, right AKA, left femoral arterial and central venous lines in place    Any additional physical findings:    VENT SETTINGS (Comprehensive) (if applicable):  Vent Information  $Ventilation: $Initial Day  Ventilator Started: Yes  Ventilation Day(s): 1  Skin Assessment: Clean, dry, & intact  Vent Type: Servo i  Vent Mode: PRVC  Vt Ordered: 620 mL  Rate Set: 16 bmp  FiO2 : 35 %  Sensitivity: 5  PEEP/CPAP: 5  I Time/ I Time %: 0.95 s  Cuff Pressure (cm H2O): 20 cm H2O  Humidification Source: HME  Nitric Oxide/Epoprostenol In Use?: No  Additional Respiratory  Assessments  Pulse: 87  Resp: 16  SpO2: 100 %  End Tidal CO2: 28 (%)  Position: Semi-Rangel's  Humidification Source: HME  Oral Care Completed?: Yes  Oral Care: Mouth moisturizer, Mouthwash, Mouth suctioned, Suction toothette  Subglottic Suction Done?: Yes  Cuff Pressure (cm H2O): 20 cm H2O  Skin barrier applied: No    Laboratory findings:-    CBC:   Recent Labs     07/10/19  2043   WBC 25.7*   HGB 6.3*        BMP:    Recent Labs     07/10/19  0614  07/10/19  1355 07/10/19  2043   *   < >  --  134*   K 4.6   < >  --  5.4*   CL 94*  --   --  98   CO2 18*  --   --  15*   BUN 49*  --   --  57*   CREATININE 3.69*  --  4.07* 4.50*   GLUCOSE 251*  --   --  338*    < > = values in this interval not displayed. S. Calcium:  Recent Labs     07/10/19  2043   CALCIUM 7.8*     S. Ionized Calcium:No results for input(s): IONCA in the last 72 hours. S. Magnesium:  Recent Labs     07/08/19  0658   MG 1.8     S. Phosphorus:No results for input(s): PHOS in the last 72 hours. S. Glucose:  Recent Labs     07/10/19  1355 07/10/19  1550 07/10/19  2146   POCGLU 382* 343* 333*     Glycosylated hemoglobin A1C: No results for input(s): LABA1C in the last 72 hours. INR:   Recent Labs     07/08/19  0746   INR 1.2     Hepatic functions:   Recent Labs     07/10/19  0614   ALKPHOS 79   ALT <5*   AST 10   PROT 6.2*   BILITOT 0.34   BILIDIR 0.18   LABALBU 2.4*     Pancreatic functions:No results for input(s): LACTA, AMYLASE in the last 72 hours. S. Lactic Acid: No results for input(s): LACTA in the last 72 hours. Cardiac enzymes:  Recent Labs     07/10/19  1407   CKTOTAL 228   CKMB 9.2     BNP:No results for input(s): BNP in the last 72 hours. Lipid profile: No results for input(s): CHOL, TRIG, HDL, LDLCALC in the last 72 hours.     Invalid input(s): LDL  Blood Gases: No results found for: PH, PCO2, PO2, HCO3, O2SAT  Thyroid functions:   Lab Results   Component Value Date    TSH 3.72 10/14/2015        Urinalysis:     Microbiology:    Cultures during this admission:     Blood cultures:                 [] None drawn      [] Negative             [x]  Positive (Details:Candida glabrata)  Urine Culture:                   [x] None drawn

## 2019-07-11 NOTE — CONSULTS
daily ) 60 tablet 0    glipiZIDE (GLUCOTROL XL) 2.5 MG extended release tablet Take 1 tablet by mouth daily 30 tablet 3    Blood Glucose Monitoring Suppl (ONE TOUCH ULTRA 2) w/Device KIT       aspirin EC 81 MG EC tablet Take 1 tablet by mouth daily 30 tablet 2    losartan (COZAAR) 50 MG tablet TAKE ONE TABLET BY MOUTH ONCE DAILY (Patient not taking: Reported on 6/29/2019) 30 tablet 11    clopidogrel (PLAVIX) 75 MG tablet Take 1 tablet by mouth daily 30 tablet 3       Data         BP 95/64   Pulse 101   Temp 100.9 °F (38.3 °C) (Core)   Resp 30   Ht 6' (1.829 m)   Wt 187 lb 6.3 oz (85 kg)   SpO2 100%   BMI 25.41 kg/m²      Wt Readings from Last 3 Encounters:   07/09/19 187 lb 6.3 oz (85 kg)   06/28/19 188 lb 11.4 oz (85.6 kg)   06/13/19 189 lb 6 oz (85.9 kg)        Code Status: Full Code     ADVANCED CARE PLANNING:  Patient has capacity for medical decisions: no  Health Care Power of : no  Living Will: no     Personal, Social, and Family History  Marital Status: single  Living situation:alone  Does patient understand diagnosis/treatment? no  Does caregiver understand diagnosis/treatment? yes      Assessment        REVIEW OF SYSTEMS:    ROS unable to be done, patient intubated. PHYSICAL ASSESSMENT:  Constitutional: Intubated, unresponsive, ill-appearing  Head: Normocephalic and atraumatic. Eyes: EOM are normal. Pupils are equal, round   Neck: Normal range of motion. Neck supple. No tracheal deviation present. Cardiovascular: Normal rate and regular rhythm, S1, S2, no murmur   Pulmonary/Chest: On mechanical ventilator, no rales or wheezes. Abdomen: Soft. No tenderness, not distended, no ascites, no organomegaly   Musculoskeletal: Normal range of motion. No edema lower ext.    Neurological: Unresponsive, not following commands  Skin: Normal turgor, no bleeding, no bruising     Palliative Performance Scale:  ___60%  Ambulation reduced; Significant disease; Can't do hobbies/housework; intake

## 2019-07-11 NOTE — PROGRESS NOTES
normal  Respiratory: Intubated, diminished breath sounds bilaterally, tachypneic  Cardiovascular: regular rate and rhythm, normal S1, S2, no murmur appreciated  Abdomen: soft, nondistended, no masses or organomegaly  Extremities:  peripheral pulses palpable in bilateral upper extremities, left lower extremity DP/PT difficult to palpate, no pedal edema, no clubbing or cyanosis    MEDICATIONS:    Scheduled Meds:   insulin regular  10 Units Subcutaneous Once    insulin lispro  0-18 Units Subcutaneous Q4H    insulin regular  10 Units Intravenous Once    dextrose  25 g Intravenous Once    sodium bicarbonate  50 mEq Intravenous Once    calcium gluconate IVPB  2 g Intravenous Once    midazolam  2 mg Intravenous Once    sodium chloride  250 mL Intravenous Once    famotidine (PEPCID) injection  20 mg Intravenous Daily    ketorolac  1 drop Left Eye 4x Daily    prednisoLONE acetate  1 drop Left Eye 4 times per day    sodium chloride  250 mL Intravenous Once    sodium chloride  250 mL Intravenous Once    predniSONE  20 mg Oral Daily    fluconazole  200 mg Intravenous Q24H    gabapentin  100 mg Oral TID    docusate sodium  100 mg Oral Daily    aspirin EC  81 mg Oral Daily    clopidogrel  75 mg Oral Daily    tamsulosin  0.4 mg Oral Daily    sodium chloride flush  10 mL Intravenous 2 times per day     Continuous Infusions:   norepinephrine 25 mcg/min (07/11/19 0427)    heparin (porcine) 12 Units/kg/hr (07/10/19 2206)    vasopressin (Septic Shock) infusion 0.04 Units/min (07/11/19 0602)    heparin (porcine)      dextrose       PRN Meds:     magnesium sulfate 1 g PRN   dextrose 25 g PRN   AKWA TEARS  Nightly PRN   fentanNYL 50 mcg Q1H PRN   oxyCODONE-acetaminophen 1 tablet Q4H PRN   Or     oxyCODONE-acetaminophen 2 tablet Q4H PRN   midodrine 10 mg TID PRN   magnesium hydroxide 30 mL Daily PRN   doxercalciferol 1 mcg PRN   glucose 15 g PRN   dextrose 12.5 g PRN   glucagon (rDNA) 1 mg PRN   dextrose 100 mL/hr

## 2019-07-11 NOTE — PROGRESS NOTES
Physical Therapy  DATE: 2019    NAME: Jake Rosales  MRN: [id number]   : 1965    Patient not seen this date for Physical Therapy due to:  [] Blood transfusion in progress  [] Hemodialysis  []  Patient Declined  [] Spine Precautions   [] Strict Bedrest  [] Surgery/ Procedure  [] Testing      [x] Other: Per RN, not medically appropriate this date. Pt will require re-evaluation d/t change in status. PT will check back as time allows 19. [] PT being discontinued at this time. Patient independent. No further needs. [] PT being discontinued at this time as the patient has been transferred to palliative care. No further needs.     Junior Miles, PT

## 2019-07-11 NOTE — CONSULTS
I agree with the assessment, plan and orders as documented by the resident With changes made to the note.      Electronically signed by Celena Dudley MD on 7/11/2019 at 7:54 PM.    [facility]      [phone]

## 2019-07-11 NOTE — PROGRESS NOTES
Head: Normocephalic and atraumatic. Mouth/Throat: No oropharyngeal exudate. Eyes: Pupils are equal, round, and reactive to light. Conjunctivae are normal. No scleral icterus. Neck: No JVD present. No thyromegaly present. Cardiovascular: Normal rate, regular rhythm and normal heart sounds. No murmur heard. Pulmonary/Chest: Effort normal and breath sounds normal. He is intubated. He has no wheezes. He has no rales. Abdominal: Soft. He exhibits no mass. There is no tenderness. Musculoskeletal:   Right BKA. RUE fistula    Lymphadenopathy:     He has no cervical adenopathy. Neurological: He is unresponsive. Skin: He is not diaphoretic. Nursing note and vitals reviewed. Lower Extremities : No ankle Edema , No calf Tenderness     Laboratory findings:    Recent Labs     07/08/19  0746  07/10/19  0928 07/10/19  1253 07/10/19  2043 07/11/19  0426 07/11/19  0427   WBC  --    < > 12.9*  --  25.7*  --  39.6*   HGB  --    < > 7.4* 7.1 6.3*  --  8.2*   HCT  --    < > 25.7* 22.2 20.7*  --  26.1*   PLT  --    < > 279  --  360  --  331   INR 1.2  --   --   --   --  2.1  --     < > = values in this interval not displayed. Recent Labs     07/10/19  1253  07/10/19  2043 07/11/19  0007 07/11/19 0427   *  --  134* 131* 137   K 4.2  --  5.4* 6.2* 6.1*   CL  --   --  98 95* 99   CO2  --   --  15* 12* 10*   GLUCOSE  --   --  338* 316* 247*   BUN  --   --  57* 57* 59*   CREATININE  --    < > 4.50* 4.78* 5.08*   MG  --   --   --  1.9  --    CALCIUM  --   --  7.8* 7.8* 7.8*   CAION 1.08*  --   --  0.95*  --     < > = values in this interval not displayed.      Recent Labs     07/10/19  0614 07/10/19  1407 07/11/19  0007   PROT 6.2*  --  5.6*   LABALBU 2.4*  --  2.5*   AST 10  --  1,061*   ALT <5*  --  510*   ALKPHOS 79  --  84   BILITOT 0.34  --  0.86   BILIDIR 0.18  --   --    CKTOTAL  --  228  --    CKMB  --  9.2  --           Specific Gravity, UA   Date Value Ref Range Status   07/03/2019 1.018 1.005 -

## 2019-07-11 NOTE — PROGRESS NOTES
Results   Component Value Date    C3 119 08/08/2018     C4:     Lab Results   Component Value Date    C4 30 08/08/2018     MPO ANCA:     Lab Results   Component Value Date    MPO 25 08/08/2018     PR3 ANCA:     Lab Results   Component Value Date    PR3 20 08/08/2018     Anti-GBM:     Lab Results   Component Value Date    GBMABIGG 17 08/08/2018     Hep BsAg:         Lab Results   Component Value Date    HEPBSAG NONREACTIVE 11/09/2018     Hep C AB:          Lab Results   Component Value Date    HEPCAB NONREACTIVE 08/08/2018       Urinalysis/Chemistries:      Lab Results   Component Value Date    NITRU NEGATIVE 07/03/2019    COLORU DARK YELLOW 07/03/2019    PHUR 7.0 07/03/2019    WBCUA 0 TO 2 07/03/2019    RBCUA 2 TO 5 07/03/2019    MUCUS NOT REPORTED 07/03/2019    TRICHOMONAS NOT REPORTED 07/03/2019    YEAST NOT REPORTED 07/03/2019    BACTERIA NOT REPORTED 07/03/2019    SPECGRAV 1.018 07/03/2019    LEUKOCYTESUR TRACE 07/03/2019    UROBILINOGEN Normal 07/03/2019    BILIRUBINUR NEGATIVE 07/03/2019    BILIRUBINUR NEGATIVE 12/24/2011    GLUCOSEU 1+ 07/03/2019    GLUCOSEU 2+ 12/24/2011    KETUA NEGATIVE 07/03/2019    AMORPHOUS NOT REPORTED 07/03/2019     Urine Sodium:     Lab Results   Component Value Date    FELI 31 08/28/2018     Urine Potassium:    Lab Results   Component Value Date    KUR 31.6 08/28/2018     Urine Chloride:    Lab Results   Component Value Date    CLUR <20 08/28/2018     Urine Osmolarity:   Lab Results   Component Value Date    OSMOU 652 12/24/2011     Urine Protein:   No components found for: TOTALPROTEIN, URINE   Urine Creatinine:     Lab Results   Component Value Date    LABCREA 93.8 08/29/2018     Urine Eosinophils:  No components found for: UEOS    Radiology:     CXR:     Assessment:     1. ESRD on HD TTS at the [address] [place] [address] unit via Rt UE AVF under Da starks  2. Septic/ SIRS shock on 3 pressors with significant hemodynamic compromise  3. Anion gap metabolic acidosis and resp alkalosis  4.

## 2019-07-12 NOTE — PROGRESS NOTES
[x] No                [] Yes  (C. Difficile status: [] positive                                                                                                                       [] negative                                                                                                                     [] pending)    VASOPRESSORS:  [] No    [x] Yes    If yes -   [x] Levophed       [] Dopamine     [] Vasopressin       [] Dobutamine  [] Phenylephrine         [] Epinephrine    CENTRAL LINES:     [] No   [x] Yes   (Date of Insertion:   )           If yes -     [] Right IJ     [] Left IJ [] Right Femoral [] Left Femoral                   [] Right Subclavian [] Left Subclavian       ALCALA'S CATHETER:   [] No   [x] Yes  (Date of Insertion:   )     URINE OUTPUT:            [] Good   [] Low              [] Anuric      OBJECTIVE:     VITAL SIGNS:  /62   Pulse 106   Temp 98.2 °F (36.8 °C) (Oral)   Resp 24   Ht 6' (1.829 m)   Wt 187 lb 6.3 oz (85 kg)   SpO2 100%   BMI 25.41 kg/m²   Tmax over 24 hours:  Temp (24hrs), Av.1 °F (37.3 °C), Min:98.2 °F (36.8 °C), Max:100.9 °F (38.3 °C)      Patient Vitals for the past 8 hrs:   BP Temp Temp src Pulse Resp SpO2   19 0726 -- -- -- 106 24 100 %   19 0700 -- -- -- 106 25 100 %   19 0645 -- -- -- 105 22 100 %   19 0630 -- -- -- 105 26 --   19 0615 -- -- -- 105 25 --   19 0515 -- -- -- 104 23 --   19 0500 -- -- -- 104 23 --   19 0445 -- -- -- 102 25 --   19 0430 -- -- -- 102 22 --   19 0415 -- -- -- 101 23 --   19 0400 117/62 -- -- 102 23 --   19 0345 -- -- -- 104 28 99 %   19 0330 -- -- -- 104 29 --   19 0315 -- -- -- 103 26 --   19 0300 99/63 -- -- 101 28 100 %   19 0245 -- -- -- 102 28 --   19 0230 -- -- -- 92 20 --   19 0215 -- -- -- 104 24 100 %   19 0200 109/65 -- -- 104 29 100 %   19 0145 -- -- -- 103 29 100 %   19 0130 -- -- -- 106 29 Left Eye 4x Daily    prednisoLONE acetate  1 drop Left Eye 4 times per day    gabapentin  100 mg Oral TID    docusate sodium  100 mg Oral Daily    aspirin EC  81 mg Oral Daily    tamsulosin  0.4 mg Oral Daily    sodium chloride flush  10 mL Intravenous 2 times per day     Continuous Infusions:   pantoprozole (PROTONIX) infusion 8 mg/hr (07/12/19 0353)    [Held by provider] heparin (porcine) 12 Units/kg/hr (07/11/19 0848)    phenylephrine (JOHNIE-SYNEPHRINE) 50mg/250mL infusion Stopped (07/12/19 0615)    sodium bicarbonate infusion 100 mL/hr at 07/12/19 0356    prismaSATE BK 0/3.5 1,500 mL/hr (07/12/19 0126)    EPINEPHrine infusion (double concentration) Stopped (07/11/19 2355)    norepinephrine (LEVOPHED) infusion (double concentration) 30 mcg/min (07/12/19 0130)    vasopressin (Septic Shock) infusion Stopped (07/12/19 0657)    dextrose       PRN Meds:     dextrose 25 g PRN   AKWA TEARS  Nightly PRN   fentanNYL 50 mcg Q1H PRN   oxyCODONE-acetaminophen 1 tablet Q4H PRN   Or     oxyCODONE-acetaminophen 2 tablet Q4H PRN   midodrine 10 mg TID PRN   magnesium hydroxide 30 mL Daily PRN   doxercalciferol 1 mcg PRN   glucose 15 g PRN   dextrose 12.5 g PRN   glucagon (rDNA) 1 mg PRN   dextrose 100 mL/hr PRN   ondansetron 4 mg Q6H PRN   sodium chloride flush 10 mL PRN       SUPPORT DEVICES: [x] Ventilator [] BIPAP  [] Nasal Cannula [] Room Air    VENT SETTINGS (Comprehensive) (if applicable):    Vent Information  $Ventilation: $Subsequent Day  Ventilator Started: Yes  Ventilation Day(s): 1  Skin Assessment: Clean, dry, & intact  Equipment Changed: HME  Vent Type: Servo i  Vent Mode: PRVC  Vt Ordered: 620 mL  Rate Set: 28 bmp  FiO2 : 40 %  Sensitivity: 3  PEEP/CPAP: 8  I Time/ I Time %: 0.65 s  Cuff Pressure (cm H2O): 20 cm H2O  Humidification Source: Heated wire  Humidification Temp: 37  Humidification Temp Measured: 37  Circuit Condensation: Drained  Nitric Oxide/Epoprostenol In Use?: No  Additional Respiratory Urinalysis: Lab Results   Component Value Date    NITRU NEGATIVE 07/11/2019    COLORU YELLOW 07/11/2019    PHUR 7.5 07/11/2019    WBCUA 20 TO 50 07/11/2019    RBCUA 0 TO 2 07/11/2019    MUCUS NOT REPORTED 07/11/2019    TRICHOMONAS NOT REPORTED 07/11/2019    YEAST MANY 07/11/2019    BACTERIA NOT REPORTED 07/11/2019    SPECGRAV 1.020 07/11/2019    LEUKOCYTESUR TRACE 07/11/2019    UROBILINOGEN Normal 07/11/2019    BILIRUBINUR NEGATIVE 07/11/2019    BILIRUBINUR NEGATIVE 12/24/2011    GLUCOSEU NEGATIVE 07/11/2019    GLUCOSEU 2+ 12/24/2011    KETUA NEGATIVE 07/11/2019    AMORPHOUS NOT REPORTED 07/11/2019       CARDIAC ENZYMES: Recent Labs     07/10/19  1407   CKMB 9.2     BNP: No results for input(s): BNP in the last 72 hours. LFTS  Recent Labs     07/10/19  0614 07/11/19  0007 07/11/19  1312 07/12/19  0416   ALKPHOS 79 84 144* 227*   ALT <5* 510* 6,490* 5,269*   AST 10 1,061* >7,000* >7,000*   BILITOT 0.34 0.86 1.42* 2.94*   BILIDIR 0.18  --  0.92* 2.14*   LABALBU 2.4* 2.5* 1.8* 2.5*       AMYLASE/LIPASE/AMMONIA  No results for input(s): AMYLASE, LIPASE, AMMONIA in the last 72 hours. Last 3 Blood Glucose:   Recent Labs     07/10/19  0614 07/10/19  2043 07/11/19  0007 07/11/19  0427 07/11/19  1312 07/11/19  1837 07/12/19  0013 07/12/19  0416   GLUCOSE 251* 338* 316* 247* 352* 319* 319* 269*      HgBA1c:    Lab Results   Component Value Date    LABA1C 7.1 06/30/2019         TSH:    Lab Results   Component Value Date    TSH 3.72 10/14/2015     ANEMIA STUDIES  No results for input(s): LABIRON, TIBC, FERRITIN, [id number], FOLATE, OCCULTBLD in the last 72 hours.             Cultures during this admission:     Blood cultures:                 [] None drawn      [] Negative             []  Positive (Details:  )  Urine Culture:                   [] None drawn      [] Negative             []  Positive (Details:  )  Sputum Culture:               [] None drawn       [] Negative             []  Positive (Details:  ) time     Candidemia and leukocytosis  Discontinue Diflucan in the setting of shock liver  1 dose of vancomycin 1 dose of cefepime  Follow-up blood cultures  Recs per ID if patient stabilizes in the next 2 days consider starting amphotericin B to treat for fungemia     Hematology  Has received 8 units PRBC since admission  4 units FFP  Trend H&H  Transfuse for hemoglobin less than 7     PAD s/p right BKA converted to AKA on 7/10  Recs per vascular surgery     Acute respiratory failure  Patient intubated    Acute GI bleed in the setting of severe hypertension  Stop heparin and hold aspirin and Plavix for now. GI consult planning for potential EGD at bedside. Transfuse hemoglobin if less than 7     ESRD on hemodialysis  Recs per nephrology  CVVHD  Strict I/Os   Hemodialysis per Nephro  Continue to monitor potassium closely and replace as needed  Net positive by 100 mL     Diabetes  High dose ISS every 4 hours  POC glucose checks  Hypoglycemia protocol     Chronic systolic CHF   ECHO EF of 25 to 35% with no obvious valve vegetation     DVT prophylaxis  EPC in the setting of acute GI bleed     Ulcer prophylaxis  Protonix drip     Disposition  ICU      Ihsan Hu M.D.              Department of Internal Medicine/ Critical care  [address] [facility] ([place]             7/12/2019, 7:48 AM

## 2019-07-12 NOTE — CONSULTS
furosemide (LASIX) 40 MG tablet Take 1 tablet by mouth 2 times daily  Patient taking differently: Take 40 mg by mouth daily  6/13/19   Henrique Glover MD   glipiZIDE (GLUCOTROL XL) 2.5 MG extended release tablet Take 1 tablet by mouth daily 2/25/19   Mariluz Yoo MD   Blood Glucose Monitoring Suppl (ONE TOUCH ULTRA 2) w/Device KIT  1/17/19   Historical Provider, MD   aspirin EC 81 MG EC tablet Take 1 tablet by mouth daily 1/16/19 6/28/19  Threseisrrael Dai, DPM   losartan (COZAAR) 50 MG tablet TAKE ONE TABLET BY MOUTH ONCE DAILY  Patient not taking: Reported on 6/29/2019 12/7/18   Mariluz Yoo MD   clopidogrel (PLAVIX) 75 MG tablet Take 1 tablet by mouth daily 9/7/18   Ranjit Gastelum MD     .  CURRENT MEDICATIONS:  Scheduled Meds:   sodium chloride  250 mL Intravenous Once    sodium chloride  250 mL Intravenous Once    sodium chloride  250 mL Intravenous Once    insulin lispro  0-18 Units Subcutaneous Q4H    hydrocortisone sodium succinate PF  50 mg Intravenous Q6H    [Held by provider] clopidogrel  75 mg Oral Daily    chlorhexidine  15 mL Mouth/Throat BID    cefepime  1 g Intravenous Q24H    ketorolac  1 drop Left Eye 4x Daily    prednisoLONE acetate  1 drop Left Eye 4 times per day    gabapentin  100 mg Oral TID    docusate sodium  100 mg Oral Daily    [Held by provider] aspirin EC  81 mg Oral Daily    tamsulosin  0.4 mg Oral Daily    sodium chloride flush  10 mL Intravenous 2 times per day     .   Continuous Infusions:   pantoprozole (PROTONIX) infusion 8 mg/hr (07/12/19 0353)    [Held by provider] heparin (porcine) 12 Units/kg/hr (07/11/19 0848)    phenylephrine (JOHNIE-SYNEPHRINE) 50mg/250mL infusion Stopped (07/12/19 0615)    sodium bicarbonate infusion 100 mL/hr at 07/12/19 0356    prismaSATE BK 0/3.5 1,500 mL/hr (07/12/19 1034)    EPINEPHrine infusion (double concentration) Stopped (07/11/19 4146)    norepinephrine (LEVOPHED) infusion (double concentration) 30 mcg/min (07/12/19 1049)    vasopressin (Septic Shock) infusion 0.02 Units/min (07/12/19 0955)    dextrose       . PRN Meds:dextrose, AKWA TEARS, fentanNYL, oxyCODONE-acetaminophen **OR** oxyCODONE-acetaminophen, midodrine, magnesium hydroxide, doxercalciferol, glucose, dextrose, glucagon (rDNA), dextrose, ondansetron, sodium chloride flush  .   SOCIAL HISTORY:     Social History     Socioeconomic History    Marital status: Single     Spouse name: Not on file    Number of children: Not on file    Years of education: Not on file    Highest education level: Not on file   Occupational History    Not on file   Social Needs    Financial resource strain: Not on file    Food insecurity:     Worry: Not on file     Inability: Not on file    Transportation needs:     Medical: Not on file     Non-medical: Not on file   Tobacco Use    Smoking status: Never Smoker    Smokeless tobacco: Never Used   Substance and Sexual Activity    Alcohol use: No     Alcohol/week: 0.0 oz    Drug use: No    Sexual activity: Yes     Partners: Female   Lifestyle    Physical activity:     Days per week: Not on file     Minutes per session: Not on file    Stress: Not on file   Relationships    Social connections:     Talks on phone: Not on file     Gets together: Not on file     Attends Church service: Not on file     Active member of club or organization: Not on file     Attends meetings of clubs or organizations: Not on file     Relationship status: Not on file    Intimate partner violence:     Fear of current or ex partner: Not on file     Emotionally abused: Not on file     Physically abused: Not on file     Forced sexual activity: Not on file   Other Topics Concern    Not on file   Social History Narrative    Not on file       FAMILY HISTORY:   Family History   Problem Relation Age of Onset    Diabetes Mother     Heart Disease Mother     High Blood Pressure Mother     Diabetes Brother     Heart Disease Father     Diabetes Father REVIEW OF SYSTEMS:     Constitutional: No fever, no chills, no lethargy, no weakness, no weight loss  HEENT:  No headache, otalgia, itchy eyes, nasal discharge or sore throat. Cardiac:  No chest pain, dyspnea, orthopnea or PND. Chest:   No cough, phlegm or wheezing. Abdomen:  No abdominal pain, nausea, vomiting, constipation, diarrhea, + hematochezia/melena, + blood in OG. Neuro:  No focal weakness, abnormal movements or seizure like activity. Skin:   No rashes, no itching. :   No hematuria, no pyuria, no dysuria, no flank pain. Extremities:  No swelling or joint pains. ROS was otherwise negative except as mentioned in the [address] PHYSICAL EXAM:    BP (!) 105/47   Pulse 116   Temp 96.3 °F (35.7 °C) (Core)   Resp 13   Ht 6' (1.829 m)   Wt 187 lb 6.3 oz (85 kg)   SpO2 100%   BMI 25.41 kg/m²   . TMAX[24]    General: Well developed, Well nourished, No apparent distress  Head:  Normocephalic, Atraumatic  EENT: EOMI, Sclera not icteric, Oropharynx moist  Neck:  Supple, Trachea midline  Lungs:CTA Bilaterally  Heart: RRR, No murmur, No rub, No gallop, PMI nondisplaced. Abdomen:Soft, Non tender, Not distended, Hypoactive BS,  No masses. No hepatomegalia, + blood in OG tube. Ext:No clubbing. No cyanosis. No edema. Skin: No rashes. No jaundice. No stigmata of liver disease. Neuro:  A&O x Three, No focal neurological deficits    LABS and IMAGING:     Hemotological labs:   ANEMIA STUDIES  No results for input(s): LABIRON, TIBC, FERRITIN, [id number], FOLATE, OCCULTBLD in the last 72 hours.     CBC  Recent Labs     07/10/19  0928  07/10/19  2043 07/11/19  0427 07/11/19  1312 07/11/19  1837 07/12/19  0013 07/12/19  0416   WBC 12.9*  --  25.7* 39.6* 46.0*  --   --  43.9*   HGB 7.4*   < > 6.3* 8.2* 4.7* 10.1* 8.5* 7.5*   MCV 90.5  --  89.2 83.9 88.4  --   --  88.6   RDW 17.7*  --  17.2* 16.9* 18.8*  --   --  17.2*     --  360 331 222  --   --  124*    < > = values in this interval not displayed. PT/INR  Recent Labs     07/11/19 1837 07/12/19 0013 07/12/19 0416   PROTIME 58.4* 37.1* 32.6*   INR 6.4* 3.9 3.4       BMP  Recent Labs     07/11/19 1837 07/12/19 0013 07/12/19 0416    130* 130*   K 5.7* 5.0 4.7   CL 91* 86* 87*   CO2 <6* <6* 8*   BUN 54* 46* 42*   CREATININE 4.34* 3.65* 3.55*   GLUCOSE 319* 319* 269*   CALCIUM 7.6* 7.8* 7.9*       LIVER WORK UP  Hepatitis Functional Panel:  Recent Labs     07/12/19 0416   ALKPHOS 227*   ALT 5,269*   AST >7,000*   PROT 4.7*   BILITOT 2.94*   BILIDIR 2.14*   LABALBU 2.5*       AMYLASE/LIPASE/AMMONIA  No results for input(s): AMYLASE, LIPASE, AMMONIA in the last 72 hours. Acute Hepatitis Panel   Lab Results   Component Value Date    HEPBSAG NONREACTIVE 11/09/2018    HEPCAB NONREACTIVE 08/08/2018    HEPBIGM NONREACTIVE 11/09/2018       Cancer Markers:  CEA:    No results for input(s): CEA in the last 72 hours. Ca 125:   No results for input(s):  in the last 72 hours. Ca 19-9:     Invalid input(s):   AFP: No results for input(s): AFP in the last 72 hours. 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 *       Assessment:  47year old male with PMH of Afib, CAD, CHF, DM, PAD, ESRD on hemodialysis, HTN, Hyperlidemia and recent RLE amputation. Consulted for GI bleed, suspect upper GI bleed due to bright red blood per OG tube. GI Problem list:  1.  Anemia secondary to

## 2019-07-12 NOTE — PROGRESS NOTES
=  [facility]   Progress Note                    Date:   7/12/2019  Patient name:  Moon Mace  Date of admission:  6/29/2019  6:17 AM  MRN:   [id number]  YOB: 1965  PCP:    Bernarda Powell MD    Reason for Admission:  Below knee amputation status, right ([facility] [Z89.511]    Subjective:       Clinical Changes / Abnormalities: The patient was seen and examined. Patient was on 4 pressors during the day yesterday which went down to 1 pressor overnight. This morning, he is on 2 pressors. Per his nurse, patient was responding this morning although intermittently. He was started on CRRT yesterday afternoon, which he continues to be on. Lactic acid continues to worsen. I/O last 3 completed shifts: In: 7809 [I.V.:7295; CLMGC:4996; NG/GT:30]  Out: 4265 [Urine:480; Emesis/NG output:350]  I/O this shift: In: 762 [I.V.:399; Blood:350]  Out: 286 [Urine:10; Emesis/NG output:200]      In: 7077 [I. G.:6754; JUXMM:9701]  Out: 2001 [Urine:15]      Intake/Output Summary (Last 24 hours) at 7/12/2019 1041  Last data filed at 7/12/2019 0900  Gross per 24 hour   Intake 8440 ml   Output 4551 ml   Net 3889 ml         I/O since admission:  liters    Medications:   Scheduled Meds:   sodium chloride  250 mL Intravenous Once    sodium chloride  250 mL Intravenous Once    sodium chloride  250 mL Intravenous Once    insulin lispro  0-18 Units Subcutaneous Q4H    hydrocortisone sodium succinate PF  50 mg Intravenous Q6H    [Held by provider] clopidogrel  75 mg Oral Daily    chlorhexidine  15 mL Mouth/Throat BID    cefepime  1 g Intravenous Q24H    ketorolac  1 drop Left Eye 4x Daily    prednisoLONE acetate  1 drop Left Eye 4 times per day    gabapentin  100 mg Oral TID    docusate sodium  100 mg Oral Daily    [Held by provider] aspirin EC  81 mg Oral Daily    tamsulosin  0.4 mg Oral Daily    sodium chloride flush  10 mL Intravenous 2 times per day     Continuous Infusions:  

## 2019-07-12 NOTE — CONSULTS
07/11/19 1545 -- -- -- 102 18 100 %   07/11/19 1530 -- -- -- 105 29 (!) 87 %   07/11/19 1515 -- -- -- 101 (!) 34 100 %   07/11/19 1500 109/64 -- -- 101 23 100 %   07/11/19 1445 -- -- -- 103 15 --   07/11/19 1430 -- -- -- 105 (!) 35 100 %   07/11/19 1415 -- -- -- 101 30 100 %   07/11/19 1400 95/64 -- -- 101 (!) 5 97 %   07/11/19 1357 -- -- -- 100 (!) 4 --   07/11/19 1345 -- -- -- 102 (!) 33 97 %   07/11/19 1330 -- -- -- 105 27 --   07/11/19 1315 -- -- -- 105 (!) 32 --   07/11/19 1300 123/65 -- -- 105 29 (!) 88 %   07/11/19 1245 -- -- -- 105 (!) 33 --       Physical Exam   Constitutional: He appears well-developed and well-nourished. No distress. HENT:   Head: Normocephalic and atraumatic. Eyes: Pupils are equal, round, and reactive to light. No scleral icterus. Neck: Neck supple. No tracheal deviation present. Cardiovascular: Normal rate, regular rhythm and normal heart sounds. Exam reveals no friction rub. No murmur heard. Pulmonary/Chest: Effort normal and breath sounds normal. No stridor. No respiratory distress. He exhibits no tenderness. Abdominal: He exhibits no distension. There is no tenderness. Genitourinary:   Genitourinary Comments: No mendez   Musculoskeletal: He exhibits deformity (right BKA). He exhibits no edema or tenderness. Neurological: He is alert. On the vent   Skin: Skin is warm and dry. No rash noted. There is erythema.    Left leg AKA covered   Psychiatric:   Eyes open on the vent         Medical Decision Making:   I have independently reviewed/ordered the following labs:    CBC with Differential:   Recent Labs     07/11/19  0427 07/11/19  1312 07/11/19  1837   WBC 39.6* 46.0*  --    HGB 8.2* 4.7* 10.1*   HCT 26.1* 15.3* 32.6*    222  --    LYMPHOPCT 6* 6*  --    MONOPCT 3 1  --      BMP:  Recent Labs     07/11/19  0802  07/11/19  1312 07/11/19  1837   NA  --   --  140 135   K  --    < > 6.7* 5.7*   CL  --   --  92* 91*   CO2  --   --  <6* <6*   BUN  --   --  60* 54* CREATININE  --   --  5.15* 4.34*   MG 2.0  --   --  2.5    < > = values in this interval not displayed. Hepatic Function Panel:   Recent Labs     07/10/19  0614 07/11/19  0007 07/11/19  1312   PROT 6.2* 5.6* 3.8*   LABALBU 2.4* 2.5* 1.8*   BILIDIR 0.18  --  0.92*   IBILI 0.16  --  0.50   BILITOT 0.34 0.86 1.42*   ALKPHOS 79 84 144*   ALT <5* 510* 6,490*   AST 10 1,061* >7,000*     No results for input(s): RPR in the last 72 hours. No results for input(s): HIV in the last 72 hours. No results for input(s): BC in the last 72 hours. Lab Results   Component Value Date    CREATININE 4.34 07/11/2019    GLUCOSE 319 07/11/2019    GLUCOSE 229 02/21/2012       Detailed results: Thank you for allowing us to participate in the care of this patient. Please call with questions. This note is created with the assistance of a speech recognition program.  While intending to generate adocument that actually reflects the content of the visit, the document can still have some errors including those of syntax and sound a like substitutions which may escape proof reading. It such instances, actual meaningcan be extrapolated by contextual diversion.     Ramila Adamson MD  Office: [phone]  Perfect serve / office [phone]

## 2019-07-12 NOTE — PROGRESS NOTES
Occupational Therapy Not Seen Note    DATE: 2019  Name: Apolonia Rivera  : 1965  MRN: [id number]    Patient not available for Occupational Therapy due to:    Pt is not medically appropriate for therapy. Pt is sedated, vented, and not following directions. Per Infectious Disease note on  the following interval changes:  7/10/19 post AKA right leg and levophed post op -  Arrested after anesthesia, possible V fib. Defibrillated, and left on the vent after the procedure which was not aborted. abd soft  - alert on the vent,     19 around 11 AM today he arrested again with a PEA, was shocked recovered,  He is now on 4 pressors, edematous. Right leg stump covered in the left foot is cool not bluish.   Overnight the liver enzymes went up to more than 7000  ALT, no fever or chills  White count went up as well suggesting both liver and marrow shock        Next Scheduled Treatment: CHECK ICU on 19    Electronically signed by JONNA Gomez on 2019 at 11:20 AM

## 2019-07-12 NOTE — PROGRESS NOTES
bleeding noted but no signs of cellulitis. Skin:   The left foot does have some superficial ecchymotic skin changes at the distal foot and toes. There is a left anterior leg area of patchy ecchymoses. Laboratory data:   I have independently reviewed the followinglabs:  CBC with Differential:   Recent Labs     07/11/19  1312  07/12/19  0416 07/12/19  1156   WBC 46.0*  --  43.9*  --    HGB 4.7*   < > 7.5* 8.2*   HCT 15.3*   < > 24.0* 24.9*     --  124*  --    LYMPHOPCT 6*  --  4*  --    MONOPCT 1  --  1  --     < > = values in this interval not displayed. BMP:   Recent Labs     07/12/19  0416 07/12/19  1156   * 130*   K 4.7 4.1   CL 87* 89*   CO2 8* 13*   BUN 42* 38*   CREATININE 3.55* 2.91*   MG 2.0 1.8     Hepatic Function Panel:   Recent Labs     07/11/19  1312 07/12/19  0416   PROT 3.8* 4.7*   LABALBU 1.8* 2.5*   BILIDIR 0.92* 2.14*   IBILI 0.50 0.80   BILITOT 1.42* 2.94*   ALKPHOS 144* 227*   ALT 6,490* 5,269*   AST >7,000* >7,000*     No results for input(s): VANCOTROUGH in the last 72 hours. Lab Results   Component Value Date    .0 (H) 06/08/2019     Lab Results   Component Value Date    SEDRATE 114 (H) 06/08/2019       No results for input(s): PROCAL in the last 72 hours. Imaging Studies:   No new imaging    Cultures:     Culture Blood #1 [[id number]] Collected: 07/11/19 0020   Order Status: Completed Specimen: Blood Updated: 07/12/19 1404    Specimen Description . BLOOD    Special Requests NO LOCATION GIVEN 12MLS    Culture NO GROWTH 13 HOURS   Urine Culture [[id number]] Collected: 07/11/19 1137   Order Status: Completed Specimen: Urine Updated: 07/12/19 0741    Specimen Description . URINE    Special Requests NOT REPORTED    Culture NO GROWTH   CULTURE BLOOD #1 [[id number]] Collected: 07/08/19 0658   Order Status: Completed Specimen: Blood Updated: 07/12/19 0420    Specimen Description . BLOOD    Special Requests L FA 3ML    Culture NO GROWTH 4 DAYS   Culture Blood #1

## 2019-07-12 NOTE — PROGRESS NOTES
taking: Reported on 6/29/2019)  clopidogrel (PLAVIX) 75 MG tablet, Take 1 tablet by mouth daily    Current Medications:     Scheduled Meds:    sodium chloride  250 mL Intravenous Once    sodium chloride  250 mL Intravenous Once    sodium chloride  250 mL Intravenous Once    insulin lispro  0-18 Units Subcutaneous Q4H    hydrocortisone sodium succinate PF  50 mg Intravenous Q6H    [Held by provider] clopidogrel  75 mg Oral Daily    chlorhexidine  15 mL Mouth/Throat BID    cefepime  1 g Intravenous Q24H    ketorolac  1 drop Left Eye 4x Daily    prednisoLONE acetate  1 drop Left Eye 4 times per day    gabapentin  100 mg Oral TID    docusate sodium  100 mg Oral Daily    [Held by provider] aspirin EC  81 mg Oral Daily    tamsulosin  0.4 mg Oral Daily    sodium chloride flush  10 mL Intravenous 2 times per day     Continuous Infusions:    pantoprozole (PROTONIX) infusion 8 mg/hr (07/12/19 0353)    [Held by provider] heparin (porcine) 12 Units/kg/hr (07/11/19 0848)    phenylephrine (JOHNIE-SYNEPHRINE) 50mg/250mL infusion Stopped (07/12/19 0615)    sodium bicarbonate infusion 100 mL/hr at 07/12/19 0356    prismaSATE BK 0/3.5 1,500 mL/hr (07/12/19 1034)    EPINEPHrine infusion (double concentration) Stopped (07/11/19 2355)    norepinephrine (LEVOPHED) infusion (double concentration) 30 mcg/min (07/12/19 1049)    vasopressin (Septic Shock) infusion 0.02 Units/min (07/12/19 0955)    dextrose       PRN Meds:  dextrose, AKWA TEARS, fentanNYL, oxyCODONE-acetaminophen **OR** oxyCODONE-acetaminophen, midodrine, magnesium hydroxide, doxercalciferol, glucose, dextrose, glucagon (rDNA), dextrose, ondansetron, sodium chloride flush    Input/Output:       I/O last 3 completed shifts: In: 2621 [I.V.:7295; HJYIP:4213; NG/GT:30]  Out: 4067 [Urine:480; Emesis/NG output:350].       Patient Vitals for the past 96 hrs (Last 3 readings):   Weight   07/09/19 1145 187 lb 6.3 oz (85 kg)   07/09/19 0830 191 lb 12.8 oz (87 kg)

## 2019-07-13 PROBLEM — J96.00 ACUTE RESPIRATORY FAILURE (HCC): Status: ACTIVE | Noted: 2019-01-01

## 2019-07-13 PROBLEM — D68.9 COAGULOPATHY (HCC): Status: ACTIVE | Noted: 2019-01-01

## 2019-07-13 NOTE — PROGRESS NOTES
fluconazole and we are called.     He has ESRD on HD, w a malfunctioning AVF, clotted planned for declotting 19.     Interval changes  7/10/19 post AKA right leg and levophed post op -  Arrested after anesthesia, possible V fib. Defibrillated, and left on the vent after the procedure which was not aborted. abd soft  - alert on the vent,     19 around 11 AM today he arrested again with a PEA, was shocked recovered,  He is now on 4 pressors, edematous. Right leg stump covered in the left foot is cool not bluish. Overnight the liver enzymes went up to more than 7000  ALT, no fever or chills  White count went up as well suggesting both liver and marrow shock. a        Tolerating the diflucan  Repeat blood culture 19 are still negative  WBC 17-18-15 -12  Better     Post AVF balloon angioplasty 19 and was used 19 wo issues - not red or inflamed. Current evaluation:2019    /60   Pulse 120   Temp 98.6 °F (37 °C) (Core)   Resp 27   Ht 6' (1.829 m)   Wt 187 lb 6.3 oz (85 kg)   SpO2 100%   BMI 25.41 kg/m²     Temperature Range: Temp: 98.6 °F (37 °C) Temp  Av.4 °F (36.9 °C)  Min: 98.1 °F (36.7 °C)  Max: 98.6 °F (37 °C)  The care was discussed with the nurse at bedside. The patient was on levo fed only yesterday now is on vasopressin as well. He continues on the ventilator at 40% FiO2. He continues on CVVHD  The patient is not awake and not following any commands at all. He is currently off all sedation    Review of Systems   Unable to perform ROS: Intubated       Physical Examination :     Physical Exam   Constitutional: He is intubated. HENT:   Head: Normocephalic and atraumatic. Cardiovascular: Regular rhythm and normal heart sounds. Pulmonary/Chest: Effort normal and breath sounds normal. He is intubated. Abdominal: Soft. Bowel sounds are normal.   Musculoskeletal:   Right above-the-knee amputation and stump with staples in place.   There is some bleeding noted but participate in the care of this patient. Please call with questions. This note iscreated with the assistance of a speech recognition program.  While intending to generate a document that actually reflects the content of the visit, the document can still have some errors including those of syntax andsound a like substitutions which may escape proof reading. It such instances, actual meaning can be extrapolated by contextual diversion.

## 2019-07-13 NOTE — PROGRESS NOTES
output:540]. Patient Vitals for the past 96 hrs (Last 3 readings):   Weight   19 1145 187 lb 6.3 oz (85 kg)   19 0830 191 lb 12.8 oz (87 kg)       Vital Signs:   Temperature:  Temp: 98.1 °F (36.7 °C)  TMax:   Temp (24hrs), Av.1 °F (36.2 °C), Min:96.3 °F (35.7 °C), Max:98.1 °F (36.7 °C)    Respirations:  Resp: 29  Pulse:   Pulse: 120  BP:    BP: 96/60  BP Range: Systolic (60RFK), XBF:346 , Min:96 , XFE:104       Diastolic (40UAL), VFZ:60, Min:47, Max:61      Physical Examination:     General:  Appears ill looking, eyes rolled up. HEENT: Atraumatic, normocephalic, no throat congestion, moist mucosa. Eyes:   Pupils equal, round . Neck:   No JVD, no thyromegaly, no lymphadenopathy. Chest:  Bilateral vesicular breath sounds, no rales or wheezes. Cardiac:  S1 S2 RR, no murmurs, gallops or rubs, JVP not raised. Abdomen: Soft, non-tender, no masses or organomegaly, BS audible. :   No suprapubic or flank tenderness. Neuro:  Unresponsive  SKIN:  No rashes, good skin turgor. Extremities:  No edema, palpable peripheral pulses, no calf tenderness. Access: Previous R UA AVF - CVVHD continues    Labs:       Recent Labs     19  1312  19  0416  19  1820 19  2331 19  0439   WBC 46.0*  --  43.9*  --   --   --  20.7*   RBC 1.73*  --  2.71*  --   --   --  2.66*   HGB 4.7*   < > 7.5*   < > 8.3* 7.8* 7.5*   HCT 15.3*   < > 24.0*   < > 24.6* 23.0* 22.5*   MCV 88.4  --  88.6  --   --   --  84.6   MCH 27.2  --  27.7  --   --   --  28.2   MCHC 30.7  --  31.3  --   --   --  33.3   RDW 18.8*  --  17.2*  --   --   --  16.9*     --  124*  --   --   --  97*   MPV 11.8  --  11.9  --   --   --  12.5    < > = values in this interval not displayed.       BMP:   Recent Labs     19  1820 19  2331 19  0439   * 130* 133*   K 3.9 3.6* 3.4*   CL 90* 89* 91*   CO2 14* 13* 14*   BUN 35* 33* 29*   CREATININE 2.51* 2.48* 2.46*   GLUCOSE 172* 133* 133*   CALCIUM 8.0* 7. 6* 7.6*      Phosphorus:     Recent Labs     07/12/19  1820 07/12/19  2331 07/13/19  0439   PHOS 5.3* 4.8* 4.4     Magnesium:    Recent Labs     07/12/19  1820 07/12/19  2331 07/13/19  0439   MG 1.8 1.7 1.7     Albumin:    Recent Labs     07/11/19  1312 07/12/19  0416 07/13/19  0439   LABALBU 1.8* 2.5* 2.1*     BNP:      Lab Results   Component Value Date     09/20/2013     RUDOLPH:      Lab Results   Component Value Date    RUDOLPH POSITIVE 08/08/2018     SPEP:  Lab Results   Component Value Date    PROT 4.1 07/13/2019    ALBCAL 2.9 08/08/2018    ALBPCT 54 08/08/2018    LABALPH 0.2 08/08/2018    LABALPH 0.9 08/08/2018    A1PCT 3 08/08/2018    A2PCT 17 08/08/2018    LABBETA 0.7 08/08/2018    BETAPCT 13 08/08/2018    GAMGLOB 0.7 08/08/2018    GGPCT 12 08/08/2018    PATH NOT REPORTED 08/08/2018     UPEP:     Lab Results   Component Value Date    LABPE No monoclonal process detected.  08/02/2013     C3:     Lab Results   Component Value Date    C3 119 08/08/2018     C4:     Lab Results   Component Value Date    C4 30 08/08/2018     MPO ANCA:     Lab Results   Component Value Date    MPO 25 08/08/2018     PR3 ANCA:     Lab Results   Component Value Date    PR3 20 08/08/2018     Anti-GBM:     Lab Results   Component Value Date    GBMABIGG 17 08/08/2018     Hep BsAg:         Lab Results   Component Value Date    HEPBSAG NONREACTIVE 11/09/2018     Hep C AB:          Lab Results   Component Value Date    HEPCAB NONREACTIVE 08/08/2018       Urinalysis/Chemistries:      Lab Results   Component Value Date    NITRU NEGATIVE 07/11/2019    COLORU YELLOW 07/11/2019    PHUR 7.5 07/11/2019    WBCUA 20 TO 50 07/11/2019    RBCUA 0 TO 2 07/11/2019    MUCUS NOT REPORTED 07/11/2019    TRICHOMONAS NOT REPORTED 07/11/2019    YEAST MANY 07/11/2019    BACTERIA NOT REPORTED 07/11/2019    SPECGRAV 1.020 07/11/2019    LEUKOCYTESUR TRACE 07/11/2019    UROBILINOGEN Normal 07/11/2019    BILIRUBINUR NEGATIVE 07/11/2019    BILIRUBINUR NEGATIVE 12/24/2011    GLUCOSEU NEGATIVE 07/11/2019    GLUCOSEU 2+ 12/24/2011    KETUA NEGATIVE 07/11/2019    AMORPHOUS NOT REPORTED 07/11/2019     Urine Sodium:     Lab Results   Component Value Date    FELI 31 08/28/2018     Urine Potassium:    Lab Results   Component Value Date    KUR 31.6 08/28/2018     Urine Chloride:    Lab Results   Component Value Date    CLUR <20 08/28/2018     Urine Osmolarity:   Lab Results   Component Value Date    OSMOU 652 12/24/2011     Urine Creatinine:     Lab Results   Component Value Date    LABCREA 93.8 08/29/2018     Radiology:     CXR: Reviewed as available. Assessment:     1. ESRD on HD TTS at the [address] [place] [address] via Rt UE AVF under Da starks, malfunctioning AV fistula, has a temporary catheter in place  2. Septic/ SIRS shock on 3 pressors with significant hemodynamic compromise  3. Predominant respiratory alkalosis, and anion gap metabolic acidosis   4. MSOF  5. Anoxic encephalopathy, appears to be severe  6. Hyperkalemia, resolved  7. Ischemic hepatitis with liver failure and coagulopathy  8. Candidemia, antifungals had to be discontinued    Plan:   1. change CVVHD prescription, to 2 potassium dialysate for now, change ultrafiltration rate to even, no net ultrafiltration   2. Discontinue bicarbonate infusion  3. Wean pressors  4. Replace calcium and magnesium as needed   5. Prognosis continues to be very, very poor  6. Antibiotics per ID    Nutrition   Please ensure that patient is on a renal diet/TF. Avoid nephrotoxic drugs/contrast exposure. We will continue to follow along with you. zOzie Starkey, APRN-CNP  [facility]      Attending Physician Statement  I have discussed the care of Armand Bhatia, including pertinent history and exam findings with the resident/fellow. I have reviewed the key elements of all parts of the encounter with the resident/fellow. I have seen and examined the patient with the resident/fellow.   I agree with the

## 2019-07-13 NOTE — PROGRESS NOTES
[] pending)    VASOPRESSORS:  [] No    [x] Yes    If yes -   [x] Levophed       [] Dopamine     [x] Vasopressin       [] Dobutamine  [] Phenylephrine         [] Epinephrine    CENTRAL LINES:     [] No   [x] Yes   (Date of Insertion:   )           If yes -     [] Right IJ     [] Left IJ [] Right Femoral [] Left Femoral                   [] Right Subclavian [] Left Subclavian       ALCALA'S CATHETER:   [] No   [x] Yes  (Date of Insertion:   )     URINE OUTPUT:            [] Good   [x] Low              [] Anuric      OBJECTIVE:     VITAL SIGNS:  BP 96/60   Pulse 120   Temp 98.1 °F (36.7 °C) (Axillary)   Resp 29   Ht 6' (1.829 m)   Wt 187 lb 6.3 oz (85 kg)   SpO2 100%   BMI 25.41 kg/m²   Tmax over 24 hours:  Temp (24hrs), Av.6 °F (36.4 °C), Min:97 °F (36.1 °C), Max:98.1 °F (36.7 °C)      Patient Vitals for the past 8 hrs:   Pulse Resp SpO2   19 0355 120 29 100 %         Intake/Output Summary (Last 24 hours) at 2019 0843  Last data filed at 2019 0759  Gross per 24 hour   Intake 4792.96 ml   Output 1490 ml   Net 3302.96 ml     Date 19 0000 - 19 2359   Shift 8061-1699 2959-4898 4864-4281 24 Hour Total   INTAKE   I.V.(mL/kg) 5923(03.2)   1247(99.6)   Shift Total(mL/kg) 2379(64.6)   6585(62.9)   OUTPUT   Urine(mL/kg/hr) 5(0)   5   Emesis/NG output(mL/kg) 225(2.6)   225(2.6)   CRRT 202   202   Shift Total(mL/kg) 432(5.1)   432(5.1)   Weight (kg) 85 85 85 85     Wt Readings from Last 3 Encounters:   19 187 lb 6.3 oz (85 kg)   19 188 lb 11.4 oz (85.6 kg)   19 189 lb 6 oz (85.9 kg)     Body mass index is 25.41 kg/m². PHYSICAL EXAM:  Constitutional: Intubated, not sedated, no acute distress  EENT: sclera clear, anicteric, neck supple with midline trachea. Neck: Supple, symmetrical, trachea midline, no adenopathy, thyroid symmetric, no jvd skin normal  Respiratory: Intubated, bronchial breath sounds.   no wheezes   Cardiovascular: 12.5 g PRN   glucagon (rDNA) 1 mg PRN   dextrose 100 mL/hr PRN   ondansetron 4 mg Q6H PRN   sodium chloride flush 10 mL PRN       SUPPORT DEVICES: [x] Ventilator [] BIPAP  [] Nasal Cannula [] Room Air    VENT SETTINGS (Comprehensive) (if applicable):    Vent Information  $Ventilation: $Subsequent Day  Ventilator Started: Yes  Ventilation Day(s): 1  Skin Assessment: Clean, dry, & intact  Equipment ID: [id number]  Equipment Changed: HME  Vent Type: Servo i  Vent Mode: PRVC  Vt Ordered: 620 mL  Rate Set: 20 bmp  FiO2 : 40 %  Sensitivity: 3  PEEP/CPAP: 8  I Time/ I Time %: 0.75 s  Cuff Pressure (cm H2O): 25 cm H2O  Humidification Source: Heated wire  Humidification Temp: 37  Humidification Temp Measured: 37  Circuit Condensation: Drained  Nitric Oxide/Epoprostenol In Use?: No  Additional Respiratory  Assessments  Pulse: 120  Resp: 29  SpO2: 100 %  End Tidal CO2: 18 (%)  Position: Semi-Rangel's  Humidification Source: Heated wire  Humidification Temp: 37  Circuit Condensation: Drained  Oral Care Completed?: Yes  Oral Care: Teeth brushed, Mouthwash, Mouth swabbed, Mouth suctioned, Suction toothette  Subglottic Suction Done?: Yes  Cuff Pressure (cm H2O): 25 cm H2O  Skin barrier applied: No    ABGs:     Lab Results   Component Value Date    PHART 7.207 07/10/2019    WWM2DMY 44.5 07/10/2019    PO2ART 283.0 07/10/2019    UDI8BQF 17.0 07/10/2019    BAD4TNI 15 07/13/2019    A9HLHOKF 99.8 07/10/2019    FIO2 40.0 07/13/2019     Lactic Acid:   Lab Results   Component Value Date    LACTA 1.0 06/08/2019    LACTA 2.2 06/08/2019    LACTA 1.5 11/08/2018         DATA:  Complete Blood Count:   Recent Labs     07/11/19  1312  07/12/19  0416  07/12/19  1820 07/12/19  2331 07/13/19  0439   WBC 46.0*  --  43.9*  --   --   --  20.7*   HGB 4.7*   < > 7.5*   < > 8.3* 7.8* 7.5*   MCV 88.4  --  88.6  --   --   --  84.6     --  124*  --   --   --  97*   RBC 1.73*  --  2.71*  --   --   --  2.66*   HCT 15.3*   < > 24.0*   < > 24.6* 23.0*

## 2019-07-13 NOTE — PLAN OF CARE
acute pain  Outcome: Ongoing  Goal: Control of chronic pain  Description  Control of chronic pain  Outcome: Ongoing     Problem: Nutrition  Goal: Optimal nutrition therapy  Outcome: Ongoing     Problem: Musculor/Skeletal Functional Status  Goal: Highest potential functional level  Outcome: Ongoing  Goal: Absence of falls  Outcome: Ongoing     Problem: OXYGENATION/RESPIRATORY FUNCTION  Goal: Patient will maintain patent airway  7/13/2019 1513 by Mary Alice Matias RN  Outcome: Ongoing  7/13/2019 0811 by Kylah Kc RCP  Outcome: Ongoing  Goal: Patient will achieve/maintain normal respiratory rate/effort  Description  Respiratory rate and effort will be within normal limits for the patient  7/13/2019 1513 by Mary Alice Matias RN  Outcome: Ongoing  7/13/2019 0811 by Kylah Kc RCP  Outcome: Ongoing     Problem: MECHANICAL VENTILATION  Goal: Patient will maintain patent airway  7/13/2019 1513 by Mary Alice Matias RN  Outcome: Ongoing  7/13/2019 0811 by Kylah Kc RCP  Outcome: Ongoing  Goal: Oral health is maintained or improved  7/13/2019 1513 by Mary Alice Matias RN  Outcome: Ongoing  7/13/2019 0811 by Kylah Kc RCP  Outcome: Ongoing  Goal: ET tube will be managed safely  7/13/2019 1513 by Mary Alice Matias RN  Outcome: Ongoing  7/13/2019 0811 by Kylah Kc RCP  Outcome: Ongoing  Goal: Ability to express needs and understand communication  Outcome: Ongoing  Goal: Mobility/activity is maintained at optimum level for patient  Outcome: Ongoing     Problem: SKIN INTEGRITY  Goal: Skin integrity is maintained or improved  7/13/2019 1513 by Mary Alice Matias RN  Outcome: Ongoing  7/13/2019 0811 by Kylah Kc RCP  Outcome: Ongoing     Problem: Restraint Use - Nonviolent/Non-Self-Destructive Behavior:  Goal: Absence of restraint indications  Description  Absence of restraint indications  Outcome: Not Met This Shift  Goal: Absence of restraint-related injury  Description  Absence of restraint-related injury  Outcome: Not Met This Shift

## 2019-07-14 NOTE — PLAN OF CARE
Problem: OXYGENATION/RESPIRATORY FUNCTION  Goal: Patient will maintain patent airway  7/14/2019 0801 by Asha Morales RCP  Outcome: Ongoing  7/13/2019 2250 by Samantha Babcock RN  Outcome: Ongoing     Problem: OXYGENATION/RESPIRATORY FUNCTION  Goal: Patient will achieve/maintain normal respiratory rate/effort  Description  Respiratory rate and effort will be within normal limits for the patient  7/14/2019 0801 by BLANCA DennyP  Outcome: Ongoing  7/13/2019 2250 by Samantha Babcock RN  Outcome: Ongoing     Problem: MECHANICAL VENTILATION  Goal: Patient will maintain patent airway  7/14/2019 0801 by BLANCA DennyP  Outcome: Ongoing  7/13/2019 2250 by Samantha Babcock RN  Outcome: Ongoing     Problem: MECHANICAL VENTILATION  Goal: Oral health is maintained or improved  7/14/2019 0801 by Asha Morales RCP  Outcome: Ongoing  7/13/2019 2250 by Samantha Babcock RN  Outcome: Ongoing     Problem: MECHANICAL VENTILATION  Goal: ET tube will be managed safely  7/14/2019 0801 by Asha Morales RCP  Outcome: Ongoing  7/13/2019 2250 by Samantha Babcock RN  Outcome: Ongoing     Problem: MECHANICAL VENTILATION  Goal: Ability to express needs and understand communication  7/14/2019 0801 by Asha Morales RCP  Outcome: Ongoing  7/13/2019 2250 by Samantha Babcock RN  Outcome: Ongoing     Problem: MECHANICAL VENTILATION  Goal: Mobility/activity is maintained at optimum level for patient  7/14/2019 0801 by Asha Morales RCP  Outcome: Ongoing  7/13/2019 2250 by Samantha Babcock RN  Outcome: Ongoing     Problem: SKIN INTEGRITY  Goal: Skin integrity is maintained or improved  7/14/2019 0801 by BLANCA DennyP  Outcome: Ongoing  7/13/2019 2250 by Samantha Babcock RN  Outcome: Ongoing

## 2019-07-14 NOTE — PROGRESS NOTES
Critical Care Team - Daily Progress Note      Date and time: 7/14/2019 8:41 AM  Patient's name:  [facility]io [facility] Record Number: [id number]  Patient's account/billing number: [de-identified]  Patient's YOB: 1965  Age: 47 y.o. Date of Admission: 6/29/2019  6:17 AM  Length of stay during current admission: 15      Primary Care Physician: Doretha Rankin MD  ICU Attending Physician: Dr. Deepthi Colin Status: Full Code    Reason for ICU admission: No chief complaint on file. SUBJECTIVE:     OVERNIGHT EVENTS:           Patient seen and examined at bedside. Yesterday patient went into A. fib with RVR with heart rate maintaining in 130s to 140s. Patient was given digoxin. Await dig level this morning. IV fluids changed per nephrology and is currently running even. Remains on CVVHD  Patient is receiving vitamin K for 3 days. INR today is 2.8.  GI planning for EGD this morning at 10 AM.  Hemoglobin 7.8 and platelet 78 down from 97 yesterday. Patient remains on 2 vasopressors Levophed at 30 and vasopressin at 0.04.   Patient is afebrile with T-max of 99.3    Patient is not sedated and does not follow command does not have any purposeful movement    AWAKE & FOLLOWING COMMANDS:  [x] No   [] Yes    CURRENT VENTILATION STATUS:     [x] Ventilator  [] BIPAP  [] Nasal Cannula [] Room Air      IF INTUBATED, ET TUBE MARKING AT LOWER LIP:       cms    SECRETIONS Amount:  [] Small [] Moderate  [x] Large  [] None  Color:     [] White [] Colored  [x] Bloody    SEDATION:  RAAS Score:  [] Propofol gtt  [] Versed gtt  [] Ativan gtt   [x] No Sedation    PARALYZED:  [x] No    [] Yes    DIARRHEA:                [x] No                [] Yes  (C. Difficile status: [] positive                                                                                                                       [] negative 0835-6581 9732-7964 24 Hour Total   INTAKE   I.V.(mL/kg) 519(6.1) 40(0.5)  559(6.6)   Shift Total(mL/kg) 519(6.1) 40(0.5)  559(6.6)   OUTPUT   Emesis/NG output(mL/kg) 50(0.6)   50(0.6)   CRRT 326   326   Shift Total(mL/kg) 376(4.4)   376(4.4)   Weight (kg) 85 85 85 85     Wt Readings from Last 3 Encounters:   07/09/19 187 lb 6.3 oz (85 kg)   06/28/19 188 lb 11.4 oz (85.6 kg)   06/13/19 189 lb 6 oz (85.9 kg)     Body mass index is 25.41 kg/m². PHYSICAL EXAM:  Constitutional: Intubated, not sedated, not following commands  EENT: sclera clear, anicteric, neck supple with midline trachea.   Neck: Supple, symmetrical, trachea midline, no adenopathy, thyroid symmetric, no jvd skin normal  Respiratory: Intubated, bronchial breath sounds, no wheezes  Cardiovascular: regular rate and rhythm, normal S1, S2, no murmur noted   Abdomen: soft, nontender, nondistended  NEUROLOGIC not sedated, no purposeful movement, not following commands  Extremities: Right BKA, left foot gangrene  SKIN: normal coloration and turgor    Any additional physical findings:      MEDICATIONS:  Scheduled Meds:   calcium gluconate  1 g Intravenous Once    phytonadione (VITAMIN K)  IVPB  10 mg Intravenous Daily    digoxin  250 mcg Intravenous Daily    sodium chloride  250 mL Intravenous Once    cefepime  1 g Intravenous Q12H    anidulafungin  100 mg Intravenous Q24H    insulin lispro  0-18 Units Subcutaneous Q4H    hydrocortisone sodium succinate PF  50 mg Intravenous Q6H    [Held by provider] clopidogrel  75 mg Oral Daily    chlorhexidine  15 mL Mouth/Throat BID    ketorolac  1 drop Left Eye 4x Daily    prednisoLONE acetate  1 drop Left Eye 4 times per day    gabapentin  100 mg Oral TID    docusate sodium  100 mg Oral Daily    [Held by provider] aspirin EC  81 mg Oral Daily    sodium chloride flush  10 mL Intravenous 2 times per day     Continuous Infusions:   CRRT dialysis builder 1,500 mL/hr (07/14/19 0412)    pantoprozole (cm H2O): 25 cm H2O  Skin barrier applied: No    ABGs:     Lab Results   Component Value Date    PHART 7.207 07/10/2019    TOI0COF 44.5 07/10/2019    PO2ART 283.0 07/10/2019    IQB2NBL 17.0 07/10/2019    IWY5RXA 17 07/14/2019    S5JJJLRC 99.8 07/10/2019    FIO2 40.0 07/14/2019     Lactic Acid:   Lab Results   Component Value Date    LACTA 1.0 06/08/2019    LACTA 2.2 06/08/2019    LACTA 1.5 11/08/2018         DATA:  Complete Blood Count:   Recent Labs     07/12/19  0416  07/13/19  0439  07/13/19 1821 07/13/19 2350 07/14/19 0412   WBC 43.9*  --  20.7*  --   --   --  14.2*   HGB 7.5*   < > 7.5*   < > 7.8* 7.6* 7.8*   MCV 88.6  --  84.6  --   --   --  85.5   *  --  97*  --   --   --  78*   RBC 2.71*  --  2.66*  --   --   --  2.76*   HCT 24.0*   < > 22.5*   < > 23.9* 23.4* 23.6*   MCH 27.7  --  28.2  --   --   --  28.3   MCHC 31.3  --  33.3  --   --   --  33.1   RDW 17.2*  --  16.9*  --   --   --  18.0*   MPV 11.9  --  12.5  --   --   --  12.1    < > = values in this interval not displayed. PT/INR:    Lab Results   Component Value Date    PROTIME 27.1 07/14/2019    PROTIME 18.3 06/29/2019    INR 2.8 07/14/2019     PTT:    Lab Results   Component Value Date    APTT 48.1 07/11/2019       Basal Metabolic Profile:   Recent Labs     07/13/19 1821 07/13/19 2350 07/14/19 0412   * 130* 133*   K 3.6* 3.7 3.7   BUN 27* 26* 25*   CREATININE 2.01* 2.00* 2.08*   CL 90* 91* 95*   CO2 15* 15* 15*      Magnesium:   Lab Results   Component Value Date    MG 2.0 07/14/2019    MG 2.1 07/13/2019    MG 2.1 07/13/2019     Phosphorus:   Lab Results   Component Value Date    PHOS 4.0 07/14/2019    PHOS 3.8 07/13/2019    PHOS 3.8 07/13/2019     S. Calcium:  Recent Labs     07/14/19  0412   CALCIUM 8.3*     S. Ionized Calcium:No results for input(s): IONCA in the last 72 hours.       Urinalysis:   Lab Results   Component Value Date    NITRU NEGATIVE 07/11/2019    COLORU YELLOW 07/11/2019    PHUR 7.5 07/11/2019    WBCUA 20 Positive (Details:  )             Chest Xray (7/14/2019):    ASSESSMENT:     Principal Problem:    Below knee amputation status, right (HCC)  Active Problems:    Chronic systolic congestive heart failure (HCC)    Essential hypertension    Nephropathy    ESRD on hemodialysis ([facility]    Peripheral vascular occlusive disease ([facility] [facility]    Uncontrolled diabetes mellitus type 2 with atherosclerosis of arteries of extremities (HCC)    Chronic atrial fibrillation (HCC)    Fungemia    Gangrene (HCC)    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 *          PLAN:     1. Feeding Diet: Diet NPO Effective Now  2. Fluids none  3. Family no family at bedside this morning  4. Analgesic fentanyl, Percocet as needed  5. Sedation none  6. Thrombo-prophylaxis [] Enoxaparin [] Unfract. Heparin Subcut  [x] EPC Cuffs  7. Mobility PT/OT  8. Heads up 30 degrees  9. Ulcer prophylaxis [x] PPI Agent  [] [id number] [] Sucralfate  [x] Other: Protonix drip  10. Glycemic control insulin sliding scale high-dose  11. Spontaneous breathing trial per protocol  12. Bowel regimen/urine output Colace daily, Zofran as needed  13. Indwelling catheter/lines PIV, Edinson cath, central line , ETT, NG , urethral catheter  14. De-escalation wean off pressor        Cardiac arrest  Levophed and vasopressin, titrate to MAP>65  May need to add phenylephrine   ECHO showing EF 25 to 35%.   No obvious valve vegetation  Recs per cardiology  Heparin drip discontinued in the setting of acute GI bleed    A. fib with RVR-currently rate controlled  Continue digoxin daily  Follow-up dig level     Anion gap metabolic acidosis with associated hyperkalemia  Bicarb slowly improving  On CVVHD, managed

## 2019-07-14 NOTE — PROGRESS NOTES
Renal Progress Note    Patient :  Jeet Simmons; 47 y.o. MRN# [id number]  Location:  0101/0101-01  Attending:  Carlton Landa MD  Admit Date:  6/29/2019   Hospital Day: 15    Patient seen on CVVHD  Subjective:     Patient seen and evaluated at bedside. CVVHD continues  Ultrafiltration rate is even, with 2 potassium dialysate. No clotting issues. Tolerating well. Continues on Levophed 30 mcgs per minute and 0.02 units of vasopressin. Has had multiple blood transfusions. To receive vit K today for elevated INR. Intubated FIO2 30%. No purposeful movement. Potassium 3.7, creatinine 2.07, BUN 29. No urine output. Outpatient Medications:     Medications Prior to Admission: warfarin (COUMADIN) 5 MG tablet, Take 2.5 mg by mouth Twice a Week Indications:  On Monday, Friday; 5 mg all other days, pt. states he isnt taken it On Monday, Friday; 5 mg all other days   ketorolac (ACULAR) 0.5 % ophthalmic solution, Place 1 drop into the left eye every 2 hours  moxifloxacin (VIGAMOX) 0.5 % ophthalmic solution, Place 1 drop into the left eye every 4 hours  warfarin (COUMADIN) 5 MG tablet, Take 5 mg by mouth Five times weekly Indications: 2.5 mg Monday, Friday; 5 mg all other days 2.5 mg Monday, Friday; 5 mg all other days  tamsulosin (FLOMAX) 0.4 MG capsule, TAKE 1 CAPSULE BY MOUTH ONCE DAILY  carvedilol (COREG) 12.5 MG tablet, TAKE 1 TABLET BY MOUTH TWICE DAILY (Patient taking differently: TAKE 1 TABLET BY MOUTH DAILY)  furosemide (LASIX) 40 MG tablet, Take 1 tablet by mouth 2 times daily (Patient taking differently: Take 40 mg by mouth daily )  glipiZIDE (GLUCOTROL XL) 2.5 MG extended release tablet, Take 1 tablet by mouth daily  Blood Glucose Monitoring Suppl (ONE TOUCH ULTRA 2) w/Device KIT,   aspirin EC 81 MG EC tablet, Take 1 tablet by mouth daily  losartan (COZAAR) 50 MG tablet, TAKE ONE TABLET BY MOUTH ONCE DAILY (Patient not taking: Reported on 6/29/2019)  clopidogrel (PLAVIX) 75 MG tablet, Take 1 tablet by

## 2019-07-14 NOTE — PLAN OF CARE
Problem: Restraint Use - Nonviolent/Non-Self-Destructive Behavior:  Goal: Absence of restraint indications  Description  Absence of restraint indications  7/14/2019 1228 by Nancy Claros RN  Outcome: Met This Shift  7/13/2019 2250 by Catrachito Lanza RN  Outcome: Ongoing  Goal: Absence of restraint-related injury  Description  Absence of restraint-related injury  7/14/2019 1228 by Nancy Claros RN  Outcome: Met This Shift  7/13/2019 2250 by Catrachito Lanza RN  Outcome: Ongoing     Problem: Risk for Impaired Skin Integrity  Goal: Tissue integrity - skin and mucous membranes  Description  Structural intactness and normal physiological function of skin and  mucous membranes. 7/14/2019 1228 by Nancy Claros RN  Outcome: Ongoing  7/13/2019 2250 by Catrachito Lanza RN  Outcome: Ongoing     Problem: Falls - Risk of:  Goal: Will remain free from falls  Description  Will remain free from falls  7/14/2019 1228 by Nancy Claros RN  Outcome: Ongoing  7/13/2019 2250 by Catrachito Lanza RN  Outcome: Ongoing  Goal: Absence of physical injury  Description  Absence of physical injury  7/14/2019 1228 by Nancy Claros RN  Outcome: Ongoing  7/13/2019 2250 by Catrachito Lanza RN  Outcome: Ongoing     Problem:  Activity:  Goal: Risk for activity intolerance will decrease  Description  Risk for activity intolerance will decrease  7/14/2019 1228 by Nancy Claros RN  Outcome: Ongoing  7/13/2019 2250 by Catrachito Lanza RN  Outcome: Ongoing     Problem: Coping:  Goal: Ability to adjust to condition or change in health will improve  Description  Ability to adjust to condition or change in health will improve  7/14/2019 1228 by Nancy Claros RN  Outcome: Ongoing  7/13/2019 2250 by Catrachito Lanza RN  Outcome: Ongoing     Problem: Health Behavior:  Goal: Ability to identify and utilize available resources and services will improve  Description  Ability to identify and utilize available resources and services will improve  7/14/2019 1228 by Viviana Peralta Kamar Sultana RN  Outcome: Ongoing  7/13/2019 2250 by Eliz Callahan RN  Outcome: Ongoing  Goal: Ability to manage health-related needs will improve  Description  Ability to manage health-related needs will improve  7/14/2019 1228 by Dorthey Duverney, RN  Outcome: Ongoing  7/13/2019 2250 by Eliz Callahan RN  Outcome: Ongoing     Problem: Metabolic:  Goal: Ability to maintain appropriate glucose levels will improve  Description  Ability to maintain appropriate glucose levels will improve  7/14/2019 1228 by Dorthey Duverney, RN  Outcome: Ongoing  7/13/2019 2250 by Eliz Callahan RN  Outcome: Ongoing     Problem: Nutritional:  Goal: Maintenance of adequate nutrition will improve  Description  Maintenance of adequate nutrition will improve  7/14/2019 1228 by Dorthey Duverney, RN  Outcome: Ongoing  7/13/2019 2250 by Eliz Callahan RN  Outcome: Ongoing  Goal: Progress toward achieving an optimal weight will improve  Description  Progress toward achieving an optimal weight will improve  7/14/2019 1228 by Dorthey Duverney, RN  Outcome: Ongoing  7/13/2019 2250 by Eliz Callahan RN  Outcome: Ongoing     Problem: Tissue Perfusion:  Goal: Adequacy of tissue perfusion will improve  Description  Adequacy of tissue perfusion will improve  7/14/2019 1228 by Dorthey Duverney, RN  Outcome: Ongoing  7/13/2019 2250 by Eliz Callahan RN  Outcome: Ongoing     Problem: Pain:  Goal: Pain level will decrease  Description  Pain level will decrease  7/14/2019 1228 by Dorthey Duverney, RN  Outcome: Ongoing  7/13/2019 2250 by Eliz Callahan RN  Outcome: Ongoing  Goal: Control of acute pain  Description  Control of acute pain  7/14/2019 1228 by Dorthey Duverney, RN  Outcome: Ongoing  7/13/2019 2250 by lEiz Callahan RN  Outcome: Ongoing  Goal: Control of chronic pain  Description  Control of chronic pain  7/14/2019 1228 by Dorthey Duverney, RN  Outcome: Ongoing  7/13/2019 2250 by Eliz Callahan RN  Outcome: Ongoing     Problem: Nutrition  Goal: Optimal nutrition therapy  7/14/2019 1228

## 2019-07-14 NOTE — PROGRESS NOTES
problems. *      Ischemic gangrene to right foot, s/p staged right below knee amputation and revision to above knee amputation  · Change mepilex dressing every other day, ok to wash stump with hibiclens on dressing changes day.   · Keep dry gauze weaved in between the toes on the left to prevent moisture build up and maceration of skin  · Continue to wean pressors as tolerated goal to keep MAP > 60  · Continue vent and CVRT support      Electronically signed by Shelby Campa DO on 7/14/2019 at 8:18 AM

## 2019-07-15 NOTE — PLAN OF CARE
Problem: Risk for Impaired Skin Integrity  Goal: Tissue integrity - skin and mucous membranes  Description  Structural intactness and normal physiological function of skin and  mucous membranes. 7/14/2019 2308 by Raza Mcnally RN  Outcome: Ongoing  7/14/2019 1228 by Josue Stephenson RN  Outcome: Ongoing     Problem: Falls - Risk of:  Goal: Will remain free from falls  Description  Will remain free from falls  7/14/2019 2308 by Raza Mcnally RN  Outcome: Ongoing  7/14/2019 1228 by Josue Stephenson RN  Outcome: Ongoing  Goal: Absence of physical injury  Description  Absence of physical injury  7/14/2019 2308 by Raza Mcnally RN  Outcome: Ongoing  7/14/2019 1228 by Josue Stephenson RN  Outcome: Ongoing     Problem:  Activity:  Goal: Risk for activity intolerance will decrease  Description  Risk for activity intolerance will decrease  7/14/2019 2308 by Raza Mcnally RN  Outcome: Ongoing  7/14/2019 1228 by Josue Stephenson RN  Outcome: Ongoing     Problem: Coping:  Goal: Ability to adjust to condition or change in health will improve  Description  Ability to adjust to condition or change in health will improve  7/14/2019 2308 by Raza Mcnally RN  Outcome: Ongoing  7/14/2019 1228 by Josue Stephenson RN  Outcome: Ongoing     Problem: Health Behavior:  Goal: Ability to identify and utilize available resources and services will improve  Description  Ability to identify and utilize available resources and services will improve  7/14/2019 2308 by Raza Mcnally RN  Outcome: Ongoing  7/14/2019 1228 by Josue Stephenson RN  Outcome: Ongoing  Goal: Ability to manage health-related needs will improve  Description  Ability to manage health-related needs will improve  7/14/2019 2308 by Raza Mcnally RN  Outcome: Ongoing  7/14/2019 1228 by Josue Stephenson RN  Outcome: Ongoing     Problem: Metabolic:  Goal: Ability to maintain appropriate glucose levels will improve  Description  Ability to maintain appropriate glucose levels will improve  7/14/2019 2308 by

## 2019-07-15 NOTE — PROGRESS NOTES
99 °F (37.2 °C) (Axillary)   Resp 15   Ht 6' (1.829 m)   Wt 222 lb 10.6 oz (101 kg)   SpO2 100%   BMI 30.20 kg/m²   Grabiel Risk Score: Grabiel Scale Score: 12    LABS    CBC:   Lab Results   Component Value Date    WBC 17.0 07/15/2019    RBC 3.08 07/15/2019    RBC 3.98 02/27/2012    HGB 8.4 07/15/2019     CMP:  Albumin:    Lab Results   Component Value Date    LABALBU 2.2 07/15/2019    LABALBU 3.6 01/16/2012     PT/INR:    Lab Results   Component Value Date    PROTIME 19.4 07/15/2019    PROTIME 18.3 06/29/2019    INR 1.9 07/15/2019     HgBA1c:    Lab Results   Component Value Date    LABA1C 7.1 06/30/2019     PTT: No components found for: LABPTT      Assessment:     Patient Active Problem List   Diagnosis    DM type 2 with diabetic peripheral neuropathy    Diabetic foot ulcer ([address]    CHF (congestive heart failure)    HTN (hypertension)    CRF (chronic renal failure) ([facility])    Osteomyelitis ([address]    Diabetes mellitus due to underlying condition with complication, with long-term current use of insulin ([facility])    Cellulitis    Shortness of breath    Chronic systolic congestive heart failure (HCC)    Atypical chest pain    Essential hypertension    Chest pain    Nephropathy    Unstable angina pectoris ([address]    ESRD on hemodialysis ([address]    Sepsis ([address]    Fever    MSSA (methicillin susceptible Staphylococcus aureus) septicemia ([address]    Bandemia    C. 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

## 2019-07-15 NOTE — PROGRESS NOTES
344(3.4)   Weight (kg) 101 101 101 101     Wt Readings from Last 3 Encounters:   07/15/19 222 lb 10.6 oz (101 kg)   06/28/19 188 lb 11.4 oz (85.6 kg)   06/13/19 189 lb 6 oz (85.9 kg)     Body mass index is 30.2 kg/m². PHYSICAL EXAM:  Constitutional: Intubated, not sedated, not following commands  EENT: Sclera icteric, neck supple with midline trachea.   Neck: Supple, symmetrical, trachea midline, no adenopathy, thyroid symmetric, no jvd skin normal  Respiratory: Intubated, bronchial breath sounds, no wheezes  Cardiovascular: regular rate and rhythm, normal S1, S2, no murmur noted   Abdomen: soft, nontender, nondistended  NEUROLOGIC not sedated, no purposeful movement, not following commands  Extremities: Right BKA, left foot gangrenous toes  SKIN: normal coloration and turgor    Any additional physical findings:      MEDICATIONS:  Scheduled Meds:   potassium bicarb-citric acid  20 mEq Oral BID    phytonadione (VITAMIN K)  IVPB  10 mg Intravenous Daily    sodium chloride  250 mL Intravenous Once    cefepime  1 g Intravenous Q12H    anidulafungin  100 mg Intravenous Q24H    insulin lispro  0-18 Units Subcutaneous Q4H    hydrocortisone sodium succinate PF  50 mg Intravenous Q6H    [Held by provider] clopidogrel  75 mg Oral Daily    chlorhexidine  15 mL Mouth/Throat BID    ketorolac  1 drop Left Eye 4x Daily    prednisoLONE acetate  1 drop Left Eye 4 times per day    gabapentin  100 mg Oral TID    docusate sodium  100 mg Oral Daily    [Held by provider] aspirin EC  81 mg Oral Daily    sodium chloride flush  10 mL Intravenous 2 times per day     Continuous Infusions:   pantoprozole (PROTONIX) infusion 8 mg/hr (07/15/19 0532)    CRRT dialysis builder 1,500 mL/hr (07/15/19 0232)    phenylephrine (JOHNIE-SYNEPHRINE) 50mg/250mL infusion Stopped (07/12/19 0615)    norepinephrine (LEVOPHED) infusion (double concentration) 30 mcg/min (07/15/19 0206)    vasopressin (Septic Shock) infusion 0.02 Units/min Started: Yes  Ventilation Day(s): 1  Skin Assessment: Clean, dry, & intact  Equipment ID: [id number]  Equipment Changed: HME  Vent Type: Servo i  Vent Mode: PRVC  Vt Ordered: 620 mL  Rate Set: 18 bmp  FiO2 : 30 %  Sensitivity: 3  PEEP/CPAP: 8  I Time/ I Time %: 0.75 s  Cuff Pressure (cm H2O): 25 cm H2O  Humidification Source: Heated wire  Humidification Temp: 36.7  Humidification Temp Measured: 36.7  Circuit Condensation: Drained  Nitric Oxide/Epoprostenol In Use?: No     Lung Protective Ventilation ? yes    Vent Bundle (Oral care, HOB >30, Subglottic suction) reviewed ? yes   Spontaneous breathing trial (SBT) indicated ? no   Coordinated SAT & SBT ? not applicable   CXR/CT reviewed ? not applicable   RENAL    Urine Output: inadequate CVVHD   Output  Urine: 100 mL Urine: 100 mL     Intake/Output Summary (Last 24 hours) at 7/15/2019 0930  Last data filed at 7/15/2019 0900  Gross per 24 hour   Intake 2674.3 ml   Output 2448 ml   Net 226.3 ml        Fluid Balance Goal  Even     Renal panel/BMP reviewed? yes    Lytes replaced ? yes    Renal replacement therapy (HD/CRRT) ? yes   HEME/ONC     CBC/Coagulation profile reviewed ? yes    Require transfusion ? yes   DVT prophylaxis intermittent pneumatic compression boots   GI    Nutrition Plan:   continue current nutrition therapy    Current TF rate at Goal ? not applicable    TPN indicated ? no    Bowel function/regimen required ?   no   GI prophylaxis ? yes proton pump inhibitor per orders   ENDOCRINE     Adequate glycemic control ? yes    Insulin gtt indicated ? no    Stress dose steroids indicated ? no    Taper steroids? not applicable   ID     Antibiotics ? yes    Positive Cx ? yes    Source Control ? yes    De-esclation plan ?  no   DERM     Skin integrity/Wound care reviewed ? yes    WOC  nurse needed? yes   EARLY MOBILIZATION PLAN     Lines, Tubes, Drains needed ?  yes    PT/OT/Speech Therapy Bed Rest   DISPOSITION/CODE STATUS/GOAL OF CARE  Disposition/Code Status/Goals of care Unchanged. Andrew Brown M.D.              Department of Internal Medicine/ Critical care  [facility])             7/15/2019, 7:49 AM

## 2019-07-15 NOTE — PLAN OF CARE
Problem: Risk for Impaired Skin Integrity  Goal: Tissue integrity - skin and mucous membranes  Description  Structural intactness and normal physiological function of skin and  mucous membranes. 7/15/2019 1227 by Nancy Claros RN  Outcome: Ongoing  7/14/2019 2308 by Wisam Zarate RN  Outcome: Ongoing     Problem: Falls - Risk of:  Goal: Will remain free from falls  Description  Will remain free from falls  7/15/2019 1227 by Nancy Claros RN  Outcome: Ongoing  7/14/2019 2308 by Wisam Zarate RN  Outcome: Ongoing  Goal: Absence of physical injury  Description  Absence of physical injury  7/15/2019 1227 by Nancy Claros RN  Outcome: Ongoing  7/14/2019 2308 by Wisam Zarate RN  Outcome: Ongoing     Problem:  Activity:  Goal: Risk for activity intolerance will decrease  Description  Risk for activity intolerance will decrease  7/15/2019 1227 by Nancy Claros RN  Outcome: Ongoing  7/14/2019 2308 by Wisam Zarate RN  Outcome: Ongoing     Problem: Coping:  Goal: Ability to adjust to condition or change in health will improve  Description  Ability to adjust to condition or change in health will improve  7/15/2019 1227 by Nancy Claros RN  Outcome: Ongoing  7/14/2019 2308 by Wisam Zarate RN  Outcome: Ongoing     Problem: Health Behavior:  Goal: Ability to identify and utilize available resources and services will improve  Description  Ability to identify and utilize available resources and services will improve  7/15/2019 1227 by Nancy Claros RN  Outcome: Ongoing  7/14/2019 2308 by Wisam Zarate RN  Outcome: Ongoing  Goal: Ability to manage health-related needs will improve  Description  Ability to manage health-related needs will improve  7/15/2019 1227 by Nancy Claros RN  Outcome: Ongoing  7/14/2019 2308 by Wisam Zarate RN  Outcome: Ongoing     Problem: Metabolic:  Goal: Ability to maintain appropriate glucose levels will improve  Description  Ability to maintain appropriate glucose levels will improve  7/15/2019 1227 by

## 2019-07-15 NOTE — PROGRESS NOTES
[facility]  Progress Note    Olivia Bullard  MRN: [id number]  Date: 7/15/2019    Reason for F/U :   Fungemia, septic shock    Impression :   1. Acute hypoxic respiratory failure currently ventilator dependent  2. Septic/cardiogenic shock  · on levophed and vasopressin  3. PEA arrest status post successful resuscitation  4. End-stage renal disease on hemodialysis and currently only tolerating CVVHD  5. Multisystem organ failure-shock liver -improving  6. Coagulopathy likely related to the shock liver  7. Anoxic encephalopathy  8. Acute blood loss anemia felt secondary to GI bleed  9. Candida glabrata fungemia  10. Leukocytosis likely multifactorial -improving  11. Right leg gangrene status post  then AKA 7/10/2019  12. Developing anasarca    Recommendations:   · The patient continues on anidulafungin and cefepime therapy   · The blood culture data at the time of the arrest have remained negative   · While some of the laboratory parameters have improved the patient remains critically ill and is dependent on 2 pressors   · There are now evolving changes of ischemia in the left foot as well. · The patient does not show any signs of clinical improvement and continues to be encephalopathic, ventilator dependent and I do not at all feel that he will make a meaningful neurological recovery. · The care was discussed at bedside with the daughter as well as the brother and all the above was relayed to them and they expressed understanding. · I did plant the seed about them considering tracheostomy and PEG tube placement but again I am not sure that this would be the way to go in Perez's case. Infection Control Recommendations:   Universal precautions    Discharge Planning:   Estimated Length of IV antimicrobials:  To be determined  Patient will need Midline Catheter Insertion/ PICC line Insertion: No  Patient will need: Home IV , Cory,  SNF,  LTAC: Undetermined  Patient willneed Constitutional: He is intubated. HENT:   Head: Normocephalic and atraumatic. Cardiovascular: Regular rhythm and normal heart sounds. Pulmonary/Chest: Effort normal and breath sounds normal. He is intubated. Abdominal: Soft. Bowel sounds are normal.   Musculoskeletal: He exhibits edema (Anasarca). Right above-the-knee amputation and the dressing was not removed   Skin:   The left leg anterior laterally has some ecchymotic skin changes from skin necrosis. There is some area of deep tissue injury of the left heel. Foot does have some superficial ecchymotic skin changes at the distal foot and toes and this is evolving into dry gangrenous skin changes. Laboratory data:   I have independently reviewed the followinglabs:  CBC with Differential:   Recent Labs     07/14/19 0412  07/15/19  0541 07/15/19  1222   WBC 14.2*  --  17.0*  --    HGB 7.8*   < > 8.4* 8.9*   HCT 23.6*   < > 26.5* 26.6*   PLT 78*  --  60*  --    LYMPHOPCT 6*  --  4*  --    MONOPCT 2  --  6  --     < > = values in this interval not displayed. BMP:   Recent Labs     07/15/19  0541 07/15/19  1222    138   K 3.7 3.7   CL 98 100   CO2 19* 19*   BUN 24* 26*   CREATININE 1.73* 1.66*   MG 2.1 2.1     Hepatic Function Panel:   Recent Labs     07/14/19 0412 07/15/19  0541   PROT 4.1* 4.4*   LABALBU 2.1* 2.2*   BILIDIR 3.54* 5.05*   IBILI 1.56* 1.84*   BILITOT 5.10* 6.89*   ALKPHOS 249* 212*   ALT 1,583* 748*   AST 1,148* 317*     No results for input(s): VANCOTROUGH in the last 72 hours. Lab Results   Component Value Date    .0 (H) 06/08/2019     Lab Results   Component Value Date    SEDRATE 114 (H) 06/08/2019       No results for input(s): PROCAL in the last 72 hours. SURGICAL PATHOLOGY CONSULTATION       Patient Name: Sang Sarkar    Med Rec: [id number]   Path Number: [id number]   Collected: 7/10/2019   Received: 7/11/2019   Reported: 7/15/2019 14:37     -- Diagnosis --   HOLD FOR GROSS   RIGHT KNEE, ABOVE THE KNEE Collected: 07/04/19 0157   Order Status: Completed Specimen: Nasal Updated: 07/04/19 1418    Specimen Description . NASAL SWAB    MRSA, DNA, Nasal NEGATIVE:  MRSA DNA not detected by nucleic acid amplification. Medications:      hydrocortisone sodium succinate PF  50 mg Intravenous Q12H    cefepime  1 g Intravenous Q8H    pantoprazole  40 mg Intravenous BID    And    sodium chloride (PF)  10 mL Intravenous BID    phytonadione (VITAMIN K)  IVPB  10 mg Intravenous Daily    sodium chloride  250 mL Intravenous Once    anidulafungin  100 mg Intravenous Q24H    insulin lispro  0-18 Units Subcutaneous Q4H    [Held by provider] clopidogrel  75 mg Oral Daily    chlorhexidine  15 mL Mouth/Throat BID    ketorolac  1 drop Left Eye 4x Daily    prednisoLONE acetate  1 drop Left Eye 4 times per day    gabapentin  100 mg Oral TID    docusate sodium  100 mg Oral Daily    [Held by provider] aspirin EC  81 mg Oral Daily    sodium chloride flush  10 mL Intravenous 2 times per day           [facility]  Cassie Domínguez MD  [employer]  OFFICE: [phone]  CELL:     [phone]    Electronically signed by Gabriel Xie DPM on 7/15/2019 at 2:03 PM  Thank you for allowing us to participate in the care of this patient. Please call with questions. This note iscreated with the assistance of a speech recognition program.  While intending to generate a document that actually reflects the content of the visit, the document can still have some errors including those of syntax andsound a like substitutions which may escape proof reading. It such instances, actual meaning can be extrapolated by contextual diversion.

## 2019-07-15 NOTE — PROGRESS NOTES
Nutrition Assessment    Type and Reason for Visit: Reassess    Nutrition Recommendations: Start nutrition as able. If TPN, suggest starting with Premix Central Solution (5/20) at 41.7 ml/hr with 250 ml 20% lipids. Will continue to follow/monitor plans. Nutrition Assessment: Pt remains on vent. No nutrition at this time. Noted pt having coffee ground OG output. Will provide PN nutrition support recs. Nutrition Risk Level: High    Nutrient Needs:  · Estimated Daily Total Kcal: 2100kcal  · Estimated Daily Protein (g):  g    Nutrition Diagnosis:   · Problem: Inadequate oral intake  · Etiology: related to Impaired respiratory function-inability to consume food     Signs and symptoms:  as evidenced by NPO status due to medical condition    Objective Information:  · Current Nutrition Therapies:  · Oral Diet Orders: NPO   · Anthropometric Measures:  · Ht: 6' (182.9 cm)   · Current Body Wt: 222 lb 10.6 oz (101 kg)  · Admission Body Wt: 184 lb (83.5 kg)  · Ideal Body Wt: 167 lb (75.8 kg), % Ideal Body 111% (adm/ideal)  · Ideal body weight adjusted for AKA    Nutrition Interventions:   Start nutrition as able. If TPN, suggest starting with Premix Central Solution (5/20) at 41.7 ml/hr with 250 ml 20% lipids.    Continued Inpatient Monitoring, Education Not Indicated    Nutrition Evaluation:   · Evaluation: No progress toward goals   · Goals: meet % of estimated nutrition needs    · Monitoring: Nutrition Progression, Weight, Pertinent Labs, I&O      Electronically signed by Nick Wade RD, LD on 7/15/19 at 3:49 PM    Contact Number: [phone]

## 2019-07-16 NOTE — CONSULTS
Neurology Consult Note      Reason for Consult:  MARY, post cardiac arrest, prognosis  Requesting Physician:  Gwen Adams MD    CHIEF COMPLAINT:  Post cardiac arrest after AKA    History Obtained From:  family member - daughter, electronic medical record       HISTORY OF PRESENT ILLNESS:              The patient is a 47 y.o. male with below [address] who came to hospital for evaluation of right lower extremity gangrene. patient was admitted back in 6/29/2019 for right BKA due to complicated DM and gangrene of foot/osteomyelitis. Patient got BKA on 7/1/2019. Patient started having hypotension and sepsis and put on vasopressors, ID was consulted due to sepsis, fungemia. Nephrology on case due to ESRD on HD. Due to patient not getting better and thinking sepsis came form BKA evaluation was made for AKA of right lower extremity. On 7/10/2019 patient got AKA of right lower extremity and in OR had one episode of PEA. Was down without ROSC for about 15 minutes, with 2 shocks. Next day on 7/11/2019 in floor patient had another PEA and was down for about 20 minutes. Lately patient started having melena and bleeding on OG tube. Has be transfused with PRBC    Neurology consulted due to patient being off sedation and not getting better. As per history:   Patient was on warfarin but on admission INR 1.6.      Past Medical History:        Diagnosis Date    A-Fib     Eliquis    Anemia     CAD     Dr. Radha Santana Carotid artery stenosis     BILATERAL    Cerebral artery occlusion with cerebral infarction ([facility] [address] 11/2017    RIGHT SIDED WEAKNESS    CHF 2013    Diabetes Mellitus Type 2     diet controlled    ESRD     RUE Fistula     Hemodialysis patient 08/2018 tues/thurs/sat/ [facility]  [place]/ Pt. doesn't know  Nephrologist name/ access Left chest       Hyperlipidemia     PCP Dr. Palak Boo Hypertension     Nephropathy     Neuropathy     No natural teeth     Poor historian     Renal insufficiency, mild 2013    Wears glasses     Wheelchair dependence      Past Surgical History:        Procedure Laterality Date    AMPUTATION ABOVE KNEE Right 7/10/2019    RIGHT ABOVE KNEE AMPUTATION performed by Naomi Alcantar MD at [address] Bilateral 8/5/13    CATARACT REMOVAL Right     DIALYSIS FISTULA CREATION Right 1/16/2019    RIGHT ARM AV FISTULA CREATION performed by Naomi Alcantar MD at [facility] Right 6/11/2019    LOWER LEG EMBOLECTOMY THROMBECTOMY performed by Naomi Alcantar MD at [place] [address] Right     5TH DIGIT    FOOT DEBRIDEMENT Left 7/2015    [facility]     LEG AMPUTATION BELOW KNEE Right 06/29/2019    LEG AMPUTATION BELOW KNEE Right 6/29/2019    BELOW KNEE GUILLOTINE AMPUTATION performed by Naomi Alcantar MD at [address] [place] [address] Right 7/1/2019    RIGHT LEG AMPUTATION REVISION performed by Naomi [facility]cantar MD at [facility] Left 8/19/2015    Arthrodesis IP Joint Hallux    TUNNELED VENOUS CATHETER PLACEMENT  08/2018    RIGHT CHEST PERM CATH    VASCULAR SURGERY Right     RBKA     Current Medications:   Current Facility-Administered Medications: prismaSATE BK 0/3.5 5,000 mL with potassium chloride 15 mEq solution, 1,500 mL/hr, Dialysis, Continuous  hydrocortisone sodium succinate PF (SOLU-CORTEF) injection 50 mg, 50 mg, Intravenous, Q12H  cefepime (MAXIPIME) 1 g IVPB minibag, 1 g, Intravenous, Q8H  pantoprazole (PROTONIX) injection 40 mg, 40 mg, Intravenous, BID **AND** sodium chloride (PF) 0.9 % injection 10 mL, 10 mL, Intravenous, BID  potassium chloride 20 mEq/50 mL IVPB (Central Line), 20 mEq, Intravenous, PRN  potassium chloride 10 mEq/100 mL IVPB (Peripheral Line), 10 mEq, Intravenous, PRN  magnesium sulfate 1 g in dextrose 5% 100 mL IVPB, 2 g, Intravenous, PRN  phytonadione (ADULT) (VITAMIN K) 10 mg in dextrose 5 % 100 mL IVPB, 10 mg, Intravenous, Daily  0.9 % sodium chloride bolus, 250 mL, Intravenous, Once  [COMPLETED] anidulafungin (ERAXIS) 200 mg in dextrose 5 % 260 mL IVPB, 200 mg, Intravenous, Once **AND** anidulafungin (ERAXIS) 100 mg in dextrose 5 % 130 mL IVPB, 100 mg, Intravenous, Q24H  dextrose 50 % IV solution, 25 g, Intravenous, PRN  AKWA TEARS (LACRILUBE) 2-15-83 % opthalmic ointment, , Ophthalmic, Nightly PRN  insulin lispro (HUMALOG) injection vial 0-18 Units, 0-18 Units, Subcutaneous, Q4H  fentaNYL (SUBLIMAZE) injection 50 mcg, 50 mcg, Intravenous, Q1H PRN  phenylephrine (JOHNIE-SYNEPHRINE) 50 mg in dextrose 5 % 250 mL infusion, 100 mcg/min, Intravenous, Continuous  [Held by provider] clopidogrel (PLAVIX) tablet 75 mg, 75 mg, Oral, Daily  norepinephrine (LEVOPHED) 32 mg in dextrose 5 % 250 mL infusion, 10 mcg/min, Intravenous, Continuous  chlorhexidine (PERIDEX) 0.12 % solution 15 mL, 15 mL, Mouth/Throat, BID  vasopressin 20 Units in dextrose 5 % 100 mL infusion, 0.04 Units/min, Intravenous, Continuous  ketorolac (ACULAR) 0.5 % ophthalmic solution 1 drop, 1 drop, Left Eye, 4x Daily  prednisoLONE acetate (PRED FORTE) 1 % ophthalmic suspension 1 drop, 1 drop, Left Eye, 4 times per day  gabapentin (NEURONTIN) capsule 100 mg, 100 mg, Oral, TID  midodrine (PROAMATINE) tablet 10 mg, 10 mg, Oral, TID PRN  magnesium hydroxide (MILK OF MAGNESIA) 400 MG/5ML suspension 30 mL, 30 mL, Oral, Daily PRN  docusate sodium (COLACE) capsule 100 mg, 100 mg, Oral, Daily  doxercalciferol (HECTOROL) injection 1 mcg, 1 mcg, Intravenous, PRN  glucose (GLUTOSE) 40 % oral gel 15 g, 15 g, Oral, PRN  dextrose 50 % IV solution, 12.5 g, Intravenous, PRN  glucagon (rDNA) injection 1 mg, 1 mg, Intramuscular, PRN  dextrose 5 % solution, 100 mL/hr, Intravenous, PRN  [Held by provider] aspirin EC tablet 81 mg, 81 mg, Oral, Daily  ondansetron (ZOFRAN) injection 4 mg, 4 mg, Intravenous, Q6H PRN  sodium chloride flush 0.9 % injection 10 mL, 10 mL, REPORTED     POC pCO2 Temp NOT REPORTED mm Hg    POC pO2 Temp NOT REPORTED mm Hg   POCT Glucose    Collection Time: 07/15/19  4:13 AM   Result Value Ref Range    POC Glucose 116 (H) 74 - 100 mg/dL   CBC WITH AUTO DIFFERENTIAL    Collection Time: 07/15/19  5:41 AM   Result Value Ref Range    WBC 17.0 (H) 3.5 - 11.3 k/uL    RBC 3.08 (L) 4.21 - 5.77 m/uL    Hemoglobin 8.4 (L) 13.0 - 17.0 g/dL    Hematocrit 26.5 (L) 40.7 - 50.3 %    MCV 86.0 82.6 - 102.9 fL    MCH 27.3 25.2 - 33.5 pg    MCHC 31.7 28.4 - 34.8 g/dL    RDW 18.0 (H) 11.8 - 14.4 %    Platelets 60 (L) 372 - 453 k/uL    MPV 12.2 8.1 - 13.5 fL    NRBC Automated 1.4 (H) 0.0 per 100 WBC    Differential Type NOT REPORTED     WBC Morphology NOT REPORTED     RBC Morphology NOT REPORTED     Platelet Estimate NOT REPORTED     Immature Granulocytes 4 (H) 0 %    Seg Neutrophils 86 (H) 36 - 66 %    Lymphocytes 4 (L) 24 - 44 %    Monocytes 6 1 - 7 %    Eosinophils % 0 (L) 1 - 4 %    Basophils 0 0 - 2 %    nRBC 1 (H) 0 per 100 WBC    Absolute Immature Granulocyte 0.68 (H) 0.00 - 0.30 k/uL    Segs Absolute 14.62 (H) 1.8 - 7.7 k/uL    Absolute Lymph # 0.68 (L) 1.0 - 4.8 k/uL    Absolute Mono # 1.02 (H) 0.1 - 0.8 k/uL    Absolute Eos # 0.00 0.0 - 0.4 k/uL    Basophils # 0.00 0.0 - 0.2 k/uL    Morphology ANISOCYTOSIS PRESENT     Morphology 1+ POLYCHROMASIA    HEPATIC FUNCTION PANEL    Collection Time: 07/15/19  5:41 AM   Result Value Ref Range    Alb 2.2 (L) 3.5 - 5.2 g/dL    Alkaline Phosphatase 212 (H) 40 - 129 U/L     (H) 5 - 41 U/L     (H) <40 U/L    Total Bilirubin 6.89 (H) 0.3 - 1.2 mg/dL    Bilirubin, Direct 5.05 (H) <0.31 mg/dL    Bilirubin, Indirect 1.84 (H) 0.00 - 1.00 mg/dL    Total Protein 4.4 (L) 6.4 - 8.3 g/dL    Globulin NOT REPORTED 1.5 - 3.8 g/dL    Albumin/Globulin Ratio 1.0 1.0 - 2.5   BASIC METABOLIC PANEL    Collection Time: 07/15/19  5:41 AM   Result Value Ref Range    Glucose 110 (H) 70 - 99 mg/dL    BUN 24 (H) 6 - 20 mg/dL    CREATININE

## 2019-07-16 NOTE — PROGRESS NOTES
249* 212* 202*   ALT 1,583* 748* 454*   AST 1,148* 317* 172*   BILITOT 5.10* 6.89* 8.33*   BILIDIR 3.54* 5.05* 6.59*   LABALBU 2.1* 2.2* 2.1*       AMYLASE/LIPASE/AMMONIA  No results for input(s): AMYLASE, LIPASE, AMMONIA in the last 72 hours. Acute Hepatitis Panel   Lab Results   Component Value Date    HEPBSAG NONREACTIVE 11/09/2018    HEPCAB NONREACTIVE 08/08/2018    HEPBIGM NONREACTIVE 11/09/2018       HCV Genotype   No results found for: HEPATITISCGENOTYPE    HCV Quantitative   No results found for: HCVQNT    LIVER WORK UP:    AFP  No results found for: AFP    Alpha 1 antitrypsin   No results found for: A1A    Anti - Liver/Kidney Ab  No results found for: LIVER-KIDNEYMICROSOMALAB    RUDOLPH  Lab Results   Component Value Date    RUDOLPH POSITIVE 08/08/2018       AMA  No results found for: Dharmeshelyshefali Anaya    ASMA  No results found for: SMOOTHMUSCAB    Ceruloplasmin  No results found for: CERULOPLSM    Celiac panel  No results found for: Ismael Doreen, IGA    PT/INR  Recent Labs     07/14/19  0413 07/15/19  0541 07/16/19  0510   PROTIME 27.1* 19.4* 20.5*   INR 2.8 1.9 2.1       Cancer Markers:  CEA:  No results for input(s): CEA in the last 72 hours. Ca 125:  No results for input(s):  in the last 72 hours. Ca 19-9:   Invalid input(s):   AFP: No results for input(s): AFP in the last 72 hours. Lactic acid:Invalid input(s): LACTIC ACID    Radiology Review:    No results found.     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

## 2019-07-16 NOTE — PROGRESS NOTES
recommend BE  Mild RV enlargement     stress Test: 8/8/2018  Nuclear stress with fixed defect, it appears there is diffuse photopenia, c/w NICM           Cardiac Angiography: 2010, nonobstructive disease      Patient's Active Problem List   Principal Problem:    Below knee amputation status, right ([address]  Active Problems:    Chronic systolic congestive heart failure ([address]    Essential hypertension    Nephropathy    ESRD on hemodialysis ([address]    Peripheral vascular occlusive disease ([address]    Uncontrolled diabetes mellitus type 2 with atherosclerosis of arteries of extremities (HCC)    Chronic atrial fibrillation (HCC)    Fungemia    Gangrene (HCC)    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 *        Assessment / Acute Cardiac Problems:   1. Cardiac arrest in OR on 7/10, recurrent PEA cardiac arrest on 7/11  2. Shock -hypotensive and cardiogenic, EF 25%  3. Metabolic and lactic acidosis  4. H/o nonischemic cardiomyopathy - refused AICD in the past  5. Paroxysmal A. Fib, rate controled  6. ESRD on dialysis  7. Hypertension  8. Concern for GI bleed        Plan of Treatment:   1. Currently on levophed and  Vasopressin, continue supportive care   2. Nephrology managing CRRT and acidosis  3. Digoxin held due to elevated digoxin level   4. Start metoprolol PO 25 mg bid  5. Check electrolytes, Keep > 4 and Mg > 2.  6.  Will continue to follow    Sussy Velazco MD  Fellow, [address]        Attending Physician Statement  I have discussed the care of Arsen Black, including pertinent history and exam findings,  with the resident.  I have seen and examined the patient and the key elements of all parts of the encounter have been performed by me. I agree with the assessment, plan and orders as documented by the resident.

## 2019-07-16 NOTE — PROCEDURES
EEG REPORT       Patient: Oseas Bates Age: 47 y.o. MRN: [id number]    Date: 6/28/2019  Referring Provider: No ref. provider found    History: This routine 30 minute scalp EEG was recorded with video- monitoring for a 47 y.o.. male who presented with encephalopathy. This EEG was performed to evaluate for focal and epileptiform abnormalities.      Oseas Bates   Current Facility-Administered Medications   Medication Dose Route Frequency Provider Last Rate Last Dose    prismaSATE BK 0/3.5 5,000 mL with potassium chloride 20 mEq solution  1,500 mL/hr Dialysis Continuous Rickey Luna MD 1,500 mL/hr at 07/16/19 0910 1,500 mL/hr at 07/16/19 0910    sodium bicarbonate 75 mEq in sodium chloride 0.45 % 1,000 mL infusion   Intravenous Continuous Rickey Luna  mL/hr at 07/16/19 0920      hydrocortisone sodium succinate PF (SOLU-CORTEF) injection 50 mg  50 mg Intravenous Q12H Jil Mitchell MD   50 mg at 07/16/19 0911    cefepime (MAXIPIME) 1 g IVPB minibag  1 g Intravenous Q8H Jil Mitchell MD   Stopped at 07/16/19 0819    pantoprazole (PROTONIX) injection 40 mg  40 mg Intravenous BID Yordan Liu MD   40 mg at 07/16/19 0804    And    sodium chloride (PF) 0.9 % injection 10 mL  10 mL Intravenous BID Yordan Liu MD   10 mL at 07/16/19 0804    potassium chloride 20 mEq/50 mL IVPB (Central Line)  20 mEq Intravenous PRN Jaydon Sahu MD 50 mL/hr at 07/13/19 0708 20 mEq at 07/13/19 0708    potassium chloride 10 mEq/100 mL IVPB (Peripheral Line)  10 mEq Intravenous PRN Jaydon Sahu MD        magnesium sulfate 1 g in dextrose 5% 100 mL IVPB  2 g Intravenous PRN Jil Mitchell MD        0.9 % sodium chloride bolus  250 mL Intravenous Once Florence Phan DO        anidulafungin (ERAXIS) 100 mg in dextrose 5 % 130 mL IVPB  100 mg Intravenous Q24H Jayden De La Vega MD   Stopped at 07/15/19 1605    dextrose 50 % IV solution  25 g Intravenous PRN Quoc Meeks MD   25 g at 07/11/19 4478    AKWA TEARS (LACRILUBE) 2-15-83 % opthalmic ointment   Ophthalmic Nightly PRN Dhiraj Barr MD        insulin lispro (HUMALOG) injection vial 0-18 Units  0-18 Units Subcutaneous Q4H Dhiraj Barr MD   3 Units at 07/12/19 2145    fentaNYL (SUBLIMAZE) injection 50 mcg  50 mcg Intravenous Q1H PRN Dhiraj Barr MD   50 mcg at 07/12/19 0355    phenylephrine (JOHNIE-SYNEPHRINE) 50 mg in dextrose 5 % 250 mL infusion  100 mcg/min Intravenous Continuous Dhiraj Barr MD   Stopped at 07/12/19 0615    [Held by provider] clopidogrel (PLAVIX) tablet 75 mg  75 mg Oral Daily Dhiraj Barr MD   75 mg at 07/11/19 1052    norepinephrine (LEVOPHED) 32 mg in dextrose 5 % 250 mL infusion  10 mcg/min Intravenous Continuous Florence Phan, DO 15 mL/hr at 07/16/19 1111 32 mcg/min at 07/16/19 1111    chlorhexidine (PERIDEX) 0.12 % solution 15 mL  15 mL Mouth/Throat BID Danis Alex MD   15 mL at 07/16/19 0814    vasopressin 20 Units in dextrose 5 % 100 mL infusion  0.04 Units/min Intravenous Continuous Dhiraj Barr MD 12 mL/hr at 07/16/19 1112 0.04 Units/min at 07/16/19 1112    ketorolac (ACULAR) 0.5 % ophthalmic solution 1 drop  1 drop Left Eye 4x Daily Hang Mack, DO   1 drop at 07/16/19 0805    prednisoLONE acetate (PRED FORTE) 1 % ophthalmic suspension 1 drop  1 drop Left Eye 4 times per day Timothy Cody, DO   1 drop at 07/16/19 1116    gabapentin (NEURONTIN) capsule 100 mg  100 mg Oral TID Rubbie Paganini, DO   Stopped at 07/15/19 1341    midodrine (PROAMATINE) tablet 10 mg  10 mg Oral TID PRN Krise Joannei, DO        magnesium hydroxide (MILK OF MAGNESIA) 400 MG/5ML suspension 30 mL  30 mL Oral Daily PRN Rubmarcuse Joannei, DO        docusate sodium (COLACE) capsule 100 mg  100 mg Oral Daily Hang Mack, DO   100 mg at 07/10/19 0910    doxercalciferol (HECTOROL) injection 1 mcg  1 mcg Intravenous PRN Timothy Cody DO   1 mcg at 07/09/19 1030    glucose (GLUTOSE) 40 % oral gel 15 g

## 2019-07-16 NOTE — PROGRESS NOTES
NEUROLOGY INPATIENT PROGRESS NOTE    7/16/2019         Subjective: Precious Covarrubias is a  47 y.o. male admitted on 6/29/2019 with Below knee amputation status, right [facility]) [Z89.511]    Briefly, this is a  47 y.o. male admitted on 6/29/2019 with    Today the patient continues to be on x2 pressors for BP support. Still not alert or following commands. No AEO overnight. EEG completed yesterday - results pending. Per EMR notes, family wants to sign patient to [facility] tomorrow. MRI Brain order placed however unable to complete at this time due to CRRT. Continues to be off sedation. No response to pain, brainstem reflexes not intact with exception of pupillary reflex. EEG shows moderate to severe encephalopathy. Prognosis continues to be guarded. Not tracking or following commands. No current facility-administered medications on file prior to encounter. Current Outpatient Medications on File Prior to Encounter   Medication Sig Dispense Refill    warfarin (COUMADIN) 5 MG tablet Take 2.5 mg by mouth Twice a Week Indications:  On Monday, Friday; 5 mg all other days, pt. states he isnt taken it On Monday, Friday; 5 mg all other days       ketorolac (ACULAR) 0.5 % ophthalmic solution Place 1 drop into the left eye every 2 hours      moxifloxacin (VIGAMOX) 0.5 % ophthalmic solution Place 1 drop into the left eye every 4 hours      warfarin (COUMADIN) 5 MG tablet Take 5 mg by mouth Five times weekly Indications: 2.5 mg Monday, Friday; 5 mg all other days 2.5 mg Monday, Friday; 5 mg all other days      tamsulosin (FLOMAX) 0.4 MG capsule TAKE 1 CAPSULE BY MOUTH ONCE DAILY 90 capsule 1    carvedilol (COREG) 12.5 MG tablet TAKE 1 TABLET BY MOUTH TWICE DAILY (Patient taking differently: TAKE 1 TABLET BY MOUTH DAILY) 180 tablet 1    furosemide (LASIX) 40 MG tablet Take 1 tablet by mouth 2 times daily (Patient taking differently: Take 40 mg by mouth daily ) 60 tablet 0    glipiZIDE (GLUCOTROL XL) 2.5 MG extended release tablet Take 1 tablet by mouth daily 30 tablet 3    Blood Glucose Monitoring Suppl (ONE TOUCH ULTRA 2) w/Device KIT       aspirin EC 81 MG EC tablet Take 1 tablet by mouth daily 30 tablet 2    losartan (COZAAR) 50 MG tablet TAKE ONE TABLET BY MOUTH ONCE DAILY (Patient not taking: Reported on 6/29/2019) 30 tablet 11    clopidogrel (PLAVIX) 75 MG tablet Take 1 tablet by mouth daily 30 tablet 3       Allergies: Pankaj Holguin is allergic to lipitor [atorvastatin] and metformin and related.     Past Medical History:   Diagnosis Date    A-Fib     Eliquis    Anemia     CAD     Dr. Driver [place] Carotid artery stenosis     BILATERAL    Cerebral artery occlusion with cerebral infarction ([facility] [address] 11/2017    RIGHT SIDED WEAKNESS    CHF 2013    Diabetes Mellitus Type 2     diet controlled    ESRD     RUE Fistula     Hemodialysis patient 08/2018 [place]thurs/[place]/ Pt. doesn't know  Nephrologist name/ access Left chest       Hyperlipidemia     PCP Dr. Noni Fry Hypertension     Nephropathy     Neuropathy     No natural teeth     Poor historian     Renal insufficiency, mild 2013    Wears glasses     Wheelchair dependence        Past Surgical History:   Procedure Laterality Date    AMPUTATION ABOVE KNEE Right 7/10/2019    RIGHT ABOVE KNEE AMPUTATION performed by Norm Parikh MD at [address] [place] [address] Bilateral 8/5/13    CATARACT REMOVAL Right     DIALYSIS FISTULA CREATION Right 1/16/2019    RIGHT ARM AV FISTULA CREATION performed by Norm [facility]ikh MD at [facility] Right 6/11/2019    LOWER LEG EMBOLECTOMY THROMBECTOMY performed by Norm Parikh MD at [place] [address] Right     5TH DIGIT    FOOT DEBRIDEMENT Left 7/2015    [facility]     LEG AMPUTATION BELOW KNEE Right 06/29/2019    LEG AMPUTATION BELOW KNEE Right 6/29/2019    BELOW KNEE [facility]

## 2019-07-16 NOTE — PROGRESS NOTES
0400 (!) 41/28 98.4 °F (36.9 °C) Oral 100 23 100 % --   07/16/19 0300 (!) 71/26 -- -- 102 18 100 % --   07/16/19 0243 -- -- -- 97 22 100 % --   07/16/19 0200 (!) 101/44 -- -- 105 28 100 % --   07/16/19 0100 88/68 -- -- 107 21 100 % --   07/16/19 0000 (!) 83/27 98.1 °F (36.7 °C) Oral 97 18 100 % --         Intake/Output Summary (Last 24 hours) at 7/16/2019 0754  Last data filed at 7/16/2019 0700  Gross per 24 hour   Intake 1052.4 ml   Output 926 ml   Net 126.4 ml     Date 07/16/19 0000 - 07/16/19 2359   Shift 5703-2762 5744-6267 8847-1992 24 Hour Total   INTAKE   I.V.(mL/kg) 291.9(2.9)   291.9(2.9)   IV Piggyback(mL/kg) 49.7(0.5)   49.7(0.5)   Shift Total(mL/kg) 341.6(3.4)   341.6(3.4)   OUTPUT   CRRT 299   299   Shift Total(mL/kg) 299(3)   299(3)   Weight (kg) 101.3 101.3 101.3 101.3     Wt Readings from Last 3 Encounters:   07/16/19 223 lb 5.2 oz (101.3 kg)   06/28/19 188 lb 11.4 oz (85.6 kg)   06/13/19 189 lb 6 oz (85.9 kg)     Body mass index is 30.29 kg/m². PHYSICAL EXAM:  Constitutional: Intubated, not sedated,   EENT: Sclera icteric, neck supple with midline trachea. Neck: Supple, symmetrical, trachea midline, no adenopathy, thyroid symmetric, no jvd skin normal  Respiratory: Intubated, bronchial breath sounds, no wheezes  Cardiovascular: regular rate and rhythm, normal S1, S2,   Abdomen: soft, nontender, nondistended  NEUROLOGIC not sedated, no purposeful movement, not following commands.  Refer to neurology note for detailed exam   Extremities: Right BKA, left foot gangrenous toes  SKIN: normal coloration and turgor    Any additional physical findings:      MEDICATIONS:  Scheduled Meds:   hydrocortisone sodium succinate PF  50 mg Intravenous Q12H    cefepime  1 g Intravenous Q8H    pantoprazole  40 mg Intravenous BID    And    sodium chloride (PF)  10 mL Intravenous BID    phytonadione (VITAMIN K)  IVPB  10 mg Intravenous Daily    sodium chloride  250 mL Intravenous Once    anidulafungin  100

## 2019-07-16 NOTE — PROGRESS NOTES
Patients daughter and son state that they want to change code status to [facility]. They would like other family members to visit and plan on making the patient a [facility] tomorrow.

## 2019-07-17 NOTE — CARE COORDINATION
Transitional Planning    1230 Palliative care following  Intubated / vent / CRRT  148 [facility] Norma    Per RN report family planning on changing code status and provide comfort care  Will con't to follow

## 2019-07-17 NOTE — PLAN OF CARE
Татьяна Hartmann RN  Outcome: Ongoing  7/16/2019 1308 by Carmine Wiley RN  Outcome: Ongoing     Problem: Nutritional:  Goal: Maintenance of adequate nutrition will improve  Description  Maintenance of adequate nutrition will improve  7/16/2019 2356 by Татьяна Hartmann RN  Outcome: Ongoing  7/16/2019 1308 by Carmine Wiley RN  Outcome: Ongoing  Goal: Progress toward achieving an optimal weight will improve  Description  Progress toward achieving an optimal weight will improve  7/16/2019 2356 by Татьяна Hartmann RN  Outcome: Ongoing  7/16/2019 1308 by Carmine Wiley RN  Outcome: Ongoing     Problem: Tissue Perfusion:  Goal: Adequacy of tissue perfusion will improve  Description  Adequacy of tissue perfusion will improve  7/16/2019 2356 by Татьяна Hartmann RN  Outcome: Ongoing  7/16/2019 1308 by Carmine Wiley RN  Outcome: Ongoing     Problem: Pain:  Goal: Pain level will decrease  Description  Pain level will decrease  7/16/2019 2356 by Татьяна Hartmann RN  Outcome: Ongoing  7/16/2019 1308 by Carmine Wiley RN  Outcome: Ongoing  Goal: Control of acute pain  Description  Control of acute pain  7/16/2019 2356 by Татьяна Hartmann RN  Outcome: Ongoing  7/16/2019 1308 by Carmine Wiley RN  Outcome: Ongoing  Goal: Control of chronic pain  Description  Control of chronic pain  7/16/2019 2356 by Татьяна Hartmann RN  Outcome: Ongoing  7/16/2019 1308 by Carmine Wiley RN  Outcome: Ongoing     Problem: Nutrition  Goal: Optimal nutrition therapy  7/16/2019 2356 by Татьяна Hartmann RN  Outcome: Ongoing  7/16/2019 1308 by Carmine Wiley RN  Outcome: Ongoing     Problem: Musculor/Skeletal Functional Status  Goal: Highest potential functional level  7/16/2019 2356 by Татьяна Hartmann RN  Outcome: Ongoing  7/16/2019 1308 by Carmine Wiley RN  Outcome: Ongoing  Goal: Absence of falls  7/16/2019 2356 by Татьяна Hartmann RN  Outcome: Ongoing  7/16/2019 1308 by Carmine Wliey RN  Outcome: Ongoing     Problem: OXYGENATION/RESPIRATORY FUNCTION  Goal: Patient will maintain patent

## 2019-07-17 NOTE — DISCHARGE SUMMARY
Internal Medicine   Discharge Summary         Patient Identification:  Brigitte Severino is a 47 y.o. male.   :  1965  MRN: [id number]     Acct: [de-identified]   Admit Date:  2019  Discharge date and time: 2019  7:57 PM     Attending Provider: No att. providers found                                       Reason for Admission:  Right foot gangrene     Discharge Diagnoses:   Patient Active Problem List   Diagnosis    DM type 2 with diabetic peripheral neuropathy    Diabetic foot ulcer ([address]    CHF (congestive heart failure)    HTN (hypertension)    CRF (chronic renal failure) ([address]    Osteomyelitis ([address]    Diabetes mellitus due to underlying condition with complication, with long-term current use of insulin ([address]    Cellulitis    Shortness of breath    Chronic systolic congestive heart failure ([address]    Atypical chest pain    Essential hypertension    Chest pain    Nephropathy    Unstable angina pectoris ([address]    ESRD on hemodialysis ([address]    Sepsis ([address]    Fever    MSSA (methicillin susceptible Staphylococcus aureus) septicemia ([address]    Bandemia    C. 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

## 2019-07-17 NOTE — PROGRESS NOTES
to right DYANA hall        [address] [place] [address],[address]: [phone]    Electronically signed by Braeden Marquez DO on 7/17/2019 at 7:05 AM

## 2019-07-17 NOTE — PROGRESS NOTES
NEUROLOGY INPATIENT PROGRESS NOTE    7/17/2019         Subjective: Jon Bauer is a  47 y.o. male admitted on 6/29/2019 with Below knee amputation status, right [facility]) [Z89.511]    Briefly, this is a  47 y.o. male admitted on 6/29/2019 with    Today the patient continues to be on x2 pressors for BP support. Still not alert or following commands. No AEO overnight. EEG completed yesterday - results pending. Per EMR notes, family wants to sign patient to [facility] tomorrow. MRI Brain order placed however unable to complete at this time due to CRRT. Continues to be off sedation. No response to pain, brainstem reflexes not intact with exception of pupillary reflex. EEG shows moderate to severe encephalopathy. Prognosis continues to be poor. Not tracking or following commands. Brainstem reflexes not intact. Patient is now on 3 pressors for BP support. Family is changing code status today from [facility] to [facility]. No current facility-administered medications on file prior to encounter. Current Outpatient Medications on File Prior to Encounter   Medication Sig Dispense Refill    warfarin (COUMADIN) 5 MG tablet Take 2.5 mg by mouth Twice a Week Indications:  On Monday, Friday; 5 mg all other days, pt. states he isnt taken it On Monday, Friday; 5 mg all other days       ketorolac (ACULAR) 0.5 % ophthalmic solution Place 1 drop into the left eye every 2 hours      moxifloxacin (VIGAMOX) 0.5 % ophthalmic solution Place 1 drop into the left eye every 4 hours      warfarin (COUMADIN) 5 MG tablet Take 5 mg by mouth Five times weekly Indications: 2.5 mg Monday, Friday; 5 mg all other days 2.5 mg Monday, Friday; 5 mg all other days      tamsulosin (FLOMAX) 0.4 MG capsule TAKE 1 CAPSULE BY MOUTH ONCE DAILY 90 capsule 1    carvedilol (COREG) 12.5 MG tablet TAKE 1 TABLET BY MOUTH TWICE DAILY (Patient taking differently: TAKE 1 TABLET BY MOUTH DAILY) 180 tablet 1    furosemide (LASIX) 40 MG tablet Take 1 tablet by

## 2019-07-17 NOTE — PROGRESS NOTES
Discharge instructions reviewed with patient. Opportunity for questions allowed. Patient verbalized understanding of all new medications and discharge instructions. 135   < > 134* 135 136   K 3.7   < > 3.8   < > 4.2 4.5 4.4   CL 98   < > 101   < > 98 97* 98   CO2 19*   < > 17*   < > 16* 16* 13*   BUN 24*   < > 27*   < > 25* 22* 22*   CREATININE 1.73*   < > 1.59*   < > 1.33* 1.27* 1.26*   CALCIUM 8.5*   < > 8.6   < > 8.3* 8.6 8.5*   PROT 4.4*  --  4.3*  --   --   --  4.3*   LABALBU 2.2*  --  2.1*  --   --   --  2.4*   BILITOT 6.89*  --  8.33*  --   --   --  10.64*   ALKPHOS 212*  --  202*  --   --   --  152*   *  --  172*  --   --   --  141*   *  --  454*  --   --   --  260*    < > = values in this interval not displayed. Last 3 CBC:  Recent Labs     07/15/19  0541  07/16/19  0510  07/16/19  1840 07/17/19  0052 07/17/19  0609   WBC 17.0*  --  19.7*  --   --   --  24.6*   RBC 3.08*  --  3.20*  --   --   --  2.94*   HGB 8.4*   < > 8.9*   < > 9.0* 9.5* 8.4*   HCT 26.5*   < > 28.2*   < > 28.9* 30.5* 28.2*   MCV 86.0  --  88.1  --   --   --  95.9   MCH 27.3  --  27.8  --   --   --  28.6   MCHC 31.7  --  31.6  --   --   --  29.8   RDW 18.0*  --  18.5*  --   --   --  21.4*   PLT 60*  --  60*  --   --   --  57*   MPV 12.2  --  12.0  --   --   --  11.9    < > = values in this interval not displayed. ASSESSMENT     1. ESRD TTS Rt AVF/DW 86.Dr Castellon. On CRRT because of hemodynamic compromise via groin cath  2. Shock - cardiogenic and septic on pressors  3. VDRF  4. S/p cardiac arrest in OR 7/10 and recurrent PEA arrest 7/11  Echo - EF 25%  5. Ischemic gangrene to right foot, s/p staged right below knee amputation and revision to above knee amputation  6. Candida glabrata fungemia  7. Anemia - blood loss  8. Acute ischemic Hepatitis    PLAN     1. Unable to ultrafiltrate in view of worsening hypotension. Family deciding to change her CODE STATUS today. Change prefilter to D5 with 3 Amp of bicarb  2. Abx as per CRRT  3.  Prognosis guarded      Please do not hesitate to call with questions    This note is created with the assistance of a speech-recognition program.

## 2019-07-17 NOTE — PROGRESS NOTES
10 mL Intravenous BID    sodium chloride  250 mL Intravenous Once    anidulafungin  100 mg Intravenous Q24H    insulin lispro  0-18 Units Subcutaneous Q4H    [Held by provider] clopidogrel  75 mg Oral Daily    chlorhexidine  15 mL Mouth/Throat BID    ketorolac  1 drop Left Eye 4x Daily    prednisoLONE acetate  1 drop Left Eye 4 times per day    gabapentin  100 mg Oral TID    docusate sodium  100 mg Oral Daily    [Held by provider] aspirin EC  81 mg Oral Daily    sodium chloride flush  10 mL Intravenous 2 times per day     Continuous Infusions:   sodium bicarbonate infusion 50 mL/hr at 07/17/19 1219    CRRT dialysis builder Stopped (07/17/19 0504)    phenylephrine (JOHNIE-SYNEPHRINE) 50mg/250mL infusion 275 mcg/min (07/17/19 1232)    norepinephrine (LEVOPHED) infusion (double concentration) 50 mcg/min (07/17/19 1225)    vasopressin (Septic Shock) infusion 0.04 Units/min (07/17/19 1225)    dextrose       PRN Meds:     anticoagulant sodium citrate 1.9 mL PRN   anticoagulant sodium citrate 1.6 mL PRN   potassium chloride 20 mEq PRN   potassium chloride 10 mEq PRN   magnesium sulfate 2 g PRN   dextrose 25 g PRN   AKWA TEARS  Nightly PRN   fentanNYL 50 mcg Q1H PRN   midodrine 10 mg TID PRN   magnesium hydroxide 30 mL Daily PRN   doxercalciferol 1 mcg PRN   glucose 15 g PRN   dextrose 12.5 g PRN   glucagon (rDNA) 1 mg PRN   dextrose 100 mL/hr PRN   ondansetron 4 mg Q6H PRN   sodium chloride flush 10 mL PRN       SUPPORT DEVICES: [x] Ventilator [] BIPAP  [] Nasal Cannula [] Room Air    VENT SETTINGS (Comprehensive) (if applicable):    Vent Information  $Ventilation: $Subsequent Day  Ventilator Started: Yes  Ventilation Day(s): 1  Skin Assessment: Clean, dry, & intact  Equipment ID: [id number]  Equipment Changed: HME  Vent Type: Servo i  Vent Mode: PRVC  Vt Ordered: 620 mL  Rate Set: 18 bmp  FiO2 : 30 %  Sensitivity: 3  PEEP/CPAP: 6  I Time/ I Time %: 0.75 s  Cuff Pressure (cm H2O): 25 cm H2O  Humidification Date    MG 2.0 07/17/2019    MG 2.0 07/17/2019    MG 2.0 07/17/2019     Phosphorus:   Lab Results   Component Value Date    PHOS 3.8 07/17/2019    PHOS 3.0 07/17/2019    PHOS 2.8 07/17/2019     S. Calcium:  Recent Labs     07/17/19  1151   CALCIUM 8.8     S. Ionized Calcium:No results for input(s): IONCA in the last 72 hours. Urinalysis:   Lab Results   Component Value Date    NITRU NEGATIVE 07/11/2019    COLORU YELLOW 07/11/2019    PHUR 7.5 07/11/2019    WBCUA 20 TO 50 07/11/2019    RBCUA 0 TO 2 07/11/2019    MUCUS NOT REPORTED 07/11/2019    TRICHOMONAS NOT REPORTED 07/11/2019    YEAST MANY 07/11/2019    BACTERIA NOT REPORTED 07/11/2019    SPECGRAV 1.020 07/11/2019    LEUKOCYTESUR TRACE 07/11/2019    UROBILINOGEN Normal 07/11/2019    BILIRUBINUR NEGATIVE 07/11/2019    BILIRUBINUR NEGATIVE 12/24/2011    GLUCOSEU NEGATIVE 07/11/2019    GLUCOSEU 2+ 12/24/2011    KETUA NEGATIVE 07/11/2019    AMORPHOUS NOT REPORTED 07/11/2019       CARDIAC ENZYMES:   No results for input(s): CKMB, CKMBINDEX, TROPONINI in the last 72 hours. Invalid input(s): CKTOTAL;3  BNP: No results for input(s): BNP in the last 72 hours. LFTS  Recent Labs     07/15/19  0541 07/16/19  0510 07/17/19  0609   ALKPHOS 212* 202* 152*   * 454* 260*   * 172* 141*   BILITOT 6.89* 8.33* 10.64*   BILIDIR 5.05* 6.59* 7.85*   LABALBU 2.2* 2.1* 2.4*       AMYLASE/LIPASE/AMMONIA  No results for input(s): AMYLASE, LIPASE, AMMONIA in the last 72 hours. Last 3 Blood Glucose:   Recent Labs     07/15/19  1754 07/16/19  0012 07/16/19  0510 07/16/19  1216 07/16/19  1840 07/17/19  0052 07/17/19  0609 07/17/19  1151   GLUCOSE 102* 98 86 94 87 59* 87 82      HgBA1c:    Lab Results   Component Value Date    LABA1C 7.1 06/30/2019         TSH:    Lab Results   Component Value Date    TSH 3.72 10/14/2015     ANEMIA STUDIES  No results for input(s): LABIRON, TIBC, FERRITIN, XPBAGVLQ28, FOLATE, OCCULTBLD in the last 72 hours.             Cultures during

## 2019-07-17 NOTE — FLOWSHEET NOTE
1635: Pt's HR 34 bpm. Family at bedside and update on BP and HR. RRT at bedside. Pt extubated at this time. Pt medicated for comfort. all gtt were stopped at 1631.    1642: Asystole on monitor. No respirations, no pulse. Dr Luther Expose at bedside to pronounce TOD.  Emotional support given

## 2019-07-17 NOTE — DISCHARGE SUMMARY
[facility]     Department of Internal Medicine - Critical Care Service    INPATIENT DEATH/DISCHARGE SUMMARY      PATIENT IDENTIFICATION:  NAME:  Jeet Simmons   :   1965  MRN:    [id number]     Acct:    [de-identified]   Admit Date:  2019  Discharge date:  No discharge date for patient encounter. Attending Provider: Carlton Landa MD                                     Principal Problem:    Below knee amputation status, right [facility])  Active Problems:    Chronic systolic congestive heart failure ([facility]    Essential hypertension    Nephropathy    ESRD on hemodialysis ([facility]    Peripheral vascular occlusive disease ([facility]    Uncontrolled diabetes mellitus type 2 with atherosclerosis of arteries of extremities (HCC)    Chronic atrial fibrillation (HCC)    Fungemia    Gangrene ([facility]    Atherosclerosis of native artery of right lower extremity with gangrene ([address]    Cardiogenic shock ([facility]    Encounter for palliative care    Shock liver    Cardiac arrest ([facility]    Leukocytosis    Gastrointestinal hemorrhage    Coagulopathy (HCC)    Acute respiratory failure ([facility]    Acute encephalopathy  Resolved Problems:    * No resolved hospital problems. *       REASON FOR HOSPITALIZATION:   No chief complaint on file. Hospital Course  Patient admitted requiring BKA, change to AKA secondary to osteomyelitis and ischemic gangrenous extremity. With peripheral vascular disease. Patient also has severe CHF. Patient being treated for fungemia. Patient sustained his cardiac arrest intraoperatively. Sammy Herring obtained. Patient admitted to ICU and sustained another cardiac arrest.  Patient with shock liver, GI bleed, coagulopathy. Received central blood products and was on multiple pressors for hemodynamic support. Patient requiring CRRT. Overall, multiorgan system failure. Family initially change CODE STATUS to DNR CCA.   Due to continued deterioration, CODE STATUS was changed to DNR CC.

## 2019-07-18 LAB
CULTURE: NORMAL
Lab: NORMAL
SPECIMEN DESCRIPTION: NORMAL

## 2019-07-18 NOTE — PROGRESS NOTES
To OR today for BKA revision. Will evaluate for rehab 7/2/19. 80 yo F with a PMH significant for AF on Xarelto, HTN who presented to [facility] with SOB, found to be overloaded and in AF with RVR. She was found to be in ADHF 2/2 newly depressed EF(EF 30%) Hospital course there complicated by hypotension, NORM, and elevated transaminases so she was transferred to [facility] for further care. While in the CCU, she has been diuresed with IV lasix and responded well. R/LHC 7/15: normal coronaries, RA 14, RV 44/5, PA 46/20/30, PCW 25, LVEDP 10, MAP 85, PA sat 43, CO/CI 3.18/1.66.

## 2019-07-31 ENCOUNTER — TELEPHONE (OUTPATIENT)
Dept: PULMONOLOGY | Age: 54
End: 2019-07-31

## 2019-07-31 LAB
ABO/RH: NORMAL
ABO/RH: NORMAL
ANTIBODY SCREEN: NEGATIVE
ANTIBODY SCREEN: NEGATIVE
ARM BAND NUMBER: NORMAL
ARM BAND NUMBER: NORMAL
BLD PROD TYP BPU: NORMAL
CROSSMATCH RESULT: NORMAL
DISPENSE STATUS BLOOD BANK: NORMAL
EXPIRATION DATE: NORMAL
EXPIRATION DATE: NORMAL
TRANSFUSION STATUS: NORMAL
UNIT DIVISION: 0
UNIT NUMBER: NORMAL

## 2020-03-03 NOTE — CARE COORDINATION
Petrona Reddy was evaluated today and a DME order was entered for a semi-electric hospital bed because he requires assistance for requirement of elevation of head of bed more than 30 degrees for the diagnosis of CHF. Patient requires frequent and immediate positioning changes for relief of pain and care needs related to medical diagnoses. Patient needs variability of bed height requirements to perform patient transfers and for eating, personal cares and ambulating. Current body Weight: 215 lb 6.2 oz (97.7 kg). The need for this equipment was discussed with the patient and he understands and is in agreement. Petrona Reddy was evaluated today and a DME order was entered for a standard wheelchair because he requires this to successfully complete daily living tasks of personal cares. A standard manual wheelchair is necessary due to patient's impaired ambulation and mobility restrictions and would be unable to resolve these daily living tasks using a cane or walker. The patient is capable of using a standard wheelchair safely in their home and can maneuver within their home with adequate access. There is a caregiver available to provide necessary assistance. The need for this equipment was discussed with the patient and he understands, is in agreement, and has not expressed an unwillingness to use the wheelchair. Petrona Reddy requires a bedside commode due to being confined to one level of the home, and is physically incapable of utilizing regular toilet facilities. Current body weight is Weight: 215 lb 6.2 oz (97.7 kg). Number Of Stages: 2

## 2025-07-18 NOTE — PROGRESS NOTES
Encounter Date: 7/18/2025       History     Chief Complaint   Patient presents with    Fall    Head Injury     No loc / left hand  , rt knee pain      HPI patient is a 72-year-old woman who presents emergency department status post a trip and fall while getting out of bed at home.  Patient states she tripped over one of her shoes and hit the wall than the floor with her head on the way down.  She complains of hematoma to her forehead, left wrist pain, right knee pain and right 4th toe pain.  No loss of consciousness.  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 OR;  Service: Orthopedics;  Laterality: N/A;    APPENDECTOMY      ARTHROSCOPY OF SHOULDER WITH DECOMPRESSION OF SUBACROMIAL SPACE Left 10/31/2019    Procedure: ARTHROSCOPY, SHOULDER, WITH SUBACROMIAL SPACE DECOMPRESSION;  Surgeon: Ruben Martinez MD;  Location: [facility] OR;  Service: Orthopedics;  Laterality: Left;    BLADDER REPAIR      x 2    COLONOSCOPY  9/2011    COLONOSCOPY N/A 9/18/2024    Procedure: COLONOSCOPY;  Surgeon: Ceasar Calero MD;   Location: [facility];  Service: Endoscopy;  Laterality: N/A;    DISTAL CLAVICLE EXCISION Left 10/31/2019    Procedure: EXCISION, CLAVICLE, DISTAL;  Surgeon: Ruben Martinez MD;  Location: [facility] OR;  Service: Orthopedics;  Laterality: Left;    EPIDURAL STEROID INJECTION INTO LUMBAR SPINE N/A 12/7/2020    Procedure: Injection-steroid-epidural-lumbar;  Surgeon: Dk Rosales MD;  Location: [facility] OR;  Service: Pain Management;  Laterality: N/A;  L4-L5    EPIDURAL STEROID INJECTION INTO LUMBAR SPINE N/A 5/17/2021    Procedure: Injection-steroid-epidural-lumbar;  Surgeon: Dk Rosales MD;  Location: [facility] OR;  Service: Pain Management;  Laterality: N/A;  L4-L5    ESOPHAGOGASTRODUODENOSCOPY  9/2011    ESOPHAGOGASTRODUODENOSCOPY N/A 9/18/2024    Procedure: EGD (ESOPHAGOGASTRODUODENOSCOPY);  Surgeon: Ceasar Calero MD;  Location: [facility];  Service: Endoscopy;  Laterality: N/A;    EYE SURGERY      lasix bilateral    FIXATION KYPHOPLASTY N/A 1/31/2020    Procedure: Kyphoplasty T12;  Surgeon: Dk Rosales MD;  Location: [facility] OR;  Service: Pain Management;  Laterality: N/A;    HERNIA REPAIR      hiatal hernai    HYSTERECTOMY      INJECTION OF ANESTHETIC AGENT AROUND NERVE Left 6/8/2020    Procedure: Block, Nerve;  Surgeon: Dk Rosales MD;  Location: [facility] OR;  Service: Pain Management;  Laterality: Left;  left genicular nerve block     KNEE ARTHROPLASTY Left 7/30/2020    Procedure: ARTHROPLASTY, KNEE LEFT;  Surgeon: Ruben Martinez MD;  Location: [facility] OR;  Service: Orthopedics;  Laterality: Left;  REP VALERY RUBY    KNEE ARTHROSCOPY W/ MENISCECTOMY Left 1/16/2020    Procedure: ARTHROSCOPY, KNEE, WITH MEDIAL MENISCECTOMY;  Surgeon: Ruben Martinez MD;  Location: [facility] OR;  Service: Orthopedics;  Laterality: Left;    LAPAROSCOPIC NISSEN FUNDOPLICATION      NEPHRECTOMY      LT partial    RADIOFREQUENCY ABLATION Left 6/19/2020    Procedure: Radiofrequency Ablation;  Surgeon: Dk Rosales MD;  Location: [facility] OR;   fistula creation (Right, 1/16/2019); Toe Surgery (Left, 8/19/2015); Cataract removal (Right); Elbow surgery (Right, 1975); and embolectomy (Right, 6/11/2019). Restrictions  Restrictions/Precautions  Restrictions/Precautions: Weight Bearing  Required Braces or Orthoses?: No  Lower Extremity Weight Bearing Restrictions  Right Lower Extremity Weight Bearing: (heel WBing only per RN)  Position Activity Restriction  Other position/activity restrictions: Up with A, Heel WB only on R per RN  Subjective   General  Response To Previous Treatment: Patient with no complaints from previous session. Family / Caregiver Present: No  Subjective  Subjective: RN and pt agreed to PT, pt awake sitting up in chair upon arrival and denies pain  Pain Screening  Patient Currently in Pain: Denies  Vital Signs  Patient Currently in Pain: Denies       Orientation  Orientation  Overall Orientation Status: Within Functional Limits       Objective      Transfers  Sit to Stand: Contact guard assistance  Stand to sit: Contact guard assistance; Moderate Assistance  Comment: Pt able to transfer CGA with full WBing through RLE, Pt attempted multiple times to transfer with heel only and NWB to RLE but unable to complete, pt not able complete w/in WBIng precautions despite significant assistance and cueing  Ambulation  Ambulation?: Yes  Ambulation 1  Surface: level tile  Device: Rolling Walker  Assistance: Contact guard assistance  Gait Deviations: Slow Bety  Distance: ~12ft  Comments: pt not able to maintain heel only WBing during ambulation d/t foot drop, pt attempted NWB but unable to hop despite max cues ;  Pt will need offloading shoe to ensure WBIng status maintained, RN notified  Stairs/Curb  Stairs?: No     Balance  Posture: Good  Sitting - Static: Good  Sitting - Dynamic: Good  Standing - Static: Fair;+  Standing - Dynamic: Fair;-  Comments: Pt able to tolerate ~40\" stands x 2 with RW and RLE NWB  Exercises  Seated LE exercise program: Long Arc Quads, hip abduction/adduction, and marches. Reps: x 12  Other exercises  Other exercises?: Yes  Other exercises 1: sit to stands x 5                     Goals  Short term goals  Time Frame for Short term goals: 15  Short term goal 1: Patient will perform STS independently  Short term goal 2: Patient will ambulate 100 ft with appropriate AD, heel WB R LE, with supervision  Short term goal 3: Patient will be able to perform SLS for 5 sec on L LE, without UE support  Short term goal 4: Patient will be able to participate in 30 min of PT to demonstrate improved activity tolerance  Short term goal 5: Patient will be able to propel w/c using bilateral  ft independently.     Plan    Plan  Times per week: 5-6x week  Times per day: Daily  Current Treatment Recommendations: Strengthening, ROM, Balance Training, Functional Mobility Training, Transfer Training, Wheelchair Mobility Training, Endurance Training, Gait Training, Stair training, Pain Management, Home Exercise Program, Safety Education & Training, Patient/Caregiver Education & Training  Safety Devices  Type of devices: Gait belt, Left in bed, Nurse notified, Call light within reach(sitting EOB)  Restraints  Initially in place: No     Therapy Time   Individual Concurrent Group Co-treatment   Time In 1359         Time Out 1423         Minutes 24                Vida Daily, PTA Service: Pain Management;  Laterality: Left;    RADIOFREQUENCY ABLATION OF LUMBAR MEDIAL BRANCH NERVE AT SINGLE LEVEL Bilateral 2/28/2020    Procedure: Radiofrequency Ablation, Nerve, Spinal, Lumbar, Medial Branch, 1 Level;  Surgeon: Dk Rosales MD;  [facility];  Service: Pain Management;  Laterality: Bilateral;  L3,L4,L5 - Burned at 80 degrees C. for 60 seconds x 2 each site    RADIOFREQUENCY ABLATION OF LUMBAR MEDIAL BRANCH NERVE AT SINGLE LEVEL Bilateral 10/19/2020    Procedure: Radiofrequency Ablation, Nerve, Spinal, Lumbar, Medial Branch, 1 Level;  Surgeon: Dk Rosales MD;  [facility];  Service: Pain Management;  Laterality: Bilateral;  L3, L4, L5    REFRACTIVE SURGERY      ROBOTIC ARTHROPLASTY, KNEE Right 5/22/2025    Procedure: ROBOTIC ARTHROPLASTY, KNEE, TOTAL;  Surgeon: Ruben Martinez MD;  Location: [facility] OR;  Service: Orthopedics;  Laterality: Right;  [place]    ROTATOR CUFF REPAIR Left 10/31/2019    Procedure: REPAIR, ROTATOR CUFF;  Surgeon: Ruben Martinez MD;  Location: [facility] OR;  Service: Orthopedics;  Laterality: Left;  arthrex    SKIN BIOPSY      TOTAL ABDOMINAL HYSTERECTOMY W/ BILATERAL SALPINGOOPHORECTOMY  age 51    TRANSFORAMINAL EPIDURAL INJECTION OF STEROID Bilateral 7/30/2021    Procedure: Injection,steroid,epidural,transforaminal approach;  Surgeon: Dk Rosales MD;  [facility];  Service: Pain Management;  Laterality: Bilateral;  L5-S1     TRANSFORAMINAL EPIDURAL INJECTION OF STEROID Bilateral 3/28/2022    Procedure: INJECTION, STEROID, EPIDURAL, TRANSFORAMINAL APPROACH Lumbar L5-S1;  Surgeon: Dk Rosales MD;  [facility];  Service: Pain Management;  Laterality: Bilateral;     Family History   Problem Relation Name Age of Onset    Hypertension Father      Glaucoma Neg Hx      Macular degeneration Neg Hx      Retinal detachment Neg Hx       Social History[1]  Review of Systems   Constitutional:  Negative for fever.   HENT:  Negative for sore  throat.    Respiratory:  Negative for shortness of breath.    Cardiovascular:  Negative for chest pain.   Gastrointestinal:  Negative for nausea.   Genitourinary:  Negative for dysuria.   Musculoskeletal:  Positive for arthralgias. Negative for back pain.   Skin:  Negative for rash.   Neurological:  Positive for headaches. Negative for weakness.   Hematological:  Does not bruise/bleed easily.       Physical Exam     Initial Vitals [07/18/25 1630]   BP Pulse Resp Temp SpO2   (!) 155/92 78 18 97.6 °F (36.4 °C) 97 %      MAP       --         Physical Exam    Constitutional: Vital signs are normal. She appears well-developed and well-nourished.  Non-toxic appearance. No distress.   HENT:   Head: Normocephalic.   Forehead hematoma   Eyes: EOM are normal. Pupils are equal, round, and reactive to light.   Neck: Neck supple. No JVD present.   Normal range of motion.  Cardiovascular:  Normal rate, regular rhythm, normal heart sounds and intact distal pulses.     Exam reveals no gallop and no friction rub.       No murmur heard.  Pulmonary/Chest: Breath sounds normal. She has no wheezes. She has no rhonchi. She has no rales.   Abdominal: Abdomen is soft. Bowel sounds are normal. There is no abdominal tenderness. There is no rebound and no guarding.   Musculoskeletal:         General: Normal range of motion.      Cervical back: Normal range of motion and neck supple. No rigidity.      Comments: Tenderness of the her aspect of the left wrist without deformity.  No bruising.  Tenderness of the right patella with bruising.  Able to hold flexed knee.  No laxity.  Tenderness of the right 4th toe without deformity.     Neurological: She is alert and oriented to person, place, and time. She has normal strength and normal reflexes. No cranial nerve deficit or sensory deficit. She exhibits normal muscle tone. Coordination normal. GCS score is 15. GCS eye subscore is 4. GCS verbal subscore is 5. GCS motor subscore is 6.   Skin: Skin is  warm and dry.   Psychiatric: She has a normal mood and affect. Her speech is normal and behavior is normal. She is not actively hallucinating.         ED Course   Procedures  Labs Reviewed - No data to display       Imaging Results              CT Head Without Contrast (Final result)  Result time 07/18/25 19:22:11      Final result by Jorge Vu MD (07/18/25 19:22:11)                   Impression:      New right frontal scalp hematoma with no fracture or contrecoup brain injury.    Resolved occipital scalp hematoma.    Stable presumed meningioma posterior fossa on the left.      Electronically signed by: Jorge Vu  Date:    07/18/2025  Time:    19:22               Narrative:    EXAMINATION:  CT HEAD WITHOUT CONTRAST    CLINICAL HISTORY:  Head trauma, minor (Age >= 65y);    TECHNIQUE:  Low dose axial images were obtained through the head.  Coronal and sagittal reformations were also performed. Contrast was not administered.    COMPARISON:  MRI, 08/14/2023, CT brain, 08/13/2023.    FINDINGS:  BRAIN:    Brain parenchyma appears stable in attenuation with normal gray-white matter differentiation.  2 cm high density mass near the junction of the transverse and sigmoid sinus in the left posterior fossa compatible with meningioma appears stable.  No vasogenic edema.  No new mass, mass effect or pathologic fluid collection.    Ventricles and basilar cisterns appear appropriate.    Previous posterior parietooccipital scalp hematoma resolved with new right frontal supraorbital and frontal parietal scalp hematoma.  No radiopacity to suggest foreign body.  No gas to suggest laceration.  Calvarium intact.  Sinuses are clear with no significant middle ear or mastoid opacity.  Orbits and orbital contents unremarkable.                                       X-Ray Wrist Complete Left (Final result)  Result time 07/18/25 17:46:32      Final result by Dru Eagle MD (07/18/25 17:46:32)                   Impression:       Healing fracture of left distal radius without change in alignment.  No new/acute displaced fracture identified.      Electronically signed by: Dru Eagle MD  Date:    07/18/2025  Time:    17:46               Narrative:    EXAMINATION:  XR WRIST COMPLETE 3 VIEWS LEFT    CLINICAL HISTORY:  Unspecified fall, initial encounter    TECHNIQUE:  PA, lateral, and oblique views of the left wrist were performed.    COMPARISON:  Left wrist series 07/07/2025    FINDINGS:  Grossly similar overall appearance of healing fracture of the left distal radius without change in alignment.  No new/acute displaced fracture.  No dislocation or destructive osseous process.  Similar degenerative changes about the wrist and partially imaged hand.  No subcutaneous emphysema or radiopaque foreign body.                                       X-Ray Knee 3 View Right (Final result)  Result time 07/18/25 17:39:40      Final result by Dru Eagle MD (07/18/25 17:39:40)                   Impression:      Suspected anterior right knee localized soft tissue swelling/contusion and questioned nonspecific suprapatellar joint effusion without acute displaced fracture-dislocation identified.    TKA without acute complication.    Proximal right fibular healing fracture, new from 06/06/2025 study.      Electronically signed by: Dru Eagle MD  Date:    07/18/2025  Time:    17:39               Narrative:    EXAMINATION:  XR KNEE 3 VIEW RIGHT    CLINICAL HISTORY:  Unspecified fall, initial encounter    TECHNIQUE:  AP, lateral, and Merchant views of the right knee were performed.    COMPARISON:  Right knee series 06/06/2025    FINDINGS:  Right knee remote total arthroplasty demonstrating anatomic positioning and alignment.  No hardware fracture or evidence of loosening.  There is fracture with callus formation at the proximal fibular shaft new from prior imaging but suggestive of healing fracture.  No evidence of acute displaced fracture, dislocation or  destructive osseous process.  Questioned nonspecific suprapatellar joint effusion.  There is thickening of the prepatellar and anterior infrapatellar soft tissues increased from prior as well as stranding of Hoffa's fat pad, likely localized soft tissue swelling/contusion in the setting of recent trauma.  Scattered atherosclerotic vascular calcifications noted.  No subcutaneous emphysema or radiopaque foreign body.                                       X-Ray Foot Complete Right (Final result)  Result time 07/18/25 17:41:57      Final result by Dru Eagle MD (07/18/25 17:41:57)                   Impression:      No acute displaced fracture-dislocation identified.      Electronically signed by: Dru Eagle MD  Date:    07/18/2025  Time:    17:41               Narrative:    EXAMINATION:  XR FOOT COMPLETE 3 VIEW RIGHT    CLINICAL HISTORY:  . Contusion of right lesser toe(s) without damage to nail, initial encounter    TECHNIQUE:  AP, lateral, and oblique views of the right foot were performed.    COMPARISON:  Right ankle series 07/07/2025    FINDINGS:  Generalized osteopenia.  Mild hallux valgus configuration.  Lisfranc articulation appears congruent.  Unchanged well corticated ossific body adjacent to the laterally malleolar tip likely sequela remote trauma with nonunion versus accessory ossicle.  No evidence for acute displaced fracture, dislocation or destructive osseous process.  Baseline minimal to mild DJD most prominent at the 1st MTP joint.  Plantar calcaneal spur noted.  No subcutaneous emphysema or radiopaque foreign body.                                       Medications   acetaminophen tablet 1,000 mg (1,000 mg Oral Not Given 7/18/25 1700)   HYDROcodone-acetaminophen  mg per tablet 1 tablet (1 tablet Oral Given 7/18/25 1926)     Medical Decision Making  72-year-old woman who presents emergency department status post a trip and fall while getting out of bed at home.  Patient states she tripped over  one of her shoes and hit the wall than the floor with her head on the way down.  She complains of hematoma to her forehead, left wrist pain, right knee pain and right 4th toe pain.  No loss of consciousness.  Patient with a forehead hematoma and some tenderness in her left wrist.  Some bruising over her right knee and pain in her right toe.  CT head without contrast was performed showing no intracranial traumatic abnormalities.  She does have a right frontal scalp hematoma.  X-rays of her wrist, knee and toes were performed showing no evidence of fracture or dislocation that is new.  Surgical hardware in place.  Patient given Backus in the ED which she has noted tolerate.  She has this at home to take for pain.  Return precautions discussed.  Discharged in no acute distress.    Amount and/or Complexity of Data Reviewed  Radiology: ordered.    Risk  OTC drugs.  Prescription drug management.                                          Clinical Impression:  Final diagnoses:  [W19.XXXA] Fall  [S90.121A] Contusion of fourth toe of right foot  [S00.83XA] Traumatic hematoma of forehead, initial encounter (Primary)  [S93.514A] Sprain of interphalangeal joint of fifth toe of right foot, initial encounter  [S80.01XA] Contusion of right knee, initial encounter          ED Disposition Condition    Discharge Stable          ED Prescriptions    None       Follow-up Information       Follow up With Specialties Details Why Contact Info Additional Information    [facility] Emergency Medicine  As needed, If symptoms worsen [address] [place] [address] [address] [place] [address] 1st floor    Jorge Alonzo MD Internal Medicine Schedule an appointment as soon as possible for a visit   [address] [id number] [phone]                      [1]   Social History  Tobacco Use    Smoking status: Former     Current packs/day: 0.00     Average packs/day: 1 pack/day for 40.0 years (40.0 ttl pk-yrs)     Types:  Cigarettes     Start date: 1978     Quit date: 2018     Years since quittin.5    Smokeless tobacco: Never    Tobacco comments:     smokes 2-3 cigarettes a night   Substance Use Topics    Alcohol use: Not Currently     Alcohol/week: 12.0 standard drinks of alcohol     Types: 6 Cans of beer, 6 Unspecified drink type per week     Comment: states has not had alcohol since 10/2023    Drug use: No        Javire Stephen MD  25 1617

## (undated) DEVICE — GLOVE SURG SZ 75 L12IN FNGR THK87MIL WHT LTX FREE

## (undated) DEVICE — SUTURE PERMAHAND SZ 4-0 L18IN NONABSORBABLE BLK SILK BRAID A183H

## (undated) DEVICE — 3F 80 CM LEMAITRE EMBOLECTOMY CATHETER: Brand: LEMAITRE EMBOLECTOMY CATHETER

## (undated) DEVICE — SHEET, ORTHO, SPLIT, STERILE: Brand: MEDLINE

## (undated) DEVICE — CONTAINER,SPECIMEN,4OZ,OR STRL: Brand: MEDLINE

## (undated) DEVICE — LOOP VES W25MM THK1MM MAXI RED SIL FLD REPELLENT 100 PER

## (undated) DEVICE — 3M™ IOBAN™ 2 ANTIMICROBIAL INCISE DRAPE 6650EZ: Brand: IOBAN™ 2

## (undated) DEVICE — TUBING AMB

## (undated) DEVICE — 4F 80 CM LEMAITRE EMBOLECTOMY CATHETER: Brand: LEMAITRE EMBOLECTOMY CATHETER

## (undated) DEVICE — E-Z CLEAN, NON-STICK, PTFE COATED, ELECTROSURGICAL BLADE ELECTRODE, MODIFIED EXTENDED INSULATION, 2.5 INCH (6.35 CM): Brand: MEGADYNE

## (undated) DEVICE — 3M™ STERI-STRIP™ COMPOUND BENZOIN TINCTURE 40 BAGS/CARTON 4 CARTONS/CASE C1544: Brand: 3M™ STERI-STRIP™

## (undated) DEVICE — GLOVE ORANGE PI 8   MSG9080

## (undated) DEVICE — SUTURE NONABSORBABLE MONOFILAMENT 7-0 BV-1 1X24 IN PROLENE 8702H

## (undated) DEVICE — GOWN,SIRUS,POLYRNF,XLN/3XL,18/CS: Brand: MEDLINE

## (undated) DEVICE — SUTURE ETHLN SZ 3-0 L18IN NONABSORBABLE BLK L24MM FS-1 3/8 663G

## (undated) DEVICE — SUTURE PERMAHAND SZ 2-0 L18IN NONABSORBABLE BLK L26MM SH C012D

## (undated) DEVICE — CHLORAPREP 26ML ORANGE

## (undated) DEVICE — GARMENT,MEDLINE,DVT,INT,CALF,MED, GEN2: Brand: MEDLINE

## (undated) DEVICE — STRIP,CLOSURE,WOUND,MEDI-STRIP,1/2X4: Brand: MEDLINE

## (undated) DEVICE — GLOVE ORANGE PI 7   MSG9070

## (undated) DEVICE — COVER LT HNDL BLU PLAS

## (undated) DEVICE — GLOVE SURG SZ 65 L12IN FNGR THK79MIL GRN LTX FREE

## (undated) DEVICE — GLOVE SURG SZ 8 CRM LTX FREE POLYISOPRENE POLYMER BEAD ANTI

## (undated) DEVICE — SUTURE VCRL + SZ 3-0 L27IN ABSRB WHT CT-1 1/2 CIR VCP258H

## (undated) DEVICE — GLOVE ORANGE PI 7 1/2   MSG9075

## (undated) DEVICE — COVER,LIGHT HANDLE,FLX,2/PK: Brand: MEDLINE INDUSTRIES, INC.

## (undated) DEVICE — GLOVE SURG SZ 6 L12IN FNGR THK87MIL WHT LTX FREE

## (undated) DEVICE — SUTURE PERMAHAND SZ 3-0 L18IN NONABSORBABLE BLK SILK BRAID A184H

## (undated) DEVICE — SUTURE PROL SZ 6-0 L24IN NONABSORBABLE BLU L9.3MM BV-1 3/8 8805H

## (undated) DEVICE — TOWEL,OR,DSP,ST,NATURAL,DLX,4/PK,20PK/CS: Brand: MEDLINE

## (undated) DEVICE — GLOVE SURG SZ 7 L12IN FNGR THK87MIL WHT LTX FREE

## (undated) DEVICE — SUTURE PROL SZ 5-0 L36IN NONABSORBABLE BLU L13MM C-1 3/8 8720H

## (undated) DEVICE — ZIMMER® STERILE DISPOSABLE TOURNIQUET CUFF WITH PROTECTIVE SLEEVE AND PLC, DUAL PORT, SINGLE BLADDER, 24 IN. (61 CM)

## (undated) DEVICE — DECANTER FLD 9IN ST BG FOR ASEP TRNSF OF FLD

## (undated) DEVICE — DRAIN SURG W0.5XL18IN FLAT GRAV FOR OPN WND PENROSE

## (undated) DEVICE — GOWN,AURORA,NONREINFORCED,LARGE: Brand: MEDLINE

## (undated) DEVICE — ZIMMER® STERILE DISPOSABLE TOURNIQUET CUFF WITH PROTECTIVE SLEEVE AND PLC, DUAL PORT, SINGLE BLADDER, 34 IN. (86 CM)

## (undated) DEVICE — GLOVE SURG SZ 7 L12IN FNGR THK79MIL GRN LTX FREE

## (undated) DEVICE — Device

## (undated) DEVICE — 48" PROBE COVER W/GEL, ULTRASOUND, STERILE: Brand: SITE-RITE

## (undated) DEVICE — SVMMC AMPUTATION PK

## (undated) DEVICE — GLOVE SURG SZ 75 L12IN FNGR THK79MIL GRN LTX FREE

## (undated) DEVICE — SUTURE VCRL + SZ 2-0 L27IN ABSRB CLR CT-1 1/2 CIR TAPERCUT VCP259H

## (undated) DEVICE — GLOVE SURG SZ 65 THK91MIL LTX FREE SYN POLYISOPRENE

## (undated) DEVICE — CONTAINER SPEC 33OZ URIN COLL BIODEGRADABLE PAPER DISP

## (undated) DEVICE — DRESSING PETRO W3XL3IN OIL EMUL N ADH GZ KNIT IMPREG CELOS

## (undated) DEVICE — PAD,ABDOMINAL,5"X9",ST,LF,25/BX: Brand: MEDLINE INDUSTRIES, INC.

## (undated) DEVICE — CATHETER ETER IV 18GA L125IN POLYUR STR RADPQ INTROCAN SFTY

## (undated) DEVICE — SPLINT KNEE UNIV FOR LESS THAN 36IN L20IN FOAM LAM E CNTCT

## (undated) DEVICE — AGENT HEMSTAT W2XL14IN OXIDIZED REGENERATED CELOS ABSRB FOR

## (undated) DEVICE — GLOVE SURG SZ 8 L12IN FNGR THK79MIL GRN LTX FREE

## (undated) DEVICE — Z DISCONTINUED BY MEDLINE USE 2711682 TRAY SKIN PREP DRY W/ PREM GLV

## (undated) DEVICE — GLOVE SURG SZ 6 THK91MIL LTX FREE SYN POLYISOPRENE ANTI

## (undated) DEVICE — SVMMC VASC MAJ PK

## (undated) DEVICE — GAUZE,SPONGE,FLUFF,6"X6.75",STRL,5/TRAY: Brand: MEDLINE

## (undated) DEVICE — SUTURE VCRL + SZ 0 L27IN ABSRB UD CT-1 L36MM 1/2 CIR TAPR VCP260H

## (undated) DEVICE — DUAL CUT SAGITTAL BLADE

## (undated) DEVICE — SPONGE GZ W4XL4IN 12 PLY KERLIX